# Patient Record
Sex: FEMALE | Race: WHITE | NOT HISPANIC OR LATINO | Employment: OTHER | URBAN - METROPOLITAN AREA
[De-identification: names, ages, dates, MRNs, and addresses within clinical notes are randomized per-mention and may not be internally consistent; named-entity substitution may affect disease eponyms.]

---

## 2017-01-02 ENCOUNTER — OUTPATIENT INFUSION SERVICES (OUTPATIENT)
Dept: ONCOLOGY | Facility: MEDICAL CENTER | Age: 72
End: 2017-01-02
Attending: INTERNAL MEDICINE
Payer: MEDICARE

## 2017-01-02 NOTE — PROGRESS NOTES
Patient canceled appointment today. She left a message stating she had a cold. She does have appointment for 1/5/17 at 1400.

## 2017-01-05 ENCOUNTER — OUTPATIENT INFUSION SERVICES (OUTPATIENT)
Dept: ONCOLOGY | Facility: MEDICAL CENTER | Age: 72
End: 2017-01-05
Attending: INTERNAL MEDICINE
Payer: MEDICARE

## 2017-01-05 VITALS
SYSTOLIC BLOOD PRESSURE: 87 MMHG | TEMPERATURE: 98.3 F | HEIGHT: 63 IN | OXYGEN SATURATION: 97 % | BODY MASS INDEX: 20.23 KG/M2 | WEIGHT: 114.2 LBS | RESPIRATION RATE: 20 BRPM | DIASTOLIC BLOOD PRESSURE: 46 MMHG | HEART RATE: 79 BPM

## 2017-01-05 DIAGNOSIS — G93.32 CHRONIC FATIGUE SYNDROME: ICD-10-CM

## 2017-01-05 PROCEDURE — 700105 HCHG RX REV CODE 258: Performed by: INTERNAL MEDICINE

## 2017-01-05 PROCEDURE — 96360 HYDRATION IV INFUSION INIT: CPT

## 2017-01-05 RX ORDER — SODIUM CHLORIDE 9 MG/ML
1000 INJECTION, SOLUTION INTRAVENOUS ONCE
Status: COMPLETED | OUTPATIENT
Start: 2017-01-05 | End: 2017-01-05

## 2017-01-05 RX ADMIN — SODIUM CHLORIDE 1000 ML: 9 INJECTION, SOLUTION INTRAVENOUS at 14:25

## 2017-01-06 ENCOUNTER — OFFICE VISIT (OUTPATIENT)
Dept: URGENT CARE | Facility: CLINIC | Age: 72
End: 2017-01-06
Payer: MEDICARE

## 2017-01-06 VITALS
HEART RATE: 90 BPM | WEIGHT: 116 LBS | SYSTOLIC BLOOD PRESSURE: 98 MMHG | TEMPERATURE: 98.3 F | DIASTOLIC BLOOD PRESSURE: 68 MMHG | RESPIRATION RATE: 20 BRPM | OXYGEN SATURATION: 98 % | BODY MASS INDEX: 20.55 KG/M2

## 2017-01-06 DIAGNOSIS — Z20.828 EXPOSURE TO INFLUENZA: ICD-10-CM

## 2017-01-06 DIAGNOSIS — D84.9 IMMUNOCOMPROMISED (HCC): ICD-10-CM

## 2017-01-06 LAB
FLUAV+FLUBV AG SPEC QL IA: NEGATIVE
INT CON NEG: NEGATIVE
INT CON POS: POSITIVE

## 2017-01-06 PROCEDURE — 87804 INFLUENZA ASSAY W/OPTIC: CPT | Performed by: PHYSICIAN ASSISTANT

## 2017-01-06 PROCEDURE — 99214 OFFICE O/P EST MOD 30 MIN: CPT | Performed by: PHYSICIAN ASSISTANT

## 2017-01-06 RX ORDER — OSELTAMIVIR PHOSPHATE 75 MG/1
75 CAPSULE ORAL DAILY
Qty: 10 CAP | Refills: 0 | Status: SHIPPED | OUTPATIENT
Start: 2017-01-06 | End: 2017-01-16

## 2017-01-06 ASSESSMENT — ENCOUNTER SYMPTOMS
FEVER: 0
NAUSEA: 0
SHORTNESS OF BREATH: 0
VOMITING: 0
PALPITATIONS: 0
COUGH: 0
FOCAL WEAKNESS: 0
TINGLING: 0
CHILLS: 0
DIZZINESS: 0
ABDOMINAL PAIN: 0
WHEEZING: 0
SPUTUM PRODUCTION: 0
SENSORY CHANGE: 0
HEADACHES: 0
MYALGIAS: 0
DIARRHEA: 0
SORE THROAT: 0

## 2017-01-06 NOTE — PROGRESS NOTES
Pt arrived to IS ambulatory, here for hydration only. IV access established. Pt requesting 500 ml at 400 ml/hr. Amount infused as requested. BP rechecked. See doc flowsheets. Pt deciding to not receive any further hydration. IV flushed and removed. Pressure dressing applied. Pt knows when to return. Discharged home under self care in no apparent distress.

## 2017-01-06 NOTE — MR AVS SNAPSHOT
Deb Vega   2017 5:15 PM   Office Visit   MRN: [id number]    Department:  [facility]   Dept Phone:  [phone]    Description:  Female : 1945   Provider:  Sierra Gillespie PA-C           Reason for Visit     Sinus Problem x 2.5 months worse x 1 wk, nasal congestion, runny nose and post nasal drip.  Patient was exposed to influenza and she wants to get tested.       Allergies as of 2017     Allergen Noted Reactions    Gadolinium 2012   Anaphylaxis, Nausea    Influenza Virus Vaccine 10/03/2013       Zyvox [Linezolid] 2015   Rash    Severe anxiety; prominent facial rash with swelling    Cefuroxime 2015   Nausea    Epinephrine 2016       Other reaction(s): Tachycardia    Other Drug 2012       Any antibiotics by mouth dizziness, abdominal pain.     Other Misc 2011       Intolerant of any stimulants    Prednisone 2016       Aggravates tachycardia    Sulfa Drugs 2011       Other reaction(s): Not Known      You were diagnosed with     Exposure to influenza   [956853]       Immunocompromised (HCC)   [297533]         Vital Signs     Blood Pressure Pulse Temperature Respirations Weight Oxygen Saturation    98/68 mmHg 90 36.8 °C (98.3 °F) 20 52.617 kg (116 lb) 98%    Smoking Status                   Current Every Day Smoker           Basic Information     Date Of Birth Sex Race Ethnicity Preferred Language    1945 Female White Non- English      Your appointments     2017  2:00 PM   Est Hydration with RN 3   Infusion Services ([facility])    [address] [facility] [address]  [place] [address]   [phone]            2017  2:00 PM   Est Hydration with RN 3   Infusion Services ([facility])    [address]  [place] [address]   [phone]            2017  2:00 PM   Est Hydration with RN 3   Infusion Services ([facility])    [address]  [place] [address]   [phone]            2017   2:00 PM   Est Hydration with RN 3   Infusion Services ([facility])    [address] [facility] [address]  [place] [address]   [phone]            Jan 23, 2017  2:00 PM   Est Hydration with RN 3   Infusion Services ([facility])    [address] [facility] [address]  [place] [address]   [phone]            Jan 26, 2017  2:00 PM   Est Hydration with RN 3   Infusion Services ([facility])    [address] [facility] [address]  [place] [address]   [phone]            Jan 30, 2017  2:00 PM   Est Hydration with RN 3   Infusion Services ([facility])    [address] [facility] [address]  [place] [address]   [phone]            Feb 07, 2017  2:00 PM   Pulmonary Function Test with PFT-RM2   [facility] (--)    [address]  [address]  [place] [id number] [phone]            Feb 07, 2017  3:00 PM   Established Patient Pul with FELICITAS Lopez   [facility] (--)    [address]  [address]  [place] [id number] [phone]              Problem List              ICD-10-CM Priority Class Noted - Resolved    Hyperlipidemia with target LDL less than 100 (Chronic) E78.5   6/6/2012 - Present    Smoking (Chronic) F17.200   6/6/2012 - Present    Anxiety F41.9   6/6/2012 - Present    Chronic fatigue syndrome R53.82   12/7/2012 - Present    COPD (chronic obstructive pulmonary disease) (HCC) J44.9   3/1/2013 - Present    Lyme disease A69.20   3/1/2013 - Present    Immunologic deficiency syndrome (HCC) D84.9   3/1/2013 - Present    Hypotension I95.9   7/2/2015 - Present    Tachycardia R00.0   7/15/2015 - Present      Health Maintenance        Date Due Completion Dates    IMM DTaP/Tdap/Td Vaccine (1 - Tdap) 10/19/1964 ---    PAP SMEAR 10/19/1966 ---    IMM ZOSTER VACCINE 10/19/2005 ---    IMM PNEUMOCOCCAL 65+ (ADULT) LOW/MEDIUM RISK SERIES (1 of 2 - PCV13) 10/19/2010 ---    IMM INFLUENZA (1) 9/1/2016 ---    MAMMOGRAM 6/23/2017 6/23/2016    COLONOSCOPY 7/23/2018 7/23/2008 (Done)    Override on 7/23/2008:  Done ([facility]    BONE DENSITY 2/5/2020 2/5/2015            Current Immunizations     No immunizations on file.      Below and/or attached are the medications your provider expects you to take. Review all of your home medications and newly ordered medications with your provider and/or pharmacist. Follow medication instructions as directed by your provider and/or pharmacist. Please keep your medication list with you and share with your provider. Update the information when medications are discontinued, doses are changed, or new medications (including over-the-counter products) are added; and carry medication information at all times in the event of emergency situations     Allergies:  GADOLINIUM - Anaphylaxis,Nausea     INFLUENZA VIRUS VACCINE - (reactions not documented)     ZYVOX - Rash     CEFUROXIME - Nausea     EPINEPHRINE - (reactions not documented)     OTHER DRUG - (reactions not documented)     OTHER MISC - (reactions not documented)     PREDNISONE - (reactions not documented)     SULFA DRUGS - (reactions not documented)               Medications  Valid as of: January 06, 2017 -  6:17 PM    Generic Name Brand Name Tablet Size Instructions for use    Acetaminophen (Tab) TYLENOL 325 MG Take 650 mg by mouth every four hours as needed.        ALPRAZolam (Tab) XANAX 0.25 MG Take 1 Tab by mouth 2 times a day.        Aspirin (Chew Tab) ASA 81 MG Take 162 mg by mouth every day.        Azithromycin (Tab) ZITHROMAX 250 MG Two day one then qd x 4.        Budesonide-Formoterol Fumarate (Aerosol) SYMBICORT 80-4.5 MCG/ACT Inhale 2 Puffs by mouth 2 Times a Day. Use spacer. Rinse mouth after each use.        Ca Phosphate-Cholecalciferol (Chew Tab) Ca Phosphate-Cholecalciferol 250-400 MG-UNIT Take  by mouth.        ClonazePAM (Tab) KLONOPIN 0.5 MG TAKE TWO TABLETS BY MOUTH EVERY MORNING AND ONE TABLET BY MOUTH EVERY NIGHT AT BEDTIME        Desloratadine (Tab) CLARINEX 5 MG TAKE 1 TABLET BY MOUTH EVERY  DAY        Immune Globulin (Human) (Solution) Immune Globulin 10% (10 g/100mL) 5 g by Intravenous route Once. Indications: Pt now on 5 grams        Levalbuterol HCl (Nebu Soln) XOPENEX 1.25 MG/3ML 3 mL by Nebulization route every four hours as needed for Shortness of Breath.        Levalbuterol Tartrate (Aerosol) XOPENEX HFA 45 MCG/ACT INHALE ONE TO TWO PUFFS BY MOUTH EVERY 4 HOURS AS NEEDED FOR SHORTNESS OF BREATH        Magnesium (Cap) Magnesium 100 MG Take 480 mg by mouth.        Magnesium Glycinate   Take 480 mg by mouth.        Montelukast Sodium (Tab) SINGULAIR 10 MG TAKE 1 TABLET BY MOUTH EVERY DAY        Nicotine Polacrilex (Gum) NICORETTE 2 MG Take 1 Each by mouth as needed for Smoking Cessation.        NON SPECIFIED   Medrol dose pack        Omega-3 Fatty Acids (Cap) OMEGA 3 FA 1000 MG Take 1,000 mg by mouth 2 Times a Day.        Oral Electrolytes   Take  by mouth every day.        Oseltamivir Phosphate (Cap) TAMIFLU 75 MG Take 1 Cap by mouth every day for 10 days.        Pneumococcal Vac Polyvalent (Injection) PNEUMOVAX-23 25 MCG/0.5ML 0.5 mL by Intramuscular route Once PRN.        Pravastatin Sodium (Tab) PRAVACHOL 40 MG TAKE 1 TABLET BY MOUTH EVERY DAY        Tiotropium Bromide Monohydrate (Cap) SPIRIVA 18 MCG Inhale 1 Cap by mouth every day. AT THE SAME TIME EVERY DAY        .                 Medicines prescribed today were sent to:     [facility] [address] [place] [address] [address] AT [facility] [address] [facility]    [address] [facility] [address]    Phone: [phone] Fax: [phone]    Open 24 Hours?: No      Medication refill instructions:       If your prescription bottle indicates you have medication refills left, it is not necessary to call your provider’s office. Please contact your pharmacy and they will refill your medication.    If your prescription bottle indicates you do not have any refills left, you may request refills at any time through one of the following  ways: The online [employer] system (except Urgent Care), by calling your provider’s office, or by asking your pharmacy to contact your provider’s office with a refill request. Medication refills are processed only during regular business hours and may not be available until the next business day. Your provider may request additional information or to have a follow-up visit with you prior to refilling your medication.   *Please Note: Medication refills are assigned a new Rx number when refilled electronically. Your pharmacy may indicate that no refills were authorized even though a new prescription for the same medication is available at the pharmacy. Please request the medicine by name with the pharmacy before contacting your provider for a refill.           [employer] Access Code: Activation code not generated  Current [employer] Status: Active

## 2017-01-07 NOTE — PROGRESS NOTES
Subjective:      Deb Vega is a 71 y.o. female who presents with Sinus Problem            HPI  The patient is here today because she was exposed to influenza.  The patient has a hx of Immunologic deficiency syndrome, Chronic Lyme Disease and states she has severe Emphysema.  She is worried because her roommate was tested for influenza today and came back positive.  She states she has been exposed and was told by her Specialist that influenza could be very dangerous for her.  She denies any symptoms at this time. Specifically, she denies fever, chills, nausea, vomiting, diarrhea, cough,  Shortness of breath, wheezing, body aches, or fatigue.  The patient has had some nasal drainage but attributes her symptoms  To recently starting oxygen and using a nasal cannula. The patient denies any hx of kidney or liver problems.       Past Medical History   Diagnosis Date   • Allergy    • Anxiety    • Hyperlipidemia    • ASTHMA    • Back pain    • Bladder infection    • H/O blood transfusion reaction    • Bronchitis    • COPD    • Mononucleosis    • Hives    • History of measles    • History of mumps    • Pneumonia    • COPD (chronic obstructive pulmonary disease) ([facility]) 3/1/2013   • Lyme disease 3/1/2013   • Immunologic deficiency syndrome ([facility]) 3/1/2013   • Cellulitis      (L) elbow    • Tachycardia 7/15/2015   • Tachycardia 7/15/2015     Past Surgical History   Procedure Laterality Date   • Breast biopsy     • Thyroid lobectomy     • Tonsillectomy         Family History   Problem Relation Age of Onset   • Alcohol/Drug Mother    • Cancer Father    • Stroke Father    • Heart Disease Father 42       Allergies   Allergen Reactions   • Gadolinium Anaphylaxis and Nausea   • Influenza Virus Vaccine    • Zyvox [Linezolid] Rash     Severe anxiety; prominent facial rash with swelling   • Cefuroxime Nausea   • Epinephrine      Other reaction(s): Tachycardia   • Other Drug      Any antibiotics by mouth dizziness, abdominal pain.    •  Other Misc      Intolerant of any stimulants   • Prednisone      Aggravates tachycardia   • Sulfa Drugs      Other reaction(s): Not Known       Medications, Allergies, and current problem list reviewed today in Epic    Review of Systems   Constitutional: Negative for fever, chills and malaise/fatigue.   HENT: Negative for congestion, ear discharge, ear pain and sore throat.         Nasal drainage    Respiratory: Negative for cough, sputum production, shortness of breath and wheezing.    Cardiovascular: Negative for chest pain, palpitations and leg swelling.   Gastrointestinal: Negative for nausea, vomiting, abdominal pain and diarrhea.   Musculoskeletal: Negative for myalgias.   Neurological: Negative for dizziness, tingling, sensory change, focal weakness and headaches.       All other systems reviewed and are negative.        Objective:     BP 98/68 mmHg  Pulse 90  Temp(Src) 36.8 °C (98.3 °F)  Resp 20  Wt 52.617 kg (116 lb)  SpO2 98%     Physical Exam   Constitutional: She is oriented to person, place, and time. She appears well-developed and well-nourished. No distress.   HENT:   Head: Normocephalic and atraumatic.   Right Ear: Tympanic membrane, external ear and ear canal normal.   Left Ear: Tympanic membrane, external ear and ear canal normal.   Nose: Nose normal.   Mouth/Throat: Uvula is midline and oropharynx is clear and moist. No oropharyngeal exudate or posterior oropharyngeal erythema.   Neck: Neck supple.   Cardiovascular: Normal rate, regular rhythm and normal heart sounds.  Exam reveals no gallop and no friction rub.    No murmur heard.  Pulmonary/Chest: Effort normal and breath sounds normal. No respiratory distress. She has no decreased breath sounds. She has no wheezes. She has no rhonchi. She has no rales.   Abdominal: Soft. Bowel sounds are normal. She exhibits no distension and no mass. There is no tenderness. There is no rebound and no guarding.   Lymphadenopathy:     She has no cervical  adenopathy.   Neurological: She is alert and oriented to person, place, and time. No cranial nerve deficit.   Skin: Skin is warm and dry. No rash noted.   Psychiatric: She has a normal mood and affect. Her behavior is normal. Judgment and thought content normal.               Assessment/Plan:       1. Exposure to influenza  POCT Influenza A/B- negative   2. Immunocompromised (HCC)  oseltamivir (TAMIFLU) 75 MG Cap      Influenza negative.  -   Current outpatient prescriptions:   •  oseltamivir (TAMIFLU) 75 MG Cap, Take 1 Cap by mouth every day for 10 days., Disp: 10 Cap, Rfl: 0    Prophylaxis initiated due to exposure and immunocompromised state.       Differential diagnoses, Supportive care, and indications for immediate follow-up discussed with patient.   Instructed to return to clinic or nearest emergency department for any change in condition, further concerns, or worsening of symptoms.    The patient demonstrated a good understanding and agreed with the treatment plan.    Sierra Gillespie PA-C

## 2017-01-09 ENCOUNTER — OUTPATIENT INFUSION SERVICES (OUTPATIENT)
Dept: ONCOLOGY | Facility: MEDICAL CENTER | Age: 72
End: 2017-01-09
Attending: INTERNAL MEDICINE
Payer: MEDICARE

## 2017-01-09 VITALS
WEIGHT: 112.43 LBS | RESPIRATION RATE: 20 BRPM | DIASTOLIC BLOOD PRESSURE: 77 MMHG | HEIGHT: 63 IN | OXYGEN SATURATION: 95 % | BODY MASS INDEX: 19.92 KG/M2 | TEMPERATURE: 98.2 F | SYSTOLIC BLOOD PRESSURE: 109 MMHG | HEART RATE: 109 BPM

## 2017-01-09 DIAGNOSIS — G93.32 CHRONIC FATIGUE SYNDROME: ICD-10-CM

## 2017-01-09 PROCEDURE — 96360 HYDRATION IV INFUSION INIT: CPT

## 2017-01-09 PROCEDURE — 700105 HCHG RX REV CODE 258: Performed by: INTERNAL MEDICINE

## 2017-01-09 RX ORDER — SODIUM CHLORIDE 9 MG/ML
1000 INJECTION, SOLUTION INTRAVENOUS CONTINUOUS
Status: DISCONTINUED | OUTPATIENT
Start: 2017-01-09 | End: 2017-01-09 | Stop reason: HOSPADM

## 2017-01-09 RX ADMIN — SODIUM CHLORIDE 1000 ML: 9 INJECTION, SOLUTION INTRAVENOUS at 14:25

## 2017-01-09 NOTE — PROGRESS NOTES
Patient presents ambulatory for IV hydration.  IV established and hydration started at 400mL/hr per patient request.  500cc infused with no complications or adverse reactions, patient declined receiving the remaining 1500ccs.  Patient to return 1/12/17 for next appt, confirmed with patient.  IV discontinued, gauze, and coban applied to site.  Patient left ambulatory in no distress.

## 2017-01-12 ENCOUNTER — OUTPATIENT INFUSION SERVICES (OUTPATIENT)
Dept: ONCOLOGY | Facility: MEDICAL CENTER | Age: 72
End: 2017-01-12
Attending: INTERNAL MEDICINE
Payer: MEDICARE

## 2017-01-12 NOTE — PROGRESS NOTES
Patient called to cancel appt d/t weather conditions.  Patient already has appt for next week in place.

## 2017-01-15 ENCOUNTER — OUTPATIENT INFUSION SERVICES (OUTPATIENT)
Dept: ONCOLOGY | Facility: MEDICAL CENTER | Age: 72
End: 2017-01-15
Attending: INTERNAL MEDICINE
Payer: MEDICARE

## 2017-01-15 VITALS
HEIGHT: 63 IN | DIASTOLIC BLOOD PRESSURE: 65 MMHG | WEIGHT: 113.76 LBS | SYSTOLIC BLOOD PRESSURE: 92 MMHG | OXYGEN SATURATION: 96 % | RESPIRATION RATE: 20 BRPM | HEART RATE: 79 BPM | TEMPERATURE: 99 F | BODY MASS INDEX: 20.16 KG/M2

## 2017-01-15 DIAGNOSIS — G93.32 CHRONIC FATIGUE SYNDROME: ICD-10-CM

## 2017-01-15 DIAGNOSIS — G90.3 NEUROGENIC ORTHOSTATIC HYPOTENSION (HCC): ICD-10-CM

## 2017-01-15 PROCEDURE — 96360 HYDRATION IV INFUSION INIT: CPT

## 2017-01-15 PROCEDURE — 700105 HCHG RX REV CODE 258: Performed by: INTERNAL MEDICINE

## 2017-01-15 RX ORDER — OSELTAMIVIR PHOSPHATE 30 MG/1
75 CAPSULE ORAL EVERY 12 HOURS
COMMUNITY
End: 2017-02-02

## 2017-01-15 RX ORDER — SODIUM CHLORIDE 9 MG/ML
1000 INJECTION, SOLUTION INTRAVENOUS ONCE
Status: COMPLETED | OUTPATIENT
Start: 2017-01-15 | End: 2017-01-15

## 2017-01-15 RX ADMIN — SODIUM CHLORIDE 1000 ML: 9 INJECTION, SOLUTION INTRAVENOUS at 14:18

## 2017-01-15 NOTE — PROGRESS NOTES
Pt is here for her hydration.Oxygen dependent. Receives home infusions for IVIG. Stays home most of the time as she is worried about getting respiratory infection - has received pneumococcal vaccine. Her roommate got sick with Influenza and this is why Mrs Vega went to Urgent Care and was given Tamiflu for 10 days as a preventative measure. 500ml of NS infused at 400ml/h per pt request. Vitals taken upon the completion (see EPIC). Discharged home to self care. Next appointment verified.

## 2017-01-16 ENCOUNTER — APPOINTMENT (OUTPATIENT)
Dept: ONCOLOGY | Facility: MEDICAL CENTER | Age: 72
End: 2017-01-16
Attending: INTERNAL MEDICINE
Payer: MEDICARE

## 2017-01-19 ENCOUNTER — OUTPATIENT INFUSION SERVICES (OUTPATIENT)
Dept: ONCOLOGY | Facility: MEDICAL CENTER | Age: 72
End: 2017-01-19
Attending: INTERNAL MEDICINE
Payer: MEDICARE

## 2017-01-19 VITALS
DIASTOLIC BLOOD PRESSURE: 51 MMHG | SYSTOLIC BLOOD PRESSURE: 85 MMHG | HEART RATE: 86 BPM | HEIGHT: 63 IN | BODY MASS INDEX: 20.2 KG/M2 | RESPIRATION RATE: 18 BRPM | WEIGHT: 113.98 LBS | OXYGEN SATURATION: 98 % | TEMPERATURE: 99.3 F

## 2017-01-19 DIAGNOSIS — G93.32 CHRONIC FATIGUE SYNDROME: ICD-10-CM

## 2017-01-19 PROCEDURE — 96360 HYDRATION IV INFUSION INIT: CPT

## 2017-01-19 PROCEDURE — 700105 HCHG RX REV CODE 258: Performed by: INTERNAL MEDICINE

## 2017-01-19 RX ORDER — SODIUM CHLORIDE 9 MG/ML
1000 INJECTION, SOLUTION INTRAVENOUS ONCE
Status: COMPLETED | OUTPATIENT
Start: 2017-01-19 | End: 2017-01-19

## 2017-01-19 RX ADMIN — SODIUM CHLORIDE 500 ML: 9 INJECTION, SOLUTION INTRAVENOUS at 14:27

## 2017-01-20 NOTE — PROGRESS NOTES
"Pt here for hydration. Oxygen dependent. Assessment complete. POC reviewed. PIV started in L FA; brisk blood observed. 500mL NS infused at a rate of 400mL hr per pt request. States she is doing \"well with 500\". Hydration completed. Vitals taken & pt d/c'd by Bebe RN, per RN pt feeling well and ready for discharge. Future appt confirmed. Pt discharged in no apparent distress.   "

## 2017-01-23 ENCOUNTER — OUTPATIENT INFUSION SERVICES (OUTPATIENT)
Dept: ONCOLOGY | Facility: MEDICAL CENTER | Age: 72
End: 2017-01-23
Attending: INTERNAL MEDICINE
Payer: MEDICARE

## 2017-01-23 VITALS
BODY MASS INDEX: 20 KG/M2 | OXYGEN SATURATION: 97 % | HEART RATE: 98 BPM | SYSTOLIC BLOOD PRESSURE: 82 MMHG | HEIGHT: 63 IN | RESPIRATION RATE: 20 BRPM | TEMPERATURE: 98.2 F | DIASTOLIC BLOOD PRESSURE: 46 MMHG | WEIGHT: 112.88 LBS

## 2017-01-23 DIAGNOSIS — G93.32 CHRONIC FATIGUE SYNDROME: ICD-10-CM

## 2017-01-23 PROCEDURE — 700105 HCHG RX REV CODE 258: Performed by: INTERNAL MEDICINE

## 2017-01-23 PROCEDURE — 96360 HYDRATION IV INFUSION INIT: CPT

## 2017-01-23 RX ORDER — SODIUM CHLORIDE 9 MG/ML
1000 INJECTION, SOLUTION INTRAVENOUS ONCE
Status: COMPLETED | OUTPATIENT
Start: 2017-01-23 | End: 2017-01-23

## 2017-01-23 RX ADMIN — SODIUM CHLORIDE 500 ML: 9 INJECTION, SOLUTION INTRAVENOUS at 14:22

## 2017-01-23 NOTE — PROGRESS NOTES
Pt here for hydration.  Pt trying to get her hydration infused with home health and IVIG.  PIV started,  Pt requested 500 ml at 400 ml/hr.  Infusion completed without issue.  BP lower after infusion, but pt states this is normal for her and 82/46 is an appropriate number.  PIV removed. Pt left IC, no s/s of distress.

## 2017-01-26 ENCOUNTER — APPOINTMENT (OUTPATIENT)
Dept: ONCOLOGY | Facility: MEDICAL CENTER | Age: 72
End: 2017-01-26
Attending: INTERNAL MEDICINE
Payer: MEDICARE

## 2017-01-29 ENCOUNTER — OUTPATIENT INFUSION SERVICES (OUTPATIENT)
Dept: ONCOLOGY | Facility: MEDICAL CENTER | Age: 72
End: 2017-01-29
Attending: INTERNAL MEDICINE
Payer: MEDICARE

## 2017-01-29 VITALS
BODY MASS INDEX: 20 KG/M2 | RESPIRATION RATE: 18 BRPM | SYSTOLIC BLOOD PRESSURE: 87 MMHG | WEIGHT: 112.88 LBS | HEIGHT: 63 IN | HEART RATE: 96 BPM | OXYGEN SATURATION: 95 % | TEMPERATURE: 98.5 F | DIASTOLIC BLOOD PRESSURE: 52 MMHG

## 2017-01-29 DIAGNOSIS — G90.3 NEUROGENIC ORTHOSTATIC HYPOTENSION (HCC): ICD-10-CM

## 2017-01-29 DIAGNOSIS — R00.0 TACHYCARDIA: ICD-10-CM

## 2017-01-29 PROCEDURE — 700105 HCHG RX REV CODE 258: Performed by: INTERNAL MEDICINE

## 2017-01-29 PROCEDURE — 96361 HYDRATE IV INFUSION ADD-ON: CPT

## 2017-01-29 PROCEDURE — 96360 HYDRATION IV INFUSION INIT: CPT

## 2017-01-29 RX ORDER — SODIUM CHLORIDE 9 MG/ML
INJECTION, SOLUTION INTRAVENOUS ONCE
Status: COMPLETED | OUTPATIENT
Start: 2017-01-29 | End: 2017-01-29

## 2017-01-29 RX ADMIN — SODIUM CHLORIDE: 9 INJECTION, SOLUTION INTRAVENOUS at 13:56

## 2017-01-29 NOTE — PROGRESS NOTES
"Pt ambulated into department for bi-weekly hydration for Tachycardia. Pt reported that her MD has recently put her on continuous oxygen due to hypoxia with exertion. Pt denied having any acute complaints today, VSS upon arrival. PIV started, had + blood return, flushed briskly. Pt given 500 ml of NS, at which point Pt requested for vital signs to be taken. Pt who hypotensive with a BP of 87/52. Pt reported this was \"good\" for her, denied being symptomatic and declined needing additional fluid. PIV discontinued, bleeding controlled with gauze and coban. Future appointments confirmed with Pt prior to leaving, left department by self appearing in good spirits and NAD.   "

## 2017-01-30 ENCOUNTER — APPOINTMENT (OUTPATIENT)
Dept: ONCOLOGY | Facility: MEDICAL CENTER | Age: 72
End: 2017-01-30
Attending: INTERNAL MEDICINE
Payer: MEDICARE

## 2017-01-30 RX ORDER — CLONAZEPAM 0.5 MG
TABLET ORAL
Qty: 90 TAB | Refills: 1 | Status: SHIPPED
Start: 2017-01-30 | End: 2017-08-29 | Stop reason: SDUPTHER

## 2017-02-02 ENCOUNTER — OUTPATIENT INFUSION SERVICES (OUTPATIENT)
Dept: ONCOLOGY | Facility: MEDICAL CENTER | Age: 72
End: 2017-02-02
Attending: INTERNAL MEDICINE
Payer: MEDICARE

## 2017-02-02 VITALS
BODY MASS INDEX: 20.08 KG/M2 | HEART RATE: 94 BPM | RESPIRATION RATE: 18 BRPM | WEIGHT: 113.32 LBS | SYSTOLIC BLOOD PRESSURE: 96 MMHG | OXYGEN SATURATION: 95 % | DIASTOLIC BLOOD PRESSURE: 63 MMHG | TEMPERATURE: 98.5 F | HEIGHT: 63 IN

## 2017-02-02 PROCEDURE — 96360 HYDRATION IV INFUSION INIT: CPT

## 2017-02-02 ASSESSMENT — PAIN SCALES - GENERAL: PAINLEVEL: 4=SLIGHT-MODERATE PAIN

## 2017-02-03 NOTE — PROGRESS NOTES
Patient presents ambulatory for hydration.  IV established and hydration given per order with no complications or adverse reactions.  Patient to return 2/6/17 for next appointment, confirmed with patient.  IV discontinued, gauze, and coban applied to site.  Patient left ambulatory in no distress.

## 2017-02-06 ENCOUNTER — APPOINTMENT (OUTPATIENT)
Dept: ONCOLOGY | Facility: MEDICAL CENTER | Age: 72
End: 2017-02-06
Attending: INTERNAL MEDICINE
Payer: MEDICARE

## 2017-02-07 ENCOUNTER — OFFICE VISIT (OUTPATIENT)
Dept: PULMONOLOGY | Facility: HOSPICE | Age: 72
End: 2017-02-07
Payer: MEDICARE

## 2017-02-07 ENCOUNTER — NON-PROVIDER VISIT (OUTPATIENT)
Dept: PULMONOLOGY | Facility: HOSPICE | Age: 72
End: 2017-02-07
Payer: MEDICARE

## 2017-02-07 VITALS — WEIGHT: 111 LBS | BODY MASS INDEX: 19.67 KG/M2 | HEIGHT: 63 IN

## 2017-02-07 VITALS
BODY MASS INDEX: 20.38 KG/M2 | HEIGHT: 63 IN | HEART RATE: 88 BPM | SYSTOLIC BLOOD PRESSURE: 116 MMHG | RESPIRATION RATE: 14 BRPM | TEMPERATURE: 97.9 F | DIASTOLIC BLOOD PRESSURE: 72 MMHG | WEIGHT: 115 LBS

## 2017-02-07 DIAGNOSIS — Z99.81 SUPPLEMENTAL OXYGEN DEPENDENT: ICD-10-CM

## 2017-02-07 DIAGNOSIS — J43.8 OTHER EMPHYSEMA (HCC): ICD-10-CM

## 2017-02-07 DIAGNOSIS — F41.9 ANXIETY: ICD-10-CM

## 2017-02-07 DIAGNOSIS — E78.5 HYPERLIPIDEMIA WITH TARGET LDL LESS THAN 100: Chronic | ICD-10-CM

## 2017-02-07 DIAGNOSIS — J44.9 CHRONIC OBSTRUCTIVE PULMONARY DISEASE, UNSPECIFIED COPD TYPE (HCC): ICD-10-CM

## 2017-02-07 DIAGNOSIS — R53.83 OTHER FATIGUE: ICD-10-CM

## 2017-02-07 DIAGNOSIS — R91.8 ABNORMAL CT SCAN OF LUNG: ICD-10-CM

## 2017-02-07 DIAGNOSIS — F17.200 SMOKING: Chronic | ICD-10-CM

## 2017-02-07 PROCEDURE — G8432 DEP SCR NOT DOC, RNG: HCPCS | Performed by: NURSE PRACTITIONER

## 2017-02-07 PROCEDURE — G8483 FLU IMM NO ADMIN DOC REA: HCPCS | Performed by: NURSE PRACTITIONER

## 2017-02-07 PROCEDURE — 4004F PT TOBACCO SCREEN RCVD TLK: CPT | Performed by: NURSE PRACTITIONER

## 2017-02-07 PROCEDURE — 3017F COLORECTAL CA SCREEN DOC REV: CPT | Performed by: NURSE PRACTITIONER

## 2017-02-07 PROCEDURE — 3014F SCREEN MAMMO DOC REV: CPT | Performed by: NURSE PRACTITIONER

## 2017-02-07 PROCEDURE — 4040F PNEUMOC VAC/ADMIN/RCVD: CPT | Mod: 8P | Performed by: NURSE PRACTITIONER

## 2017-02-07 PROCEDURE — 1101F PT FALLS ASSESS-DOCD LE1/YR: CPT | Performed by: NURSE PRACTITIONER

## 2017-02-07 PROCEDURE — G8419 CALC BMI OUT NRM PARAM NOF/U: HCPCS | Performed by: NURSE PRACTITIONER

## 2017-02-07 PROCEDURE — 99214 OFFICE O/P EST MOD 30 MIN: CPT | Performed by: NURSE PRACTITIONER

## 2017-02-07 RX ORDER — SODIUM CHLORIDE 9 MG/ML
INJECTION, SOLUTION INTRAVENOUS
COMMUNITY
Start: 2016-11-28 | End: 2017-05-22

## 2017-02-07 ASSESSMENT — PULMONARY FUNCTION TESTS
FEV1/FVC_PERCENT_PREDICTED: 75
FEV1/FVC_PERCENT_PREDICTED: 52
FEV1_PERCENT_PREDICTED: 42
FVC: 2.51
FEV1/FVC_PERCENT_CHANGE: 120
FEV1/FVC: 39
FEV1_PERCENT_PREDICTED: 48
FEV1: .9
FVC_PERCENT_PREDICTED: 81
FEV1_PERCENT_CHANGE: 10
FVC_PERCENT_PREDICTED: 89
FEV1/FVC: 40.24
FEV1_PERCENT_CHANGE: 12
FEV1_PREDICTED: 2.11
FVC_PREDICTED: 2.8
FVC: 2.28
FEV1: 1.01
FEV1/FVC_PERCENT_PREDICTED: 54

## 2017-02-07 NOTE — PATIENT INSTRUCTIONS
1. Follow up after sleep study.  2. Due for CT scan May 2017. Follow up afterward for results.  3. Continue oxygen 2L 24/7.  4. Continue inhaler regimen.  5. Add nasal gel and oxygen mask at night. Continue humidifier on oxygen concentrator as long as tolerable.

## 2017-02-07 NOTE — MR AVS SNAPSHOT
"        Deb Vega   2017 2:00 PM   Non-Provider Visit   MRN: [id number]    Department:  [facility]   Dept Phone:  [phone]    Description:  Female : 1945   Provider:  Osei Chapa M.D.           Reason for Visit     COPD           Allergies as of 2017     Allergen Noted Reactions    Gadolinium 2012   Anaphylaxis, Nausea    Influenza Virus Vaccine 10/03/2013       Zyvox [Linezolid] 2015   Rash    Severe anxiety; prominent facial rash with swelling    Cefuroxime 2015   Nausea    Epinephrine 2016       Other reaction(s): Tachycardia    Other Drug 2012       Any antibiotics by mouth dizziness, abdominal pain.     Other Misc 2011       Intolerant of any stimulants    Prednisone 2016       Aggravates tachycardia    Sulfa Drugs 2011       Other reaction(s): Not Known      You were diagnosed with     Chronic obstructive pulmonary disease, unspecified COPD type (CMS-[facility])   [[id number]]         Vital Signs     Height Weight Body Mass Index Smoking Status          1.594 m (5' 2.75\") 50.349 kg (111 lb) 19.82 kg/m2 Current Every Day Smoker        Basic Information     Date Of Birth Sex Race Ethnicity Preferred Language    1945 Female White Non- English      Your appointments     2017  2:00 PM   Est Hydration with RN 3   Infusion Services ([facility])    [id number]  [place] [address]   [phone]            2017  2:00 PM   Est Hydration with RN 3   Infusion Services ([facility])    [address] [address]  [place] [address]   [phone]            2017  2:00 PM   Est Hydration with RN 6   Infusion Services ([facility])    [address] [address]  [place] [address]   [phone]            2017  2:00 PM   Est Hydration with RN 3   Infusion Services ([facility])    [address] [address]  [place] [address]   [phone]            2017  2:00 PM   Est Hydration with RN 6   Infusion " Services ([facility])    [address] [facility] [address]  [place] [address]   [phone]            Feb 27, 2017  2:00 PM   Est Hydration with RN 3   Infusion Services ([facility])    [address] [facility] [address]  [place] [address]   [phone]            Mar 02, 2017  2:00 PM   Est Hydration with RN 3   Infusion Services ([facility])    [address] [facility] [address]  [place] [address]   [phone]            Mar 06, 2017  2:00 PM   Est Hydration with RN 3   Infusion Services ([facility])    [address] [facility] [address]  [place] [address]   [phone]            Mar 09, 2017  2:00 PM   Est Hydration with RN 3   Infusion Services ([facility])    [address] [facility] [address]  [place] [address]   [phone]            Mar 13, 2017  2:00 PM   Est Hydration with RN 3   Infusion Services ([facility])    [address] [facility] [address]  [place] [address]   [phone]            Mar 16, 2017  2:00 PM   Est Hydration with RN 3   Infusion Services ([facility])    [address] [facility] [address]  [place] [address]   [phone]            Mar 20, 2017  2:00 PM   Est Hydration with RN 3   Infusion Services ([facility])    [address] [facility] [address]  [place] [address]   [phone]            Mar 23, 2017  2:00 PM   Est Hydration with RN 3   Infusion Services ([facility])    [address] [facility] [address]  [place] [address]   [phone]            Mar 27, 2017  2:00 PM   Est Hydration with RN 3   Infusion Services ([facility])    [address] [facility] [address]  [place] [address]   [phone]            Mar 30, 2017  2:00 PM   Est Hydration with RN 3   Infusion Services ([facility])    [address] [facility] [address]  [place] [address]   [phone]            Apr 03, 2017  2:00 PM   Est Hydration with RN 3   Infusion Services ([facility])    [address] [facility] [address]  [place] [address]   [phone]            Apr 06, 2017  2:00 PM   Est Hydration with RN 3   Infusion Services ([facility])    [address] [facility] [address]  [place] [address]   [phone]            Apr 10, 2017  2:00 PM   Est  Hydration with RN 3   Infusion Services ([facility])    [address] [facility] [address]  [place] [address]   [phone]            Apr 13, 2017  2:00 PM   Est Hydration with RN 3   Infusion Services ([facility])    [address] [facility] [address]  [place] [address]   [phone]            Apr 17, 2017  2:00 PM   Est Hydration with RN 3   Infusion Services ([facility])    [address]  [place] [address]   [phone]            Apr 20, 2017  2:00 PM   Est Hydration with RN 6   Infusion Services ([facility])    [address]  [place] [address]   [phone]            Apr 24, 2017  2:00 PM   Est Hydration with RN 3   Infusion Services ([facility])    [address]  [place] [address]   [phone]            Apr 27, 2017  2:00 PM   Est Hydration with RN 3   Infusion Services ([facility])    [address]  [place] [address]   [phone]              Problem List              ICD-10-CM Priority Class Noted - Resolved    Hyperlipidemia with target LDL less than 100 (Chronic) E78.5   6/6/2012 - Present    Smoking (Chronic) F17.200   6/6/2012 - Present    Anxiety F41.9   6/6/2012 - Present    Chronic fatigue syndrome R53.82   12/7/2012 - Present    COPD (chronic obstructive pulmonary disease) (CMS-HCC) J44.9   3/1/2013 - Present    Lyme disease A69.20   3/1/2013 - Present    Immunologic deficiency syndrome (CMS-HCC) D84.9   3/1/2013 - Present    Hypotension I95.9   7/2/2015 - Present    Tachycardia R00.0   7/15/2015 - Present    Supplemental oxygen dependent Z99.81   2/7/2017 - Present    Abnormal CT scan of lung R91.8   2/7/2017 - Present      Health Maintenance        Date Due Completion Dates    IMM DTaP/Tdap/Td Vaccine (1 - Tdap) 10/19/1964 ---    PAP SMEAR 10/19/1966 ---    IMM ZOSTER VACCINE 10/19/2005 ---    IMM PNEUMOCOCCAL 65+ (ADULT) LOW/MEDIUM RISK SERIES (1 of 2 - PCV13) 10/19/2010 ---    IMM INFLUENZA (1) 9/1/2016 ---    MAMMOGRAM 6/23/2017 6/23/2016    COLONOSCOPY 7/23/2018  7/23/2008 (Done)    Override on 7/23/2008: Done ([facility]    BONE DENSITY 2/5/2020 2/5/2015            Current Immunizations     No immunizations on file.      Below and/or attached are the medications your provider expects you to take. Review all of your home medications and newly ordered medications with your provider and/or pharmacist. Follow medication instructions as directed by your provider and/or pharmacist. Please keep your medication list with you and share with your provider. Update the information when medications are discontinued, doses are changed, or new medications (including over-the-counter products) are added; and carry medication information at all times in the event of emergency situations     Allergies:  GADOLINIUM - Anaphylaxis,Nausea     INFLUENZA VIRUS VACCINE - (reactions not documented)     ZYVOX - Rash     CEFUROXIME - Nausea     EPINEPHRINE - (reactions not documented)     OTHER DRUG - (reactions not documented)     OTHER MISC - (reactions not documented)     PREDNISONE - (reactions not documented)     SULFA DRUGS - (reactions not documented)               Medications  Valid as of: February 07, 2017 -  3:05 PM    Generic Name Brand Name Tablet Size Instructions for use    Acetaminophen (Tab) TYLENOL 325 MG Take 650 mg by mouth every four hours as needed.        ALPRAZolam (Tab) XANAX 0.25 MG Take 1 Tab by mouth 2 times a day.        Aspirin (Chew Tab) ASA 81 MG Take 162 mg by mouth every day.        Azithromycin (Tab) ZITHROMAX 250 MG Two day one then qd x 4.        Budesonide-Formoterol Fumarate (Aerosol) SYMBICORT 80-4.5 MCG/ACT Inhale 2 Puffs by mouth 2 Times a Day. Use spacer. Rinse mouth after each use.        Ca Phosphate-Cholecalciferol (Chew Tab) Ca Phosphate-Cholecalciferol 250-400 MG-UNIT Take  by mouth.        ClonazePAM (Tab) KLONOPIN 0.5 MG TAKE TWO TABLETS BY MOUTH EVERY MORNING AND ONE TABLET BY MOUTH EVERY NIGHT AT BEDTIME        Desloratadine (Tab)  CLARINEX 5 MG TAKE 1 TABLET BY MOUTH EVERY DAY        Immune Globulin (Human) (Solution) Immune Globulin 10% (10 g/100mL) 5 g by Intravenous route Once. Indications: Pt now on 5 grams        Levalbuterol HCl (Nebu Soln) XOPENEX 1.25 MG/3ML 3 mL by Nebulization route every four hours as needed for Shortness of Breath.        Levalbuterol Tartrate (Aerosol) XOPENEX HFA 45 MCG/ACT INHALE ONE TO TWO PUFFS BY MOUTH EVERY 4 HOURS AS NEEDED FOR SHORTNESS OF BREATH        Magnesium (Cap) Magnesium 100 MG Take 480 mg by mouth.        Magnesium Glycinate   Take 480 mg by mouth.        Montelukast Sodium (Tab) SINGULAIR 10 MG TAKE 1 TABLET BY MOUTH EVERY DAY        Nicotine Polacrilex (Gum) NICORETTE 2 MG Take 1 Each by mouth as needed for Smoking Cessation.        NON SPECIFIED   Medrol dose pack        Omega-3 Fatty Acids (Cap) OMEGA 3 FA 1000 MG Take 1,000 mg by mouth 2 Times a Day.        Oral Electrolytes   Take  by mouth every day.        Pneumococcal Vac Polyvalent (Injection) PNEUMOVAX-23 25 MCG/0.5ML 0.5 mL by Intramuscular route Once PRN.        Pravastatin Sodium (Tab) PRAVACHOL 40 MG TAKE 1 TABLET BY MOUTH EVERY DAY        Sodium Chloride (Solution) NS          Tiotropium Bromide Monohydrate (Cap) SPIRIVA 18 MCG Inhale 1 Cap by mouth every day. AT THE SAME TIME EVERY DAY        .                 Medicines prescribed today were sent to:     [facility] [address] [place] [address] [address] [address] AT [facility] [address] [facility]    [address] [facility] [address]    Phone: [phone] Fax: [phone]    Open 24 Hours?: No      Medication refill instructions:       If your prescription bottle indicates you have medication refills left, it is not necessary to call your provider’s office. Please contact your pharmacy and they will refill your medication.    If your prescription bottle indicates you do not have any refills left, you may request refills at any time through one of the following ways: The  online [employer] system (except Urgent Care), by calling your provider’s office, or by asking your pharmacy to contact your provider’s office with a refill request. Medication refills are processed only during regular business hours and may not be available until the next business day. Your provider may request additional information or to have a follow-up visit with you prior to refilling your medication.   *Please Note: Medication refills are assigned a new Rx number when refilled electronically. Your pharmacy may indicate that no refills were authorized even though a new prescription for the same medication is available at the pharmacy. Please request the medicine by name with the pharmacy before contacting your provider for a refill.           [employer] Access Code: Activation code not generated  Current Fat[employer] Status: Active

## 2017-02-07 NOTE — PROCEDURES
Technician: ZEFERINO Pereira    Technician Comment:  Good patient effort & cooperation.  The results of this test meet the ATS/ERS standards for acceptability & reproducibility.  Test was performed on the [employer] Body Plethysmograph-Elite DX system.  Predicted values were [facility]-3 for spirometry, [facility] for DLCO, ITS for Lung Volumes.  The DLCO was uncorrected for Hgb.  A bronchodilator of Ventolin HFA -2puffs via spacer administered.    Interpretation:    Baseline FEV1 is 0.90 L which is 42% of predicted. FEV1 FVC ratio is reduced at 40%.  After administration of a Hep-Lock better there is a 12% improvement in FEV1.  Lung volume studies showed hyperinflation.  Gas exchange as estimated by DLCO is reduced at 50% of predicted.    Severe obstructive lung disease with a partially reversible component. The decrease in DLCO suggests underlying emphysema.

## 2017-02-07 NOTE — MR AVS SNAPSHOT
"        Deb Iversonporsha   2017 3:00 PM   Office Visit   MRN: [id number]    Department:  [facility]   Dept Phone:  [phone]    Description:  Female : 1945   Provider:  FELICITAS Lopez           Reason for Visit     Follow-Up           Allergies as of 2017     Allergen Noted Reactions    Gadolinium 2012   Anaphylaxis, Nausea    Influenza Virus Vaccine 10/03/2013       Zyvox [Linezolid] 2015   Rash    Severe anxiety; prominent facial rash with swelling    Cefuroxime 2015   Nausea    Epinephrine 2016       Other reaction(s): Tachycardia    Other Drug 2012       Any antibiotics by mouth dizziness, abdominal pain.     Other Misc 2011       Intolerant of any stimulants    Prednisone 2016       Aggravates tachycardia    Sulfa Drugs 2011       Other reaction(s): Not Known      You were diagnosed with     Other emphysema (CMS-HCC)   [492.8.ICD-9-CM]       Supplemental oxygen dependent   [079020]       Abnormal CT scan of lung   [401024]       Smoking   [192831]       Hyperlipidemia with target LDL less than 100   [711076]       Other fatigue   [8943853]         Vital Signs     Blood Pressure Pulse Temperature Respirations Height Weight    116/72 mmHg 88 36.6 °C (97.9 °F) (Temporal) 14 1.6 m (5' 2.99\") 52.164 kg (115 lb)    Body Mass Index Smoking Status                20.38 kg/m2 Current Every Day Smoker          Basic Information     Date Of Birth Sex Race Ethnicity Preferred Language    1945 Female White Non- English      Your appointments     2017  2:00 PM   Est Hydration with RN 3   Infusion Services ([facility])    [address]  [place] [address]   [phone]            2017  2:00 PM   Est Hydration with RN 3   Infusion Services ([facility])    [address]  [place] [address]   [phone]            2017  2:00 PM   Est Hydration with RN 6   Infusion Services ([facility])   " [address] [facility] [address] [facility] [address]  [place] [address]   [phone]            Feb 20, 2017  2:00 PM   Est Hydration with RN 3   Infusion Services ([facility])    [address]  [place] [address]   [phone]            Feb 23, 2017  2:00 PM   Est Hydration with RN 6   Infusion Services ([facility])    [address]  [place] [address]   [phone]            Feb 27, 2017  2:00 PM   Est Hydration with RN 3   Infusion Services ([facility])    [address]  [place] [address]   [phone]            Mar 02, 2017  2:00 PM   Est Hydration with RN 3   Infusion Services ([facility])    [address]  [place] [address]   [phone]            Mar 06, 2017  2:00 PM   Est Hydration with RN 3   Infusion Services ([facility])    [address]  [place] [address]   [phone]            Mar 09, 2017  2:00 PM   Est Hydration with RN 3   Infusion Services ([facility])    [address] [facility] [address]  [place] [address]   [phone]            Mar 13, 2017  2:00 PM   Est Hydration with RN 3   Infusion Services ([facility])    [address] [facility] [address]  [place] [address]   [phone]            Mar 16, 2017  2:00 PM   Est Hydration with RN 3   Infusion Services ([facility])    [address] [facility] [address]  [place] [address]   [phone]            Mar 20, 2017  2:00 PM   Est Hydration with RN 3   Infusion Services ([facility])    [address] [facility] [address]  [place] [address]   [phone]            Mar 23, 2017  2:00 PM   Est Hydration with RN 3   Infusion Services ([facility])    [address] [facility] [address]  [place] [address]   [phone]            Mar 27, 2017  2:00 PM   Est Hydration with RN 3   Infusion Services ([facility])    [address] [facility] [address] [facility] [address]  [place] [address]   [phone]            Mar 30, 2017  2:00 PM   Est Hydration with RN 3   Infusion Services ([facility])    [address] [facility] [address]  [place] [address]   [phone]            Apr 03, 2017  2:00 PM   Est Hydration with RN 3   Infusion  Services ([facility])    [address] [facility] [address]  [place] [address]   [phone]            Apr 06, 2017  2:00 PM   Est Hydration with RN 3   Infusion Services ([facility])    [address] [facility] [address]  [place] [address]   [phone]            Apr 10, 2017  2:00 PM   Est Hydration with RN 3   Infusion Services ([facility])    [address]  [place] [address]   [phone]            Apr 13, 2017  2:00 PM   Est Hydration with RN 3   Infusion Services ([facility])    [address]  [place] [address]   [phone]            Apr 17, 2017  2:00 PM   Est Hydration with RN 3   Infusion Services ([facility])    [address]  [place] [address]   [phone]            Apr 20, 2017  2:00 PM   Est Hydration with RN 6   Infusion Services ([facility])    [address] [address]  [place] [address]   [phone]            Apr 24, 2017  2:00 PM   Est Hydration with RN 3   Infusion Services ([facility])    [address]  [place] [address]   [phone]            Apr 27, 2017  2:00 PM   Est Hydration with RN 3   Infusion Services ([facility])    [address]  [place] [address]   [phone]              Problem List              ICD-10-CM Priority Class Noted - Resolved    Hyperlipidemia with target LDL less than 100 (Chronic) E78.5   6/6/2012 - Present    Smoking (Chronic) F17.200   6/6/2012 - Present    Anxiety F41.9   6/6/2012 - Present    Chronic fatigue syndrome R53.82   12/7/2012 - Present    COPD (chronic obstructive pulmonary disease) (CMS-HCC) J44.9   3/1/2013 - Present    Lyme disease A69.20   3/1/2013 - Present    Immunologic deficiency syndrome (CMS-HCC) D84.9   3/1/2013 - Present    Hypotension I95.9   7/2/2015 - Present    Tachycardia R00.0   7/15/2015 - Present    Supplemental oxygen dependent Z99.81   2/7/2017 - Present    Abnormal CT scan of lung R91.8   2/7/2017 - Present      Health Maintenance        Date Due Completion Dates    IMM DTaP/Tdap/Td Vaccine (1 -  Tdap) 10/19/1964 ---    PAP SMEAR 10/19/1966 ---    IMM ZOSTER VACCINE 10/19/2005 ---    IMM PNEUMOCOCCAL 65+ (ADULT) LOW/MEDIUM RISK SERIES (1 of 2 - PCV13) 10/19/2010 ---    IMM INFLUENZA (1) 9/1/2016 ---    MAMMOGRAM 6/23/2017 6/23/2016    COLONOSCOPY 7/23/2018 7/23/2008 (Done)    Override on 7/23/2008: Done ([facility]    BONE DENSITY 2/5/2020 2/5/2015            Current Immunizations     No immunizations on file.      Below and/or attached are the medications your provider expects you to take. Review all of your home medications and newly ordered medications with your provider and/or pharmacist. Follow medication instructions as directed by your provider and/or pharmacist. Please keep your medication list with you and share with your provider. Update the information when medications are discontinued, doses are changed, or new medications (including over-the-counter products) are added; and carry medication information at all times in the event of emergency situations     Allergies:  GADOLINIUM - Anaphylaxis,Nausea     INFLUENZA VIRUS VACCINE - (reactions not documented)     ZYVOX - Rash     CEFUROXIME - Nausea     EPINEPHRINE - (reactions not documented)     OTHER DRUG - (reactions not documented)     OTHER MISC - (reactions not documented)     PREDNISONE - (reactions not documented)     SULFA DRUGS - (reactions not documented)               Medications  Valid as of: February 07, 2017 -  3:44 PM    Generic Name Brand Name Tablet Size Instructions for use    Acetaminophen (Tab) TYLENOL 325 MG Take 650 mg by mouth every four hours as needed.        ALPRAZolam (Tab) XANAX 0.25 MG Take 1 Tab by mouth 2 times a day.        Aspirin (Chew Tab) ASA 81 MG Take 162 mg by mouth every day.        Azithromycin (Tab) ZITHROMAX 250 MG Two day one then qd x 4.        Budesonide-Formoterol Fumarate (Aerosol) SYMBICORT 80-4.5 MCG/ACT Inhale 2 Puffs by mouth 2 Times a Day. Use spacer. Rinse mouth after each  use.        Ca Phosphate-Cholecalciferol (Chew Tab) Ca Phosphate-Cholecalciferol 250-400 MG-UNIT Take  by mouth.        ClonazePAM (Tab) KLONOPIN 0.5 MG TAKE TWO TABLETS BY MOUTH EVERY MORNING AND ONE TABLET BY MOUTH EVERY NIGHT AT BEDTIME        Desloratadine (Tab) CLARINEX 5 MG TAKE 1 TABLET BY MOUTH EVERY DAY        Immune Globulin (Human) (Solution) Immune Globulin 10% (10 g/100mL) 5 g by Intravenous route Once. Indications: Pt now on 5 grams        Levalbuterol HCl (Nebu Soln) XOPENEX 1.25 MG/3ML 3 mL by Nebulization route every four hours as needed for Shortness of Breath.        Levalbuterol Tartrate (Aerosol) XOPENEX HFA 45 MCG/ACT INHALE ONE TO TWO PUFFS BY MOUTH EVERY 4 HOURS AS NEEDED FOR SHORTNESS OF BREATH        Magnesium (Cap) Magnesium 100 MG Take 480 mg by mouth.        Magnesium Glycinate   Take 480 mg by mouth.        Montelukast Sodium (Tab) SINGULAIR 10 MG TAKE 1 TABLET BY MOUTH EVERY DAY        Nicotine Polacrilex (Gum) NICORETTE 2 MG Take 1 Each by mouth as needed for Smoking Cessation.        NON SPECIFIED   Medrol dose pack        Omega-3 Fatty Acids (Cap) OMEGA 3 FA 1000 MG Take 1,000 mg by mouth 2 Times a Day.        Oral Electrolytes   Take  by mouth every day.        Pneumococcal Vac Polyvalent (Injection) PNEUMOVAX-23 25 MCG/0.5ML 0.5 mL by Intramuscular route Once PRN.        Pravastatin Sodium (Tab) PRAVACHOL 40 MG TAKE 1 TABLET BY MOUTH EVERY DAY        Sodium Chloride (Solution) NS          Tiotropium Bromide Monohydrate (Cap) SPIRIVA 18 MCG Inhale 1 Cap by mouth every day. AT THE SAME TIME EVERY DAY        .                 Medicines prescribed today were sent to:     [id number] - [place] - [address]    [address] [place] [address]    Phone: [phone] Fax: [phone]    Open 24 Hours?: No      Medication refill instructions:       If your prescription bottle indicates you have medication refills left, it is  not necessary to call your provider’s office. Please contact your pharmacy and they will refill your medication.    If your prescription bottle indicates you do not have any refills left, you may request refills at any time through one of the following ways: The online [employer] system (except Urgent Care), by calling your provider’s office, or by asking your pharmacy to contact your provider’s office with a refill request. Medication refills are processed only during regular business hours and may not be available until the next business day. Your provider may request additional information or to have a follow-up visit with you prior to refilling your medication.   *Please Note: Medication refills are assigned a new Rx number when refilled electronically. Your pharmacy may indicate that no refills were authorized even though a new prescription for the same medication is available at the pharmacy. Please request the medicine by name with the pharmacy before contacting your provider for a refill.        Your To Do List     Future Labs/Procedures Complete By Expires    POLYSOMNOGRAPHY, 4 OR MORE  As directed 2/7/2018      Instructions    1. Follow up after sleep study.  2. Due for CT scan May 2017. Follow up afterward for results.  3. Continue oxygen 2L 24/7.  4. Continue inhaler regimen.  5. Add nasal gel and oxygen mask at night. Continue humidifier on oxygen concentrator as long as tolerable.          [employer] Access Code: Activation code not generated  Current [employer] Status: Active

## 2017-02-08 NOTE — PROGRESS NOTES
Chief Complaint   Patient presents with   • Follow-Up       HPI:  Deb Vega is a 71 y.o. year old female here today for follow-up on PFT results. She is referred by PCP Dr. Mccullough for further evaluation and management of COPD. She is first evaluated 10/27/2016 by Dr. Tucker. PFT 8/18/2016 indicated FEV1 0.78 L or 36% predicted, FEV1/FVC ratio 41 and a DLCO of 31% predicted. Patient did have significant myocardial response. Patient was placed on Symbicort 160/4.5 µg 2 puffs twice a day, Spiriva once daily and Xopenex HFA inhaler when necessary. She also has nebulizer on him. Repeat PFT 2/7/2017 indicated stage III severe COPD with FEV1 0.90 L or 42% predicted, FEV1/FVC ratio 40, % predicted and a DLCO of 52% predicted. Patient does have significant bronchospasm response. Patient has a 55 year history of smoking and his tapered from 3 packs daily down to 6-9 cigarettes daily. She is motivated to quit. CT scan 11/23/2016 indicated moderate diffuse centrilobular emphysematous changes. A 2.0 x 0.6 cm opacity in the medial right lower lobe. No adenopathy noted. PET scan was performed 11/23/2016 indicating no metabolic activity in the right lower lobe opacity. Repeat CT scan was ordered for May 2017. Today she notes some improvement in her dyspnea but continues to have it with minimal exertion. She notes occasional cough but no significant phlegm or wheezing. She denies fever, chills, night sweats, chest pain, ankle swelling or hemoptysis. She is attempted to use her nebulizer but states he takes for 20 minutes to even get the medication to steam and she still has the majority the left. She also is a mouth breather and does not feel she is getting full benefit of her oxygen at night. She uses 2L 24/7. She notes nasal dryness with scabbing and some bleeding recently. She does have a humidifier but this caused condensation. She notes difficulty being able to carry her potable oxygen device due to chronic back  deterioration. She would prefer to have battery operated device which would be easier for her to manipulate with her hands and possibly carry.    She does have chronic fatigue and excess daytime tiredness. She has an irregular sleep schedule going to bed at 2 AM and awakening at 11 AM. She notes after being awake for several hours she finally has energy. Sleep study was ordered but she's been fearful to that facility to complete this because she does not want to be exposed to sick patients. I reassured her that we do not see L patient's for sleep studies. Encouraged her to complete this within the next 1-2 months. She is amenable to this. She also has a history of anxiety.      ROS: As per HPI and otherwise negative if not stated.    Past Medical History   Diagnosis Date   • Allergy    • Anxiety    • Hyperlipidemia    • ASTHMA    • Back pain    • Bladder infection    • H/O blood transfusion reaction    • Bronchitis    • COPD    • Mononucleosis    • Hives    • History of measles    • History of mumps    • Pneumonia    • COPD (chronic obstructive pulmonary disease) (CMS-HCC) 3/1/2013   • Lyme disease 3/1/2013   • Immunologic deficiency syndrome (CMS-HCC) 3/1/2013   • Cellulitis      (L) elbow    • Tachycardia 7/15/2015   • Tachycardia 7/15/2015       Past Surgical History   Procedure Laterality Date   • Breast biopsy     • Thyroid lobectomy     • Tonsillectomy         Family History   Problem Relation Age of Onset   • Alcohol/Drug Mother    • Cancer Father    • Stroke Father    • Heart Disease Father 42       Social History     Social History   • Marital Status:      Spouse Name: N/A   • Number of Children: N/A   • Years of Education: N/A     Occupational History   • Not on file.     Social History Main Topics   • Smoking status: Current Every Day Smoker -- 0.50 packs/day for 55 years     Types: Cigarettes   • Smokeless tobacco: Never Used   • Alcohol Use: No      Comment: patient states she has not had a  "drink in 2 years 10/09/2015   • Drug Use: No   • Sexual Activity: No     Other Topics Concern   • Not on file     Social History Narrative       Allergies as of 02/07/2017 - Donny as Reviewed 02/07/2017   Allergen Reaction Noted   • Gadolinium Anaphylaxis and Nausea 04/19/2012   • Influenza virus vaccine  10/03/2013   • Zyvox [linezolid] Rash 07/06/2015   • Cefuroxime Nausea 02/23/2015   • Epinephrine  09/08/2016   • Other drug  06/06/2012   • Other misc  11/06/2011   • Prednisone  09/08/2016   • Sulfa drugs  11/06/2011        @Vital signs for this encounter:  Filed Vitals:    02/07/17 1455   Height: 1.6 m (5' 2.99\")   Weight: 52.164 kg (115 lb)   Weight % change since last entry.: 0 %   BP: 116/72   Pulse: 88   BMI (Calculated): 20.38   Resp: 14   Temp: 36.6 °C (97.9 °F)   TempSrc: Temporal   O2 sat % on O2: 94 %   O2 Flow Rate (L/min): 2       Current medications as of today   Current Outpatient Prescriptions   Medication Sig Dispense Refill   • Sodium Chloride (NS) Solution      • KLONOPIN 0.5 MG Tab TAKE TWO TABLETS BY MOUTH EVERY MORNING AND ONE TABLET BY MOUTH EVERY NIGHT AT BEDTIME 90 Tab 1   • pneumococcal vaccine (PNEUMOVAX-23) 25 MCG/0.5ML Injection 0.5 mL by Intramuscular route Once PRN.     • budesonide-formoterol (SYMBICORT) 80-4.5 MCG/ACT Aerosol Inhale 2 Puffs by mouth 2 Times a Day. Use spacer. Rinse mouth after each use. 1 Inhaler 5   • montelukast (SINGULAIR) 10 MG Tab TAKE 1 TABLET BY MOUTH EVERY DAY 90 Tab 3   • pravastatin (PRAVACHOL) 40 MG tablet TAKE 1 TABLET BY MOUTH EVERY DAY 90 Tab 3   • XOPENEX HFA 45 MCG/ACT inhaler INHALE ONE TO TWO PUFFS BY MOUTH EVERY 4 HOURS AS NEEDED FOR SHORTNESS OF BREATH 15 g 11   • alprazolam (XANAX) 0.25 MG Tab Take 1 Tab by mouth 2 times a day. 60 Tab 3   • Magnesium 100 MG Cap Take 480 mg by mouth.     • levalbuterol (XOPENEX) 1.25 MG/3ML Nebu Soln 3 mL by Nebulization route every four hours as needed for Shortness of Breath. 24 mL 11   • nicotine polacrilex " (NICORETTE) 2 MG Gum Take 1 Each by mouth as needed for Smoking Cessation. 150 Each 3   • azithromycin (ZITHROMAX) 250 MG Tab Two day one then qd x 4. 6 Tab 0   • NON SPECIFIED Medrol dose pack 1 Each 3   • tiotropium (SPIRIVA HANDIHALER) 18 MCG Cap Inhale 1 Cap by mouth every day. AT THE SAME TIME EVERY DAY 90 Cap 3   • acetaminophen (TYLENOL) 325 MG Tab Take 650 mg by mouth every four hours as needed.     • CLARINEX 5 MG tablet TAKE 1 TABLET BY MOUTH EVERY DAY 90 Tab 3   • immune globulin (FLEBOGAMMA) 10 GM/100ML Solution 5 g by Intravenous route Once. Indications: Pt now on 5 grams     • Oral Electrolytes (EMERGEN-C ELECTRO MIX PO) Take  by mouth every day.     • Ca Phosphate-Cholecalciferol 250-400 MG-UNIT CHEW Take  by mouth.     • docosahexanoic acid (OMEGA 3 FA) 1000 MG CAPS Take 1,000 mg by mouth 2 Times a Day.     • aspirin (ASA) 81 MG CHEW Take 162 mg by mouth every day.     • MAGNESIUM GLYCINATE Take 480 mg by mouth.       No current facility-administered medications for this visit.         Physical Exam:   Gen:           Alert and oriented, No apparent distress. Mood and affect appropriate, normal interaction with examiner. Thin.  Eyes:          PERRL, EOM intact, sclere white, conjunctive moist.  Ears:          Not examined.   Hearing:     Grossly intact.  Nose:          Normal, no lesions or deformities.  Dentition:    Good dentition.  Oropharynx:   Tongue normal, posterior pharynx without erythema or exudate.  Mallampati Classification: 3  Neck:        Supple, trachea midline, no masses.  Respiratory Effort: No intercostal retractions or use of accessory muscles.   Lung Auscultation:      Clear to auscultation bilaterally; no rales, rhonchi or wheezing.  CV:            Regular rate and rhythm. No murmurs, rubs or gallops.  Abd:           Not examined.   Lymphadenopathy: Not examined.  Gait and Station: Normal.  Digits and Nails: No clubbing, cyanosis, petechiae, or nodes.   Cranial Nerves: II-XII  grossly intact.  Skin:        No rashes, lesions or ulcers noted.               Ext:           No cyanosis or edema. Gloves on hands.      Assessment:  1. Other emphysema (CMS-HCC)  DME OTHER    DME OTHER   2. Supplemental oxygen dependent  DME OTHER    DME OTHER    DME OTHER   3. Abnormal CT scan of lung     4. Smoking     5. Hyperlipidemia with target LDL less than 100     6. Other fatigue  POLYSOMNOGRAPHY, 4 OR MORE   7. Anxiety         Immunizations:    Flu:not given  Pneumovax 23:not given  Prevnar 13:not given    Plan:  1. PSG now.  2. Continue 2L oxygen 24/7. DME order for oxygen mask, nebulizer to be checked and battery POC.  3. Continue inhaler regimen. Reviewed appropriate use. Use spacer with devices.  4. Smoking cessation encouraged. When she quits, she would benefit from pulmonary rehab.  5. CT scan of chest w/o contrast due 5/2017.  6. Discussed respiratory and sleep hygiene.  7. Add nasal gel for nasal dryness and attempt humidifier use with oxygen mask.  8. Follow up after sleep study, sooner if needed. Follow up after CT scan, sooner if needed.

## 2017-02-09 ENCOUNTER — OUTPATIENT INFUSION SERVICES (OUTPATIENT)
Dept: ONCOLOGY | Facility: MEDICAL CENTER | Age: 72
End: 2017-02-09
Attending: INTERNAL MEDICINE
Payer: MEDICARE

## 2017-02-09 VITALS
WEIGHT: 113.32 LBS | HEART RATE: 99 BPM | BODY MASS INDEX: 20.08 KG/M2 | HEIGHT: 63 IN | OXYGEN SATURATION: 93 % | TEMPERATURE: 98.8 F | DIASTOLIC BLOOD PRESSURE: 74 MMHG | RESPIRATION RATE: 18 BRPM | SYSTOLIC BLOOD PRESSURE: 104 MMHG

## 2017-02-09 PROCEDURE — 96360 HYDRATION IV INFUSION INIT: CPT

## 2017-02-09 ASSESSMENT — PAIN SCALES - GENERAL: PAINLEVEL: 4=SLIGHT-MODERATE PAIN

## 2017-02-10 NOTE — PROGRESS NOTES
Patient presents ambulatory for hydration. IV established and hydration given per order with no complications or adverse reactions. Patient to return 2/13/17 for next appointment, confirmed with patient. IV discontinued, gauze, and coban applied to site. Patient left ambulatory in no distress.

## 2017-02-13 ENCOUNTER — OUTPATIENT INFUSION SERVICES (OUTPATIENT)
Dept: ONCOLOGY | Facility: MEDICAL CENTER | Age: 72
End: 2017-02-13
Attending: INTERNAL MEDICINE
Payer: MEDICARE

## 2017-02-13 VITALS
BODY MASS INDEX: 20.12 KG/M2 | HEIGHT: 63 IN | WEIGHT: 113.54 LBS | HEART RATE: 98 BPM | TEMPERATURE: 99.8 F | SYSTOLIC BLOOD PRESSURE: 98 MMHG | OXYGEN SATURATION: 95 % | DIASTOLIC BLOOD PRESSURE: 65 MMHG | RESPIRATION RATE: 12 BRPM

## 2017-02-13 DIAGNOSIS — G93.32 CHRONIC FATIGUE SYNDROME: ICD-10-CM

## 2017-02-13 PROCEDURE — 96360 HYDRATION IV INFUSION INIT: CPT

## 2017-02-13 PROCEDURE — 700105 HCHG RX REV CODE 258: Performed by: INTERNAL MEDICINE

## 2017-02-13 RX ORDER — SODIUM CHLORIDE 9 MG/ML
1000 INJECTION, SOLUTION INTRAVENOUS ONCE
Status: COMPLETED | OUTPATIENT
Start: 2017-02-13 | End: 2017-02-13

## 2017-02-13 RX ADMIN — SODIUM CHLORIDE 1000 ML: 9 INJECTION, SOLUTION INTRAVENOUS at 14:30

## 2017-02-13 ASSESSMENT — PAIN SCALES - GENERAL: PAINLEVEL: 4=SLIGHT-MODERATE PAIN

## 2017-02-14 NOTE — PROGRESS NOTES
Pt is here for her hydration.Oxygen dependent. Stays home as she is worried about getting respiratory infection - has received pneumococcal vaccine. 500ml of NS infused at 400ml/h per pt request. Vitals taken upon the completion (see EPIC). Discharged home to self care. Next appointment verified.

## 2017-02-16 ENCOUNTER — APPOINTMENT (OUTPATIENT)
Dept: ONCOLOGY | Facility: MEDICAL CENTER | Age: 72
End: 2017-02-16
Attending: INTERNAL MEDICINE
Payer: MEDICARE

## 2017-02-20 ENCOUNTER — OUTPATIENT INFUSION SERVICES (OUTPATIENT)
Dept: ONCOLOGY | Facility: MEDICAL CENTER | Age: 72
End: 2017-02-20
Attending: INTERNAL MEDICINE
Payer: MEDICARE

## 2017-02-20 VITALS
WEIGHT: 113.32 LBS | TEMPERATURE: 99 F | DIASTOLIC BLOOD PRESSURE: 64 MMHG | HEART RATE: 106 BPM | SYSTOLIC BLOOD PRESSURE: 109 MMHG | OXYGEN SATURATION: 90 % | HEIGHT: 63 IN | BODY MASS INDEX: 20.08 KG/M2 | RESPIRATION RATE: 20 BRPM

## 2017-02-20 PROCEDURE — 96360 HYDRATION IV INFUSION INIT: CPT

## 2017-02-20 ASSESSMENT — PAIN SCALES - GENERAL: PAINLEVEL: 3=SLIGHT PAIN

## 2017-02-20 NOTE — PROGRESS NOTES
Patient arrived to Infusion for hydration.  PIV started, pt preferred 500ml run at 400ml/hr.  Hydration infused.  Pt was given heat to apply to sore back; pt also provided own heating pad.  Confirmed pt's next appt. PIV removed, gauze pressure dressing in place.  Pt left in good spirits under no apparent distress.

## 2017-02-23 ENCOUNTER — APPOINTMENT (OUTPATIENT)
Dept: ONCOLOGY | Facility: MEDICAL CENTER | Age: 72
End: 2017-02-23
Attending: INTERNAL MEDICINE
Payer: MEDICARE

## 2017-02-26 ENCOUNTER — OUTPATIENT INFUSION SERVICES (OUTPATIENT)
Dept: ONCOLOGY | Facility: MEDICAL CENTER | Age: 72
End: 2017-02-26
Attending: INTERNAL MEDICINE
Payer: MEDICARE

## 2017-02-26 VITALS
HEIGHT: 63 IN | WEIGHT: 113.1 LBS | OXYGEN SATURATION: 97 % | TEMPERATURE: 99 F | SYSTOLIC BLOOD PRESSURE: 86 MMHG | HEART RATE: 89 BPM | BODY MASS INDEX: 20.04 KG/M2 | DIASTOLIC BLOOD PRESSURE: 48 MMHG | RESPIRATION RATE: 18 BRPM

## 2017-02-26 DIAGNOSIS — G93.32 CHRONIC FATIGUE SYNDROME: ICD-10-CM

## 2017-02-26 PROCEDURE — 96360 HYDRATION IV INFUSION INIT: CPT

## 2017-02-26 PROCEDURE — 700105 HCHG RX REV CODE 258: Performed by: INTERNAL MEDICINE

## 2017-02-26 RX ORDER — SODIUM CHLORIDE 9 MG/ML
1000 INJECTION, SOLUTION INTRAVENOUS ONCE
Status: COMPLETED | OUTPATIENT
Start: 2017-02-26 | End: 2017-02-26

## 2017-02-26 RX ADMIN — SODIUM CHLORIDE 1000 ML: 9 INJECTION, SOLUTION INTRAVENOUS at 14:33

## 2017-02-26 ASSESSMENT — PAIN SCALES - GENERAL: PAINLEVEL: 5=MODERATE PAIN

## 2017-02-27 ENCOUNTER — APPOINTMENT (OUTPATIENT)
Dept: ONCOLOGY | Facility: MEDICAL CENTER | Age: 72
End: 2017-02-27
Attending: INTERNAL MEDICINE
Payer: MEDICARE

## 2017-03-02 ENCOUNTER — APPOINTMENT (OUTPATIENT)
Dept: ONCOLOGY | Facility: MEDICAL CENTER | Age: 72
End: 2017-03-02
Attending: INTERNAL MEDICINE
Payer: MEDICARE

## 2017-03-06 ENCOUNTER — TELEPHONE (OUTPATIENT)
Dept: ONCOLOGY | Facility: MEDICAL CENTER | Age: 72
End: 2017-03-06

## 2017-03-06 ENCOUNTER — APPOINTMENT (OUTPATIENT)
Dept: ONCOLOGY | Facility: MEDICAL CENTER | Age: 72
End: 2017-03-06
Attending: INTERNAL MEDICINE
Payer: MEDICARE

## 2017-03-06 RX ORDER — DESLORATADINE 5 MG
TABLET ORAL
Qty: 90 TAB | Refills: 3 | Status: SHIPPED | OUTPATIENT
Start: 2017-03-06 | End: 2018-03-03 | Stop reason: SDUPTHER

## 2017-03-09 ENCOUNTER — OUTPATIENT INFUSION SERVICES (OUTPATIENT)
Dept: ONCOLOGY | Facility: MEDICAL CENTER | Age: 72
End: 2017-03-09
Attending: INTERNAL MEDICINE
Payer: MEDICARE

## 2017-03-09 VITALS
OXYGEN SATURATION: 97 % | DIASTOLIC BLOOD PRESSURE: 67 MMHG | SYSTOLIC BLOOD PRESSURE: 97 MMHG | TEMPERATURE: 98.8 F | RESPIRATION RATE: 18 BRPM | HEART RATE: 94 BPM | BODY MASS INDEX: 20 KG/M2 | WEIGHT: 112.88 LBS | HEIGHT: 63 IN

## 2017-03-09 PROCEDURE — 96360 HYDRATION IV INFUSION INIT: CPT

## 2017-03-09 ASSESSMENT — PAIN SCALES - GENERAL: PAINLEVEL: 3=SLIGHT PAIN

## 2017-03-10 NOTE — PROGRESS NOTES
Patient presents ambulatory for hydration.  Patient reports feeling well, POC reviewed with patient, and patient verbalized understanding.  IV established and hydration infused per MD order with no complications or adverse reactions.  Patient to return 3/13/17, confirmed with patient.  IV discontinued, gauze, and coban applied to site.  Patient left ambulatory in no distress.

## 2017-03-13 ENCOUNTER — OUTPATIENT INFUSION SERVICES (OUTPATIENT)
Dept: ONCOLOGY | Facility: MEDICAL CENTER | Age: 72
End: 2017-03-13
Attending: INTERNAL MEDICINE
Payer: MEDICARE

## 2017-03-13 VITALS
RESPIRATION RATE: 20 BRPM | TEMPERATURE: 98 F | OXYGEN SATURATION: 95 % | BODY MASS INDEX: 20.08 KG/M2 | HEIGHT: 63 IN | HEART RATE: 100 BPM | DIASTOLIC BLOOD PRESSURE: 55 MMHG | WEIGHT: 113.32 LBS | SYSTOLIC BLOOD PRESSURE: 90 MMHG

## 2017-03-13 DIAGNOSIS — G93.32 CHRONIC FATIGUE SYNDROME: ICD-10-CM

## 2017-03-13 PROCEDURE — 96360 HYDRATION IV INFUSION INIT: CPT

## 2017-03-13 PROCEDURE — 700105 HCHG RX REV CODE 258: Performed by: INTERNAL MEDICINE

## 2017-03-13 RX ORDER — SODIUM CHLORIDE 9 MG/ML
1000 INJECTION, SOLUTION INTRAVENOUS ONCE
Status: COMPLETED | OUTPATIENT
Start: 2017-03-13 | End: 2017-03-13

## 2017-03-13 RX ADMIN — SODIUM CHLORIDE 1000 ML: 9 INJECTION, SOLUTION INTRAVENOUS at 14:46

## 2017-03-13 ASSESSMENT — PAIN SCALES - GENERAL: PAINLEVEL: 2=MINIMAL-SLIGHT

## 2017-03-14 NOTE — PROGRESS NOTES
"Late entry for 1610:  Pt to infusion services ambulatory per self.  Pt here for scheduled IV Normal saline hydration.  Plan of care reviewed.  Pt verbalizes understanding.  PIV established to [id number].  IV flushes easily; + blood return verified.  Pt requesting \"500 mL at 400 mL/hr\".  IV hydration administered per MD orders and per patient request.  IV hydration completed without incident.  Pt refuses additional BP check and refuses additional hydration at this time.  PIV flushed with normal saline; continued + blood return verified.  PIV d/c'd.  Pressure dressing applied.  Pt released to self care in no apparent distress after completion of treatment, ambulatory.  Pt tells me she cancelled Thursdays appointment for hydration as she tells me she has home health coming to her home on Friday for IVIG infusion and hydration at that time.  Pt tells me she will return next week.  Future appointments scheduled.   "

## 2017-03-16 ENCOUNTER — APPOINTMENT (OUTPATIENT)
Dept: ONCOLOGY | Facility: MEDICAL CENTER | Age: 72
End: 2017-03-16
Attending: INTERNAL MEDICINE
Payer: MEDICARE

## 2017-03-20 ENCOUNTER — OUTPATIENT INFUSION SERVICES (OUTPATIENT)
Dept: ONCOLOGY | Facility: MEDICAL CENTER | Age: 72
End: 2017-03-20
Attending: INTERNAL MEDICINE
Payer: MEDICARE

## 2017-03-20 VITALS
HEIGHT: 63 IN | DIASTOLIC BLOOD PRESSURE: 59 MMHG | OXYGEN SATURATION: 96 % | RESPIRATION RATE: 18 BRPM | TEMPERATURE: 98.6 F | HEART RATE: 101 BPM | BODY MASS INDEX: 20.23 KG/M2 | WEIGHT: 114.2 LBS | SYSTOLIC BLOOD PRESSURE: 98 MMHG

## 2017-03-20 DIAGNOSIS — F41.9 ANXIETY: ICD-10-CM

## 2017-03-20 DIAGNOSIS — G93.32 CHRONIC FATIGUE SYNDROME: ICD-10-CM

## 2017-03-20 PROCEDURE — 700105 HCHG RX REV CODE 258: Performed by: INTERNAL MEDICINE

## 2017-03-20 PROCEDURE — 96360 HYDRATION IV INFUSION INIT: CPT

## 2017-03-20 RX ORDER — SODIUM CHLORIDE 9 MG/ML
1000 INJECTION, SOLUTION INTRAVENOUS ONCE
Status: COMPLETED | OUTPATIENT
Start: 2017-03-20 | End: 2017-03-20

## 2017-03-20 RX ORDER — ALPRAZOLAM 0.25 MG/1
0.25 TABLET ORAL 2 TIMES DAILY
Qty: 60 TAB | Refills: 0 | Status: SHIPPED
Start: 2017-03-20 | End: 2017-08-29 | Stop reason: SDUPTHER

## 2017-03-20 RX ADMIN — SODIUM CHLORIDE 1000 ML: 9 INJECTION, SOLUTION INTRAVENOUS at 14:18

## 2017-03-20 ASSESSMENT — PAIN SCALES - GENERAL: PAINLEVEL: 2=MINIMAL-SLIGHT

## 2017-03-21 NOTE — PROGRESS NOTES
Pt is here for her hydration.Oxygen dependent. Stays home as she is worried about getting respiratory infection. Wearing plastic gloves when outdoors in public.. 500ml of NS infused at 400ml/h per pt request. Vitals taken upon the completion (see EPIC). Discharged home to self care. Next appointment verified.

## 2017-03-23 ENCOUNTER — APPOINTMENT (OUTPATIENT)
Dept: ONCOLOGY | Facility: MEDICAL CENTER | Age: 72
End: 2017-03-23
Attending: INTERNAL MEDICINE
Payer: MEDICARE

## 2017-03-26 ENCOUNTER — OUTPATIENT INFUSION SERVICES (OUTPATIENT)
Dept: ONCOLOGY | Facility: MEDICAL CENTER | Age: 72
End: 2017-03-26
Attending: INTERNAL MEDICINE
Payer: MEDICARE

## 2017-03-26 VITALS
HEIGHT: 63 IN | WEIGHT: 112.43 LBS | OXYGEN SATURATION: 97 % | HEART RATE: 100 BPM | RESPIRATION RATE: 20 BRPM | DIASTOLIC BLOOD PRESSURE: 68 MMHG | SYSTOLIC BLOOD PRESSURE: 110 MMHG | BODY MASS INDEX: 19.92 KG/M2 | TEMPERATURE: 98.6 F

## 2017-03-26 DIAGNOSIS — G93.32 CHRONIC FATIGUE SYNDROME: ICD-10-CM

## 2017-03-26 PROCEDURE — 700105 HCHG RX REV CODE 258: Performed by: INTERNAL MEDICINE

## 2017-03-26 PROCEDURE — 96360 HYDRATION IV INFUSION INIT: CPT

## 2017-03-26 PROCEDURE — 96365 THER/PROPH/DIAG IV INF INIT: CPT

## 2017-03-26 PROCEDURE — 36415 COLL VENOUS BLD VENIPUNCTURE: CPT

## 2017-03-26 RX ORDER — SODIUM CHLORIDE 9 MG/ML
1000 INJECTION, SOLUTION INTRAVENOUS ONCE
Status: COMPLETED | OUTPATIENT
Start: 2017-03-26 | End: 2017-03-26

## 2017-03-26 RX ADMIN — SODIUM CHLORIDE 500 ML: 9 INJECTION, SOLUTION INTRAVENOUS at 15:20

## 2017-03-26 ASSESSMENT — PAIN SCALES - GENERAL: PAINLEVEL: 5=MODERATE PAIN

## 2017-03-26 NOTE — PROGRESS NOTES
Patient arrived to infusion center for hydration. No new complaints today. PIV started and flushed with positive blood return. Normal saline 500 ml infused without difficulty at 400 cc/hr per patients request. Patient tolerated well. Patient confirms appointment for 3/30/17 @ 1400. Discharged to self-care.

## 2017-03-27 ENCOUNTER — APPOINTMENT (OUTPATIENT)
Dept: ONCOLOGY | Facility: MEDICAL CENTER | Age: 72
End: 2017-03-27
Attending: INTERNAL MEDICINE
Payer: MEDICARE

## 2017-03-30 ENCOUNTER — APPOINTMENT (OUTPATIENT)
Dept: ONCOLOGY | Facility: MEDICAL CENTER | Age: 72
End: 2017-03-30
Attending: INTERNAL MEDICINE
Payer: MEDICARE

## 2017-04-03 ENCOUNTER — OUTPATIENT INFUSION SERVICES (OUTPATIENT)
Dept: ONCOLOGY | Facility: MEDICAL CENTER | Age: 72
End: 2017-04-03
Attending: INTERNAL MEDICINE
Payer: MEDICARE

## 2017-04-03 VITALS
HEART RATE: 94 BPM | TEMPERATURE: 98.8 F | HEIGHT: 63 IN | BODY MASS INDEX: 19.96 KG/M2 | DIASTOLIC BLOOD PRESSURE: 55 MMHG | SYSTOLIC BLOOD PRESSURE: 95 MMHG | OXYGEN SATURATION: 92 % | WEIGHT: 112.66 LBS | RESPIRATION RATE: 20 BRPM

## 2017-04-03 PROCEDURE — 700105 HCHG RX REV CODE 258: Performed by: INTERNAL MEDICINE

## 2017-04-03 PROCEDURE — 96360 HYDRATION IV INFUSION INIT: CPT

## 2017-04-03 RX ORDER — SODIUM CHLORIDE 9 MG/ML
1000 INJECTION, SOLUTION INTRAVENOUS ONCE
Status: COMPLETED | OUTPATIENT
Start: 2017-04-03 | End: 2017-04-03

## 2017-04-03 RX ADMIN — SODIUM CHLORIDE 1000 ML: 9 INJECTION, SOLUTION INTRAVENOUS at 14:28

## 2017-04-03 ASSESSMENT — PAIN SCALES - GENERAL: PAINLEVEL: 5=MODERATE PAIN

## 2017-04-03 NOTE — PROGRESS NOTES
Pt arrived to IS ambulatory, oxygen dependent, here for hydration. IV access established. Hydration infused as requested by pt. At completion of infusion, VS assessed and pt determined she would not need remaining fluid. Pt states she has been experiencing increasing cramping and numbness/tingling to BLE. Message sent to her primary to request lab orders as she is uncomfortable to be seen in her MD office during flu season. Pt also concerned regarding some moles on her hands and legs. Suggested pt be evaluated by a dermatologist. Pt reports she does not have a dermatologist but requested message to Dr. Mccullough. Included request for referral to derm. Pt informed of requests. Discharged home under self care in no apparent distress.

## 2017-04-03 NOTE — Clinical Note
Infusion Services   [address] [place] [address]  JON Elziondo [address]  Phone: [phone]  Fax: [phone]              Dear Dr. Mccullough,    Your patient, Deb Vega (: 1945), was scheduled at [facility].    Deb's encounter diagnosis is:  Dehydration  She arrived for her appointment, and  the scheduled treatment was given. These medications were administered to the patient: We administered NS..  Deb tolerated treatment.    Her next appointment is scheduled for 2017.    Pt has been complaining of worsening cramping and numbness/tingling not only to her lower extremities but also her hands now. Her home infusion nurse suggested she request a CMP. Since we see her frequently for hydration, it would be more convenient for her to have her labs drawn on a hydration day as opposed to being seen in your office or getting labs at an outside lab. She has also been noticing some changing moles. Would you be able to do a referral for a dermatologist?     Thanks for your attention to her care and you can fax the order for the CMP (and any other labs you may like) to [phone].     For more information, you may review the nurse's progress notes in chart review under the notes section.       Sincerely,  Nora Wakefield R.N.

## 2017-04-06 ENCOUNTER — OUTPATIENT INFUSION SERVICES (OUTPATIENT)
Dept: ONCOLOGY | Facility: MEDICAL CENTER | Age: 72
End: 2017-04-06
Attending: INTERNAL MEDICINE
Payer: MEDICARE

## 2017-04-06 VITALS
HEART RATE: 93 BPM | SYSTOLIC BLOOD PRESSURE: 89 MMHG | BODY MASS INDEX: 20.08 KG/M2 | DIASTOLIC BLOOD PRESSURE: 52 MMHG | WEIGHT: 113.32 LBS | OXYGEN SATURATION: 92 % | RESPIRATION RATE: 18 BRPM | TEMPERATURE: 98.5 F | HEIGHT: 63 IN

## 2017-04-06 PROCEDURE — 700105 HCHG RX REV CODE 258: Performed by: INTERNAL MEDICINE

## 2017-04-06 PROCEDURE — 96360 HYDRATION IV INFUSION INIT: CPT

## 2017-04-06 RX ORDER — SODIUM CHLORIDE 9 MG/ML
1000 INJECTION, SOLUTION INTRAVENOUS ONCE
Status: COMPLETED | OUTPATIENT
Start: 2017-04-06 | End: 2017-04-06

## 2017-04-06 RX ADMIN — SODIUM CHLORIDE 500 ML: 9 INJECTION, SOLUTION INTRAVENOUS at 14:30

## 2017-04-06 ASSESSMENT — PAIN SCALES - GENERAL: PAINLEVEL: 5=MODERATE PAIN

## 2017-04-07 NOTE — PROGRESS NOTES
Pt presented for weekly IV hydration. PIV started, brisk blood return observed. Pt elected to have only 500cc of NS at 400cc/hr. Fluids infused with no s/s of adverse reaction. BP taken after infusion finished, 89/52, pt asymptomatic and reports her BP is always lower after sitting in chair for a while. PIV flushed and removed, gauze dressing placed. Has next appt, left on foot in good spirits.

## 2017-04-10 ENCOUNTER — TELEPHONE (OUTPATIENT)
Dept: MEDICAL GROUP | Facility: CLINIC | Age: 72
End: 2017-04-10

## 2017-04-10 ENCOUNTER — OUTPATIENT INFUSION SERVICES (OUTPATIENT)
Dept: ONCOLOGY | Facility: MEDICAL CENTER | Age: 72
End: 2017-04-10
Attending: INTERNAL MEDICINE
Payer: MEDICARE

## 2017-04-10 ENCOUNTER — TELEPHONE (OUTPATIENT)
Dept: PULMONOLOGY | Facility: HOSPICE | Age: 72
End: 2017-04-10

## 2017-04-10 VITALS
SYSTOLIC BLOOD PRESSURE: 95 MMHG | DIASTOLIC BLOOD PRESSURE: 56 MMHG | OXYGEN SATURATION: 95 % | HEART RATE: 95 BPM | WEIGHT: 113.1 LBS | TEMPERATURE: 98.7 F | RESPIRATION RATE: 18 BRPM | HEIGHT: 63 IN | BODY MASS INDEX: 20.04 KG/M2

## 2017-04-10 DIAGNOSIS — G93.32 CHRONIC FATIGUE SYNDROME: ICD-10-CM

## 2017-04-10 DIAGNOSIS — R25.2 CRAMPING OF FEET: ICD-10-CM

## 2017-04-10 PROCEDURE — 96360 HYDRATION IV INFUSION INIT: CPT

## 2017-04-10 PROCEDURE — 700105 HCHG RX REV CODE 258: Performed by: INTERNAL MEDICINE

## 2017-04-10 RX ORDER — SODIUM CHLORIDE 9 MG/ML
1000 INJECTION, SOLUTION INTRAVENOUS ONCE
Status: COMPLETED | OUTPATIENT
Start: 2017-04-10 | End: 2017-04-10

## 2017-04-10 RX ADMIN — SODIUM CHLORIDE 1000 ML: 9 INJECTION, SOLUTION INTRAVENOUS at 15:00

## 2017-04-10 ASSESSMENT — PAIN SCALES - GENERAL: PAINLEVEL: 6=MODERATE PAIN

## 2017-04-10 NOTE — TELEPHONE ENCOUNTER
1. Caller Name: Deb Vega                      Call Back Number: [phone] (home)     2. Message: pt called to please enter a CMP order, pt is already at the infusion center she is having leg cramping so she wants to be tested. She was informed Dr. schwartz is gone for the day, pt requested to ask Latasha for an order if would please. Thank you     3. Patient approves office to leave a detailed voicemail/MyChart message: yes

## 2017-04-10 NOTE — PROGRESS NOTES
Patient presents ambulatory for hydration appt.  Patient reports feeling well and no s/s of infection at this time.  Patient requests RN to call MD office for possible lab orders as she is reporting increased cramping to lower extremities.  Phone call made to pulmonary MD office, voicemail left for MA.  IV established and hydration given per MD order with no complications or adverse reactions.  Patient reached MD office during her visit and they declined to order the labs and asked patient to contact primary MD.  Patient will contact primary MD and will schedule a separate lab appt if she needs to.  IV discontinued, gauze, and coban applied to site.  Patient to return 4/13/17 for next appt, confirmed with patient.  Patient left ambulatory in no distress.

## 2017-04-10 NOTE — TELEPHONE ENCOUNTER
Patient called wanting order for CMP, she states she has leg and arm cramping, I talked to Lilian Perez who reviewed her chart and it does not seem appropriate to have that ordered through our office. I advised patient to contact Dr. Mccullough who looks like manages her dehydration and possible IVIG.

## 2017-04-13 ENCOUNTER — OUTPATIENT INFUSION SERVICES (OUTPATIENT)
Dept: ONCOLOGY | Facility: MEDICAL CENTER | Age: 72
End: 2017-04-13
Attending: INTERNAL MEDICINE
Payer: MEDICARE

## 2017-04-13 VITALS
DIASTOLIC BLOOD PRESSURE: 70 MMHG | OXYGEN SATURATION: 95 % | RESPIRATION RATE: 20 BRPM | TEMPERATURE: 97.9 F | SYSTOLIC BLOOD PRESSURE: 105 MMHG | HEIGHT: 63 IN | HEART RATE: 90 BPM | WEIGHT: 113.32 LBS | BODY MASS INDEX: 20.08 KG/M2

## 2017-04-13 DIAGNOSIS — G93.32 CHRONIC FATIGUE SYNDROME: ICD-10-CM

## 2017-04-13 LAB
ALBUMIN SERPL BCP-MCNC: 4 G/DL (ref 3.2–4.9)
ALBUMIN/GLOB SERPL: 1.5 G/DL
ALP SERPL-CCNC: 52 U/L (ref 30–99)
ALT SERPL-CCNC: 15 U/L (ref 2–50)
ANION GAP SERPL CALC-SCNC: 6 MMOL/L (ref 0–11.9)
AST SERPL-CCNC: 18 U/L (ref 12–45)
BILIRUB SERPL-MCNC: 0.4 MG/DL (ref 0.1–1.5)
BUN SERPL-MCNC: 10 MG/DL (ref 8–22)
CALCIUM SERPL-MCNC: 9.3 MG/DL (ref 8.5–10.5)
CHLORIDE SERPL-SCNC: 103 MMOL/L (ref 96–112)
CO2 SERPL-SCNC: 30 MMOL/L (ref 20–33)
CREAT SERPL-MCNC: 0.48 MG/DL (ref 0.5–1.4)
GFR SERPL CREATININE-BSD FRML MDRD: >60 ML/MIN/1.73 M 2
GLOBULIN SER CALC-MCNC: 2.7 G/DL (ref 1.9–3.5)
GLUCOSE SERPL-MCNC: 102 MG/DL (ref 65–99)
POTASSIUM SERPL-SCNC: 4 MMOL/L (ref 3.6–5.5)
PROT SERPL-MCNC: 6.7 G/DL (ref 6–8.2)
SODIUM SERPL-SCNC: 139 MMOL/L (ref 135–145)

## 2017-04-13 PROCEDURE — 36415 COLL VENOUS BLD VENIPUNCTURE: CPT

## 2017-04-13 PROCEDURE — 80053 COMPREHEN METABOLIC PANEL: CPT

## 2017-04-13 ASSESSMENT — PAIN SCALES - GENERAL: PAINLEVEL: 7=MODERATE-SEVERE PAIN

## 2017-04-13 NOTE — PROGRESS NOTES
Pt arrived to IS, ambulatory, for labs. Pt voices no new complaints. Labs drawn with 23g butterfly needle in the R-AC. Pt left IS with no s/sx of distress. Follow up appointment confirmed.

## 2017-04-17 ENCOUNTER — OUTPATIENT INFUSION SERVICES (OUTPATIENT)
Dept: ONCOLOGY | Facility: MEDICAL CENTER | Age: 72
End: 2017-04-17
Attending: INTERNAL MEDICINE
Payer: MEDICARE

## 2017-04-17 VITALS
OXYGEN SATURATION: 93 % | WEIGHT: 112.88 LBS | DIASTOLIC BLOOD PRESSURE: 55 MMHG | SYSTOLIC BLOOD PRESSURE: 94 MMHG | HEIGHT: 63 IN | TEMPERATURE: 98.4 F | RESPIRATION RATE: 20 BRPM | BODY MASS INDEX: 20 KG/M2 | HEART RATE: 107 BPM

## 2017-04-17 PROCEDURE — 96360 HYDRATION IV INFUSION INIT: CPT

## 2017-04-17 PROCEDURE — 700105 HCHG RX REV CODE 258: Performed by: INTERNAL MEDICINE

## 2017-04-17 RX ORDER — SODIUM CHLORIDE 9 MG/ML
1000 INJECTION, SOLUTION INTRAVENOUS ONCE
Status: COMPLETED | OUTPATIENT
Start: 2017-04-17 | End: 2017-04-17

## 2017-04-17 RX ADMIN — SODIUM CHLORIDE 1000 ML: 9 INJECTION, SOLUTION INTRAVENOUS at 14:17

## 2017-04-17 ASSESSMENT — PAIN SCALES - GENERAL: PAINLEVEL: 7=MODERATE-SEVERE PAIN

## 2017-04-18 NOTE — PROGRESS NOTES
Message left for patient to return call to clinic   Pt here for hydration.  PIV started.  Pt requested to have 500 ml running at a rate of 400 ml/hr.  Pt c/o leg cramping MD aware.  Hydration competed without issue.  PIV removed post infusion.  Next apt confirmed.  Pt left IC, no s/s of distress.  Next apt confirmed.

## 2017-04-19 NOTE — TELEPHONE ENCOUNTER
There are orders for the patient on the system, left message for Deb to please call us back to find out if she needs something else to be order and I will be happy to schedule her an apt.

## 2017-04-19 NOTE — TELEPHONE ENCOUNTER
Voicemail: [facility] called requesting blood orders for Deb Maame will be back tomorrow Thursday. Ok to call the patient to follow up on blood orders.

## 2017-04-19 NOTE — TELEPHONE ENCOUNTER
Spoke to Dora at the infusion center could you please order a magnesium test since pt is getting hydration and pt is complaining of leg cramping may be something is been missed in the magnesium. Please order.

## 2017-04-20 ENCOUNTER — OUTPATIENT INFUSION SERVICES (OUTPATIENT)
Dept: ONCOLOGY | Facility: MEDICAL CENTER | Age: 72
End: 2017-04-20
Attending: INTERNAL MEDICINE
Payer: MEDICARE

## 2017-04-20 VITALS
OXYGEN SATURATION: 97 % | HEIGHT: 63 IN | SYSTOLIC BLOOD PRESSURE: 96 MMHG | WEIGHT: 113.76 LBS | RESPIRATION RATE: 18 BRPM | DIASTOLIC BLOOD PRESSURE: 60 MMHG | BODY MASS INDEX: 20.16 KG/M2 | HEART RATE: 90 BPM | TEMPERATURE: 98.4 F

## 2017-04-20 DIAGNOSIS — R25.2 CRAMPING OF FEET: ICD-10-CM

## 2017-04-20 DIAGNOSIS — R09.02 HYPOXEMIA: ICD-10-CM

## 2017-04-20 LAB
BASOPHILS # BLD AUTO: 1.3 % (ref 0–1.8)
BASOPHILS # BLD: 0.08 K/UL (ref 0–0.12)
EOSINOPHIL # BLD AUTO: 0.06 K/UL (ref 0–0.51)
EOSINOPHIL NFR BLD: 0.9 % (ref 0–6.9)
ERYTHROCYTE [DISTWIDTH] IN BLOOD BY AUTOMATED COUNT: 44.3 FL (ref 35.9–50)
HCT VFR BLD AUTO: 41.6 % (ref 37–47)
HGB BLD-MCNC: 13.9 G/DL (ref 12–16)
IMM GRANULOCYTES # BLD AUTO: 0.01 K/UL (ref 0–0.11)
IMM GRANULOCYTES NFR BLD AUTO: 0.2 % (ref 0–0.9)
LYMPHOCYTES # BLD AUTO: 1.42 K/UL (ref 1–4.8)
LYMPHOCYTES NFR BLD: 22.5 % (ref 22–41)
MAGNESIUM SERPL-MCNC: 2.2 MG/DL (ref 1.5–2.5)
MCH RBC QN AUTO: 31.1 PG (ref 27–33)
MCHC RBC AUTO-ENTMCNC: 33.4 G/DL (ref 33.6–35)
MCV RBC AUTO: 93.1 FL (ref 81.4–97.8)
MONOCYTES # BLD AUTO: 0.39 K/UL (ref 0–0.85)
MONOCYTES NFR BLD AUTO: 6.2 % (ref 0–13.4)
NEUTROPHILS # BLD AUTO: 4.36 K/UL (ref 2–7.15)
NEUTROPHILS NFR BLD: 68.9 % (ref 44–72)
NRBC # BLD AUTO: 0 K/UL
NRBC BLD AUTO-RTO: 0 /100 WBC
PLATELET # BLD AUTO: 252 K/UL (ref 164–446)
PMV BLD AUTO: 9.5 FL (ref 9–12.9)
RBC # BLD AUTO: 4.47 M/UL (ref 4.2–5.4)
WBC # BLD AUTO: 6.3 K/UL (ref 4.8–10.8)

## 2017-04-20 PROCEDURE — 96360 HYDRATION IV INFUSION INIT: CPT

## 2017-04-20 PROCEDURE — 83735 ASSAY OF MAGNESIUM: CPT

## 2017-04-20 PROCEDURE — 85025 COMPLETE CBC W/AUTO DIFF WBC: CPT

## 2017-04-20 PROCEDURE — 36415 COLL VENOUS BLD VENIPUNCTURE: CPT

## 2017-04-20 PROCEDURE — 96365 THER/PROPH/DIAG IV INF INIT: CPT

## 2017-04-20 PROCEDURE — 700105 HCHG RX REV CODE 258: Performed by: INTERNAL MEDICINE

## 2017-04-20 RX ORDER — SODIUM CHLORIDE 9 MG/ML
1000 INJECTION, SOLUTION INTRAVENOUS ONCE
Status: COMPLETED | OUTPATIENT
Start: 2017-04-20 | End: 2017-04-20

## 2017-04-20 RX ADMIN — SODIUM CHLORIDE 1000 ML: 9 INJECTION, SOLUTION INTRAVENOUS at 14:38

## 2017-04-20 ASSESSMENT — PAIN SCALES - GENERAL: PAINLEVEL: 6=MODERATE PAIN

## 2017-04-20 NOTE — PROGRESS NOTES
Pt is here for her scheduled hydration. Wearing plastic gloves when outdoors in public. Labs drawn (CBC/Mg). Pt states having had some muscle cramping and thought it might be Mg related.  500ml of NS infused at 400ml/h per pt request. Vitals taken upon the completion (see EPIC). Discharged home to self care. Next appointment verified.

## 2017-04-24 ENCOUNTER — OUTPATIENT INFUSION SERVICES (OUTPATIENT)
Dept: ONCOLOGY | Facility: MEDICAL CENTER | Age: 72
End: 2017-04-24
Attending: INTERNAL MEDICINE
Payer: MEDICARE

## 2017-04-24 VITALS
HEART RATE: 92 BPM | TEMPERATURE: 99.4 F | HEIGHT: 63 IN | BODY MASS INDEX: 20.16 KG/M2 | OXYGEN SATURATION: 97 % | WEIGHT: 113.76 LBS | DIASTOLIC BLOOD PRESSURE: 43 MMHG | RESPIRATION RATE: 20 BRPM | SYSTOLIC BLOOD PRESSURE: 82 MMHG

## 2017-04-24 PROCEDURE — 700105 HCHG RX REV CODE 258: Performed by: INTERNAL MEDICINE

## 2017-04-24 PROCEDURE — 96360 HYDRATION IV INFUSION INIT: CPT

## 2017-04-24 RX ORDER — SODIUM CHLORIDE 9 MG/ML
1000 INJECTION, SOLUTION INTRAVENOUS ONCE
Status: COMPLETED | OUTPATIENT
Start: 2017-04-24 | End: 2017-04-24

## 2017-04-24 RX ADMIN — SODIUM CHLORIDE 1000 ML: 9 INJECTION, SOLUTION INTRAVENOUS at 14:21

## 2017-04-24 ASSESSMENT — PAIN SCALES - GENERAL: PAINLEVEL: 6=MODERATE PAIN

## 2017-04-24 NOTE — PROGRESS NOTES
"Pt arrived to infusion for routine hydration. PIV access est, brisk blood return. Pt requesting 500ml at 400ml/hr. Bp evaluated post transfusion. Pt remains hypotensive, states \"thats good for me.\" PIV flushed and removed. Pt dc'd home self care in no acute distress. Future appointment confirmed.   "

## 2017-04-25 ENCOUNTER — TELEPHONE (OUTPATIENT)
Dept: PULMONOLOGY | Facility: HOSPICE | Age: 72
End: 2017-04-25

## 2017-04-25 DIAGNOSIS — G93.32 CHRONIC FATIGUE SYNDROME: ICD-10-CM

## 2017-04-25 NOTE — TELEPHONE ENCOUNTER
Next Visit: 05/17/17-Binu   DX: COPD      Pt nose is raw and sore from using O2, is it okay for her to use OTC Ponaris?

## 2017-04-26 ENCOUNTER — SLEEP STUDY (OUTPATIENT)
Dept: SLEEP MEDICINE | Facility: MEDICAL CENTER | Age: 72
End: 2017-04-26
Attending: INTERNAL MEDICINE
Payer: MEDICARE

## 2017-04-26 DIAGNOSIS — G47.33 OSA (OBSTRUCTIVE SLEEP APNEA): ICD-10-CM

## 2017-04-26 PROCEDURE — 95811 POLYSOM 6/>YRS CPAP 4/> PARM: CPT | Performed by: INTERNAL MEDICINE

## 2017-04-27 ENCOUNTER — APPOINTMENT (OUTPATIENT)
Dept: ONCOLOGY | Facility: MEDICAL CENTER | Age: 72
End: 2017-04-27
Attending: INTERNAL MEDICINE
Payer: MEDICARE

## 2017-04-28 NOTE — PROCEDURES
Clinical Comments:  The patient underwent a comprehensive polysomnogram using the standard montage for measurement of parameters of sleep, respiratory events, movement abnormalities, heart rate and rhythm. A microphone was used to monitor snoring.      INTERPRETATION:  The total recording time was 358.9 minutes with a sleep period of 295.5 minutes and the total sleep time was 197.5 minutes with a sleep efficiency of 55.0%.  The sleep latency was 63.3 minutes, and REM latency was N/A minutes.  The patient experienced 26 arousals in total, for an arousal index of 7.9    RESPIRATORY: The patient had 0 apneas in total.  Of these, 0 were obstructive apneas, and 0 were central apneas.  This resulted in an apnea index (AI) of 0.0.  The patient had 1 hypopneas, for a hypopnea index of 0.3.  The overall AHI was 0.3, while the AHI during Stage R sleep was N/A.  AHI while supine was N/A.    OXIMETRY: Oxygen saturation monitoring showed a mean SpO2 of 83.9%, with a minimum oxygen saturation of 75.0%.  Oxygen saturations were less than or = 89% for 186.9 minutes of sleep time.    CARDIAC: The highest heart rate during the recording was 112.0 beats per minute.  The average heart rate during sleep was 88.4 bpm.    LIMB MOVEMENTS: There were a total of 148 PLMs during sleep, of which 3 were PLMs arousals.  This resulted in a PLMS index of 45.0.    Interpretation:    This was an overnight diagnostic polysomnogram. The patient's sleep efficiency was significantly reduced at 55% and there were no REM periods. The sleep stage proportions were 98 minutes of wake after sleep onset, 60.5 minutes of stage I sleep, 180 minutes of stage II sleep, one minute of stage III sleep, and no REM. The latency to sleep was prolonged to 63 minutes. While the patient had a limb movement index of 45, most did not provoke arousals or disrupt sleep continuity.    No significant sleep disordered breathing was found. The apnea hypopnea index was 0.3 and  the respiratory disturbance index was 6.4. The lowest saturation during sleep was 75% and saturations were normal that is greater than or equal to 90% for 19 % of the recording.     Recommendation:    Positive airway pressure is not needed. The patient's nocturnal hypoxemia is likely on the basis of lung disease and the patient is a candidate for nocturnal oxygen supplementation.

## 2017-05-01 ENCOUNTER — OUTPATIENT INFUSION SERVICES (OUTPATIENT)
Dept: ONCOLOGY | Facility: MEDICAL CENTER | Age: 72
End: 2017-05-01
Attending: INTERNAL MEDICINE
Payer: MEDICARE

## 2017-05-01 VITALS
SYSTOLIC BLOOD PRESSURE: 88 MMHG | BODY MASS INDEX: 20.12 KG/M2 | RESPIRATION RATE: 18 BRPM | TEMPERATURE: 97.6 F | DIASTOLIC BLOOD PRESSURE: 47 MMHG | HEART RATE: 93 BPM | OXYGEN SATURATION: 93 % | WEIGHT: 113.54 LBS | HEIGHT: 63 IN

## 2017-05-01 PROCEDURE — 96360 HYDRATION IV INFUSION INIT: CPT

## 2017-05-01 PROCEDURE — 700105 HCHG RX REV CODE 258: Performed by: INTERNAL MEDICINE

## 2017-05-01 RX ORDER — SODIUM CHLORIDE 9 MG/ML
1000 INJECTION, SOLUTION INTRAVENOUS ONCE
Status: COMPLETED | OUTPATIENT
Start: 2017-05-01 | End: 2017-05-01

## 2017-05-01 RX ADMIN — SODIUM CHLORIDE 500 ML: 9 INJECTION, SOLUTION INTRAVENOUS at 14:45

## 2017-05-01 ASSESSMENT — PAIN SCALES - GENERAL: PAINLEVEL: 6=MODERATE PAIN

## 2017-05-02 NOTE — PROGRESS NOTES
"Late entry for 1615:    Pt arrives for scheduled hydration. Reports that she has not been feeling well and recently completed a sleep study in which she inquires about result. Informed pt study is to be reviewed by MD and ordering MD may read her the impression once reviewed. PIV attempt in L FA unsuccessful, gauze dressing applied. L upper FA PIV established; brisk blood return observed. Pt requests 500mL total volume of NS and at a rate of 400mL/hr with BP assessed at completion. Hydration completed w/o adverse s/s reported or observed. See epic flow sheet for vitals. Pt reports BP \"that's good for me\". PIV flushed and removed per policy, gauze dressing applied. Pt d/c'd in good condition no distress observed. Future appt confirmed.   "

## 2017-05-04 ENCOUNTER — OUTPATIENT INFUSION SERVICES (OUTPATIENT)
Dept: ONCOLOGY | Facility: MEDICAL CENTER | Age: 72
End: 2017-05-04
Attending: INTERNAL MEDICINE
Payer: MEDICARE

## 2017-05-04 VITALS
DIASTOLIC BLOOD PRESSURE: 52 MMHG | SYSTOLIC BLOOD PRESSURE: 99 MMHG | WEIGHT: 112.21 LBS | HEIGHT: 63 IN | BODY MASS INDEX: 19.88 KG/M2 | RESPIRATION RATE: 18 BRPM | HEART RATE: 104 BPM | TEMPERATURE: 99.2 F | OXYGEN SATURATION: 95 %

## 2017-05-04 PROCEDURE — 96360 HYDRATION IV INFUSION INIT: CPT

## 2017-05-04 PROCEDURE — 700105 HCHG RX REV CODE 258: Performed by: INTERNAL MEDICINE

## 2017-05-04 RX ORDER — SODIUM CHLORIDE 9 MG/ML
1000 INJECTION, SOLUTION INTRAVENOUS ONCE
Status: COMPLETED | OUTPATIENT
Start: 2017-05-04 | End: 2017-05-04

## 2017-05-04 RX ADMIN — SODIUM CHLORIDE 500 ML: 9 INJECTION, SOLUTION INTRAVENOUS at 14:28

## 2017-05-04 ASSESSMENT — PAIN SCALES - GENERAL: PAINLEVEL: 4=SLIGHT-MODERATE PAIN

## 2017-05-05 NOTE — PROGRESS NOTES
Pt arrives for scheduled hydration. Reports she is feeling slightly better since last visit otherwise no changes. Assessment complete. POC reviewed. PIV established in L FA; brisk blood return observed. Pt requests 500mL total volume of NS and at a rate of 400mL/hr with BP assessed at completion. Hydration completed w/o adverse s/s reported or observed. See epic flow sheet for vitals.Pt states she feels good and is satisfied w/ vitals. PIV flushed and removed per policy, gauze dressing applied. Pt d/c'd in good condition no distress observed. Future appt confirmed.

## 2017-05-08 ENCOUNTER — OUTPATIENT INFUSION SERVICES (OUTPATIENT)
Dept: ONCOLOGY | Facility: MEDICAL CENTER | Age: 72
End: 2017-05-08
Attending: INTERNAL MEDICINE
Payer: MEDICARE

## 2017-05-08 VITALS
DIASTOLIC BLOOD PRESSURE: 63 MMHG | OXYGEN SATURATION: 92 % | SYSTOLIC BLOOD PRESSURE: 87 MMHG | BODY MASS INDEX: 19.92 KG/M2 | HEART RATE: 75 BPM | HEIGHT: 63 IN | WEIGHT: 112.43 LBS | TEMPERATURE: 98.6 F | RESPIRATION RATE: 20 BRPM

## 2017-05-08 PROCEDURE — 700105 HCHG RX REV CODE 258: Performed by: INTERNAL MEDICINE

## 2017-05-08 PROCEDURE — 96360 HYDRATION IV INFUSION INIT: CPT

## 2017-05-08 RX ORDER — SODIUM CHLORIDE 9 MG/ML
1000 INJECTION, SOLUTION INTRAVENOUS ONCE
Status: COMPLETED | OUTPATIENT
Start: 2017-05-08 | End: 2017-05-08

## 2017-05-08 RX ADMIN — SODIUM CHLORIDE 1000 ML: 9 INJECTION, SOLUTION INTRAVENOUS at 14:10

## 2017-05-08 ASSESSMENT — PAIN SCALES - GENERAL: PAINLEVEL: 6=MODERATE PAIN

## 2017-05-08 NOTE — PROGRESS NOTES
Pt arrived to IS ambulatory, oxygen dependent, here for hydration. IV access established. Hydration infused as requested by pt. At completion of infusion, VS assessed and pt determined she would not need remaining fluid. Discharged home under self care in no apparent distress. Knows when to follow up.

## 2017-05-11 ENCOUNTER — APPOINTMENT (OUTPATIENT)
Dept: RADIOLOGY | Facility: MEDICAL CENTER | Age: 72
End: 2017-05-11
Attending: INTERNAL MEDICINE
Payer: MEDICARE

## 2017-05-11 ENCOUNTER — OUTPATIENT INFUSION SERVICES (OUTPATIENT)
Dept: ONCOLOGY | Facility: MEDICAL CENTER | Age: 72
End: 2017-05-11
Attending: INTERNAL MEDICINE
Payer: MEDICARE

## 2017-05-15 ENCOUNTER — OUTPATIENT INFUSION SERVICES (OUTPATIENT)
Dept: ONCOLOGY | Facility: MEDICAL CENTER | Age: 72
End: 2017-05-15
Attending: INTERNAL MEDICINE
Payer: MEDICARE

## 2017-05-15 VITALS
HEART RATE: 94 BPM | DIASTOLIC BLOOD PRESSURE: 64 MMHG | WEIGHT: 113.98 LBS | OXYGEN SATURATION: 91 % | RESPIRATION RATE: 18 BRPM | SYSTOLIC BLOOD PRESSURE: 104 MMHG | BODY MASS INDEX: 20.2 KG/M2 | TEMPERATURE: 97.2 F | HEIGHT: 63 IN

## 2017-05-15 PROCEDURE — 96360 HYDRATION IV INFUSION INIT: CPT

## 2017-05-15 PROCEDURE — 700105 HCHG RX REV CODE 258: Performed by: INTERNAL MEDICINE

## 2017-05-15 RX ORDER — SODIUM CHLORIDE 9 MG/ML
1000 INJECTION, SOLUTION INTRAVENOUS ONCE
Status: COMPLETED | OUTPATIENT
Start: 2017-05-15 | End: 2017-05-15

## 2017-05-15 RX ADMIN — SODIUM CHLORIDE 1000 ML: 9 INJECTION, SOLUTION INTRAVENOUS at 13:57

## 2017-05-15 ASSESSMENT — PAIN SCALES - GENERAL: PAINLEVEL: 5=MODERATE PAIN

## 2017-05-15 NOTE — PROGRESS NOTES
Patient presents for hydration. Reviewed plan of care, patient verbalizes understanding. IV started, flushes well with brisk blood return. Hydration infused at a rate of 400 mL/hr for a total volume of 500 mL per patient request. Infusion completed with no new patient complaints, line flushed clear. IV discontinued, catheter intact, compression dressing to site. Patient scheduled for next appointment and released in no acute distress.

## 2017-05-17 ENCOUNTER — APPOINTMENT (OUTPATIENT)
Dept: PULMONOLOGY | Facility: HOSPICE | Age: 72
End: 2017-05-17
Payer: MEDICARE

## 2017-05-18 ENCOUNTER — OUTPATIENT INFUSION SERVICES (OUTPATIENT)
Dept: ONCOLOGY | Facility: MEDICAL CENTER | Age: 72
End: 2017-05-18
Attending: INTERNAL MEDICINE
Payer: MEDICARE

## 2017-05-18 VITALS
TEMPERATURE: 99.2 F | HEIGHT: 63 IN | OXYGEN SATURATION: 93 % | HEART RATE: 93 BPM | RESPIRATION RATE: 20 BRPM | DIASTOLIC BLOOD PRESSURE: 64 MMHG | SYSTOLIC BLOOD PRESSURE: 96 MMHG | WEIGHT: 113.1 LBS | BODY MASS INDEX: 20.04 KG/M2

## 2017-05-18 PROCEDURE — 700105 HCHG RX REV CODE 258: Performed by: INTERNAL MEDICINE

## 2017-05-18 PROCEDURE — 96360 HYDRATION IV INFUSION INIT: CPT

## 2017-05-18 RX ORDER — SODIUM CHLORIDE 9 MG/ML
INJECTION, SOLUTION INTRAVENOUS ONCE
Status: COMPLETED | OUTPATIENT
Start: 2017-05-18 | End: 2017-05-18

## 2017-05-18 RX ADMIN — SODIUM CHLORIDE 500 ML: 9 INJECTION, SOLUTION INTRAVENOUS at 13:56

## 2017-05-18 ASSESSMENT — PAIN SCALES - GENERAL: PAINLEVEL: 4=SLIGHT-MODERATE PAIN

## 2017-05-18 NOTE — PROGRESS NOTES
Pt arrived to infusion center for hydration. PIV started, brisk blood return observed. Pt elects to receive 500cc at 400 cc /hr. NS infused with no s/s of adverse effect, pt declined having BP done afterwards, reports feeling fine. PIV flushed and removed, gauze and coban dressing placed. Has next several appts. Left on foot in good spirits.

## 2017-05-22 ENCOUNTER — OUTPATIENT INFUSION SERVICES (OUTPATIENT)
Dept: ONCOLOGY | Facility: MEDICAL CENTER | Age: 72
End: 2017-05-22
Attending: INTERNAL MEDICINE
Payer: MEDICARE

## 2017-05-22 VITALS
BODY MASS INDEX: 20.16 KG/M2 | WEIGHT: 113.76 LBS | OXYGEN SATURATION: 85 % | HEIGHT: 63 IN | DIASTOLIC BLOOD PRESSURE: 65 MMHG | SYSTOLIC BLOOD PRESSURE: 100 MMHG | RESPIRATION RATE: 20 BRPM | TEMPERATURE: 99.4 F | HEART RATE: 103 BPM

## 2017-05-22 PROCEDURE — 700105 HCHG RX REV CODE 258: Performed by: INTERNAL MEDICINE

## 2017-05-22 PROCEDURE — 96360 HYDRATION IV INFUSION INIT: CPT

## 2017-05-22 RX ORDER — SODIUM CHLORIDE 9 MG/ML
1000 INJECTION, SOLUTION INTRAVENOUS ONCE
Status: COMPLETED | OUTPATIENT
Start: 2017-05-22 | End: 2017-05-22

## 2017-05-22 RX ADMIN — SODIUM CHLORIDE 1000 ML: 9 INJECTION, SOLUTION INTRAVENOUS at 13:58

## 2017-05-22 ASSESSMENT — PAIN SCALES - GENERAL: PAINLEVEL: 3=SLIGHT PAIN

## 2017-05-22 NOTE — PROGRESS NOTES
Pt ambulated into department on home oxygen for her bi-weekly hydration for Tachycardia. Pt denied having any acute complaints today, VSS upon arrival. PIV started, had + blood return, flushed briskly. Pt requested that fluid be given at 400 ml/hr and that only 500 ml of NS be given. Pt refused that vitals be taken after. Marleen BENITO discontinued Pt's IV, bleeding controlled with gauze and coban. Future appointments confirmed with Pt prior to leaving, left department by self appearing in good spirits and NAD using home O2.

## 2017-05-23 ENCOUNTER — APPOINTMENT (OUTPATIENT)
Dept: PULMONOLOGY | Facility: HOSPICE | Age: 72
End: 2017-05-23
Payer: MEDICARE

## 2017-05-23 ENCOUNTER — TELEPHONE (OUTPATIENT)
Dept: PULMONOLOGY | Facility: HOSPICE | Age: 72
End: 2017-05-23

## 2017-05-23 NOTE — TELEPHONE ENCOUNTER
She needs to use sugar free candy to suck on instead of menthol cough drops that are meant for congestion.

## 2017-05-23 NOTE — TELEPHONE ENCOUNTER
1. Caller Name: Pt                       Call Back Number: [phone]    2. Message: Pt wants to know if its okay to use halls menthol cough drops as a crutch while she quits smoking. The bag has a warning about using them if she has a chronic cough from Emphysema. It also states to use only every 2 hours but she is using them more often.    Last Seen: 02/07/17Adin  Next Visit: 06/06/17-Brown  DX: Emphysema        3. Patient approves office to leave a detailed voicemail/MyChart message: no

## 2017-05-25 ENCOUNTER — APPOINTMENT (OUTPATIENT)
Dept: ONCOLOGY | Facility: MEDICAL CENTER | Age: 72
End: 2017-05-25
Attending: INTERNAL MEDICINE
Payer: MEDICARE

## 2017-05-29 ENCOUNTER — OUTPATIENT INFUSION SERVICES (OUTPATIENT)
Dept: ONCOLOGY | Facility: MEDICAL CENTER | Age: 72
End: 2017-05-29
Attending: INTERNAL MEDICINE
Payer: MEDICARE

## 2017-05-29 VITALS
SYSTOLIC BLOOD PRESSURE: 111 MMHG | OXYGEN SATURATION: 92 % | HEIGHT: 63 IN | BODY MASS INDEX: 19.92 KG/M2 | DIASTOLIC BLOOD PRESSURE: 67 MMHG | HEART RATE: 98 BPM | WEIGHT: 112.43 LBS | TEMPERATURE: 99.3 F | RESPIRATION RATE: 18 BRPM

## 2017-05-29 DIAGNOSIS — R00.0 TACHYCARDIA: ICD-10-CM

## 2017-05-29 DIAGNOSIS — G90.3 NEUROGENIC ORTHOSTATIC HYPOTENSION (HCC): ICD-10-CM

## 2017-05-29 PROCEDURE — 96360 HYDRATION IV INFUSION INIT: CPT

## 2017-05-29 PROCEDURE — 700105 HCHG RX REV CODE 258: Performed by: INTERNAL MEDICINE

## 2017-05-29 RX ORDER — SODIUM CHLORIDE 9 MG/ML
INJECTION, SOLUTION INTRAVENOUS ONCE
Status: COMPLETED | OUTPATIENT
Start: 2017-05-29 | End: 2017-05-29

## 2017-05-29 RX ADMIN — SODIUM CHLORIDE: 9 INJECTION, SOLUTION INTRAVENOUS at 13:46

## 2017-05-29 ASSESSMENT — PAIN SCALES - GENERAL: PAINLEVEL: 2=MINIMAL-SLIGHT

## 2017-05-29 NOTE — PROGRESS NOTES
"Pt ambulated into department for her bi-weekly hydration for Tachycardia. Pt connected to [facility] oxygen by CNA upon arrival. Pt reported that she left home oxygen in car because she doesn't \"need it\" when walking between [facility] and her car. Pt denied having any new or acute complaints today, VSS upon arrival. PIV started, had + blood return, flushed briskly. Pt requested that fluid be given at 400 ml/hr and that only 500 ml of NS be given. Pt refused that vitals be taken after. IV discontinued, bleeding controlled with gauze and coban. Future appointments confirmed with Pt prior to leaving, left department by self appearing in good spirits and NAD. Told RN that she would put home O2 on once she got to her car.  "

## 2017-06-01 ENCOUNTER — OUTPATIENT INFUSION SERVICES (OUTPATIENT)
Dept: ONCOLOGY | Facility: MEDICAL CENTER | Age: 72
End: 2017-06-01
Attending: INTERNAL MEDICINE
Payer: MEDICARE

## 2017-06-01 VITALS
RESPIRATION RATE: 20 BRPM | HEIGHT: 63 IN | OXYGEN SATURATION: 97 % | HEART RATE: 100 BPM | BODY MASS INDEX: 20.04 KG/M2 | DIASTOLIC BLOOD PRESSURE: 61 MMHG | WEIGHT: 113.1 LBS | SYSTOLIC BLOOD PRESSURE: 98 MMHG | TEMPERATURE: 99.4 F

## 2017-06-01 PROCEDURE — 96360 HYDRATION IV INFUSION INIT: CPT

## 2017-06-01 PROCEDURE — 700105 HCHG RX REV CODE 258: Performed by: INTERNAL MEDICINE

## 2017-06-01 RX ORDER — SODIUM CHLORIDE 9 MG/ML
1000 INJECTION, SOLUTION INTRAVENOUS ONCE
Status: COMPLETED | OUTPATIENT
Start: 2017-06-01 | End: 2017-06-01

## 2017-06-01 RX ADMIN — SODIUM CHLORIDE 500 ML: 9 INJECTION, SOLUTION INTRAVENOUS at 13:58

## 2017-06-01 ASSESSMENT — PAIN SCALES - GENERAL: PAINLEVEL: 2=MINIMAL-SLIGHT

## 2017-06-01 NOTE — PROGRESS NOTES
Patient arrived ambulatory for hydration appt.  Patient reports feeling well and denies any s/s of infection at this time.  Patient on 2L O2 via [facility] of [facility]'s oxygen, patient declines wearing her O2 from car into [facility], patient educated regarding danger of going without even for a short time, patient verbalized understanding.  IV established and hydration given at a rate of 400mL/hr per patient request and patient requests only 500mLs be given.  Hydration completed with no complications or adverse reactions, patient declined having vital signs checked post hydration.  Patient to return 6/5/17 for next appt, confirmed with patient.  IV discontinued, gauze, and coban applied to site.  Patient left ambulatory in no distress.

## 2017-06-02 ENCOUNTER — HOSPITAL ENCOUNTER (OUTPATIENT)
Dept: RADIOLOGY | Facility: MEDICAL CENTER | Age: 72
End: 2017-06-02
Attending: INTERNAL MEDICINE
Payer: MEDICARE

## 2017-06-02 DIAGNOSIS — R91.1 LUNG NODULE: ICD-10-CM

## 2017-06-02 PROCEDURE — 71250 CT THORAX DX C-: CPT

## 2017-06-05 ENCOUNTER — OUTPATIENT INFUSION SERVICES (OUTPATIENT)
Dept: ONCOLOGY | Facility: MEDICAL CENTER | Age: 72
End: 2017-06-05
Attending: INTERNAL MEDICINE
Payer: MEDICARE

## 2017-06-05 VITALS
DIASTOLIC BLOOD PRESSURE: 69 MMHG | OXYGEN SATURATION: 98 % | TEMPERATURE: 99 F | HEIGHT: 63 IN | SYSTOLIC BLOOD PRESSURE: 103 MMHG | BODY MASS INDEX: 19.88 KG/M2 | RESPIRATION RATE: 20 BRPM | HEART RATE: 89 BPM | WEIGHT: 112.21 LBS

## 2017-06-05 PROCEDURE — 700105 HCHG RX REV CODE 258: Performed by: INTERNAL MEDICINE

## 2017-06-05 PROCEDURE — 96361 HYDRATE IV INFUSION ADD-ON: CPT

## 2017-06-05 PROCEDURE — 96360 HYDRATION IV INFUSION INIT: CPT

## 2017-06-05 RX ORDER — SODIUM CHLORIDE 9 MG/ML
1000 INJECTION, SOLUTION INTRAVENOUS ONCE
Status: COMPLETED | OUTPATIENT
Start: 2017-06-05 | End: 2017-06-05

## 2017-06-05 RX ADMIN — SODIUM CHLORIDE 1000 ML: 9 INJECTION, SOLUTION INTRAVENOUS at 13:51

## 2017-06-05 ASSESSMENT — PAIN SCALES - GENERAL: PAINLEVEL: 2=MINIMAL-SLIGHT

## 2017-06-05 NOTE — PROGRESS NOTES
Pt arrived to infusion for hydration. PIV access est, brisk blood return. Hydration administered, pt refusing full ordered amount. Requesting 500ml. Hydration complete. PIV flushed and removed. Pt dc'd home self care in no acute distress. Future appointment confirmed.

## 2017-06-06 ENCOUNTER — OFFICE VISIT (OUTPATIENT)
Dept: PULMONOLOGY | Facility: HOSPICE | Age: 72
End: 2017-06-06
Payer: MEDICARE

## 2017-06-06 VITALS
HEIGHT: 63 IN | HEART RATE: 118 BPM | BODY MASS INDEX: 20.2 KG/M2 | RESPIRATION RATE: 16 BRPM | OXYGEN SATURATION: 96 % | SYSTOLIC BLOOD PRESSURE: 118 MMHG | DIASTOLIC BLOOD PRESSURE: 68 MMHG | WEIGHT: 114 LBS

## 2017-06-06 DIAGNOSIS — Z99.81 SUPPLEMENTAL OXYGEN DEPENDENT: ICD-10-CM

## 2017-06-06 DIAGNOSIS — F17.200 SMOKING: Chronic | ICD-10-CM

## 2017-06-06 DIAGNOSIS — J43.8 OTHER EMPHYSEMA (HCC): ICD-10-CM

## 2017-06-06 DIAGNOSIS — R91.8 ABNORMAL CT SCAN OF LUNG: ICD-10-CM

## 2017-06-06 DIAGNOSIS — G47.34 NOCTURNAL HYPOXEMIA: ICD-10-CM

## 2017-06-06 PROCEDURE — 99214 OFFICE O/P EST MOD 30 MIN: CPT | Performed by: NURSE PRACTITIONER

## 2017-06-06 PROCEDURE — 3014F SCREEN MAMMO DOC REV: CPT | Performed by: NURSE PRACTITIONER

## 2017-06-06 PROCEDURE — G8420 CALC BMI NORM PARAMETERS: HCPCS | Performed by: NURSE PRACTITIONER

## 2017-06-06 PROCEDURE — 1101F PT FALLS ASSESS-DOCD LE1/YR: CPT | Performed by: NURSE PRACTITIONER

## 2017-06-06 PROCEDURE — 4040F PNEUMOC VAC/ADMIN/RCVD: CPT | Mod: 8P | Performed by: NURSE PRACTITIONER

## 2017-06-06 PROCEDURE — 4004F PT TOBACCO SCREEN RCVD TLK: CPT | Performed by: NURSE PRACTITIONER

## 2017-06-06 PROCEDURE — G8432 DEP SCR NOT DOC, RNG: HCPCS | Performed by: NURSE PRACTITIONER

## 2017-06-06 RX ORDER — CLINDAMYCIN HYDROCHLORIDE 150 MG/1
CAPSULE ORAL
Refills: 0 | COMMUNITY
Start: 2017-05-16 | End: 2017-10-05

## 2017-06-06 NOTE — PROGRESS NOTES
Chief Complaint   Patient presents with   • Results     SS       HPI:  Deb Vega is a 71 y.o. year old female here today for follow-up on COPD with hypoxia and abnormal CT scan. She was first evaluated 10/27/2016 by Dr. Tucker.  Patient was placed on Symbicort 160/4.5 µg 2 puffs twice a day, Spiriva once daily and Xopenex HFA inhaler when necessary. She also has nebulizer prn. Repeat PFT 2/7/2017 indicated stage III severe COPD with FEV1 0.90 L or 42% predicted, FEV1/FVC ratio 40, % predicted and a DLCO of 52% predicted. Patient does have significant bronchospasm response. Patient has a 55 year history of smoking and his tapered from 3 packs daily down to 6-9 cigarettes daily. She is motivated to quit. She uses oxygen 2L 24/7. She started using Ponaris due to chronic nasal dryness which has been very beneficial. She underwent sleep testing due to chronic fatigue which did not indicate sleep disordered breathing, but nocturnal hypoxemia only and PLMS index 45.0. She does not pins/needles and cramping in lower extremities at night. Labs for K+, Mg are normal. PCP has not check iron levels.    CT scan 11/23/2016 indicated moderate diffuse centrilobular emphysematous changes. A 2.0 x 0.6 cm opacity in the medial right lower lobe. No adenopathy noted. PET scan was performed 11/23/2016 indicating no metabolic activity in the right lower lobe opacity. Repeat CT 6/2/2017 indicates no change in 6.8 mm x 18.0 mm ill-defined opacity medial right lower lobe, markedly hyperexpanded emphysematous lungs and apical scarring. No pleural effusion. Patient will be due for repeat 6 months for continued monitoring. I reviewed testing with patient.    Today she notes dyspnea to be stable with routine AM cough that is nonproductive. No wheezing. She notes seasonal allergies with sinus congestion. She denies fever, chills, night sweats, chest pain, ankle swelling, chest pain, ankle swelling or hemoptysis. She is requesting Alpha  1 to be checked.    History of IgG deficiency and chronic fatigue. She receives IgG infusions regularly.    ROS: As per HPI and otherwise negative if not stated.    Past Medical History   Diagnosis Date   • Allergy    • Anxiety    • Hyperlipidemia    • ASTHMA    • Back pain    • Bladder infection    • H/O blood transfusion reaction    • Bronchitis    • COPD    • Mononucleosis    • Hives    • History of measles    • History of mumps    • Pneumonia    • COPD (chronic obstructive pulmonary disease) (CMS-HCC) 3/1/2013   • Lyme disease 3/1/2013   • Immunologic deficiency syndrome (CMS-HCC) 3/1/2013   • Cellulitis      (L) elbow    • Tachycardia 7/15/2015   • Tachycardia 7/15/2015       Past Surgical History   Procedure Laterality Date   • Breast biopsy     • Thyroid lobectomy     • Tonsillectomy         Family History   Problem Relation Age of Onset   • Alcohol/Drug Mother    • Cancer Father    • Stroke Father    • Heart Disease Father 42       Social History     Social History   • Marital Status:      Spouse Name: N/A   • Number of Children: N/A   • Years of Education: N/A     Occupational History   • Not on file.     Social History Main Topics   • Smoking status: Current Every Day Smoker -- 0.50 packs/day for 55 years     Types: Cigarettes   • Smokeless tobacco: Never Used   • Alcohol Use: No      Comment: patient states she has not had a drink in 2 years 10/09/2015   • Drug Use: No   • Sexual Activity: No     Other Topics Concern   • Not on file     Social History Narrative       Allergies as of 06/06/2017 - Donny as Reviewed 06/06/2017   Allergen Reaction Noted   • Gadolinium Anaphylaxis and Nausea 04/19/2012   • Influenza virus vaccine  10/03/2013   • Zyvox [linezolid] Rash 07/06/2015   • Cefuroxime Nausea 02/23/2015   • Epinephrine  09/08/2016   • Other drug  06/06/2012   • Other misc  11/06/2011   • Prednisone  09/08/2016   • Sulfa drugs  11/06/2011        @Vital signs for this encounter:  Filed Vitals:     "06/06/17 1323   Height: 1.59 m (5' 2.6\")   Weight: 51.71 kg (114 lb)   Weight % change since last entry.: 0 %   BP: 118/68   Pulse: 118   BMI (Calculated): 20.45   Resp: 16   O2 sat % on O2: 96 %   O2 Flow Rate (L/min): 2       Current medications as of today   Current Outpatient Prescriptions   Medication Sig Dispense Refill   • clindamycin (CLEOCIN) 150 MG Cap TK ONE C PO  BID FOR 10 DAYS  0   • CLARINEX 5 MG tablet TAKE 1 TABLET BY MOUTH EVERY DAY 90 Tab 3   • KLONOPIN 0.5 MG Tab TAKE TWO TABLETS BY MOUTH EVERY MORNING AND ONE TABLET BY MOUTH EVERY NIGHT AT BEDTIME 90 Tab 1   • budesonide-formoterol (SYMBICORT) 80-4.5 MCG/ACT Aerosol Inhale 2 Puffs by mouth 2 Times a Day. Use spacer. Rinse mouth after each use. 1 Inhaler 5   • montelukast (SINGULAIR) 10 MG Tab TAKE 1 TABLET BY MOUTH EVERY DAY 90 Tab 3   • pravastatin (PRAVACHOL) 40 MG tablet TAKE 1 TABLET BY MOUTH EVERY DAY 90 Tab 3   • Magnesium 100 MG Cap Take 480 mg by mouth.     • tiotropium (SPIRIVA HANDIHALER) 18 MCG Cap Inhale 1 Cap by mouth every day. AT THE SAME TIME EVERY DAY 90 Cap 3   • immune globulin (FLEBOGAMMA) 10 GM/100ML Solution 5 g by Intravenous route Once. Indications: Pt now on 5 grams     • Oral Electrolytes (EMERGEN-C ELECTRO MIX PO) Take  by mouth every day.     • aspirin (ASA) 81 MG CHEW Take 162 mg by mouth every day.     • CLINDAMYCIN HCL PO Take  by mouth.     • NON SPECIFIED (hydration orders)  please infuse 2 L NS per week. Infuse over one hour QT: 2 L REFILL: 56 fax [phone] 2 Each 56   • alprazolam (XANAX) 0.25 MG Tab Take 1 Tab by mouth 2 times a day. 60 Tab 0   • pneumococcal vaccine (PNEUMOVAX-23) 25 MCG/0.5ML Injection 0.5 mL by Intramuscular route Once PRN.     • XOPENEX HFA 45 MCG/ACT inhaler INHALE ONE TO TWO PUFFS BY MOUTH EVERY 4 HOURS AS NEEDED FOR SHORTNESS OF BREATH 15 g 11   • levalbuterol (XOPENEX) 1.25 MG/3ML Nebu Soln 3 mL by Nebulization route every four hours as needed for Shortness of Breath. 24 mL 11   • " nicotine polacrilex (NICORETTE) 2 MG Gum Take 1 Each by mouth as needed for Smoking Cessation. 150 Each 3   • azithromycin (ZITHROMAX) 250 MG Tab Two day one then qd x 4. 6 Tab 0   • acetaminophen (TYLENOL) 325 MG Tab Take 650 mg by mouth every four hours as needed.     • Ca Phosphate-Cholecalciferol 250-400 MG-UNIT CHEW Take  by mouth.     • docosahexanoic acid (OMEGA 3 FA) 1000 MG CAPS Take 1,000 mg by mouth 2 Times a Day.     • MAGNESIUM GLYCINATE Take 480 mg by mouth.       No current facility-administered medications for this visit.         Physical Exam:   Gen:           Alert and oriented, No apparent distress. Mood and affect appropriate, normal interaction with examiner.  Eyes:          PERRL, EOM intact, sclere white, conjunctive moist.  Ears:          Not examined.   Hearing:     Grossly intact.  Nose:          Normal, no lesions or deformities.  Dentition:    Good dentition.  Oropharynx:   Tongue normal, posterior pharynx without erythema or exudate.  Mallampati Classification: 2/3  Neck:        Supple, trachea midline, no masses.  Respiratory Effort: No intercostal retractions or use of accessory muscles.   Lung Auscultation:      Clear to auscultation bilaterally; no rales, rhonchi or wheezing.  CV:            Regular rate and rhythm. No murmurs, rubs or gallops.  Abd:           Not examined.  Lymphadenopathy: Not examined.  Gait and Station: Normal.  Digits and Nails: No clubbing, cyanosis, petechiae, or nodes.   Cranial Nerves: II-XII grossly intact.  Skin:        No rashes, lesions or ulcers noted.               Ext:           No cyanosis or edema.      Assessment:  1. Other emphysema (CMS-HCC)     2. Supplemental oxygen dependent     3. Abnormal CT scan of lung     4. Smoking     5. Nocturnal hypoxemia         Immunizations:    Flu:not given  Pneumovax 23:not given  Prevnar 13:not given    Plan: Face to face 30 minutes discussing COPD/abnormal CT/sleep results education and pathophysiology.  Answered all patient questions to their satisfaction.    1. CT scan of chest w/o contrast in 6 months for pulmonary nodule monitoring.  2. Continue inhaler regimen.  3. Continue 2L oxygen 24/7.  4. Discussed respiratory hygiene.  5. Continue Ponaris for dryness.  6. Add Flonase/Nasacort for current ear/sinus congestion.  7. Continue IgG infusions.  8. Alpha 1 in office today. May call for results.  9. Recommend pneumonia vaccines updated.  10. Follow up in 6 months with repeat CT scan of chest w/o contrast for nodule monitoring, sooner if needed.

## 2017-06-06 NOTE — MR AVS SNAPSHOT
"        Deb Vega   2017 1:20 PM   Office Visit   MRN: [id number]    Department:  [facility]   Dept Phone:  [phone]    Description:  Female : 1945   Provider:  FELICITAS Lopez           Reason for Visit     Results SS      Allergies as of 2017     Allergen Noted Reactions    Gadolinium 2012   Anaphylaxis, Nausea    Influenza Virus Vaccine 10/03/2013       Zyvox [Linezolid] 2015   Rash    Severe anxiety; prominent facial rash with swelling    Cefuroxime 2015   Nausea    Epinephrine 2016       Other reaction(s): Tachycardia    Other Drug 2012       Any antibiotics by mouth dizziness, abdominal pain.     Other Misc 2011       Intolerant of any stimulants    Prednisone 2016       Aggravates tachycardia    Sulfa Drugs 2011       Other reaction(s): Not Known      You were diagnosed with     Other emphysema (CMS-HCC)   [492.8.ICD-9-CM]       Supplemental oxygen dependent   [027572]       Abnormal CT scan of lung   [694747]       Smoking   [782775]       Nocturnal hypoxemia   [267662]         Vital Signs     Blood Pressure Pulse Respirations Height Weight Body Mass Index    118/68 mmHg 118 16 1.59 m (5' 2.6\") 51.71 kg (114 lb) 20.45 kg/m2    Oxygen Saturation Smoking Status                96% Current Every Day Smoker          Basic Information     Date Of Birth Sex Race Ethnicity Preferred Language    1945 Female White Non- English      Your appointments     2017  1:30 PM   Est Hydration with RN 2   Infusion Services ([facility])    [address] [address]  [place] [address]   [phone]            2017  1:30 PM   Est Hydration with RN 1   Infusion Services ([facility])    [address] [address]  [place] [address]   [phone]            Manjit 15, 2017  1:30 PM   Est Hydration with RN 5   Infusion Services ([facility])    [address] [address]  [place] [address]   [phone]            2017  " 1:30 PM   Est Hydration with RN 2   Infusion Services ([facility])    [address]  [place] [address]   [phone]            Jun 22, 2017  1:30 PM   Est Hydration with RN 2   Infusion Services ([facility])    [address]  [place] [address]   [phone]            Jun 26, 2017  1:30 PM   Est Hydration with RN 2   Infusion Services ([facility])    [address]  [place] [address]   [phone]            Jun 27, 2017  2:00 PM   Established Patient with Andi Mccullough D.O.   [facility] ([facility])    [address] [facility] [address]  [place] [address]   [phone]           You will be receiving a confirmation call a few days before your appointment from our automated call confirmation system.            Jun 29, 2017  1:30 PM   Est Hydration with RN 2   Infusion Services ([facility])    [address]  [place] [address]   [phone]            Dec 15, 2017  1:40 PM   Established Patient Pul with FELICITAS Samson   [facility] (--)    [address]  [address]  [place] [address]   [phone]              Problem List              ICD-10-CM Priority Class Noted - Resolved    Hyperlipidemia with target LDL less than 100 (Chronic) E78.5   6/6/2012 - Present    Smoking (Chronic) F17.200   6/6/2012 - Present    Anxiety F41.9   6/6/2012 - Present    Chronic fatigue syndrome R53.82   12/7/2012 - Present    COPD (chronic obstructive pulmonary disease) (CMS-HCC) J44.9   3/1/2013 - Present    Lyme disease A69.20   3/1/2013 - Present    Immunologic deficiency syndrome (CMS-HCC) D84.9   3/1/2013 - Present    Hypotension I95.9   7/2/2015 - Present    Tachycardia R00.0   7/15/2015 - Present    Supplemental oxygen dependent Z99.81   2/7/2017 - Present    Abnormal CT scan of lung R91.8   2/7/2017 - Present    Nocturnal hypoxemia G47.34   6/6/2017 - Present      Health Maintenance        Date Due Completion Dates    IMM DTaP/Tdap/Td Vaccine (1  - Tdap) 10/19/1964 ---    PAP SMEAR 10/19/1966 ---    IMM ZOSTER VACCINE 10/19/2005 ---    IMM PNEUMOCOCCAL 65+ (ADULT) LOW/MEDIUM RISK SERIES (1 of 2 - PCV13) 10/19/2010 ---    MAMMOGRAM 6/23/2017 6/23/2016    COLONOSCOPY 7/23/2018 7/23/2008 (Done)    Override on 7/23/2008: Done ([facility]    BONE DENSITY 2/5/2020 2/5/2015            Current Immunizations     No immunizations on file.      Below and/or attached are the medications your provider expects you to take. Review all of your home medications and newly ordered medications with your provider and/or pharmacist. Follow medication instructions as directed by your provider and/or pharmacist. Please keep your medication list with you and share with your provider. Update the information when medications are discontinued, doses are changed, or new medications (including over-the-counter products) are added; and carry medication information at all times in the event of emergency situations     Allergies:  GADOLINIUM - Anaphylaxis,Nausea     INFLUENZA VIRUS VACCINE - (reactions not documented)     ZYVOX - Rash     CEFUROXIME - Nausea     EPINEPHRINE - (reactions not documented)     OTHER DRUG - (reactions not documented)     OTHER MISC - (reactions not documented)     PREDNISONE - (reactions not documented)     SULFA DRUGS - (reactions not documented)               Medications  Valid as of: June 06, 2017 -  2:29 PM    Generic Name Brand Name Tablet Size Instructions for use    Acetaminophen (Tab) TYLENOL 325 MG Take 650 mg by mouth every four hours as needed.        ALPRAZolam (Tab) XANAX 0.25 MG Take 1 Tab by mouth 2 times a day.        Aspirin (Chew Tab) ASA 81 MG Take 162 mg by mouth every day.        Azithromycin (Tab) ZITHROMAX 250 MG Two day one then qd x 4.        Budesonide-Formoterol Fumarate (Aerosol) SYMBICORT 80-4.5 MCG/ACT Inhale 2 Puffs by mouth 2 Times a Day. Use spacer. Rinse mouth after each use.        Ca  Phosphate-Cholecalciferol (Chew Tab) Ca Phosphate-Cholecalciferol 250-400 MG-UNIT Take  by mouth.        Clindamycin HCl   Take  by mouth.        Clindamycin HCl (Cap) CLEOCIN 150 MG TK ONE C PO  BID FOR 10 DAYS        ClonazePAM (Tab) KLONOPIN 0.5 MG TAKE TWO TABLETS BY MOUTH EVERY MORNING AND ONE TABLET BY MOUTH EVERY NIGHT AT BEDTIME        Desloratadine (Tab) CLARINEX 5 MG TAKE 1 TABLET BY MOUTH EVERY DAY        Immune Globulin (Human) (Solution) Immune Globulin 10% (10 g/100mL) 5 g by Intravenous route Once. Indications: Pt now on 5 grams        Levalbuterol HCl (Nebu Soln) XOPENEX 1.25 MG/3ML 3 mL by Nebulization route every four hours as needed for Shortness of Breath.        Levalbuterol Tartrate (Aerosol) XOPENEX HFA 45 MCG/ACT INHALE ONE TO TWO PUFFS BY MOUTH EVERY 4 HOURS AS NEEDED FOR SHORTNESS OF BREATH        Magnesium (Cap) Magnesium 100 MG Take 480 mg by mouth.        Magnesium Glycinate   Take 480 mg by mouth.        Montelukast Sodium (Tab) SINGULAIR 10 MG TAKE 1 TABLET BY MOUTH EVERY DAY        Nicotine Polacrilex (Gum) NICORETTE 2 MG Take 1 Each by mouth as needed for Smoking Cessation.        NON SPECIFIED   (hydration orders)  please infuse 2 L NS per week. Infuse over one hour QT: 2 L REFILL: 56 fax [phone]        Omega-3 Fatty Acids (Cap) OMEGA 3 FA 1000 MG Take 1,000 mg by mouth 2 Times a Day.        Oral Electrolytes   Take  by mouth every day.        Pneumococcal Vac Polyvalent (Injection) PNEUMOVAX-23 25 MCG/0.5ML 0.5 mL by Intramuscular route Once PRN.        Pravastatin Sodium (Tab) PRAVACHOL 40 MG TAKE 1 TABLET BY MOUTH EVERY DAY        Tiotropium Bromide Monohydrate (Cap) SPIRIVA 18 MCG Inhale 1 Cap by mouth every day. AT THE SAME TIME EVERY DAY        .                 Medicines prescribed today were sent to:     [id number] - [place] - [address] AT [facility] [address]    [address] [place] [address]    Phone: [phone] Fax:  [phone]    Open 24 Hours?: No      Medication refill instructions:       If your prescription bottle indicates you have medication refills left, it is not necessary to call your provider’s office. Please contact your pharmacy and they will refill your medication.    If your prescription bottle indicates you do not have any refills left, you may request refills at any time through one of the following ways: The online ZipZap system (except Urgent Care), by calling your provider’s office, or by asking your pharmacy to contact your provider’s office with a refill request. Medication refills are processed only during regular business hours and may not be available until the next business day. Your provider may request additional information or to have a follow-up visit with you prior to refilling your medication.   *Please Note: Medication refills are assigned a new Rx number when refilled electronically. Your pharmacy may indicate that no refills were authorized even though a new prescription for the same medication is available at the pharmacy. Please request the medicine by name with the pharmacy before contacting your provider for a refill.        Your To Do List     Future Labs/Procedures Complete By Expires    CT-CHEST (THORAX) W/O  12/2/2017 6/6/2018         ZipZap Access Code: Activation code not generated  Current ZipZap Status: Active          Quit Tobacco Information     Do you want to quit using tobacco?    Quitting tobacco decreases risks of cancer, heart and lung disease, increases life expectancy, improves sense of taste and smell, and increases spending money, among other benefits.    If you are thinking about quitting, we can help.  • [facility] Quit Tobacco Program: 941.292.2833  o Program occurs weekly for four weeks and includes pharmacist consultation on products to support quitting smoking or chewing tobacco. A provider referral is needed for pharmacist consultation.  • Tobacco Users Help  Hotline: 0-800-QUIT-NOW (482-9515) or https://[place].quitlogix.org/  o Free, confidential telephone and online coaching for [place] residents. Sessions are designed on a schedule that is convenient for you. Eligible clients receive free nicotine replacement therapy.  • Nationally: www.smokefree.gov  o Information and professional assistance to support both immediate and long-term needs as you become, and remain, a non-smoker. Smokefree.gov allows you to choose the help that best fits your needs.

## 2017-06-08 ENCOUNTER — OUTPATIENT INFUSION SERVICES (OUTPATIENT)
Dept: ONCOLOGY | Facility: MEDICAL CENTER | Age: 72
End: 2017-06-08
Attending: INTERNAL MEDICINE
Payer: MEDICARE

## 2017-06-08 VITALS
WEIGHT: 114.42 LBS | TEMPERATURE: 98.6 F | HEIGHT: 63 IN | BODY MASS INDEX: 20.27 KG/M2 | SYSTOLIC BLOOD PRESSURE: 90 MMHG | DIASTOLIC BLOOD PRESSURE: 58 MMHG | HEART RATE: 90 BPM | OXYGEN SATURATION: 92 % | RESPIRATION RATE: 20 BRPM

## 2017-06-08 PROCEDURE — 700105 HCHG RX REV CODE 258: Performed by: INTERNAL MEDICINE

## 2017-06-08 PROCEDURE — 96360 HYDRATION IV INFUSION INIT: CPT

## 2017-06-08 RX ORDER — SODIUM CHLORIDE 9 MG/ML
1000 INJECTION, SOLUTION INTRAVENOUS ONCE
Status: COMPLETED | OUTPATIENT
Start: 2017-06-08 | End: 2017-06-08

## 2017-06-08 RX ADMIN — SODIUM CHLORIDE 1000 ML: 9 INJECTION, SOLUTION INTRAVENOUS at 14:08

## 2017-06-08 ASSESSMENT — PAIN SCALES - GENERAL: PAINLEVEL: 2=MINIMAL-SLIGHT

## 2017-06-08 NOTE — PROGRESS NOTES
Pt is here for her scheduled hydration. Wearing plastic gloves when outdoors in public. Pt states having had occasional muscle cramping (mainly on her legs) and has tried magnesium, quinine, electrolytes etc without significant relief. MD aware.  500ml of NS infused at 400ml/h per pt request. Vitals taken upon the completion (see EPIC). Discharged home to self care. Next appointment verified.

## 2017-06-12 ENCOUNTER — OUTPATIENT INFUSION SERVICES (OUTPATIENT)
Dept: ONCOLOGY | Facility: MEDICAL CENTER | Age: 72
End: 2017-06-12
Attending: INTERNAL MEDICINE
Payer: MEDICARE

## 2017-06-12 VITALS
HEIGHT: 63 IN | BODY MASS INDEX: 20.31 KG/M2 | RESPIRATION RATE: 20 BRPM | HEART RATE: 103 BPM | OXYGEN SATURATION: 92 % | TEMPERATURE: 97.6 F | WEIGHT: 114.64 LBS

## 2017-06-12 PROCEDURE — 700105 HCHG RX REV CODE 258: Performed by: INTERNAL MEDICINE

## 2017-06-12 PROCEDURE — 96360 HYDRATION IV INFUSION INIT: CPT

## 2017-06-12 RX ORDER — SODIUM CHLORIDE 9 MG/ML
1000 INJECTION, SOLUTION INTRAVENOUS ONCE
Status: COMPLETED | OUTPATIENT
Start: 2017-06-12 | End: 2017-06-12

## 2017-06-12 RX ADMIN — SODIUM CHLORIDE 500 ML: 9 INJECTION, SOLUTION INTRAVENOUS at 14:01

## 2017-06-12 NOTE — PROGRESS NOTES
Patient presents ambulatory for hydration.  Patient reports feeling well and denies any s/s of infection at this time.  IV established and hydration given per MD order with no complications or adverse reactions.  Patient to return 6/15/17 for next appt, confirmed with patient.  IV discontinued, gauze, and coban applied to site.  Patient left ambulatory in no distress.

## 2017-06-13 DIAGNOSIS — J44.9 CHRONIC OBSTRUCTIVE PULMONARY DISEASE, UNSPECIFIED COPD TYPE (HCC): ICD-10-CM

## 2017-06-13 RX ORDER — DILTIAZEM HYDROCHLORIDE 60 MG/1
TABLET, FILM COATED ORAL
Qty: 1 G | Refills: 5 | Status: SHIPPED | OUTPATIENT
Start: 2017-06-13 | End: 2017-11-09

## 2017-06-13 NOTE — TELEPHONE ENCOUNTER
Caller Name: Deb Vega                 Call Back Number: [phone] (home)         Patient approves a detailed voicemail message: N\A    Have we ever prescribed this med? Yes.  If yes, what date? 11/17/16    Last OV: 6/6/17- Cintia Binu     Next OV: 12/15/17- Fiordaliza Mccall     DX: COPD    Medications: Symbicort     Current Outpatient Prescriptions   Medication Sig Dispense Refill   • clindamycin (CLEOCIN) 150 MG Cap TK ONE C PO  BID FOR 10 DAYS  0   • CLINDAMYCIN HCL PO Take  by mouth.     • NON SPECIFIED (hydration orders)  please infuse 2 L NS per week. Infuse over one hour QT: 2 L REFILL: 56 fax [phone] 2 Each 56   • alprazolam (XANAX) 0.25 MG Tab Take 1 Tab by mouth 2 times a day. 60 Tab 0   • CLARINEX 5 MG tablet TAKE 1 TABLET BY MOUTH EVERY DAY 90 Tab 3   • KLONOPIN 0.5 MG Tab TAKE TWO TABLETS BY MOUTH EVERY MORNING AND ONE TABLET BY MOUTH EVERY NIGHT AT BEDTIME 90 Tab 1   • pneumococcal vaccine (PNEUMOVAX-23) 25 MCG/0.5ML Injection 0.5 mL by Intramuscular route Once PRN.     • budesonide-formoterol (SYMBICORT) 80-4.5 MCG/ACT Aerosol Inhale 2 Puffs by mouth 2 Times a Day. Use spacer. Rinse mouth after each use. 1 Inhaler 5   • montelukast (SINGULAIR) 10 MG Tab TAKE 1 TABLET BY MOUTH EVERY DAY 90 Tab 3   • pravastatin (PRAVACHOL) 40 MG tablet TAKE 1 TABLET BY MOUTH EVERY DAY 90 Tab 3   • XOPENEX HFA 45 MCG/ACT inhaler INHALE ONE TO TWO PUFFS BY MOUTH EVERY 4 HOURS AS NEEDED FOR SHORTNESS OF BREATH 15 g 11   • Magnesium 100 MG Cap Take 480 mg by mouth.     • levalbuterol (XOPENEX) 1.25 MG/3ML Nebu Soln 3 mL by Nebulization route every four hours as needed for Shortness of Breath. 24 mL 11   • nicotine polacrilex (NICORETTE) 2 MG Gum Take 1 Each by mouth as needed for Smoking Cessation. 150 Each 3   • azithromycin (ZITHROMAX) 250 MG Tab Two day one then qd x 4. 6 Tab 0   • tiotropium (SPIRIVA HANDIHALER) 18 MCG Cap Inhale 1 Cap by mouth every day. AT THE SAME TIME EVERY DAY 90 Cap 3   •  acetaminophen (TYLENOL) 325 MG Tab Take 650 mg by mouth every four hours as needed.     • immune globulin (FLEBOGAMMA) 10 GM/100ML Solution 5 g by Intravenous route Once. Indications: Pt now on 5 grams     • Oral Electrolytes (EMERGEN-C ELECTRO MIX PO) Take  by mouth every day.     • Ca Phosphate-Cholecalciferol 250-400 MG-UNIT CHEW Take  by mouth.     • docosahexanoic acid (OMEGA 3 FA) 1000 MG CAPS Take 1,000 mg by mouth 2 Times a Day.     • aspirin (ASA) 81 MG CHEW Take 162 mg by mouth every day.     • MAGNESIUM GLYCINATE Take 480 mg by mouth.       No current facility-administered medications for this visit.

## 2017-06-14 ENCOUNTER — RX ONLY (OUTPATIENT)
Age: 72
Setting detail: RX ONLY
End: 2017-06-14

## 2017-06-14 PROBLEM — D22.62 MELANOCYTIC NEVI OF LEFT UPPER LIMB, INCLUDING SHOULDER: Status: ACTIVE | Noted: 2017-06-14

## 2017-06-14 PROBLEM — D22.61 MELANOCYTIC NEVI OF RIGHT UPPER LIMB, INCLUDING SHOULDER: Status: ACTIVE | Noted: 2017-06-14

## 2017-06-15 ENCOUNTER — OUTPATIENT INFUSION SERVICES (OUTPATIENT)
Dept: ONCOLOGY | Facility: MEDICAL CENTER | Age: 72
End: 2017-06-15
Attending: INTERNAL MEDICINE
Payer: MEDICARE

## 2017-06-15 VITALS
BODY MASS INDEX: 19.96 KG/M2 | RESPIRATION RATE: 20 BRPM | HEIGHT: 63 IN | HEART RATE: 90 BPM | SYSTOLIC BLOOD PRESSURE: 98 MMHG | DIASTOLIC BLOOD PRESSURE: 60 MMHG | OXYGEN SATURATION: 93 % | WEIGHT: 112.66 LBS | TEMPERATURE: 99.3 F

## 2017-06-15 PROCEDURE — 700105 HCHG RX REV CODE 258: Performed by: INTERNAL MEDICINE

## 2017-06-15 PROCEDURE — 96360 HYDRATION IV INFUSION INIT: CPT

## 2017-06-15 RX ORDER — SODIUM CHLORIDE 9 MG/ML
1000 INJECTION, SOLUTION INTRAVENOUS ONCE
Status: COMPLETED | OUTPATIENT
Start: 2017-06-15 | End: 2017-06-15

## 2017-06-15 RX ADMIN — SODIUM CHLORIDE 1000 ML: 9 INJECTION, SOLUTION INTRAVENOUS at 14:00

## 2017-06-15 ASSESSMENT — PAIN SCALES - GENERAL: PAINLEVEL: 2=MINIMAL-SLIGHT

## 2017-06-15 NOTE — PROGRESS NOTES
Pt is here for her scheduled hydration. Wearing plastic gloves when outdoors in public.L FA skin biopsy site - CDI- covered with Band-Aid. 500ml of NS infused at 400ml/h per pt request. Treatment well tolerated. Vitals taken upon the completion (see EPIC). Discharged home to self care. Next appointment verified.

## 2017-06-18 ENCOUNTER — OUTPATIENT INFUSION SERVICES (OUTPATIENT)
Dept: ONCOLOGY | Facility: MEDICAL CENTER | Age: 72
End: 2017-06-18
Attending: INTERNAL MEDICINE
Payer: MEDICARE

## 2017-06-18 VITALS
RESPIRATION RATE: 18 BRPM | DIASTOLIC BLOOD PRESSURE: 58 MMHG | HEIGHT: 63 IN | OXYGEN SATURATION: 92 % | BODY MASS INDEX: 20.31 KG/M2 | HEART RATE: 99 BPM | TEMPERATURE: 99.4 F | SYSTOLIC BLOOD PRESSURE: 93 MMHG | WEIGHT: 114.64 LBS

## 2017-06-18 PROCEDURE — 96360 HYDRATION IV INFUSION INIT: CPT

## 2017-06-18 PROCEDURE — 700105 HCHG RX REV CODE 258: Performed by: INTERNAL MEDICINE

## 2017-06-18 RX ORDER — SODIUM CHLORIDE 9 MG/ML
1000 INJECTION, SOLUTION INTRAVENOUS ONCE
Status: COMPLETED | OUTPATIENT
Start: 2017-06-18 | End: 2017-06-18

## 2017-06-18 RX ADMIN — SODIUM CHLORIDE 1000 ML: 9 INJECTION, SOLUTION INTRAVENOUS at 13:40

## 2017-06-18 ASSESSMENT — PAIN SCALES - GENERAL: PAINLEVEL: 2=MINIMAL-SLIGHT

## 2017-06-18 NOTE — PROGRESS NOTES
Pt is here for her scheduled hydration. Wearing plastic gloves when outdoors in public.L FA skin biopsy site - CDI- covered with Band-Aid. 500ml of NS infused at 400ml/h per pt request. Treatment well tolerated. Vitals taken upon the completion of the hydration (see EPIC). Discharged home to self care. Next appointment verified.

## 2017-06-19 ENCOUNTER — TELEPHONE (OUTPATIENT)
Dept: PULMONOLOGY | Facility: HOSPICE | Age: 72
End: 2017-06-19

## 2017-06-22 ENCOUNTER — APPOINTMENT (OUTPATIENT)
Dept: ONCOLOGY | Facility: MEDICAL CENTER | Age: 72
End: 2017-06-22
Attending: INTERNAL MEDICINE
Payer: MEDICARE

## 2017-06-26 ENCOUNTER — OUTPATIENT INFUSION SERVICES (OUTPATIENT)
Dept: ONCOLOGY | Facility: MEDICAL CENTER | Age: 72
End: 2017-06-26
Attending: INTERNAL MEDICINE
Payer: MEDICARE

## 2017-06-26 VITALS
TEMPERATURE: 99.2 F | RESPIRATION RATE: 18 BRPM | SYSTOLIC BLOOD PRESSURE: 90 MMHG | OXYGEN SATURATION: 91 % | WEIGHT: 113.54 LBS | DIASTOLIC BLOOD PRESSURE: 65 MMHG | BODY MASS INDEX: 20.12 KG/M2 | HEART RATE: 101 BPM | HEIGHT: 63 IN

## 2017-06-26 PROCEDURE — 96360 HYDRATION IV INFUSION INIT: CPT

## 2017-06-26 PROCEDURE — 700105 HCHG RX REV CODE 258: Performed by: INTERNAL MEDICINE

## 2017-06-26 RX ORDER — SODIUM CHLORIDE 9 MG/ML
1000 INJECTION, SOLUTION INTRAVENOUS ONCE
Status: COMPLETED | OUTPATIENT
Start: 2017-06-26 | End: 2017-06-26

## 2017-06-26 RX ADMIN — SODIUM CHLORIDE 1000 ML: 9 INJECTION, SOLUTION INTRAVENOUS at 13:46

## 2017-06-26 ASSESSMENT — PAIN SCALES - GENERAL: PAINLEVEL: 2=MINIMAL-SLIGHT

## 2017-06-26 NOTE — PROGRESS NOTES
Pt is here for her scheduled hydration. Oxygen in use. Heating pad in use to ease chronic lower back pain. 500ml of NS infused at 400ml/h per pt request. Treatment well tolerated. Vitals taken upon the completion of the hydration (see EPIC). Pt has her mammogram also scheduled for today. Discharged home to self care. Next appointment verified.

## 2017-06-27 ENCOUNTER — OFFICE VISIT (OUTPATIENT)
Dept: MEDICAL GROUP | Facility: CLINIC | Age: 72
End: 2017-06-27
Payer: MEDICARE

## 2017-06-27 VITALS
RESPIRATION RATE: 18 BRPM | HEART RATE: 80 BPM | SYSTOLIC BLOOD PRESSURE: 118 MMHG | OXYGEN SATURATION: 97 % | HEIGHT: 64 IN | DIASTOLIC BLOOD PRESSURE: 66 MMHG | BODY MASS INDEX: 19.75 KG/M2 | WEIGHT: 115.7 LBS | TEMPERATURE: 98.9 F

## 2017-06-27 DIAGNOSIS — M79.605 PAIN IN BOTH LOWER EXTREMITIES: ICD-10-CM

## 2017-06-27 DIAGNOSIS — M54.50 CHRONIC LOW BACK PAIN WITHOUT SCIATICA, UNSPECIFIED BACK PAIN LATERALITY: ICD-10-CM

## 2017-06-27 DIAGNOSIS — G89.29 CHRONIC LOW BACK PAIN WITHOUT SCIATICA, UNSPECIFIED BACK PAIN LATERALITY: ICD-10-CM

## 2017-06-27 DIAGNOSIS — J44.1 CHRONIC OBSTRUCTIVE PULMONARY DISEASE WITH ACUTE EXACERBATION (HCC): ICD-10-CM

## 2017-06-27 DIAGNOSIS — Z92.89 H/O SCREENING MAMMOGRAPHY: ICD-10-CM

## 2017-06-27 DIAGNOSIS — R79.0 LOW MAGNESIUM LEVEL: ICD-10-CM

## 2017-06-27 DIAGNOSIS — M79.604 PAIN IN BOTH LOWER EXTREMITIES: ICD-10-CM

## 2017-06-27 DIAGNOSIS — G93.32 CHRONIC FATIGUE SYNDROME: ICD-10-CM

## 2017-06-27 PROCEDURE — 99214 OFFICE O/P EST MOD 30 MIN: CPT | Performed by: INTERNAL MEDICINE

## 2017-06-27 NOTE — Clinical Note
[employer]  Andi Mccullough D.O.  [address]  [place] [address]  Fax: [phone]   Authorization for Release/Disclosure of   Protected Health Information   Name: FABIO VEGA : 1945 SSN: [id number]   Address: [address] [place] [address]  [place] [address] Phone:    [phone] (home)    I authorize the entity listed below to release/disclose the PHI below to:   [employer]/Andi Mccullough D.O. and Andi Mccullough D.O.   Provider or Entity Name: Fabio Vega (Self)     Address   City, State, Zip   Phone:      Fax:     Reason for request: continuity of care   Information to be released:  Results for Alpha 1 Results dated 2017   [  ] LAST COLONOSCOPY,  including any PATH REPORT and follow-up  [  ] LAST FIT/COLOGUARD RESULT [  ] LAST DEXA  [  ] LAST MAMMOGRAM  [  ] LAST PAP  [  ] LAST LABS [  ] RETINA EXAM REPORT  [  ] IMMUNIZATION RECORDS  [  ] Release all info      [  ] Check here and initial the line next to each item to release ALL health information INCLUDING  _____ Care and treatment for drug and / or alcohol abuse  _____ HIV testing, infection status, or AIDS  _____ Genetic Testing    DATES OF SERVICE OR TIME PERIOD TO BE DISCLOSED: _____________  I understand and acknowledge that:  * This Authorization may be revoked at any time by you in writing, except if your health information has already been used or disclosed.  * Your health information that will be used or disclosed as a result of you signing this authorization could be re-disclosed by the recipient. If this occurs, your re-disclosed health information may no longer be protected by State or Federal laws.  * You may refuse to sign this Authorization. Your refusal will not affect your ability to obtain treatment.  * This Authorization becomes effective upon signing and will  on (date) __________.      If no date is indicated, this Authorization will  one (1) year from the signature date.    Name: Fabio  Gary    Signature:   Date:     6/27/2017       PLEASE FAX REQUESTED RECORDS BACK TO: [phone]

## 2017-06-27 NOTE — MR AVS SNAPSHOT
"        Deb Vega   2017 2:00 PM   Office Visit   MRN: [id number]    Department:  [facility] [place]   Dept Phone:  [phone]    Description:  Female : 1945   Provider:  Andi Mccullough D.O.           Reason for Visit     Follow-Up           Allergies as of 2017     Allergen Noted Reactions    Gadolinium 2012   Anaphylaxis, Nausea    Influenza Virus Vaccine 10/03/2013       Zyvox [Linezolid] 2015   Rash    Severe anxiety; prominent facial rash with swelling    Cefuroxime 2015   Nausea    Epinephrine 2016       Other reaction(s): Tachycardia    Other Drug 2012       Any antibiotics by mouth dizziness, abdominal pain.     Other Misc 2011       Intolerant of any stimulants    Prednisone 2016       Aggravates tachycardia    Sulfa Drugs 2011       Other reaction(s): Not Known      You were diagnosed with     H/O screening mammography   [080382]       Chronic obstructive pulmonary disease with acute exacerbation (CMS-HCC)   [522839]       Chronic fatigue syndrome   [780.71.ICD-9-CM]       Pain in both lower extremities   [7221966]       Low magnesium level   [1670964]       Chronic low back pain without sciatica, unspecified back pain laterality   [6299689]         Vital Signs     Blood Pressure Pulse Temperature Respirations Height Weight    118/66 mmHg 80 37.2 °C (98.9 °F) 18 1.613 m (5' 3.5\") 52.481 kg (115 lb 11.2 oz)    Body Mass Index Oxygen Saturation Breastfeeding? Smoking Status          20.17 kg/m2 97% No Current Every Day Smoker        Basic Information     Date Of Birth Sex Race Ethnicity Preferred Language    1945 Female White Non- English      Your appointments     2017  1:30 PM   Est Hydration with RN 2   [facility] ([facility])    [address] [place] [place] [address]  [place] [id number] [phone]            2017  1:30 PM   Est Hydration with RN 2   [facility] ([facility])    [address] [place] [place] [address]  [place] [address]   [phone]            Jul 06, 2017  2:30 PM   Est Hydration with RN 2   In[facility] ([facility])    [address] [facility] [address]  [place] [address]   [phone]            Jul 10, 2017  2:00 PM   Est Hydration with RN 6   [facility] ([facility])    [address] [facility] [address]  [place] [address]   [phone]            Jul 13, 2017  2:00 PM   Est Hydration with RN 3   [facility] ([facility])    [address] [facility] [address]  [place] [address]   [phone]            Jul 17, 2017  1:30 PM   Est Hydration with RN 1   [facility] ([facility])    [address] [facility] [address]  [place] [address]   [phone]            Jul 20, 2017  2:00 PM   Est Hydration with RN 6   In[facility] ([facility])    [address] [facility] [address]  [place] [address]   [phone]            Jul 24, 2017  1:30 PM   Est Hydration with RN 2   [facility] ([facility])    [address] [facility] [address]  [place] [address]   [phone]            Jul 27, 2017  2:00 PM   Est Hydration with RN 6   Infusion [facility] ([facility])    [address] [facility] [address]  [place] [address]   [phone]            Jul 31, 2017  1:30 PM   Est Hydration with RN 2   Infusion [facility] ([facility])    [address] [facility] [address]  [place] [address]   [phone]            Aug 03, 2017  1:30 PM   Est Hydration with RN 2   In[facility] ([facility])    [address] [facility] [address]  [place] [address]   [phone]            Aug 07, 2017  1:30 PM   Est Hydration with RN 2   In[facility] ([facility])    [address] [facility] [address]  [place] [address]   [phone]            Aug 10, 2017  1:30 PM   Est Hydration with RN 2   Infusion [facility] ([facility])    [address] [facility] [address]  [place] [address]   [phone]            Aug 14, 2017  1:30 PM   Est Hydration with RN 2   Infusion [facility] ([facility])    [address]  [place] [address]   [phone]            Aug 17, 2017  2:00 PM   Est Hydration with RN 3   [facility] ([facility])    [address]  [facility] [address]   [phone]            Aug 21, 2017  1:30 PM   Est Hydration with RN 2   Infusion [facility] ([facility])    [address] [address]   [phone]            Aug 24, 2017  1:30 PM   Est Hydration with RN 2   Infusion [facility] ([facility])    [address] [address]   [phone]            Aug 28, 2017  1:30 PM   Est Hydration with RN 2   Infusion [facility] ([facility])    [address] [address]   [phone]            Aug 31, 2017  1:30 PM   Est Hydration with RN 2   Infusion Services ([facility])    [address] [place] [address]   [phone]            Dec 15, 2017  1:40 PM   Established Patient Pul with FELICITAS Samson   [facility] (--)    [address]  [facility] [address]  [facility] [id number] [phone]              Problem List              ICD-10-CM Priority Class Noted - Resolved    Hyperlipidemia with target LDL less than 100 (Chronic) E78.5   6/6/2012 - Present    Smoking (Chronic) F17.200   6/6/2012 - Present    Anxiety F41.9   6/6/2012 - Present    Chronic fatigue syndrome R53.82   12/7/2012 - Present    COPD (chronic obstructive pulmonary disease) (CMS-HCC) J44.9   3/1/2013 - Present    Lyme disease A69.20   3/1/2013 - Present    Immunologic deficiency syndrome (CMS-HCC) D84.9   3/1/2013 - Present    Hypotension I95.9   7/2/2015 - Present    Tachycardia R00.0   7/15/2015 - Present    Supplemental oxygen dependent Z99.81   2/7/2017 - Present    Abnormal CT scan of lung R91.8   2/7/2017 - Present    Nocturnal hypoxemia G47.34   6/6/2017 - Present      Health Maintenance        Date Due Completion Dates    IMM DTaP/Tdap/Td Vaccine (1 - Tdap) 10/19/1964 ---    PAP SMEAR 10/19/1966 ---    IMM ZOSTER VACCINE 10/19/2005 ---    IMM PNEUMOCOCCAL 65+ (ADULT) LOW/MEDIUM RISK SERIES (1 of 2 - PCV13) 10/19/2010 ---    MAMMOGRAM 6/23/2017 6/23/2016    COLONOSCOPY  7/23/2018 7/23/2008 (Done)    Override on 7/23/2008: Done ([facility]    BONE DENSITY 2/5/2020 2/5/2015            Current Immunizations     No immunizations on file.      Below and/or attached are the medications your provider expects you to take. Review all of your home medications and newly ordered medications with your provider and/or pharmacist. Follow medication instructions as directed by your provider and/or pharmacist. Please keep your medication list with you and share with your provider. Update the information when medications are discontinued, doses are changed, or new medications (including over-the-counter products) are added; and carry medication information at all times in the event of emergency situations     Allergies:  GADOLINIUM - Anaphylaxis,Nausea     INFLUENZA VIRUS VACCINE - (reactions not documented)     ZYVOX - Rash     CEFUROXIME - Nausea     EPINEPHRINE - (reactions not documented)     OTHER DRUG - (reactions not documented)     OTHER MISC - (reactions not documented)     PREDNISONE - (reactions not documented)     SULFA DRUGS - (reactions not documented)               Medications  Valid as of: June 27, 2017 -  3:49 PM    Generic Name Brand Name Tablet Size Instructions for use    Acetaminophen (Tab) TYLENOL 325 MG Take 650 mg by mouth every four hours as needed.        ALPRAZolam (Tab) XANAX 0.25 MG Take 1 Tab by mouth 2 times a day.        Aspirin (Chew Tab) ASA 81 MG Take 162 mg by mouth every day.        Azithromycin (Tab) ZITHROMAX 250 MG Two day one then qd x 4.        Budesonide-Formoterol Fumarate (Aerosol) SYMBICORT 80-4.5 MCG/ACT Inhale 2 Puffs by mouth 2 Times a Day. Use spacer. Rinse mouth after each use.        Budesonide-Formoterol Fumarate (Aerosol) SYMBICORT 80-4.5 MCG/ACT INHALE 2 PUFFS BY MOUTH TWICE DAILY. USE WITH SPACER. RINSE MOUTH AFTER EACH USE        Ca Phosphate-Cholecalciferol (Chew Tab) Ca Phosphate-Cholecalciferol 250-400 MG-UNIT Take  by  mouth.        Clindamycin HCl (Cap) CLEOCIN 150 MG TK ONE C PO  BID FOR 10 DAYS        ClonazePAM (Tab) KLONOPIN 0.5 MG TAKE TWO TABLETS BY MOUTH EVERY MORNING AND ONE TABLET BY MOUTH EVERY NIGHT AT BEDTIME        Desloratadine (Tab) CLARINEX 5 MG TAKE 1 TABLET BY MOUTH EVERY DAY        Immune Globulin (Human) (Solution) Immune Globulin 10% (10 g/100mL) 5 g by Intravenous route Once. Indications: Pt now on 5 grams        Levalbuterol HCl (Nebu Soln) XOPENEX 1.25 MG/3ML 3 mL by Nebulization route every four hours as needed for Shortness of Breath.        Levalbuterol Tartrate (Aerosol) XOPENEX HFA 45 MCG/ACT INHALE ONE TO TWO PUFFS BY MOUTH EVERY 4 HOURS AS NEEDED FOR SHORTNESS OF BREATH        Magnesium (Cap) Magnesium 100 MG Take 480 mg by mouth.        Magnesium Glycinate   Take 480 mg by mouth.        Montelukast Sodium (Tab) SINGULAIR 10 MG TAKE 1 TABLET BY MOUTH EVERY DAY        Nicotine Polacrilex (Gum) NICORETTE 2 MG Take 1 Each by mouth as needed for Smoking Cessation.        NON SPECIFIED   (hydration orders)  please infuse 2 L NS per week. Infuse over one hour QT: 2 L REFILL: 56 fax [phone]        Omega-3 Fatty Acids (Cap) OMEGA 3 FA 1000 MG Take 1,000 mg by mouth 2 Times a Day.        Oral Electrolytes   Take  by mouth every day.        Pneumococcal Vac Polyvalent (Injection) PNEUMOVAX-23 25 MCG/0.5ML 0.5 mL by Intramuscular route Once PRN.        Pravastatin Sodium (Tab) PRAVACHOL 40 MG TAKE 1 TABLET BY MOUTH EVERY DAY        Tiotropium Bromide Monohydrate (Cap) SPIRIVA 18 MCG Inhale 1 Cap by mouth every day. AT THE SAME TIME EVERY DAY        .                 Medicines prescribed today were sent to:     [id number] - [place] - [address] AT [facility] [address]    [address] [place] [address]    Phone: [phone] Fax: [phone]    Open 24 Hours?: No      Medication refill instructions:       If your prescription bottle indicates you have medication  refills left, it is not necessary to call your provider’s office. Please contact your pharmacy and they will refill your medication.    If your prescription bottle indicates you do not have any refills left, you may request refills at any time through one of the following ways: The online [employer] system (except Urgent Care), by calling your provider’s office, or by asking your pharmacy to contact your provider’s office with a refill request. Medication refills are processed only during regular business hours and may not be available until the next business day. Your provider may request additional information or to have a follow-up visit with you prior to refilling your medication.   *Please Note: Medication refills are assigned a new Rx number when refilled electronically. Your pharmacy may indicate that no refills were authorized even though a new prescription for the same medication is available at the pharmacy. Please request the medicine by name with the pharmacy before contacting your provider for a refill.        Your To Do List     Future Labs/Procedures Complete By Expires    COMP METABOLIC PANEL  As directed 6/28/2018    COMP METABOLIC PANEL  As directed 6/27/2018    DX-LUMBAR SPINE-2 OR 3 VIEWS  As directed 6/27/2018    FOLATE  As directed 6/27/2018    MAGNESIUM  As directed 6/27/2018    VITAMIN B12  As directed 6/27/2018         [employer] Access Code: Activation code not generated  Current [employer] Status: Active          Quit Tobacco Information     Do you want to quit using tobacco?    Quitting tobacco decreases risks of cancer, heart and lung disease, increases life expectancy, improves sense of taste and smell, and increases spending money, among other benefits.    If you are thinking about quitting, we can help.  • [facility] Quit Tobacco Program: 977-866-4205  o Program occurs weekly for four weeks and includes pharmacist consultation on products to support quitting smoking or chewing tobacco. A provider  referral is needed for pharmacist consultation.  • Tobacco Users Help Hotline: 0-653-QUIT-NOW (199-8938) or https://[place].quitlogix.org/  o Free, confidential telephone and online coaching for [place] residents. Sessions are designed on a schedule that is convenient for you. Eligible clients receive free nicotine replacement therapy.  • Nationally: www.smokefree.gov  o Information and professional assistance to support both immediate and long-term needs as you become, and remain, a non-smoker. Smokefree.gov allows you to choose the help that best fits your needs.

## 2017-06-27 NOTE — TELEPHONE ENCOUNTER
Alpha 1 lab results can not be given by the specialty lab. Please review results in media. I will call the patient. She also requested Dr. Mccullough receive the results because she has a follow up with him today. Faxed/ap

## 2017-06-28 ENCOUNTER — TELEPHONE (OUTPATIENT)
Dept: PULMONOLOGY | Facility: HOSPICE | Age: 72
End: 2017-06-28

## 2017-06-28 PROBLEM — D49.2 NEOPLASM OF UNSPECIFIED BEHAVIOR OF BONE, SOFT TISSUE, AND SKIN: Status: RESOLVED | Noted: 2017-06-14 | Resolved: 2017-06-28

## 2017-06-28 NOTE — PROGRESS NOTES
CC: Deb Vega is a 71 y.o. female is suffering from   Chief Complaint   Patient presents with   • Follow-Up         SUBJECTIVE:  1. Chronic fatigue syndrome  Deb is here for follow-up, continues to struggle with chronic fatigue syndrome is being followed by Dr. Ion Gutierrez at [facility].     2. Chronic obstructive pulmonary disease with acute exacerbation (CMS-HCC)  Patient and I have reviewed that she continues to have moderate to severe chronic obstructive pulmonary disease, discussed with her the importance of possibly moving to a lower altitude.     3. Chronic low back pain without sciatica, unspecified back pain laterality  Patient continues to have problems with chronic low back pain I have recommended that we re-x-ray her lower lumbar spine.     4. H/O screening mammography  Completed at [facility]    5. Pain in both lower extremities  Query possible B12 deficiency.     6. Low magnesium level  We'll recheck magnesium levels        Past social, family, history: Single, , former   Social History   Substance Use Topics   • Smoking status: Current Every Day Smoker -- 0.25 packs/day for 55 years     Types: Cigarettes   • Smokeless tobacco: Never Used      Comment: patient smokes 6 to 8 cigarettes a day    • Alcohol Use: No      Comment: patient states she has not had a drink in 2 years 10/09/2015         MEDICATIONS:    Current outpatient prescriptions:   •  SYMBICORT 80-4.5 MCG/ACT Aerosol, INHALE 2 PUFFS BY MOUTH TWICE DAILY. USE WITH SPACER. RINSE MOUTH AFTER EACH USE, Disp: 1 g, Rfl: 5  •  clindamycin (CLEOCIN) 150 MG Cap, TK ONE C PO  BID FOR 10 DAYS, Disp: , Rfl: 0  •  NON SPECIFIED, (hydration orders)  please infuse 2 L NS per week. Infuse over one hour QT: 2 L REFILL: 56 fax [phone], Disp: 2 Each, Rfl: 56  •  alprazolam (XANAX) 0.25 MG Tab, Take 1 Tab by mouth 2 times a day., Disp: 60 Tab, Rfl: 0  •  CLARINEX 5 MG tablet, TAKE 1 TABLET BY MOUTH EVERY DAY, Disp: 90  Tab, Rfl: 3  •  KLONOPIN 0.5 MG Tab, TAKE TWO TABLETS BY MOUTH EVERY MORNING AND ONE TABLET BY MOUTH EVERY NIGHT AT BEDTIME, Disp: 90 Tab, Rfl: 1  •  pneumococcal vaccine (PNEUMOVAX-23) 25 MCG/0.5ML Injection, 0.5 mL by Intramuscular route Once PRN., Disp: , Rfl:   •  montelukast (SINGULAIR) 10 MG Tab, TAKE 1 TABLET BY MOUTH EVERY DAY, Disp: 90 Tab, Rfl: 3  •  pravastatin (PRAVACHOL) 40 MG tablet, TAKE 1 TABLET BY MOUTH EVERY DAY, Disp: 90 Tab, Rfl: 3  •  XOPENEX HFA 45 MCG/ACT inhaler, INHALE ONE TO TWO PUFFS BY MOUTH EVERY 4 HOURS AS NEEDED FOR SHORTNESS OF BREATH, Disp: 15 g, Rfl: 11  •  Magnesium 100 MG Cap, Take 480 mg by mouth., Disp: , Rfl:   •  levalbuterol (XOPENEX) 1.25 MG/3ML Nebu Soln, 3 mL by Nebulization route every four hours as needed for Shortness of Breath., Disp: 24 mL, Rfl: 11  •  nicotine polacrilex (NICORETTE) 2 MG Gum, Take 1 Each by mouth as needed for Smoking Cessation., Disp: 150 Each, Rfl: 3  •  tiotropium (SPIRIVA HANDIHALER) 18 MCG Cap, Inhale 1 Cap by mouth every day. AT THE SAME TIME EVERY DAY, Disp: 90 Cap, Rfl: 3  •  acetaminophen (TYLENOL) 325 MG Tab, Take 650 mg by mouth every four hours as needed., Disp: , Rfl:   •  immune globulin (FLEBOGAMMA) 10 GM/100ML Solution, 5 g by Intravenous route Once. Indications: Pt now on 5 grams, Disp: , Rfl:   •  Oral Electrolytes (EMERGEN-C ELECTRO MIX PO), Take  by mouth every day., Disp: , Rfl:   •  Ca Phosphate-Cholecalciferol 250-400 MG-UNIT CHEW, Take  by mouth., Disp: , Rfl:   •  docosahexanoic acid (OMEGA 3 FA) 1000 MG CAPS, Take 1,000 mg by mouth 2 Times a Day., Disp: , Rfl:   •  aspirin (ASA) 81 MG CHEW, Take 162 mg by mouth every day., Disp: , Rfl:   •  MAGNESIUM GLYCINATE, Take 480 mg by mouth., Disp: , Rfl:   •  budesonide-formoterol (SYMBICORT) 80-4.5 MCG/ACT Aerosol, Inhale 2 Puffs by mouth 2 Times a Day. Use spacer. Rinse mouth after each use., Disp: 1 Inhaler, Rfl: 5  •  azithromycin (ZITHROMAX) 250 MG Tab, Two day one then qd x  "4., Disp: 6 Tab, Rfl: 0    PROBLEMS:  Patient Active Problem List    Diagnosis Date Noted   • Nocturnal hypoxemia 06/06/2017   • Supplemental oxygen dependent 02/07/2017   • Abnormal CT scan of lung 02/07/2017   • Tachycardia 07/15/2015   • Hypotension 07/02/2015   • COPD (chronic obstructive pulmonary disease) (CMS-HCC) 03/01/2013   • Lyme disease 03/01/2013   • Immunologic deficiency syndrome (CMS-HCC) 03/01/2013   • Chronic fatigue syndrome 12/07/2012   • Hyperlipidemia with target LDL less than 100 06/06/2012   • Smoking 06/06/2012   • Anxiety 06/06/2012       REVIEW OF SYSTEMS:  Gen.:  No Nausea, Vomiting, fever, Chills.  Heart: No chest pain.  Lungs:  No shortness of Breath.  Psychological: Girish unusual Anxiety depression     PHYSICAL EXAM   Constitutional: Alert, cooperative, not in acute distress.  Cardiovascular:  Rate Rhythm is regular without murmurs rubs clicks.     Thorax & Lungs: Clear to auscultation, no wheezing, rhonchi, or rales  HENT: Normocephalic, Atraumatic.  Eyes: PERRLA, EOMI, Conjunctiva normal.   Neck: Trachia is midline no swelling of the thyroid.   Lymphatic: No lymphadenopathy noted.   Musculoskeletal: Pain to palpation lower lumbar spine also sacroiliac joints negative straight leg raising test  Neurologic: Alert & oriented x 3, cranial nerves II through XII are intact, Normal motor function, Normal sensory function, No focal deficits noted.   Psychiatric: Affect normal, Judgment normal, Mood normal.     VITAL SIGNS:/66 mmHg  Pulse 80  Temp(Src) 37.2 °C (98.9 °F)  Resp 18  Ht 1.613 m (5' 3.5\")  Wt 52.481 kg (115 lb 11.2 oz)  BMI 20.17 kg/m2  SpO2 97%  Breastfeeding? No    Labs: Reviewed    Assessment:                                                     Plan:    1. Chronic fatigue syndrome  Recheck conference metabolic panel  - COMP METABOLIC PANEL; Future    2. Chronic obstructive pulmonary disease with acute exacerbation (CMS-HCC)  Continue on oxygen replacement " therapy  - COMP METABOLIC PANEL; Future    3. Chronic low back pain without sciatica, unspecified back pain laterality  X-ray ordered  - DX-LUMBAR SPINE-2 OR 3 VIEWS; Future  - COMP METABOLIC PANEL; Future    4. H/O screening mammography  Completed at [facility]    5. Pain in both lower extremities  Labs ordered  - VITAMIN B12; Future  - FOLATE; Future  - COMP METABOLIC PANEL; Future    6. Low magnesium level  Recheck magnesium  - MAGNESIUM; Future

## 2017-06-28 NOTE — TELEPHONE ENCOUNTER
The pt called and she stated that she got very short of breath from the heat yesterday afternoon while she was out.  She wasn't sure what to do and she panicked.  I told her to use her rescue inhaler at those times and try to relax and breathe slowly.  She said she would do that.  She also asked if she could and should use her nebulizer.  I told her that her can use it with her Symbicort and Spiriva.  She said it had been a long time since she had used it and she forgot if she could use it with the other meds.  I told her that she could.  She said that she would call if she runs into anymore problems.

## 2017-06-29 ENCOUNTER — OUTPATIENT INFUSION SERVICES (OUTPATIENT)
Dept: ONCOLOGY | Facility: MEDICAL CENTER | Age: 72
End: 2017-06-29
Attending: INTERNAL MEDICINE
Payer: MEDICARE

## 2017-06-29 VITALS
HEIGHT: 63 IN | OXYGEN SATURATION: 92 % | DIASTOLIC BLOOD PRESSURE: 49 MMHG | HEART RATE: 91 BPM | TEMPERATURE: 99 F | WEIGHT: 117.06 LBS | BODY MASS INDEX: 20.74 KG/M2 | SYSTOLIC BLOOD PRESSURE: 92 MMHG | RESPIRATION RATE: 20 BRPM

## 2017-06-29 DIAGNOSIS — M79.604 BILATERAL LOWER EXTREMITY PAIN: ICD-10-CM

## 2017-06-29 DIAGNOSIS — M79.605 BILATERAL LOWER EXTREMITY PAIN: ICD-10-CM

## 2017-06-29 DIAGNOSIS — R79.0 LOW MAGNESIUM LEVELS: ICD-10-CM

## 2017-06-29 LAB
ALBUMIN SERPL BCP-MCNC: 4.1 G/DL (ref 3.2–4.9)
ALBUMIN/GLOB SERPL: 1.4 G/DL
ALP SERPL-CCNC: 59 U/L (ref 30–99)
ALT SERPL-CCNC: 19 U/L (ref 2–50)
ANION GAP SERPL CALC-SCNC: 7 MMOL/L (ref 0–11.9)
AST SERPL-CCNC: 21 U/L (ref 12–45)
BILIRUB SERPL-MCNC: 0.5 MG/DL (ref 0.1–1.5)
BUN SERPL-MCNC: 8 MG/DL (ref 8–22)
CALCIUM SERPL-MCNC: 9.3 MG/DL (ref 8.5–10.5)
CHLORIDE SERPL-SCNC: 104 MMOL/L (ref 96–112)
CO2 SERPL-SCNC: 30 MMOL/L (ref 20–33)
CREAT SERPL-MCNC: 0.53 MG/DL (ref 0.5–1.4)
FOLATE SERPL-MCNC: 23.4 NG/ML
GFR SERPL CREATININE-BSD FRML MDRD: >60 ML/MIN/1.73 M 2
GLOBULIN SER CALC-MCNC: 3 G/DL (ref 1.9–3.5)
GLUCOSE SERPL-MCNC: 84 MG/DL (ref 65–99)
MAGNESIUM SERPL-MCNC: 2.1 MG/DL (ref 1.5–2.5)
POTASSIUM SERPL-SCNC: 3.7 MMOL/L (ref 3.6–5.5)
PROT SERPL-MCNC: 7.1 G/DL (ref 6–8.2)
SODIUM SERPL-SCNC: 141 MMOL/L (ref 135–145)
VIT B12 SERPL-MCNC: 775 PG/ML (ref 211–911)

## 2017-06-29 PROCEDURE — 36415 COLL VENOUS BLD VENIPUNCTURE: CPT

## 2017-06-29 PROCEDURE — 700105 HCHG RX REV CODE 258: Performed by: INTERNAL MEDICINE

## 2017-06-29 PROCEDURE — 83735 ASSAY OF MAGNESIUM: CPT

## 2017-06-29 PROCEDURE — 96360 HYDRATION IV INFUSION INIT: CPT

## 2017-06-29 PROCEDURE — 80053 COMPREHEN METABOLIC PANEL: CPT

## 2017-06-29 PROCEDURE — 82746 ASSAY OF FOLIC ACID SERUM: CPT

## 2017-06-29 PROCEDURE — 82607 VITAMIN B-12: CPT

## 2017-06-29 RX ORDER — SODIUM CHLORIDE 9 MG/ML
INJECTION, SOLUTION INTRAVENOUS ONCE
Status: COMPLETED | OUTPATIENT
Start: 2017-06-29 | End: 2017-06-29

## 2017-06-29 RX ADMIN — SODIUM CHLORIDE 1000 ML: 9 INJECTION, SOLUTION INTRAVENOUS at 14:06

## 2017-06-29 ASSESSMENT — PAIN SCALES - GENERAL: PAINLEVEL: 2=MINIMAL-SLIGHT

## 2017-06-29 NOTE — PROGRESS NOTES
Pt arrived to IS ambulatory, oxygen dependent, here for hydration. IV access established. Pt hand-carried in labs, drawn with IV start. Hydration infused as requested by pt. At completion of infusion, VS assessed and pt determined she would not need remaining fluid. Discharged home under self care in no apparent distress. Knows when to follow up.

## 2017-07-03 ENCOUNTER — OUTPATIENT INFUSION SERVICES (OUTPATIENT)
Dept: ONCOLOGY | Facility: MEDICAL CENTER | Age: 72
End: 2017-07-03
Attending: INTERNAL MEDICINE
Payer: MEDICARE

## 2017-07-03 VITALS
HEIGHT: 63 IN | OXYGEN SATURATION: 93 % | RESPIRATION RATE: 20 BRPM | BODY MASS INDEX: 20.2 KG/M2 | HEART RATE: 99 BPM | DIASTOLIC BLOOD PRESSURE: 65 MMHG | WEIGHT: 113.98 LBS | TEMPERATURE: 98.7 F | SYSTOLIC BLOOD PRESSURE: 104 MMHG

## 2017-07-03 PROCEDURE — 96360 HYDRATION IV INFUSION INIT: CPT

## 2017-07-03 PROCEDURE — 96361 HYDRATE IV INFUSION ADD-ON: CPT

## 2017-07-03 PROCEDURE — 700105 HCHG RX REV CODE 258: Performed by: INTERNAL MEDICINE

## 2017-07-03 RX ORDER — SODIUM CHLORIDE 9 MG/ML
1000 INJECTION, SOLUTION INTRAVENOUS ONCE
Status: COMPLETED | OUTPATIENT
Start: 2017-07-03 | End: 2017-07-03

## 2017-07-03 RX ADMIN — SODIUM CHLORIDE 1000 ML: 9 INJECTION, SOLUTION INTRAVENOUS at 13:51

## 2017-07-03 ASSESSMENT — PAIN SCALES - GENERAL: PAINLEVEL: 4=SLIGHT-MODERATE PAIN

## 2017-07-03 NOTE — PROGRESS NOTES
Pt arrived to infusion for hydration. PIV access est, brisk blood return. Hydration administered, pt refusing full dose, requesting 500ml at 400ml/hr. Pt tolerated well. PIV flushed and removed. Pt dc'd home self care in no acute distress. Future appointment confirmed.

## 2017-07-06 ENCOUNTER — HOSPITAL ENCOUNTER (OUTPATIENT)
Dept: RADIOLOGY | Facility: MEDICAL CENTER | Age: 72
End: 2017-07-06
Attending: INTERNAL MEDICINE
Payer: MEDICARE

## 2017-07-06 ENCOUNTER — OUTPATIENT INFUSION SERVICES (OUTPATIENT)
Dept: ONCOLOGY | Facility: MEDICAL CENTER | Age: 72
End: 2017-07-06
Attending: INTERNAL MEDICINE
Payer: MEDICARE

## 2017-07-06 VITALS
TEMPERATURE: 99 F | WEIGHT: 113.32 LBS | DIASTOLIC BLOOD PRESSURE: 70 MMHG | RESPIRATION RATE: 18 BRPM | HEART RATE: 100 BPM | SYSTOLIC BLOOD PRESSURE: 107 MMHG | HEIGHT: 63 IN | OXYGEN SATURATION: 96 % | BODY MASS INDEX: 20.08 KG/M2

## 2017-07-06 DIAGNOSIS — G89.29 CHRONIC LOW BACK PAIN WITHOUT SCIATICA, UNSPECIFIED BACK PAIN LATERALITY: ICD-10-CM

## 2017-07-06 DIAGNOSIS — M54.50 CHRONIC LOW BACK PAIN WITHOUT SCIATICA, UNSPECIFIED BACK PAIN LATERALITY: ICD-10-CM

## 2017-07-06 PROCEDURE — 96360 HYDRATION IV INFUSION INIT: CPT

## 2017-07-06 PROCEDURE — 700105 HCHG RX REV CODE 258: Performed by: INTERNAL MEDICINE

## 2017-07-06 RX ORDER — SODIUM CHLORIDE 9 MG/ML
1000 INJECTION, SOLUTION INTRAVENOUS ONCE
Status: COMPLETED | OUTPATIENT
Start: 2017-07-06 | End: 2017-07-06

## 2017-07-06 RX ADMIN — SODIUM CHLORIDE 500 ML: 9 INJECTION, SOLUTION INTRAVENOUS at 15:27

## 2017-07-06 ASSESSMENT — PAIN SCALES - GENERAL: PAINLEVEL: 5=MODERATE PAIN

## 2017-07-10 ENCOUNTER — OUTPATIENT INFUSION SERVICES (OUTPATIENT)
Dept: ONCOLOGY | Facility: MEDICAL CENTER | Age: 72
End: 2017-07-10
Attending: INTERNAL MEDICINE
Payer: MEDICARE

## 2017-07-10 VITALS
HEART RATE: 89 BPM | DIASTOLIC BLOOD PRESSURE: 62 MMHG | RESPIRATION RATE: 18 BRPM | WEIGHT: 114.2 LBS | TEMPERATURE: 98.8 F | BODY MASS INDEX: 20.23 KG/M2 | SYSTOLIC BLOOD PRESSURE: 102 MMHG | HEIGHT: 63 IN | OXYGEN SATURATION: 94 %

## 2017-07-10 PROCEDURE — 700105 HCHG RX REV CODE 258: Performed by: INTERNAL MEDICINE

## 2017-07-10 PROCEDURE — 96360 HYDRATION IV INFUSION INIT: CPT

## 2017-07-10 RX ORDER — SODIUM CHLORIDE 9 MG/ML
1000 INJECTION, SOLUTION INTRAVENOUS ONCE
Status: COMPLETED | OUTPATIENT
Start: 2017-07-10 | End: 2017-07-10

## 2017-07-10 RX ADMIN — SODIUM CHLORIDE 1000 ML: 9 INJECTION, SOLUTION INTRAVENOUS at 14:42

## 2017-07-10 ASSESSMENT — PAIN SCALES - GENERAL: PAINLEVEL: 5=MODERATE PAIN

## 2017-07-10 NOTE — PROGRESS NOTES
Pt returns to infusion center for hydration.  PIV established, brisk blood return noted.  Pt requested 500ml of NS at a rate of 400ml/hr.  Pt tolerated hydration without incident.  PIV flushed with saline per policy, removed, gauze dressing placed.  Pt left infusion center ambulatory and in good condition.  Returns Thursday, appointment scheduled.

## 2017-07-12 DIAGNOSIS — F41.9 ANXIETY: ICD-10-CM

## 2017-07-12 RX ORDER — ALPRAZOLAM 0.25 MG/1
0.25 TABLET ORAL 2 TIMES DAILY
Qty: 60 TAB | Refills: 0 | Status: CANCELLED | OUTPATIENT
Start: 2017-07-12

## 2017-07-13 ENCOUNTER — OUTPATIENT INFUSION SERVICES (OUTPATIENT)
Dept: ONCOLOGY | Facility: MEDICAL CENTER | Age: 72
End: 2017-07-13
Attending: INTERNAL MEDICINE
Payer: MEDICARE

## 2017-07-13 VITALS
TEMPERATURE: 97.8 F | BODY MASS INDEX: 20.12 KG/M2 | HEART RATE: 100 BPM | RESPIRATION RATE: 18 BRPM | DIASTOLIC BLOOD PRESSURE: 66 MMHG | WEIGHT: 113.54 LBS | SYSTOLIC BLOOD PRESSURE: 96 MMHG | OXYGEN SATURATION: 98 % | HEIGHT: 63 IN

## 2017-07-13 PROCEDURE — 96360 HYDRATION IV INFUSION INIT: CPT

## 2017-07-13 PROCEDURE — 700105 HCHG RX REV CODE 258: Performed by: INTERNAL MEDICINE

## 2017-07-13 RX ORDER — SODIUM CHLORIDE 9 MG/ML
1000 INJECTION, SOLUTION INTRAVENOUS ONCE
Status: COMPLETED | OUTPATIENT
Start: 2017-07-13 | End: 2017-07-13

## 2017-07-13 RX ADMIN — SODIUM CHLORIDE 1000 ML: 9 INJECTION, SOLUTION INTRAVENOUS at 14:49

## 2017-07-13 ASSESSMENT — PAIN SCALES - GENERAL: PAINLEVEL: 4=SLIGHT-MODERATE PAIN

## 2017-07-17 ENCOUNTER — OUTPATIENT INFUSION SERVICES (OUTPATIENT)
Dept: ONCOLOGY | Facility: MEDICAL CENTER | Age: 72
End: 2017-07-17
Attending: INTERNAL MEDICINE
Payer: MEDICARE

## 2017-07-17 VITALS
OXYGEN SATURATION: 93 % | BODY MASS INDEX: 20.35 KG/M2 | DIASTOLIC BLOOD PRESSURE: 63 MMHG | HEART RATE: 99 BPM | SYSTOLIC BLOOD PRESSURE: 103 MMHG | RESPIRATION RATE: 20 BRPM | WEIGHT: 114.86 LBS | TEMPERATURE: 99.5 F | HEIGHT: 63 IN

## 2017-07-17 PROCEDURE — 96360 HYDRATION IV INFUSION INIT: CPT

## 2017-07-17 PROCEDURE — 700105 HCHG RX REV CODE 258: Performed by: INTERNAL MEDICINE

## 2017-07-17 RX ORDER — SODIUM CHLORIDE 9 MG/ML
1000 INJECTION, SOLUTION INTRAVENOUS ONCE
Status: COMPLETED | OUTPATIENT
Start: 2017-07-17 | End: 2017-07-17

## 2017-07-17 RX ADMIN — SODIUM CHLORIDE 500 ML: 9 INJECTION, SOLUTION INTRAVENOUS at 14:03

## 2017-07-17 ASSESSMENT — PAIN SCALES - GENERAL: PAINLEVEL: 4=SLIGHT-MODERATE PAIN

## 2017-07-17 NOTE — PROGRESS NOTES
Patient arrived ambulatory with use of O2 2L/NC to the [facility] for hydration. Reviewed vital signs, labs, and physician order. Patient requests partial dose of 500ml of NS at rate of 400ml/hr. Initiated IV access to left forearm, obtained brisk blood return. Administered infusion, with assist of another RN, IV was removed, gauze dressing was placed, patient left the clinic in no signs of distress at 1529. No vital signs were assessed prior to departure. Patient aware of upcoming apt dates and times, has handout.

## 2017-07-20 ENCOUNTER — OUTPATIENT INFUSION SERVICES (OUTPATIENT)
Dept: ONCOLOGY | Facility: MEDICAL CENTER | Age: 72
End: 2017-07-20
Attending: INTERNAL MEDICINE
Payer: MEDICARE

## 2017-07-20 VITALS
TEMPERATURE: 99.2 F | HEIGHT: 63 IN | HEART RATE: 82 BPM | SYSTOLIC BLOOD PRESSURE: 94 MMHG | WEIGHT: 114.2 LBS | BODY MASS INDEX: 20.23 KG/M2 | RESPIRATION RATE: 18 BRPM | OXYGEN SATURATION: 95 % | DIASTOLIC BLOOD PRESSURE: 52 MMHG

## 2017-07-20 PROCEDURE — 700105 HCHG RX REV CODE 258: Performed by: INTERNAL MEDICINE

## 2017-07-20 PROCEDURE — 96360 HYDRATION IV INFUSION INIT: CPT

## 2017-07-20 RX ORDER — SODIUM CHLORIDE 9 MG/ML
1000 INJECTION, SOLUTION INTRAVENOUS ONCE
Status: COMPLETED | OUTPATIENT
Start: 2017-07-20 | End: 2017-07-20

## 2017-07-20 RX ORDER — ACYCLOVIR 400 MG/1
TABLET ORAL
Refills: 2 | COMMUNITY
Start: 2017-07-07 | End: 2017-10-19

## 2017-07-20 RX ADMIN — SODIUM CHLORIDE 1000 ML: 9 INJECTION, SOLUTION INTRAVENOUS at 13:59

## 2017-07-20 ASSESSMENT — PAIN SCALES - GENERAL: PAINLEVEL: 4=SLIGHT-MODERATE PAIN

## 2017-07-21 NOTE — PROGRESS NOTES
LATE ENTRY:    Pt ambulated into department for her bi-weekly hydration for Tachycardia. Pt connected to [facility] oxygen by CNA upon arrival. PIV started, had + return, flushed briskly. Pt requested that fluid be given at 400 ml/hr and that only 500 ml of NS be given. Pt refused that vitals be taken after. IV discontinued, bleeding controlled with gauze and coban. Future appointments confirmed with Pt prior to leaving, left department by self appearing in good spirits and NAD.

## 2017-07-23 ENCOUNTER — OUTPATIENT INFUSION SERVICES (OUTPATIENT)
Dept: ONCOLOGY | Facility: MEDICAL CENTER | Age: 72
End: 2017-07-23
Attending: INTERNAL MEDICINE
Payer: MEDICARE

## 2017-07-23 VITALS
WEIGHT: 114.64 LBS | OXYGEN SATURATION: 98 % | RESPIRATION RATE: 18 BRPM | DIASTOLIC BLOOD PRESSURE: 60 MMHG | TEMPERATURE: 98.8 F | SYSTOLIC BLOOD PRESSURE: 99 MMHG | HEART RATE: 98 BPM | HEIGHT: 63 IN | BODY MASS INDEX: 20.31 KG/M2

## 2017-07-23 PROCEDURE — 700105 HCHG RX REV CODE 258: Performed by: INTERNAL MEDICINE

## 2017-07-23 PROCEDURE — 96360 HYDRATION IV INFUSION INIT: CPT

## 2017-07-23 RX ORDER — SODIUM CHLORIDE 9 MG/ML
1000 INJECTION, SOLUTION INTRAVENOUS ONCE
Status: COMPLETED | OUTPATIENT
Start: 2017-07-23 | End: 2017-07-23

## 2017-07-23 RX ADMIN — SODIUM CHLORIDE 1000 ML: 9 INJECTION, SOLUTION INTRAVENOUS at 13:53

## 2017-07-23 ASSESSMENT — PAIN SCALES - GENERAL: PAINLEVEL: 4=SLIGHT-MODERATE PAIN

## 2017-07-23 NOTE — PROGRESS NOTES
Pt is here for her scheduled hydration. Oxygen dependent. Heating pad in use to ease chronic lower back pain. 500ml of NS infused at 400ml/h per pt request. Treatment well tolerated. Vitals taken upon the completion of the hydration (see EPIC).  Discharged home to self care. Next appointment verified.

## 2017-07-27 ENCOUNTER — OUTPATIENT INFUSION SERVICES (OUTPATIENT)
Dept: ONCOLOGY | Facility: MEDICAL CENTER | Age: 72
End: 2017-07-27
Attending: INTERNAL MEDICINE
Payer: MEDICARE

## 2017-07-27 VITALS
SYSTOLIC BLOOD PRESSURE: 106 MMHG | RESPIRATION RATE: 18 BRPM | TEMPERATURE: 99 F | DIASTOLIC BLOOD PRESSURE: 73 MMHG | HEIGHT: 63 IN | OXYGEN SATURATION: 96 % | HEART RATE: 104 BPM | BODY MASS INDEX: 19.92 KG/M2 | WEIGHT: 112.43 LBS

## 2017-07-27 PROCEDURE — 700105 HCHG RX REV CODE 258: Performed by: INTERNAL MEDICINE

## 2017-07-27 PROCEDURE — 96360 HYDRATION IV INFUSION INIT: CPT

## 2017-07-27 RX ORDER — SODIUM CHLORIDE 9 MG/ML
1000 INJECTION, SOLUTION INTRAVENOUS ONCE
Status: COMPLETED | OUTPATIENT
Start: 2017-07-27 | End: 2017-07-27

## 2017-07-27 RX ADMIN — SODIUM CHLORIDE 1000 ML: 9 INJECTION, SOLUTION INTRAVENOUS at 14:27

## 2017-07-27 ASSESSMENT — PAIN SCALES - GENERAL: PAINLEVEL: 4=SLIGHT-MODERATE PAIN

## 2017-07-27 NOTE — PROGRESS NOTES
Pt arrived to IS, ambulatory, for hydration. Pt voices no complaints. 24g PIV established in the L-FA, positive blood return noted. Pt requesting 500 mL NS at 400 mL/hr. Hydration infused with no s/sx of adverse reaction. Pt refusing vital signs post hydration. PIV flushed and removed. Pt left IS with no s/sx of distress. Follow up appointment confirmed.

## 2017-07-30 ENCOUNTER — OUTPATIENT INFUSION SERVICES (OUTPATIENT)
Dept: ONCOLOGY | Facility: MEDICAL CENTER | Age: 72
End: 2017-07-30
Attending: INTERNAL MEDICINE
Payer: MEDICARE

## 2017-07-30 VITALS
TEMPERATURE: 99.1 F | SYSTOLIC BLOOD PRESSURE: 98 MMHG | OXYGEN SATURATION: 95 % | DIASTOLIC BLOOD PRESSURE: 67 MMHG | BODY MASS INDEX: 20.23 KG/M2 | HEART RATE: 96 BPM | WEIGHT: 114.2 LBS | RESPIRATION RATE: 18 BRPM | HEIGHT: 63 IN

## 2017-07-30 DIAGNOSIS — J44.1 CHRONIC OBSTRUCTIVE PULMONARY DISEASE WITH ACUTE EXACERBATION (HCC): ICD-10-CM

## 2017-07-30 PROCEDURE — 96360 HYDRATION IV INFUSION INIT: CPT

## 2017-07-30 PROCEDURE — 700105 HCHG RX REV CODE 258: Performed by: INTERNAL MEDICINE

## 2017-07-30 RX ORDER — SODIUM CHLORIDE 9 MG/ML
1000 INJECTION, SOLUTION INTRAVENOUS ONCE
Status: COMPLETED | OUTPATIENT
Start: 2017-07-30 | End: 2017-07-30

## 2017-07-30 RX ADMIN — SODIUM CHLORIDE 1000 ML: 9 INJECTION, SOLUTION INTRAVENOUS at 13:59

## 2017-07-30 ASSESSMENT — PAIN SCALES - GENERAL: PAINLEVEL: 4=SLIGHT-MODERATE PAIN

## 2017-08-03 ENCOUNTER — OUTPATIENT INFUSION SERVICES (OUTPATIENT)
Dept: ONCOLOGY | Facility: MEDICAL CENTER | Age: 72
End: 2017-08-03
Attending: INTERNAL MEDICINE
Payer: MEDICARE

## 2017-08-03 VITALS
OXYGEN SATURATION: 97 % | DIASTOLIC BLOOD PRESSURE: 79 MMHG | SYSTOLIC BLOOD PRESSURE: 115 MMHG | HEART RATE: 89 BPM | BODY MASS INDEX: 20.16 KG/M2 | RESPIRATION RATE: 20 BRPM | WEIGHT: 113.76 LBS | TEMPERATURE: 99.6 F | HEIGHT: 63 IN

## 2017-08-03 PROCEDURE — 96360 HYDRATION IV INFUSION INIT: CPT

## 2017-08-03 PROCEDURE — 700105 HCHG RX REV CODE 258: Performed by: INTERNAL MEDICINE

## 2017-08-03 RX ORDER — SODIUM CHLORIDE 9 MG/ML
1000 INJECTION, SOLUTION INTRAVENOUS ONCE
Status: COMPLETED | OUTPATIENT
Start: 2017-08-03 | End: 2017-08-03

## 2017-08-03 RX ADMIN — SODIUM CHLORIDE 1000 ML: 9 INJECTION, SOLUTION INTRAVENOUS at 13:44

## 2017-08-03 ASSESSMENT — PAIN SCALES - GENERAL: PAINLEVEL: 4=SLIGHT-MODERATE PAIN

## 2017-08-03 NOTE — PROGRESS NOTES
Pt arrived to IS, ambulatory, for hydration. Pt voices no complaints. 24g PIV established in the R-FA, positive blood return noted. Pt requesting 500 mL NS at 400 mL/hr. Hydration infused with no s/sx of adverse reaction. Pt refusing vital signs post hydration. PIV flushed and removed. Pt left IS with no s/sx of distress. Follow up appointment confirmed.

## 2017-08-06 ENCOUNTER — OUTPATIENT INFUSION SERVICES (OUTPATIENT)
Dept: ONCOLOGY | Facility: MEDICAL CENTER | Age: 72
End: 2017-08-06
Attending: INTERNAL MEDICINE
Payer: MEDICARE

## 2017-08-06 VITALS
HEIGHT: 63 IN | WEIGHT: 113.54 LBS | SYSTOLIC BLOOD PRESSURE: 111 MMHG | BODY MASS INDEX: 20.12 KG/M2 | HEART RATE: 102 BPM | RESPIRATION RATE: 18 BRPM | TEMPERATURE: 98.7 F | DIASTOLIC BLOOD PRESSURE: 66 MMHG | OXYGEN SATURATION: 93 %

## 2017-08-06 PROCEDURE — 96360 HYDRATION IV INFUSION INIT: CPT

## 2017-08-06 PROCEDURE — 700105 HCHG RX REV CODE 258: Performed by: INTERNAL MEDICINE

## 2017-08-06 PROCEDURE — 96361 HYDRATE IV INFUSION ADD-ON: CPT

## 2017-08-06 RX ORDER — VITAMIN C
1 TAB ORAL DAILY
COMMUNITY
End: 2017-10-05

## 2017-08-06 RX ORDER — SODIUM CHLORIDE 9 MG/ML
1000 INJECTION, SOLUTION INTRAVENOUS ONCE
Status: COMPLETED | OUTPATIENT
Start: 2017-08-06 | End: 2017-08-06

## 2017-08-06 RX ADMIN — SODIUM CHLORIDE 1000 ML: 9 INJECTION, SOLUTION INTRAVENOUS at 14:00

## 2017-08-06 ASSESSMENT — PAIN SCALES - GENERAL: PAINLEVEL: 4=SLIGHT-MODERATE PAIN

## 2017-08-10 ENCOUNTER — OUTPATIENT INFUSION SERVICES (OUTPATIENT)
Dept: ONCOLOGY | Facility: MEDICAL CENTER | Age: 72
End: 2017-08-10
Attending: INTERNAL MEDICINE
Payer: MEDICARE

## 2017-08-10 VITALS
HEIGHT: 63 IN | WEIGHT: 113.98 LBS | TEMPERATURE: 99.3 F | SYSTOLIC BLOOD PRESSURE: 107 MMHG | OXYGEN SATURATION: 93 % | DIASTOLIC BLOOD PRESSURE: 87 MMHG | RESPIRATION RATE: 18 BRPM | BODY MASS INDEX: 20.2 KG/M2 | HEART RATE: 102 BPM

## 2017-08-10 DIAGNOSIS — G90.3 NEUROGENIC ORTHOSTATIC HYPOTENSION (HCC): ICD-10-CM

## 2017-08-10 PROCEDURE — 96360 HYDRATION IV INFUSION INIT: CPT

## 2017-08-10 PROCEDURE — 700105 HCHG RX REV CODE 258: Performed by: INTERNAL MEDICINE

## 2017-08-10 RX ORDER — SODIUM CHLORIDE 9 MG/ML
1000 INJECTION, SOLUTION INTRAVENOUS ONCE
Status: COMPLETED | OUTPATIENT
Start: 2017-08-10 | End: 2017-08-10

## 2017-08-10 RX ADMIN — SODIUM CHLORIDE 1000 ML: 9 INJECTION, SOLUTION INTRAVENOUS at 13:56

## 2017-08-10 ASSESSMENT — PAIN SCALES - GENERAL: PAINLEVEL: 3=SLIGHT PAIN

## 2017-08-10 NOTE — PROGRESS NOTES
"Pt presents ambulatory for hydration. IV established and hydration given. Pt requests hydration stopped after 500 ml, \"I feel good\". Pt declined repeat VS. IV flushed with saline and d/c'd with gauze and coband applied to site. Pt scheduled to return, appt confirmed with pt. Pt left ambulatory in no distress at 1525  "

## 2017-08-13 ENCOUNTER — OUTPATIENT INFUSION SERVICES (OUTPATIENT)
Dept: ONCOLOGY | Facility: MEDICAL CENTER | Age: 72
End: 2017-08-13
Attending: INTERNAL MEDICINE
Payer: MEDICARE

## 2017-08-13 VITALS
OXYGEN SATURATION: 92 % | DIASTOLIC BLOOD PRESSURE: 61 MMHG | HEART RATE: 101 BPM | WEIGHT: 114.86 LBS | BODY MASS INDEX: 20.35 KG/M2 | RESPIRATION RATE: 18 BRPM | HEIGHT: 63 IN | TEMPERATURE: 99.1 F | SYSTOLIC BLOOD PRESSURE: 105 MMHG

## 2017-08-13 DIAGNOSIS — G90.3 NEUROGENIC ORTHOSTATIC HYPOTENSION (HCC): ICD-10-CM

## 2017-08-13 PROCEDURE — 96360 HYDRATION IV INFUSION INIT: CPT

## 2017-08-13 PROCEDURE — 700105 HCHG RX REV CODE 258: Performed by: INTERNAL MEDICINE

## 2017-08-13 PROCEDURE — 96365 THER/PROPH/DIAG IV INF INIT: CPT

## 2017-08-13 RX ORDER — SODIUM CHLORIDE 9 MG/ML
500 INJECTION, SOLUTION INTRAVENOUS ONCE
Status: COMPLETED | OUTPATIENT
Start: 2017-08-13 | End: 2017-08-13

## 2017-08-13 RX ADMIN — SODIUM CHLORIDE 500 ML: 9 INJECTION, SOLUTION INTRAVENOUS at 13:53

## 2017-08-13 ASSESSMENT — PAIN SCALES - GENERAL: PAINLEVEL: 4=SLIGHT-MODERATE PAIN

## 2017-08-13 NOTE — PROGRESS NOTES
Pt presents ambulatory for hydration. IV established and hydration given. Pt declined repeat VS. IV flushed with saline and d/c'd with gauze and coband applied to site. Pt scheduled to return, appt confirmed with pt. Pt left ambulatory in no distress at 1519

## 2017-08-17 ENCOUNTER — OUTPATIENT INFUSION SERVICES (OUTPATIENT)
Dept: ONCOLOGY | Facility: MEDICAL CENTER | Age: 72
End: 2017-08-17
Attending: INTERNAL MEDICINE
Payer: MEDICARE

## 2017-08-17 VITALS
DIASTOLIC BLOOD PRESSURE: 67 MMHG | SYSTOLIC BLOOD PRESSURE: 115 MMHG | HEIGHT: 63 IN | RESPIRATION RATE: 18 BRPM | OXYGEN SATURATION: 96 % | BODY MASS INDEX: 20.16 KG/M2 | WEIGHT: 113.76 LBS | TEMPERATURE: 97.2 F | HEART RATE: 95 BPM

## 2017-08-17 DIAGNOSIS — G90.3 NEUROGENIC ORTHOSTATIC HYPOTENSION (HCC): ICD-10-CM

## 2017-08-17 PROCEDURE — 700105 HCHG RX REV CODE 258: Performed by: INTERNAL MEDICINE

## 2017-08-17 PROCEDURE — 96360 HYDRATION IV INFUSION INIT: CPT

## 2017-08-17 RX ORDER — SODIUM CHLORIDE 9 MG/ML
1000 INJECTION, SOLUTION INTRAVENOUS ONCE
Status: COMPLETED | OUTPATIENT
Start: 2017-08-17 | End: 2017-08-17

## 2017-08-17 RX ADMIN — SODIUM CHLORIDE 1000 ML: 9 INJECTION, SOLUTION INTRAVENOUS at 14:48

## 2017-08-17 ASSESSMENT — PAIN SCALES - GENERAL: PAINLEVEL: 5=MODERATE PAIN

## 2017-08-18 NOTE — PROGRESS NOTES
"Late entry for 1610:  Pt to infusion services ambulatory per self.  Pt here for scheduled IV NS hydration.  Plan of care reviewed.  Pt verbalizes understanding.  PIV established to LAC, #24G.  IV flushes easily; + blood return verified.  IV hydration administered per MD orders.  Pt requests and states she will take \"500 mL at a rate of 400 mL/hr\".  1605:  Hydration stopped.  VS:  BP = 90/58, pulse = 89.  Pt refuses remaining hydration.  PIV d/c's per patient request.  Pressure dressing applied.  Pt released to self care in no apparent distress after completion of treatment, ambulatory.  Pt to knows when to return, next appointment scheduled.   "

## 2017-08-21 ENCOUNTER — OUTPATIENT INFUSION SERVICES (OUTPATIENT)
Dept: ONCOLOGY | Facility: MEDICAL CENTER | Age: 72
End: 2017-08-21
Attending: INTERNAL MEDICINE
Payer: MEDICARE

## 2017-08-21 VITALS
BODY MASS INDEX: 19.88 KG/M2 | RESPIRATION RATE: 20 BRPM | WEIGHT: 112.21 LBS | TEMPERATURE: 99.4 F | HEIGHT: 63 IN | HEART RATE: 110 BPM | SYSTOLIC BLOOD PRESSURE: 118 MMHG | DIASTOLIC BLOOD PRESSURE: 67 MMHG | OXYGEN SATURATION: 95 %

## 2017-08-21 DIAGNOSIS — G90.3 NEUROGENIC ORTHOSTATIC HYPOTENSION (HCC): ICD-10-CM

## 2017-08-21 PROCEDURE — 700105 HCHG RX REV CODE 258: Performed by: INTERNAL MEDICINE

## 2017-08-21 PROCEDURE — 96360 HYDRATION IV INFUSION INIT: CPT

## 2017-08-21 RX ORDER — SODIUM CHLORIDE 9 MG/ML
1000 INJECTION, SOLUTION INTRAVENOUS ONCE
Status: COMPLETED | OUTPATIENT
Start: 2017-08-21 | End: 2017-08-21

## 2017-08-21 RX ADMIN — SODIUM CHLORIDE 400 ML: 9 INJECTION, SOLUTION INTRAVENOUS at 14:14

## 2017-08-21 ASSESSMENT — PAIN SCALES - GENERAL: PAINLEVEL: 6=MODERATE PAIN

## 2017-08-21 NOTE — PROGRESS NOTES
Pt presents to IS for IV NS hydration.  Pt reports feeling well.  PIV established to LFA; brisk blood return noted.  Hydration infused at rate of 400 mL/hr, per pt's request.  Hydration completed without incident.  PIC removed and site covered with gauze and tape.  Pt left IS in NAD under self care.

## 2017-08-22 RX ORDER — CLONAZEPAM 0.5 MG
TABLET ORAL
Refills: 0 | OUTPATIENT
Start: 2017-08-22

## 2017-08-24 ENCOUNTER — OUTPATIENT INFUSION SERVICES (OUTPATIENT)
Dept: ONCOLOGY | Facility: MEDICAL CENTER | Age: 72
End: 2017-08-24
Attending: INTERNAL MEDICINE
Payer: MEDICARE

## 2017-08-24 VITALS
SYSTOLIC BLOOD PRESSURE: 85 MMHG | HEIGHT: 63 IN | BODY MASS INDEX: 20.12 KG/M2 | TEMPERATURE: 98.6 F | RESPIRATION RATE: 18 BRPM | WEIGHT: 113.54 LBS | DIASTOLIC BLOOD PRESSURE: 49 MMHG | HEART RATE: 98 BPM | OXYGEN SATURATION: 90 %

## 2017-08-24 DIAGNOSIS — G90.3 NEUROGENIC ORTHOSTATIC HYPOTENSION (HCC): ICD-10-CM

## 2017-08-24 PROCEDURE — 700105 HCHG RX REV CODE 258: Performed by: INTERNAL MEDICINE

## 2017-08-24 PROCEDURE — 96360 HYDRATION IV INFUSION INIT: CPT

## 2017-08-24 RX ORDER — SODIUM CHLORIDE 9 MG/ML
1000 INJECTION, SOLUTION INTRAVENOUS ONCE
Status: COMPLETED | OUTPATIENT
Start: 2017-08-24 | End: 2017-08-24

## 2017-08-24 RX ADMIN — SODIUM CHLORIDE 1000 ML: 9 INJECTION, SOLUTION INTRAVENOUS at 14:05

## 2017-08-25 ENCOUNTER — TELEPHONE (OUTPATIENT)
Dept: PULMONOLOGY | Facility: HOSPICE | Age: 72
End: 2017-08-25

## 2017-08-25 NOTE — TELEPHONE ENCOUNTER
Pt was driving over [employer] and panicked because she got SOB. She was wearing her O2 but didn't know if she could turn it up. Her sats were in the 80s. Is it okay if I tell her to turn O2 up so that she maintains a sat above 90?/ap    Last Seen: 06/06/17-Manuel  Next Visit: 12/15/17-Yam  DX: Emphysema

## 2017-08-25 NOTE — TELEPHONE ENCOUNTER
"Patient is suppose to be using 2L 24/7... No \"weaning\". As she goes to higher altitudes she may need to increase her oxygen to keep above 90%. This is fine.  "

## 2017-08-28 ENCOUNTER — APPOINTMENT (OUTPATIENT)
Dept: ONCOLOGY | Facility: MEDICAL CENTER | Age: 72
End: 2017-08-28
Attending: INTERNAL MEDICINE
Payer: MEDICARE

## 2017-08-28 ENCOUNTER — TELEPHONE (OUTPATIENT)
Dept: MEDICAL GROUP | Facility: CLINIC | Age: 72
End: 2017-08-28

## 2017-08-28 NOTE — TELEPHONE ENCOUNTER
Future Appointments       Provider Department Center    8/29/2017 3:20 PM FELICITAS [facility]    8/31/2017 1:30 PM RN 2 Infusion Services [facility]    9/4/2017 2:00 PM RN 3 Infusion Services [facility]    9/7/2017 1:30 PM RN 2 Infusion Services [facility]    9/11/2017 2:00 PM RN 3 Infusion Services [facility]    9/14/2017 1:30 PM RN 1 Infusion Services [facility]    9/18/2017 2:00 PM RN 3 Infusion Services [facility]    9/21/2017 1:30 PM RN 2 Infusion Services [facility]    9/25/2017 1:30 PM RN 2 Infusion [facility]    9/28/2017 2:00 PM RN 3 Infusion [facility]    10/2/2017 2:00 PM RN 3 Infusion [facility]    10/5/2017 1:30 PM RN 2 Infusion [facility]    10/9/2017 2:00 PM RN 3 Infusion [facility]    10/12/2017 1:30 PM RN 2 Infusion [facility]    10/16/2017 2:00 PM RN 3 Infusion [facility]    10/19/2017 1:30 PM RN 2 Infusion Services [facility]    10/23/2017 2:00 PM RN 3 Infusion [facility]    10/26/2017 1:30 PM RN 2 Infusion [facility]    10/30/2017 1:30 PM RN 2 Infusion Services [facility]    12/15/2017 1:40 PM FELICITAS [facility]           ESTABLISHED PATIENT PRE-VISIT PLANNING     Note: Patient will not be contacted if there is no indication to call. PT was not Contacted.    1.    Reviewed note from last office visit with PCP: YES Last office visit: 6/27/17    2.  If any orders were placed at last visit, do we have Results/Consult Notes?        •  Labs - Labs ordered, completed and results are in chart. CMP,B-12,FOLATE,CMP,MAGNESIUM        •  Imaging - Imaging ordered, completed and results are in chart. L-SPINE       •  Referrals - No referrals were ordered at last office visit.     3.  Immunizations were updated in Epic using WebIZ?: No WebIZ record       4.  Patient is due for the following Health Maintenance Topics:    Health Maintenance Due   Topic Date Due   • IMM DTaP/Tdap/Td Vaccine (1 - Tdap) 10/19/1964   • PAP SMEAR  10/19/1966   • IMM ZOSTER VACCINE  10/19/2005   • IMM INFLUENZA (1) 09/01/2017           5.  Patient was not informed to arrive 15 min prior to their scheduled appointment and bring in their medication bottles.

## 2017-08-29 ENCOUNTER — OFFICE VISIT (OUTPATIENT)
Dept: MEDICAL GROUP | Facility: CLINIC | Age: 72
End: 2017-08-29
Payer: MEDICARE

## 2017-08-29 VITALS
HEART RATE: 101 BPM | OXYGEN SATURATION: 96 % | WEIGHT: 112 LBS | DIASTOLIC BLOOD PRESSURE: 68 MMHG | RESPIRATION RATE: 18 BRPM | BODY MASS INDEX: 19.84 KG/M2 | TEMPERATURE: 98.6 F | HEIGHT: 63 IN | SYSTOLIC BLOOD PRESSURE: 126 MMHG

## 2017-08-29 DIAGNOSIS — J34.89 NOSE DRYNESS: ICD-10-CM

## 2017-08-29 DIAGNOSIS — F51.01 PRIMARY INSOMNIA: ICD-10-CM

## 2017-08-29 DIAGNOSIS — F41.9 ANXIETY: ICD-10-CM

## 2017-08-29 DIAGNOSIS — R25.2 CRAMP OF BOTH LOWER EXTREMITIES: ICD-10-CM

## 2017-08-29 DIAGNOSIS — F13.20 BENZODIAZEPINE DEPENDENCE (HCC): ICD-10-CM

## 2017-08-29 DIAGNOSIS — H92.02 EARACHE SYMPTOMS IN LEFT EAR: ICD-10-CM

## 2017-08-29 DIAGNOSIS — G62.9 NEUROPATHY: ICD-10-CM

## 2017-08-29 PROCEDURE — 99214 OFFICE O/P EST MOD 30 MIN: CPT | Performed by: NURSE PRACTITIONER

## 2017-08-29 RX ORDER — ALPRAZOLAM 0.25 MG/1
0.25 TABLET ORAL 2 TIMES DAILY PRN
Qty: 180 TAB | Refills: 0 | Status: SHIPPED
Start: 2017-08-29 | End: 2017-11-21 | Stop reason: SDUPTHER

## 2017-08-29 RX ORDER — CLONAZEPAM 0.5 MG/1
TABLET ORAL
Qty: 90 TAB | Refills: 0 | Status: SHIPPED
Start: 2017-08-29 | End: 2017-11-21 | Stop reason: SDUPTHER

## 2017-08-30 NOTE — PROGRESS NOTES
"CC: Medication Refill (Clonapin medication refill; ) and Ear Fullness (Left ear fullness but was told she had ear wax; )        HPI:     Deb is a patient of Dr. Mccullough, presents today for the followin. Anxiety/ Benzodiazepine dependence (CMS-HCC)/Primary insomnia  Patient is requesting refills of Klonopin and alprazolam. She uses Klonopin specifically one pill at night and has reduced down to one pill at night from 2-3 times a day historically. She uses alprazolam once in the day and in the second occasional pill in the afternoon only if she's having any anxiety attack.  She denies these medications or adverse reactions using these medications together    4. Cramp of both lower extremities/Neuropathy (CMS-HCC)  Has a diagnosis of fibromyalgia and chronic fatigue. She states over the last several years she's had cramping in her legs and her hands. She reviewed this with her specialist, Dr. Gutierrez, who did not have much further treatment however did recommend an MRI of her head for \"MS\".. She wishes to avoid any additional medications today history of poor tolerance to medications.    6. Earache symptoms in left ear  Complaints of pain in her left ear. States she saw specialist who told her she had wax in there. She put D proximal drops and had pain several hours later. Feels that there some pressure in that ear    7. Nose dryness  Does have chronic nose dryness and occasional nosebleeds. She does use 24-hour oxygen per nasal cannula. She states she has tried gels for hydration. Currently gets good relief with ponaris    Current Outpatient Prescriptions   Medication Sig Dispense Refill   • clonazepam (KLONOPIN) 0.5 MG Tab TAKE ONE TABLET BY MOUTH EVERY NIGHT AT BEDTIME 90 Tab 0   • alprazolam (XANAX) 0.25 MG Tab Take 1 Tab by mouth 2 times a day as needed for Anxiety. 180 Tab 0   • SPIRIVA HANDIHALER 18 MCG Cap INHALE 1 CAPSULE BY MOUTH AT THE SAME TIME EVERY DAY 90 Cap 3   • vitamin D (CHOLECALCIFEROL) " 1000 UNIT Tab Take 1,000 Units by mouth every day.     • vitamin B comp+C (ALLBEE WITH C) Tab Take 1 Tab by mouth every day.     • acyclovir (ZOVIRAX) 400 MG tablet TK 1 T PO  TID FOR 5 DAYS  2   • SYMBICORT 80-4.5 MCG/ACT Aerosol INHALE 2 PUFFS BY MOUTH TWICE DAILY. USE WITH SPACER. RINSE MOUTH AFTER EACH USE 1 g 5   • clindamycin (CLEOCIN) 150 MG Cap TK ONE C PO  BID FOR 10 DAYS  0   • NON SPECIFIED (hydration orders)  please infuse 2 L NS per week. Infuse over one hour QT: 2 L REFILL: 56 fax [phone] 2 Each 56   • CLARINEX 5 MG tablet TAKE 1 TABLET BY MOUTH EVERY DAY 90 Tab 3   • pneumococcal vaccine (PNEUMOVAX-23) 25 MCG/0.5ML Injection 0.5 mL by Intramuscular route Once PRN.     • budesonide-formoterol (SYMBICORT) 80-4.5 MCG/ACT Aerosol Inhale 2 Puffs by mouth 2 Times a Day. Use spacer. Rinse mouth after each use. 1 Inhaler 5   • montelukast (SINGULAIR) 10 MG Tab TAKE 1 TABLET BY MOUTH EVERY DAY 90 Tab 3   • pravastatin (PRAVACHOL) 40 MG tablet TAKE 1 TABLET BY MOUTH EVERY DAY 90 Tab 3   • XOPENEX HFA 45 MCG/ACT inhaler INHALE ONE TO TWO PUFFS BY MOUTH EVERY 4 HOURS AS NEEDED FOR SHORTNESS OF BREATH 15 g 11   • Magnesium 100 MG Cap Take 480 mg by mouth.     • levalbuterol (XOPENEX) 1.25 MG/3ML Nebu Soln 3 mL by Nebulization route every four hours as needed for Shortness of Breath. 24 mL 11   • nicotine polacrilex (NICORETTE) 2 MG Gum Take 1 Each by mouth as needed for Smoking Cessation. 150 Each 3   • azithromycin (ZITHROMAX) 250 MG Tab Two day one then qd x 4. 6 Tab 0   • acetaminophen (TYLENOL) 325 MG Tab Take 650 mg by mouth every four hours as needed.     • immune globulin (FLEBOGAMMA) 10 GM/100ML Solution 5 g by Intravenous route Once. Indications: Pt now on 5 grams     • Oral Electrolytes (EMERGEN-C ELECTRO MIX PO) Take  by mouth every day.     • Ca Phosphate-Cholecalciferol 250-400 MG-UNIT CHEW Take  by mouth.     • docosahexanoic acid (OMEGA 3 FA) 1000 MG CAPS Take 1,000 mg by mouth 2 Times a Day.    "  • aspirin (ASA) 81 MG CHEW Take 162 mg by mouth every day.     • MAGNESIUM GLYCINATE Take 480 mg by mouth.       No current facility-administered medications for this visit.      Social History   Substance Use Topics   • Smoking status: Current Every Day Smoker     Packs/day: 0.25     Years: 55.00     Types: Cigarettes   • Smokeless tobacco: Never Used      Comment: patient smokes 6 to 8 cigarettes a day    • Alcohol use No      Comment: patient states she has not had a drink in 2 years 10/09/2015     I reviewed patients allergies, problem list and medications today in [facility].    ROS: Any/all pertinent positives listed in the HPI, otherwise all others reviewed are negative today.      /68   Pulse (!) 101   Temp 37 °C (98.6 °F)   Resp 18   Ht 1.59 m (5' 2.6\")   Wt 50.8 kg (112 lb)   SpO2 96%   Breastfeeding? No   BMI 20.09 kg/m²     Exam:    Gen: Alert and oriented, No apparent distress. WDWN  Psych: A+Ox3, normal affect and mood  Skin: Warm, dry and intact. Good turgor   No rashes in visible areas.  Eye: Conjunctiva clear, lids normal  ENMT: Lips without lesions, good dentition  Bilateral nasal turbinates appear slightly dry. There is no active bleeding. This no ulcerations.  Neck: No Lymphadenopathy, Thyromegaly, Bruits.   Trachea midline, no masses  Lungs: Clear However decreased to auscultation bilaterally, no rales or rhonchi   Unlabored respiratory effort.   CV: Regular rate and rhythm, S1, S2. No murmurs.   No Edema  On oxygen      Assessment and Plan.   71 y.o. female with the following issues.    1. Anxiety/Benzodiazepine dependence (CMS-HCC)/. Primary insomnia   reviewed from state pharmacy database-Medications found to be medically necessary/appropriate.  She did sign a controlled substance agreement. I did change the medications to reflect how she is taking them. She understands not to take these medications together and to use them only as needed.  - clonazepam (KLONOPIN) 0.5 MG Tab; " TAKE ONE TABLET BY MOUTH EVERY NIGHT AT BEDTIME  Dispense: 90 Tab; Refill: 0  - Controlled Substance Treatment Agreement    4. Cramp of both lower extremities/ Neuropathy (CMS-HCC)  Stable. Chronic. She has followed up with her specialist for fibromyalgia, she has seen her primary care and that were normal. This is distressing to her  - REFERRAL TO NEUROLOGY    6. Earache symptoms in left ear  Stable. No wax. Will restart using intermittent Flonase, caution for worsening nose dryness    7. Nose dryness  Discussed using nasal gel, saline spray/ rinse.

## 2017-08-31 ENCOUNTER — OUTPATIENT INFUSION SERVICES (OUTPATIENT)
Dept: ONCOLOGY | Facility: MEDICAL CENTER | Age: 72
End: 2017-08-31
Attending: INTERNAL MEDICINE
Payer: MEDICARE

## 2017-08-31 VITALS
TEMPERATURE: 98.7 F | HEART RATE: 97 BPM | BODY MASS INDEX: 19.84 KG/M2 | OXYGEN SATURATION: 95 % | SYSTOLIC BLOOD PRESSURE: 106 MMHG | RESPIRATION RATE: 18 BRPM | HEIGHT: 63 IN | DIASTOLIC BLOOD PRESSURE: 67 MMHG | WEIGHT: 111.99 LBS

## 2017-08-31 DIAGNOSIS — G90.3 NEUROGENIC ORTHOSTATIC HYPOTENSION (HCC): ICD-10-CM

## 2017-08-31 PROCEDURE — 700105 HCHG RX REV CODE 258: Performed by: INTERNAL MEDICINE

## 2017-08-31 PROCEDURE — 96360 HYDRATION IV INFUSION INIT: CPT

## 2017-08-31 RX ORDER — SODIUM CHLORIDE 9 MG/ML
1000 INJECTION, SOLUTION INTRAVENOUS ONCE
Status: COMPLETED | OUTPATIENT
Start: 2017-08-31 | End: 2017-08-31

## 2017-08-31 RX ADMIN — SODIUM CHLORIDE 1000 ML: 9 INJECTION, SOLUTION INTRAVENOUS at 14:06

## 2017-08-31 ASSESSMENT — PAIN SCALES - GENERAL: PAINLEVEL: 4=SLIGHT-MODERATE PAIN

## 2017-09-04 ENCOUNTER — OUTPATIENT INFUSION SERVICES (OUTPATIENT)
Dept: ONCOLOGY | Facility: MEDICAL CENTER | Age: 72
End: 2017-09-04
Attending: INTERNAL MEDICINE
Payer: MEDICARE

## 2017-09-04 VITALS
OXYGEN SATURATION: 95 % | TEMPERATURE: 99 F | DIASTOLIC BLOOD PRESSURE: 76 MMHG | WEIGHT: 112.21 LBS | HEIGHT: 63 IN | RESPIRATION RATE: 18 BRPM | HEART RATE: 111 BPM | BODY MASS INDEX: 19.88 KG/M2 | SYSTOLIC BLOOD PRESSURE: 109 MMHG

## 2017-09-04 DIAGNOSIS — Z79.899 CHRONIC USE OF BENZODIAZEPINE FOR THERAPEUTIC PURPOSE: ICD-10-CM

## 2017-09-04 DIAGNOSIS — G90.3 NEUROGENIC ORTHOSTATIC HYPOTENSION (HCC): ICD-10-CM

## 2017-09-04 PROCEDURE — 700105 HCHG RX REV CODE 258: Performed by: INTERNAL MEDICINE

## 2017-09-04 PROCEDURE — 36415 COLL VENOUS BLD VENIPUNCTURE: CPT

## 2017-09-04 PROCEDURE — 96360 HYDRATION IV INFUSION INIT: CPT

## 2017-09-04 RX ORDER — SODIUM CHLORIDE 9 MG/ML
500 INJECTION, SOLUTION INTRAVENOUS ONCE
Status: COMPLETED | OUTPATIENT
Start: 2017-09-04 | End: 2017-09-04

## 2017-09-04 RX ADMIN — SODIUM CHLORIDE 500 ML: 9 INJECTION, SOLUTION INTRAVENOUS at 14:20

## 2017-09-04 ASSESSMENT — PAIN SCALES - GENERAL: PAINLEVEL: 4=SLIGHT-MODERATE PAIN

## 2017-09-04 NOTE — PROGRESS NOTES
Patient arrived for hydration; reports no changes or concerns from last week.  PIV started, hydration infused per pt preference of 500ml at 400ml/hr. PIV flushed, removed and gauze pressure dressing applied. Confirmed next appt; pt discharged home in good spirits under no apparent distress.

## 2017-09-07 ENCOUNTER — OUTPATIENT INFUSION SERVICES (OUTPATIENT)
Dept: ONCOLOGY | Facility: MEDICAL CENTER | Age: 72
End: 2017-09-07
Attending: INTERNAL MEDICINE
Payer: MEDICARE

## 2017-09-07 VITALS
HEART RATE: 100 BPM | TEMPERATURE: 98.9 F | WEIGHT: 112.21 LBS | OXYGEN SATURATION: 93 % | RESPIRATION RATE: 18 BRPM | BODY MASS INDEX: 19.88 KG/M2 | DIASTOLIC BLOOD PRESSURE: 69 MMHG | HEIGHT: 63 IN | SYSTOLIC BLOOD PRESSURE: 100 MMHG

## 2017-09-07 DIAGNOSIS — G90.3 NEUROGENIC ORTHOSTATIC HYPOTENSION (HCC): ICD-10-CM

## 2017-09-07 PROCEDURE — 96360 HYDRATION IV INFUSION INIT: CPT

## 2017-09-07 PROCEDURE — 700105 HCHG RX REV CODE 258: Performed by: INTERNAL MEDICINE

## 2017-09-07 RX ORDER — SODIUM CHLORIDE 9 MG/ML
500 INJECTION, SOLUTION INTRAVENOUS ONCE
Status: COMPLETED | OUTPATIENT
Start: 2017-09-07 | End: 2017-09-07

## 2017-09-07 RX ADMIN — SODIUM CHLORIDE 500 ML: 9 INJECTION, SOLUTION INTRAVENOUS at 13:58

## 2017-09-07 ASSESSMENT — PAIN SCALES - GENERAL: PAINLEVEL: 8=MODERATE-SEVERE PAIN

## 2017-09-07 NOTE — PROGRESS NOTES
Pt presents ambulatory for hydration. IV established and hydration given. Pt declines full infusion and requests to be stopped after 500 mL infused. IV flushed with saline and gauze with coband applied to site. Pt to return and appt confirmed with pt. Pt left ambulatory in no distress at 1515

## 2017-09-08 ENCOUNTER — PATIENT MESSAGE (OUTPATIENT)
Dept: MEDICAL GROUP | Facility: CLINIC | Age: 72
End: 2017-09-08

## 2017-09-11 ENCOUNTER — APPOINTMENT (OUTPATIENT)
Dept: ONCOLOGY | Facility: MEDICAL CENTER | Age: 72
End: 2017-09-11
Attending: INTERNAL MEDICINE
Payer: MEDICARE

## 2017-09-13 PROBLEM — D22.61 MELANOCYTIC NEVI OF RIGHT UPPER LIMB, INCLUDING SHOULDER: Status: ACTIVE | Noted: 2017-09-13

## 2017-09-14 ENCOUNTER — OUTPATIENT INFUSION SERVICES (OUTPATIENT)
Dept: ONCOLOGY | Facility: MEDICAL CENTER | Age: 72
End: 2017-09-14
Attending: INTERNAL MEDICINE
Payer: MEDICARE

## 2017-09-14 VITALS
OXYGEN SATURATION: 91 % | WEIGHT: 113.1 LBS | BODY MASS INDEX: 20.04 KG/M2 | HEIGHT: 63 IN | SYSTOLIC BLOOD PRESSURE: 105 MMHG | TEMPERATURE: 98.8 F | HEART RATE: 89 BPM | DIASTOLIC BLOOD PRESSURE: 63 MMHG | RESPIRATION RATE: 18 BRPM

## 2017-09-14 PROCEDURE — 700105 HCHG RX REV CODE 258: Performed by: INTERNAL MEDICINE

## 2017-09-14 PROCEDURE — 96360 HYDRATION IV INFUSION INIT: CPT

## 2017-09-14 RX ORDER — SODIUM CHLORIDE 9 MG/ML
INJECTION, SOLUTION INTRAVENOUS ONCE
Status: COMPLETED | OUTPATIENT
Start: 2017-09-14 | End: 2017-09-14

## 2017-09-14 RX ADMIN — SODIUM CHLORIDE: 9 INJECTION, SOLUTION INTRAVENOUS at 14:00

## 2017-09-14 ASSESSMENT — PAIN SCALES - GENERAL: PAINLEVEL: 4=SLIGHT-MODERATE PAIN

## 2017-09-14 NOTE — PROGRESS NOTES
Patient presents for hydration. IV started, flushes well with brisk blood return. Patient refuses full infusion and requests to stop after receiving 500 mL. IV discontinued, catheter intact, compression dressing to site. Patient scheduled for next appointment and released in no acute distress.

## 2017-09-18 ENCOUNTER — OUTPATIENT INFUSION SERVICES (OUTPATIENT)
Dept: ONCOLOGY | Facility: MEDICAL CENTER | Age: 72
End: 2017-09-18
Attending: INTERNAL MEDICINE
Payer: MEDICARE

## 2017-09-18 VITALS
RESPIRATION RATE: 18 BRPM | OXYGEN SATURATION: 94 % | TEMPERATURE: 99 F | BODY MASS INDEX: 20 KG/M2 | SYSTOLIC BLOOD PRESSURE: 90 MMHG | DIASTOLIC BLOOD PRESSURE: 62 MMHG | HEART RATE: 98 BPM | WEIGHT: 112.88 LBS | HEIGHT: 63 IN

## 2017-09-18 PROCEDURE — 96360 HYDRATION IV INFUSION INIT: CPT

## 2017-09-18 PROCEDURE — 700105 HCHG RX REV CODE 258: Performed by: INTERNAL MEDICINE

## 2017-09-18 RX ORDER — SODIUM CHLORIDE 9 MG/ML
INJECTION, SOLUTION INTRAVENOUS ONCE
Status: COMPLETED | OUTPATIENT
Start: 2017-09-18 | End: 2017-09-18

## 2017-09-18 RX ADMIN — SODIUM CHLORIDE: 9 INJECTION, SOLUTION INTRAVENOUS at 14:37

## 2017-09-18 ASSESSMENT — PAIN SCALES - GENERAL: PAINLEVEL: 3=SLIGHT PAIN

## 2017-09-21 ENCOUNTER — OUTPATIENT INFUSION SERVICES (OUTPATIENT)
Dept: ONCOLOGY | Facility: MEDICAL CENTER | Age: 72
End: 2017-09-21
Attending: INTERNAL MEDICINE
Payer: MEDICARE

## 2017-09-21 VITALS
DIASTOLIC BLOOD PRESSURE: 55 MMHG | BODY MASS INDEX: 20.04 KG/M2 | WEIGHT: 113.1 LBS | RESPIRATION RATE: 18 BRPM | OXYGEN SATURATION: 96 % | HEIGHT: 63 IN | HEART RATE: 89 BPM | TEMPERATURE: 98.8 F | SYSTOLIC BLOOD PRESSURE: 108 MMHG

## 2017-09-21 PROCEDURE — 96360 HYDRATION IV INFUSION INIT: CPT

## 2017-09-21 PROCEDURE — 700105 HCHG RX REV CODE 258: Performed by: INTERNAL MEDICINE

## 2017-09-21 RX ORDER — SODIUM CHLORIDE 9 MG/ML
INJECTION, SOLUTION INTRAVENOUS ONCE
Status: COMPLETED | OUTPATIENT
Start: 2017-09-21 | End: 2017-09-21

## 2017-09-21 RX ADMIN — SODIUM CHLORIDE: 9 INJECTION, SOLUTION INTRAVENOUS at 13:54

## 2017-09-21 ASSESSMENT — PAIN SCALES - GENERAL: PAINLEVEL: 3=SLIGHT PAIN

## 2017-09-21 NOTE — PROGRESS NOTES
Patient presents ambulatory for hydration.  Patient reports feeling well and denies any s/s of infection at this time.  PIV established, and brisk blood return noted.  500mL infused at a rate of 400 mL/hr with no complications or adverse reactions.  Patient to return 9/25/17, confirmed with patient.  PIV flushed, brisk blood return noted, discontinued, gauze, and coban applied to site.  Patient left ambulatory in no distress.

## 2017-09-22 ENCOUNTER — PATIENT MESSAGE (OUTPATIENT)
Dept: HEALTH INFORMATION MANAGEMENT | Facility: OTHER | Age: 72
End: 2017-09-22

## 2017-09-25 ENCOUNTER — APPOINTMENT (OUTPATIENT)
Dept: ONCOLOGY | Facility: MEDICAL CENTER | Age: 72
End: 2017-09-25
Attending: INTERNAL MEDICINE
Payer: MEDICARE

## 2017-09-27 PROBLEM — D49.2 NEOPLASM OF UNSPECIFIED BEHAVIOR OF BONE, SOFT TISSUE, AND SKIN: Status: RESOLVED | Noted: 2017-09-13 | Resolved: 2017-09-27

## 2017-09-28 ENCOUNTER — OUTPATIENT INFUSION SERVICES (OUTPATIENT)
Dept: ONCOLOGY | Facility: MEDICAL CENTER | Age: 72
End: 2017-09-28
Attending: INTERNAL MEDICINE
Payer: MEDICARE

## 2017-09-28 VITALS
WEIGHT: 112.43 LBS | HEART RATE: 98 BPM | HEIGHT: 63 IN | OXYGEN SATURATION: 93 % | DIASTOLIC BLOOD PRESSURE: 66 MMHG | SYSTOLIC BLOOD PRESSURE: 108 MMHG | BODY MASS INDEX: 19.92 KG/M2 | TEMPERATURE: 98.5 F | RESPIRATION RATE: 18 BRPM

## 2017-09-28 PROCEDURE — 700105 HCHG RX REV CODE 258: Performed by: INTERNAL MEDICINE

## 2017-09-28 PROCEDURE — 96360 HYDRATION IV INFUSION INIT: CPT

## 2017-09-28 RX ORDER — SODIUM CHLORIDE 9 MG/ML
INJECTION, SOLUTION INTRAVENOUS ONCE
Status: COMPLETED | OUTPATIENT
Start: 2017-09-28 | End: 2017-09-28

## 2017-09-28 RX ADMIN — SODIUM CHLORIDE: 9 INJECTION, SOLUTION INTRAVENOUS at 14:15

## 2017-09-28 ASSESSMENT — PAIN SCALES - GENERAL: PAINLEVEL: 2=MINIMAL-SLIGHT

## 2017-10-02 ENCOUNTER — OUTPATIENT INFUSION SERVICES (OUTPATIENT)
Dept: ONCOLOGY | Facility: MEDICAL CENTER | Age: 72
End: 2017-10-02
Attending: INTERNAL MEDICINE
Payer: MEDICARE

## 2017-10-02 VITALS
BODY MASS INDEX: 19.92 KG/M2 | HEART RATE: 97 BPM | RESPIRATION RATE: 18 BRPM | TEMPERATURE: 99.4 F | OXYGEN SATURATION: 95 % | DIASTOLIC BLOOD PRESSURE: 46 MMHG | HEIGHT: 63 IN | SYSTOLIC BLOOD PRESSURE: 84 MMHG | WEIGHT: 112.43 LBS

## 2017-10-02 PROCEDURE — 700105 HCHG RX REV CODE 258: Performed by: INTERNAL MEDICINE

## 2017-10-02 PROCEDURE — 96360 HYDRATION IV INFUSION INIT: CPT

## 2017-10-02 RX ORDER — SODIUM CHLORIDE 9 MG/ML
INJECTION, SOLUTION INTRAVENOUS ONCE
Status: COMPLETED | OUTPATIENT
Start: 2017-10-02 | End: 2017-10-02

## 2017-10-02 RX ADMIN — SODIUM CHLORIDE: 9 INJECTION, SOLUTION INTRAVENOUS at 14:29

## 2017-10-02 ASSESSMENT — PAIN SCALES - GENERAL: PAINLEVEL: 3=SLIGHT PAIN

## 2017-10-05 ENCOUNTER — OUTPATIENT INFUSION SERVICES (OUTPATIENT)
Dept: ONCOLOGY | Facility: MEDICAL CENTER | Age: 72
End: 2017-10-05
Attending: INTERNAL MEDICINE
Payer: MEDICARE

## 2017-10-05 VITALS
RESPIRATION RATE: 18 BRPM | SYSTOLIC BLOOD PRESSURE: 99 MMHG | DIASTOLIC BLOOD PRESSURE: 60 MMHG | OXYGEN SATURATION: 96 % | TEMPERATURE: 99.2 F | HEART RATE: 90 BPM | WEIGHT: 112.66 LBS | HEIGHT: 63 IN | BODY MASS INDEX: 19.96 KG/M2

## 2017-10-05 PROCEDURE — 96360 HYDRATION IV INFUSION INIT: CPT

## 2017-10-05 PROCEDURE — 700105 HCHG RX REV CODE 258: Performed by: INTERNAL MEDICINE

## 2017-10-05 RX ORDER — SODIUM CHLORIDE 9 MG/ML
INJECTION, SOLUTION INTRAVENOUS ONCE
Status: COMPLETED | OUTPATIENT
Start: 2017-10-05 | End: 2017-10-05

## 2017-10-05 RX ADMIN — SODIUM CHLORIDE: 9 INJECTION, SOLUTION INTRAVENOUS at 13:57

## 2017-10-05 ASSESSMENT — PAIN SCALES - GENERAL: PAINLEVEL: 4=SLIGHT-MODERATE PAIN

## 2017-10-05 NOTE — PROGRESS NOTES
Patient arrived ambulatory to the \[facility]\"" for IV hydration. Reviewed vital signs, and physician order. Patient wears O2 2L/NC, denies S&S of infection. IV accessed established in left forearm, visualized brisk blood return. IV hydration administered, no adverse reaction noted. IV flushed per protocol, catheter removed with tip intact, placed guaze and coban dressing. Vital signs evaluated after infusion, document. Patient able to state date and time of next apt. Patient left the \[facility]\"" ambulatory in no signs of distress.

## 2017-10-09 ENCOUNTER — OUTPATIENT INFUSION SERVICES (OUTPATIENT)
Dept: ONCOLOGY | Facility: MEDICAL CENTER | Age: 72
End: 2017-10-09
Attending: INTERNAL MEDICINE
Payer: MEDICARE

## 2017-10-09 NOTE — PROGRESS NOTES
Patient contacted  to notify PAR that patient doesn't have enough O2 to get to apt, and is awaiting arrival of O2 delivery. Patient re-scheduled apt for hydration on 10/9/17.

## 2017-10-11 ENCOUNTER — TELEPHONE (OUTPATIENT)
Dept: MEDICAL GROUP | Facility: CLINIC | Age: 72
End: 2017-10-11

## 2017-10-11 DIAGNOSIS — G93.32 CHRONIC FATIGUE SYNDROME: ICD-10-CM

## 2017-10-11 NOTE — TELEPHONE ENCOUNTER
Fax message received:    From [facility] [place].    2. Message: Please fax new order for (hydration orders)  please infuse 2 L NS per week. Infuse over one hour QT: 2 L REFILL: 56 fax [phone]    3. Patient approves office to leave a detailed voicemail/MyChart message: N\A    Thank you!

## 2017-10-12 ENCOUNTER — OUTPATIENT INFUSION SERVICES (OUTPATIENT)
Dept: ONCOLOGY | Facility: MEDICAL CENTER | Age: 72
End: 2017-10-12
Attending: INTERNAL MEDICINE
Payer: MEDICARE

## 2017-10-12 VITALS
DIASTOLIC BLOOD PRESSURE: 61 MMHG | BODY MASS INDEX: 19.8 KG/M2 | TEMPERATURE: 97.8 F | RESPIRATION RATE: 18 BRPM | HEIGHT: 63 IN | HEART RATE: 92 BPM | WEIGHT: 111.77 LBS | SYSTOLIC BLOOD PRESSURE: 106 MMHG | OXYGEN SATURATION: 93 %

## 2017-10-12 PROCEDURE — 96360 HYDRATION IV INFUSION INIT: CPT

## 2017-10-12 PROCEDURE — 700105 HCHG RX REV CODE 258: Performed by: INTERNAL MEDICINE

## 2017-10-12 RX ORDER — SODIUM CHLORIDE 9 MG/ML
INJECTION, SOLUTION INTRAVENOUS ONCE
Status: COMPLETED | OUTPATIENT
Start: 2017-10-12 | End: 2017-10-12

## 2017-10-12 RX ADMIN — SODIUM CHLORIDE: 9 INJECTION, SOLUTION INTRAVENOUS at 13:53

## 2017-10-12 ASSESSMENT — PAIN SCALES - GENERAL: PAINLEVEL: 3=SLIGHT PAIN

## 2017-10-12 NOTE — PROGRESS NOTES
LATE ENTRY:    Pt ambulated into department for her bi-weekly hydration for Tachycardia. Pt declined having any new or acute symptoms, VSS upon arrival.  Pt connected to [facility] oxygen by CNA upon arrival. PIV started, had + return, flushed briskly. Pt requested that fluid be given at 400 ml/hr and that only 500 ml of NS be given. Pt refused that vitals be taken after. IV discontinued, bleeding controlled with gauze and coban. Future appointments confirmed with Pt prior to leaving, left department by self appearing in good spirits and NAD.

## 2017-10-16 ENCOUNTER — APPOINTMENT (OUTPATIENT)
Dept: ONCOLOGY | Facility: MEDICAL CENTER | Age: 72
End: 2017-10-16
Attending: INTERNAL MEDICINE
Payer: MEDICARE

## 2017-10-19 ENCOUNTER — OUTPATIENT INFUSION SERVICES (OUTPATIENT)
Dept: ONCOLOGY | Facility: MEDICAL CENTER | Age: 72
End: 2017-10-19
Attending: INTERNAL MEDICINE
Payer: MEDICARE

## 2017-10-19 VITALS
TEMPERATURE: 98.8 F | HEART RATE: 92 BPM | WEIGHT: 111.99 LBS | OXYGEN SATURATION: 95 % | BODY MASS INDEX: 19.84 KG/M2 | HEIGHT: 63 IN | RESPIRATION RATE: 18 BRPM | DIASTOLIC BLOOD PRESSURE: 65 MMHG | SYSTOLIC BLOOD PRESSURE: 110 MMHG

## 2017-10-19 PROCEDURE — 700105 HCHG RX REV CODE 258: Performed by: INTERNAL MEDICINE

## 2017-10-19 PROCEDURE — 96360 HYDRATION IV INFUSION INIT: CPT

## 2017-10-19 RX ORDER — SODIUM CHLORIDE 9 MG/ML
INJECTION, SOLUTION INTRAVENOUS ONCE
Status: COMPLETED | OUTPATIENT
Start: 2017-10-19 | End: 2017-10-19

## 2017-10-19 RX ADMIN — SODIUM CHLORIDE: 9 INJECTION, SOLUTION INTRAVENOUS at 14:00

## 2017-10-19 ASSESSMENT — PAIN SCALES - GENERAL: PAINLEVEL: NO PAIN

## 2017-10-20 NOTE — PROGRESS NOTES
Here for hydration. In good spirits. Voices no new complaints. On IV Ig's at home E O Week . Teaching and education reinforcement provided, also emotional support. Treatment well tolerated ( 400 cc given per patient's request). Discharge home to self care. Appointment given for next treatment.

## 2017-10-23 ENCOUNTER — OUTPATIENT INFUSION SERVICES (OUTPATIENT)
Dept: ONCOLOGY | Facility: MEDICAL CENTER | Age: 72
End: 2017-10-23
Attending: INTERNAL MEDICINE
Payer: MEDICARE

## 2017-10-23 VITALS
TEMPERATURE: 98.3 F | BODY MASS INDEX: 19.77 KG/M2 | RESPIRATION RATE: 18 BRPM | WEIGHT: 111.55 LBS | HEIGHT: 63 IN | DIASTOLIC BLOOD PRESSURE: 52 MMHG | OXYGEN SATURATION: 96 % | SYSTOLIC BLOOD PRESSURE: 98 MMHG | HEART RATE: 90 BPM

## 2017-10-23 PROCEDURE — 96360 HYDRATION IV INFUSION INIT: CPT

## 2017-10-23 PROCEDURE — 700105 HCHG RX REV CODE 258: Performed by: INTERNAL MEDICINE

## 2017-10-23 RX ORDER — SODIUM CHLORIDE 9 MG/ML
1000 INJECTION, SOLUTION INTRAVENOUS CONTINUOUS
Status: DISCONTINUED | OUTPATIENT
Start: 2017-10-23 | End: 2017-10-23 | Stop reason: HOSPADM

## 2017-10-23 RX ADMIN — SODIUM CHLORIDE 500 ML: 9 INJECTION, SOLUTION INTRAVENOUS at 14:15

## 2017-10-23 ASSESSMENT — PAIN SCALES - GENERAL: PAINLEVEL: 2=MINIMAL-SLIGHT

## 2017-10-23 NOTE — PROGRESS NOTES
Patient arrived for hydration; requests 500ml infused at rate of 400ml/hr.  PIV started, NS infused per pt preference.  PIV flushed, removed.  Confirmed next appt. Pt discharged in no acute distress.

## 2017-10-26 ENCOUNTER — OUTPATIENT INFUSION SERVICES (OUTPATIENT)
Dept: ONCOLOGY | Facility: MEDICAL CENTER | Age: 72
End: 2017-10-26
Attending: INTERNAL MEDICINE
Payer: MEDICARE

## 2017-10-26 VITALS
DIASTOLIC BLOOD PRESSURE: 62 MMHG | SYSTOLIC BLOOD PRESSURE: 94 MMHG | TEMPERATURE: 98.8 F | OXYGEN SATURATION: 95 % | WEIGHT: 111.99 LBS | BODY MASS INDEX: 19.84 KG/M2 | HEART RATE: 87 BPM | RESPIRATION RATE: 18 BRPM | HEIGHT: 63 IN

## 2017-10-26 PROCEDURE — 700105 HCHG RX REV CODE 258: Performed by: INTERNAL MEDICINE

## 2017-10-26 PROCEDURE — 96360 HYDRATION IV INFUSION INIT: CPT

## 2017-10-26 RX ORDER — SODIUM CHLORIDE 9 MG/ML
INJECTION, SOLUTION INTRAVENOUS ONCE
Status: COMPLETED | OUTPATIENT
Start: 2017-10-26 | End: 2017-10-26

## 2017-10-26 RX ADMIN — SODIUM CHLORIDE: 9 INJECTION, SOLUTION INTRAVENOUS at 14:00

## 2017-10-26 ASSESSMENT — PAIN SCALES - GENERAL: PAINLEVEL: 2=MINIMAL-SLIGHT

## 2017-10-27 NOTE — PROGRESS NOTES
Patient here for hydration. PIV established. Requesting only 500 ml NS at a rate of 400 ml/hr. 500 ml NS given per MAR. PIV removed; gauze/coban applied over site. Next appointment scheduled. Discharged to self care; no apparent distress noted.

## 2017-10-30 ENCOUNTER — APPOINTMENT (OUTPATIENT)
Dept: ONCOLOGY | Facility: MEDICAL CENTER | Age: 72
End: 2017-10-30
Attending: INTERNAL MEDICINE
Payer: MEDICARE

## 2017-11-02 ENCOUNTER — OUTPATIENT INFUSION SERVICES (OUTPATIENT)
Dept: ONCOLOGY | Facility: MEDICAL CENTER | Age: 72
End: 2017-11-02
Attending: INTERNAL MEDICINE
Payer: MEDICARE

## 2017-11-02 VITALS
RESPIRATION RATE: 18 BRPM | HEART RATE: 92 BPM | SYSTOLIC BLOOD PRESSURE: 99 MMHG | BODY MASS INDEX: 19.84 KG/M2 | DIASTOLIC BLOOD PRESSURE: 67 MMHG | OXYGEN SATURATION: 93 % | WEIGHT: 111.99 LBS | HEIGHT: 63 IN | TEMPERATURE: 99 F

## 2017-11-02 PROCEDURE — 700105 HCHG RX REV CODE 258: Performed by: INTERNAL MEDICINE

## 2017-11-02 PROCEDURE — 96360 HYDRATION IV INFUSION INIT: CPT

## 2017-11-02 RX ORDER — SODIUM CHLORIDE 9 MG/ML
INJECTION, SOLUTION INTRAVENOUS ONCE
Status: COMPLETED | OUTPATIENT
Start: 2017-11-02 | End: 2017-11-02

## 2017-11-02 RX ADMIN — SODIUM CHLORIDE: 9 INJECTION, SOLUTION INTRAVENOUS at 14:10

## 2017-11-02 ASSESSMENT — PAIN SCALES - GENERAL: PAINLEVEL: 2=MINIMAL-SLIGHT

## 2017-11-02 NOTE — PROGRESS NOTES
Patient presents ambulatory for hydration.  Patient reports feeling well and denies any s/s of infection at this time.  PIV established and brisk blood return noted.  Hydration of 500 mL NS at rate of 450 mL/hr infused with no complications or adverse reactions.  Patient to return 11/6/17, confirmed with patient.  PIV flushed, brisk blood return noted, discontinued, gauze, and coban applied to site.  Patient left ambulatory in no distress.

## 2017-11-06 ENCOUNTER — APPOINTMENT (OUTPATIENT)
Dept: ONCOLOGY | Facility: MEDICAL CENTER | Age: 72
End: 2017-11-06
Attending: INTERNAL MEDICINE
Payer: MEDICARE

## 2017-11-08 ENCOUNTER — TELEPHONE (OUTPATIENT)
Dept: PULMONOLOGY | Facility: HOSPICE | Age: 72
End: 2017-11-08

## 2017-11-08 NOTE — TELEPHONE ENCOUNTER
FINDINGS: The study is limited due to non-use of intravenous contrast.  The mediastinum and hilum is not well evaluated.    Limited evaluation of the mediastinum and hilar without contrast.      SEE ABOVE- Pt read her CT scan and now thinks she needs contrast to follow up on her Pulmonary Nodules, Please advise.     Last CT: 06/02/17   Next OV:12/15/17-Yevgeniy  Last OV:06/06/17-Binu

## 2017-11-09 ENCOUNTER — OUTPATIENT INFUSION SERVICES (OUTPATIENT)
Dept: ONCOLOGY | Facility: MEDICAL CENTER | Age: 72
End: 2017-11-09
Attending: INTERNAL MEDICINE
Payer: MEDICARE

## 2017-11-09 VITALS
OXYGEN SATURATION: 94 % | HEIGHT: 63 IN | WEIGHT: 112.21 LBS | BODY MASS INDEX: 19.88 KG/M2 | RESPIRATION RATE: 18 BRPM | TEMPERATURE: 99 F | SYSTOLIC BLOOD PRESSURE: 104 MMHG | HEART RATE: 95 BPM | DIASTOLIC BLOOD PRESSURE: 66 MMHG

## 2017-11-09 PROCEDURE — 700105 HCHG RX REV CODE 258: Performed by: INTERNAL MEDICINE

## 2017-11-09 PROCEDURE — 96360 HYDRATION IV INFUSION INIT: CPT

## 2017-11-09 RX ORDER — SODIUM CHLORIDE 9 MG/ML
INJECTION, SOLUTION INTRAVENOUS ONCE
Status: COMPLETED | OUTPATIENT
Start: 2017-11-09 | End: 2017-11-09

## 2017-11-09 RX ADMIN — SODIUM CHLORIDE: 9 INJECTION, SOLUTION INTRAVENOUS at 14:00

## 2017-11-09 ASSESSMENT — PAIN SCALES - GENERAL: PAINLEVEL: NO PAIN

## 2017-11-09 NOTE — PROGRESS NOTES
Here for hydration. In good spirits. Voices no new complaints. On IV Ig's at home E O Week . Teaching and education reinforcement provided, also emotional support. Treatment well tolerated ( 500 cc given per patient's request). Discharge home to self care. Appointment given for next treatment.

## 2017-11-13 ENCOUNTER — OUTPATIENT INFUSION SERVICES (OUTPATIENT)
Dept: ONCOLOGY | Facility: MEDICAL CENTER | Age: 72
End: 2017-11-13
Attending: INTERNAL MEDICINE
Payer: MEDICARE

## 2017-11-13 VITALS
SYSTOLIC BLOOD PRESSURE: 76 MMHG | HEART RATE: 103 BPM | BODY MASS INDEX: 19.69 KG/M2 | HEIGHT: 63 IN | DIASTOLIC BLOOD PRESSURE: 43 MMHG | OXYGEN SATURATION: 91 % | WEIGHT: 111.11 LBS | TEMPERATURE: 99.1 F | RESPIRATION RATE: 19 BRPM

## 2017-11-13 PROCEDURE — 700105 HCHG RX REV CODE 258: Performed by: INTERNAL MEDICINE

## 2017-11-13 PROCEDURE — 96360 HYDRATION IV INFUSION INIT: CPT

## 2017-11-13 RX ORDER — SODIUM CHLORIDE 9 MG/ML
INJECTION, SOLUTION INTRAVENOUS ONCE
Status: COMPLETED | OUTPATIENT
Start: 2017-11-13 | End: 2017-11-13

## 2017-11-13 RX ORDER — LEVALBUTEROL TARTRATE 45 UG/1
AEROSOL, METERED ORAL
Refills: 10 | COMMUNITY
Start: 2017-08-18 | End: 2018-01-04

## 2017-11-13 RX ADMIN — SODIUM CHLORIDE: 9 INJECTION, SOLUTION INTRAVENOUS at 13:51

## 2017-11-16 ENCOUNTER — OUTPATIENT INFUSION SERVICES (OUTPATIENT)
Dept: ONCOLOGY | Facility: MEDICAL CENTER | Age: 72
End: 2017-11-16
Attending: INTERNAL MEDICINE
Payer: MEDICARE

## 2017-11-16 VITALS
HEIGHT: 63 IN | OXYGEN SATURATION: 95 % | TEMPERATURE: 98.7 F | BODY MASS INDEX: 19.8 KG/M2 | SYSTOLIC BLOOD PRESSURE: 107 MMHG | DIASTOLIC BLOOD PRESSURE: 61 MMHG | RESPIRATION RATE: 18 BRPM | WEIGHT: 111.77 LBS | HEART RATE: 90 BPM

## 2017-11-16 PROCEDURE — 96360 HYDRATION IV INFUSION INIT: CPT

## 2017-11-16 PROCEDURE — 700105 HCHG RX REV CODE 258: Performed by: INTERNAL MEDICINE

## 2017-11-16 RX ORDER — SODIUM CHLORIDE 9 MG/ML
INJECTION, SOLUTION INTRAVENOUS ONCE
Status: COMPLETED | OUTPATIENT
Start: 2017-11-16 | End: 2017-11-16

## 2017-11-16 RX ADMIN — SODIUM CHLORIDE 500 ML: 9 INJECTION, SOLUTION INTRAVENOUS at 14:34

## 2017-11-16 ASSESSMENT — PAIN SCALES - GENERAL: PAINLEVEL: NO PAIN

## 2017-11-17 ENCOUNTER — HOSPITAL ENCOUNTER (OUTPATIENT)
Dept: RADIOLOGY | Facility: MEDICAL CENTER | Age: 72
End: 2017-11-17
Attending: NURSE PRACTITIONER
Payer: MEDICARE

## 2017-11-17 DIAGNOSIS — J43.8 OTHER EMPHYSEMA (HCC): ICD-10-CM

## 2017-11-17 DIAGNOSIS — R91.8 ABNORMAL CT SCAN OF LUNG: ICD-10-CM

## 2017-11-17 PROCEDURE — 71250 CT THORAX DX C-: CPT

## 2017-11-17 NOTE — PROGRESS NOTES
Pt here for scheduled hydration. Reports no change in condition since last visit. Assessment complete. POC reviewed. PIV established in R FA; brisk blood return observed. Pt requesting 500mL volume of NS at a rate of 400mL/hr. Hydration administered per pt request. Per Bebe BENITO the pt refused vitals at end of treatment, RN removed PIV, and d/c'd the patient in good condition. Future appt confirmed.

## 2017-11-20 ENCOUNTER — APPOINTMENT (OUTPATIENT)
Dept: ONCOLOGY | Facility: MEDICAL CENTER | Age: 72
End: 2017-11-20
Attending: INTERNAL MEDICINE
Payer: MEDICARE

## 2017-11-20 DIAGNOSIS — R93.89 ABNORMAL CAT SCAN: ICD-10-CM

## 2017-11-21 ENCOUNTER — OFFICE VISIT (OUTPATIENT)
Dept: MEDICAL GROUP | Facility: CLINIC | Age: 72
End: 2017-11-21
Payer: MEDICARE

## 2017-11-21 VITALS
DIASTOLIC BLOOD PRESSURE: 66 MMHG | HEART RATE: 88 BPM | BODY MASS INDEX: 19.7 KG/M2 | OXYGEN SATURATION: 96 % | HEIGHT: 63 IN | WEIGHT: 111.2 LBS | RESPIRATION RATE: 14 BRPM | SYSTOLIC BLOOD PRESSURE: 102 MMHG | TEMPERATURE: 98.6 F

## 2017-11-21 DIAGNOSIS — F13.20 BENZODIAZEPINE DEPENDENCE (HCC): ICD-10-CM

## 2017-11-21 DIAGNOSIS — F51.01 PRIMARY INSOMNIA: ICD-10-CM

## 2017-11-21 DIAGNOSIS — R91.8 ABNORMAL CT SCAN OF LUNG: ICD-10-CM

## 2017-11-21 DIAGNOSIS — Z28.21 INFLUENZA VACCINATION DECLINED: ICD-10-CM

## 2017-11-21 DIAGNOSIS — F41.9 ANXIETY: ICD-10-CM

## 2017-11-21 PROCEDURE — 99214 OFFICE O/P EST MOD 30 MIN: CPT | Performed by: NURSE PRACTITIONER

## 2017-11-21 RX ORDER — ALPRAZOLAM 0.25 MG/1
0.25 TABLET ORAL 2 TIMES DAILY PRN
Qty: 180 TAB | Refills: 1 | Status: SHIPPED
Start: 2017-11-21 | End: 2018-05-29 | Stop reason: SDUPTHER

## 2017-11-21 RX ORDER — CLONAZEPAM 0.5 MG/1
TABLET ORAL
Qty: 90 TAB | Refills: 1 | Status: SHIPPED
Start: 2017-11-21 | End: 2018-05-23 | Stop reason: SDUPTHER

## 2017-11-21 RX ORDER — AZELASTINE 1 MG/ML
1 SPRAY, METERED NASAL 2 TIMES DAILY
Qty: 30 ML | Refills: 11 | Status: SHIPPED | OUTPATIENT
Start: 2017-11-21 | End: 2018-07-05

## 2017-11-21 ASSESSMENT — PATIENT HEALTH QUESTIONNAIRE - PHQ9: CLINICAL INTERPRETATION OF PHQ2 SCORE: 0

## 2017-11-21 NOTE — PROGRESS NOTES
CC: Follow-Up (medication management )        HPI:     Deb presents today for the followin. Benzodiazepine dependence (CMS-HCC)/Primary insomnia/Anxiety  Her today for refills of her benzodiazepines. She's been on it since the 1980s. She has had her dose slowly weaned including a 50% to reduction in her Klonopin. She uses Xanax only intermittently/as needed and she did bring her bottle today which appears quite full at least 30 pills.  She is requesting refills of this medication.    4. Influenza vaccination declined      Current Outpatient Prescriptions   Medication Sig Dispense Refill   • clonazepam (KLONOPIN) 0.5 MG Tab TAKE ONE TABLET BY MOUTH EVERY NIGHT AT BEDTIME 90 Tab 1   • alprazolam (XANAX) 0.25 MG Tab Take 1 Tab by mouth 2 times a day as needed for Anxiety. 180 Tab 1   • azelastine (ASTELIN) 137 MCG/SPRAY nasal spray [place] 1 Spray in nose 2 times a day. 30 mL 11   • XOPENEX HFA 45 MCG/ACT inhaler INHALE ONE TO TWO PUFFS BY MOUTH EVERY 4 HOURS AS NEEDED FOR SHORTNESS OF BREATH 15 g 11   • XOPENEX HFA 45 MCG/ACT inhaler INHALE ONE TO TWO PUFFS PO Q 4 H PRF SOB  10   • montelukast (SINGULAIR) 10 MG Tab TAKE 1 TABLET BY MOUTH EVERY DAY 90 Tab 3   • pravastatin (PRAVACHOL) 40 MG tablet TAKE 1 TABLET BY MOUTH EVERY DAY 90 Tab 3   • SPIRIVA HANDIHALER 18 MCG Cap INHALE 1 CAPSULE BY MOUTH AT THE SAME TIME EVERY DAY 90 Cap 3   • vitamin D (CHOLECALCIFEROL) 1000 UNIT Tab Take 1,000 Units by mouth every day.     • CLARINEX 5 MG tablet TAKE 1 TABLET BY MOUTH EVERY DAY 90 Tab 3   • budesonide-formoterol (SYMBICORT) 80-4.5 MCG/ACT Aerosol Inhale 2 Puffs by mouth 2 Times a Day. Use spacer. Rinse mouth after each use. 1 Inhaler 5   • levalbuterol (XOPENEX) 1.25 MG/3ML Nebu Soln 3 mL by Nebulization route every four hours as needed for Shortness of Breath. 24 mL 11   • acetaminophen (TYLENOL) 325 MG Tab Take 650 mg by mouth every four hours as needed.     • immune globulin (FLEBOGAMMA) 10 GM/100ML Solution 5 g by  "Intravenous route Once. Indications: Pt now on 5 grams     • Oral Electrolytes (EMERGEN-C ELECTRO MIX PO) Take  by mouth every day.     • docosahexanoic acid (OMEGA 3 FA) 1000 MG CAPS Take 1,000 mg by mouth 2 Times a Day.     • aspirin (ASA) 81 MG CHEW Take 162 mg by mouth every day.     • MAGNESIUM GLYCINATE Take 480 mg by mouth.       No current facility-administered medications for this visit.      Social History   Substance Use Topics   • Smoking status: Current Every Day Smoker     Packs/day: 0.25     Years: 55.00     Types: Cigarettes   • Smokeless tobacco: Never Used      Comment: patient smokes 6 to 8 cigarettes a day    • Alcohol use No      Comment: patient states she has not had a drink in 2 years 10/09/2015     I reviewed patients allergies, problem list and medications today in [facility].    ROS: Any/all pertinent positives listed in the HPI, otherwise all others reviewed are negative today.      /66   Pulse 88   Temp 37 °C (98.6 °F)   Resp 14   Ht 1.59 m (5' 2.6\")   Wt 50.4 kg (111 lb 3.2 oz)   SpO2 96%   Breastfeeding? No   BMI 19.95 kg/m²     Exam:  Gen: Alert and oriented, No apparent distress. WDWN  Psych: A+Ox3, normal affect and mood  Skin: Warm, dry and intact. Good turgor   No rashes in visible areas.  Eye: Conjunctiva clear, lids normal  ENMT: Lips without lesions, good dentition  Lungs: Clear to auscultation bilaterally, no rales or rhonchi   Unlabored respiratory effort.   CV: Regular rate and rhythm, S1, S2. No murmurs.   No Edema      Assessment and Plan.   72 y.o. female with the following issues.    1. Benzodiazepine dependence (CMS-[facility])/ Primary insomnia   reviewed from state pharmacy database-Medications found to be medically necessary/appropriate.  Typically required for an every six-month appointment. She will follow-up with her primary care  - clonazepam (KLONOPIN) 0.5 MG Tab; TAKE ONE TABLET BY MOUTH EVERY NIGHT AT BEDTIME  Dispense: 90 Tab; Refill: 1    3. Anxiety   " reviewed from state pharmacy database-Medications found to be medically necessary/appropriate.    - alprazolam (XANAX) 0.25 MG Tab; Take 1 Tab by mouth 2 times a day as needed for Anxiety.  Dispense: 180 Tab; Refill: 1    4. Influenza vaccination declined      5. Abnormal CT scan of lung  Followed by pulmonology for lung mass. She is at this point post repeat the CT scan in 6 months. She has an upcoming appointment with them. She has no changes in symptoms. She is on oxygen 20/70 with significant emphysema and no changes in the last 6 months on her CT scan. Again this is followed by pulmonology, they have a plan of care and have ordered a repeat CT scan for her to do in the future

## 2017-11-23 ENCOUNTER — APPOINTMENT (OUTPATIENT)
Dept: ONCOLOGY | Facility: MEDICAL CENTER | Age: 72
End: 2017-11-23
Attending: INTERNAL MEDICINE
Payer: MEDICARE

## 2017-11-24 ENCOUNTER — PATIENT MESSAGE (OUTPATIENT)
Dept: MEDICAL GROUP | Facility: CLINIC | Age: 72
End: 2017-11-24

## 2017-11-24 DIAGNOSIS — F51.01 PRIMARY INSOMNIA: ICD-10-CM

## 2017-11-24 DIAGNOSIS — F13.20 BENZODIAZEPINE DEPENDENCE (HCC): ICD-10-CM

## 2017-11-27 ENCOUNTER — OUTPATIENT INFUSION SERVICES (OUTPATIENT)
Dept: ONCOLOGY | Facility: MEDICAL CENTER | Age: 72
End: 2017-11-27
Attending: INTERNAL MEDICINE
Payer: MEDICARE

## 2017-11-27 VITALS
HEART RATE: 100 BPM | HEIGHT: 63 IN | TEMPERATURE: 98.9 F | DIASTOLIC BLOOD PRESSURE: 62 MMHG | OXYGEN SATURATION: 92 % | RESPIRATION RATE: 20 BRPM | BODY MASS INDEX: 19.73 KG/M2 | WEIGHT: 111.33 LBS | SYSTOLIC BLOOD PRESSURE: 114 MMHG

## 2017-11-27 PROCEDURE — 96360 HYDRATION IV INFUSION INIT: CPT

## 2017-11-27 PROCEDURE — 700105 HCHG RX REV CODE 258: Performed by: INTERNAL MEDICINE

## 2017-11-27 RX ORDER — SODIUM CHLORIDE 9 MG/ML
INJECTION, SOLUTION INTRAVENOUS ONCE
Status: COMPLETED | OUTPATIENT
Start: 2017-11-27 | End: 2017-11-27

## 2017-11-27 RX ORDER — CLONAZEPAM 0.5 MG/1
0.5 TABLET ORAL
Qty: 90 TAB | Refills: 1 | Status: SHIPPED | OUTPATIENT
Start: 2017-11-27 | End: 2018-01-01

## 2017-11-27 RX ADMIN — SODIUM CHLORIDE: 9 INJECTION, SOLUTION INTRAVENOUS at 14:35

## 2017-11-27 ASSESSMENT — PAIN SCALES - GENERAL: PAINLEVEL: NO PAIN

## 2017-11-28 NOTE — PROGRESS NOTES
Patient arrived to clinic for scheduled hydration.  Reports no changes and denies any discomfort.  On home O2.  PIV established with good blood return noted.  Pt requested 500ml volume to be infused at a rate of 400ml/hr.  Hydration given, pt tolerated well.  PIV removed, gauze/coban  Placed.  Pt ambulated out of clinic with future appointments scheduled.

## 2017-11-30 ENCOUNTER — OUTPATIENT INFUSION SERVICES (OUTPATIENT)
Dept: ONCOLOGY | Facility: MEDICAL CENTER | Age: 72
End: 2017-11-30
Attending: INTERNAL MEDICINE
Payer: MEDICARE

## 2017-11-30 VITALS
WEIGHT: 110.67 LBS | OXYGEN SATURATION: 92 % | HEIGHT: 63 IN | DIASTOLIC BLOOD PRESSURE: 66 MMHG | TEMPERATURE: 99 F | SYSTOLIC BLOOD PRESSURE: 112 MMHG | HEART RATE: 100 BPM | RESPIRATION RATE: 20 BRPM | BODY MASS INDEX: 19.61 KG/M2

## 2017-11-30 PROCEDURE — 96360 HYDRATION IV INFUSION INIT: CPT

## 2017-11-30 PROCEDURE — 700105 HCHG RX REV CODE 258: Performed by: INTERNAL MEDICINE

## 2017-11-30 RX ORDER — SODIUM CHLORIDE 9 MG/ML
INJECTION, SOLUTION INTRAVENOUS ONCE
Status: COMPLETED | OUTPATIENT
Start: 2017-11-30 | End: 2017-11-30

## 2017-11-30 RX ADMIN — SODIUM CHLORIDE: 9 INJECTION, SOLUTION INTRAVENOUS at 14:45

## 2017-11-30 ASSESSMENT — PAIN SCALES - GENERAL: PAINLEVEL: 2=MINIMAL-SLIGHT

## 2017-12-01 NOTE — PROGRESS NOTES
Pt presents to IS ambulatory for hydration.  She denies acute complaints at this time.  Her fatigue is ongoing.  Plan of care discussed and she verbalizes agreement.  PIV to RFA established with brisk blood return.  500 mL infused per patient request at rate of 400 ml/hr.  Pt tolerated well.  PIV removed, site covered with gauze and coban.  Next appointment confirmed.  Pt discharged from IS in NAD under self care.

## 2017-12-04 ENCOUNTER — APPOINTMENT (OUTPATIENT)
Dept: ONCOLOGY | Facility: MEDICAL CENTER | Age: 72
End: 2017-12-04
Attending: INTERNAL MEDICINE
Payer: MEDICARE

## 2017-12-07 ENCOUNTER — OUTPATIENT INFUSION SERVICES (OUTPATIENT)
Dept: ONCOLOGY | Facility: MEDICAL CENTER | Age: 72
End: 2017-12-07
Attending: INTERNAL MEDICINE
Payer: MEDICARE

## 2017-12-11 ENCOUNTER — OUTPATIENT INFUSION SERVICES (OUTPATIENT)
Dept: ONCOLOGY | Facility: MEDICAL CENTER | Age: 72
End: 2017-12-11
Attending: INTERNAL MEDICINE
Payer: MEDICARE

## 2017-12-11 VITALS
TEMPERATURE: 98.2 F | RESPIRATION RATE: 20 BRPM | WEIGHT: 110.89 LBS | BODY MASS INDEX: 19.65 KG/M2 | DIASTOLIC BLOOD PRESSURE: 75 MMHG | HEIGHT: 63 IN | OXYGEN SATURATION: 91 % | HEART RATE: 96 BPM | SYSTOLIC BLOOD PRESSURE: 106 MMHG

## 2017-12-11 PROCEDURE — 96360 HYDRATION IV INFUSION INIT: CPT

## 2017-12-11 PROCEDURE — 700105 HCHG RX REV CODE 258: Performed by: INTERNAL MEDICINE

## 2017-12-11 RX ORDER — SODIUM CHLORIDE 9 MG/ML
INJECTION, SOLUTION INTRAVENOUS ONCE
Status: COMPLETED | OUTPATIENT
Start: 2017-12-11 | End: 2017-12-11

## 2017-12-11 RX ADMIN — SODIUM CHLORIDE 500 ML: 9 INJECTION, SOLUTION INTRAVENOUS at 14:00

## 2017-12-11 ASSESSMENT — PAIN SCALES - GENERAL: PAINLEVEL: 2=MINIMAL-SLIGHT

## 2017-12-12 NOTE — PROGRESS NOTES
Pt returns to infusion center for scheduled hydration.  PIV established, brisk blood return noted.  Pt tolerated 500ml normal saline hydration at rate of 400ml/hr without incident.  PIV flushed with saline per policy, removed, gauze dressing placed.  Pt left infusion center ambulatory and in good condition.  Returns Thursday, appointment scheduled.

## 2017-12-14 ENCOUNTER — OUTPATIENT INFUSION SERVICES (OUTPATIENT)
Dept: ONCOLOGY | Facility: MEDICAL CENTER | Age: 72
End: 2017-12-14
Attending: INTERNAL MEDICINE
Payer: MEDICARE

## 2017-12-14 VITALS
SYSTOLIC BLOOD PRESSURE: 120 MMHG | HEIGHT: 63 IN | WEIGHT: 110.23 LBS | BODY MASS INDEX: 19.53 KG/M2 | OXYGEN SATURATION: 94 % | HEART RATE: 104 BPM | DIASTOLIC BLOOD PRESSURE: 67 MMHG | TEMPERATURE: 97 F | RESPIRATION RATE: 20 BRPM

## 2017-12-14 PROCEDURE — 700105 HCHG RX REV CODE 258: Performed by: INTERNAL MEDICINE

## 2017-12-14 PROCEDURE — 96360 HYDRATION IV INFUSION INIT: CPT

## 2017-12-14 RX ORDER — SODIUM CHLORIDE 9 MG/ML
1000 INJECTION, SOLUTION INTRAVENOUS ONCE
Status: COMPLETED | OUTPATIENT
Start: 2017-12-14 | End: 2017-12-14

## 2017-12-14 RX ADMIN — SODIUM CHLORIDE 500 ML: 9 INJECTION, SOLUTION INTRAVENOUS at 14:24

## 2017-12-14 ASSESSMENT — PAIN SCALES - GENERAL: PAINLEVEL: NO PAIN

## 2017-12-15 ENCOUNTER — OFFICE VISIT (OUTPATIENT)
Dept: PULMONOLOGY | Facility: HOSPICE | Age: 72
End: 2017-12-15
Payer: MEDICARE

## 2017-12-15 VITALS
SYSTOLIC BLOOD PRESSURE: 130 MMHG | HEART RATE: 99 BPM | RESPIRATION RATE: 16 BRPM | OXYGEN SATURATION: 92 % | DIASTOLIC BLOOD PRESSURE: 72 MMHG | TEMPERATURE: 97.5 F

## 2017-12-15 DIAGNOSIS — J44.9 CHRONIC OBSTRUCTIVE PULMONARY DISEASE, UNSPECIFIED COPD TYPE (HCC): ICD-10-CM

## 2017-12-15 PROCEDURE — 99214 OFFICE O/P EST MOD 30 MIN: CPT | Performed by: NURSE PRACTITIONER

## 2017-12-15 NOTE — PATIENT INSTRUCTIONS
"1) Continue oxygen at 24/7 at 2L  2) Continue Symbicort 80/4.5,   3) Encourage light conditioning   4) Add Mucinex found OTC and flutter valve to help with mucus clearance  5) Continue IgG infusion every other week  6) Vaccines: Allergic vaccines  7) Smoking cessation discussed  8) Return in about 3 months (around 3/15/2018), or IAVN Schmid, for review of symptoms, if not sooner.  Smoking Cessation, Tips for Success  If you are ready to quit smoking, congratulations! You have chosen to help yourself be healthier. Cigarettes bring nicotine, tar, carbon monoxide, and other irritants into your body. Your lungs, heart, and blood vessels will be able to work better without these poisons. There are many different ways to quit smoking. Nicotine gum, nicotine patches, a nicotine inhaler, or nicotine nasal spray can help with physical craving. Hypnosis, support groups, and medicines help break the habit of smoking.  WHAT THINGS CAN I DO TO MAKE QUITTING EASIER?   Here are some tips to help you quit for good:  · Pick a date when you will quit smoking completely. Tell all of your friends and family about your plan to quit on that date.  · Do not try to slowly cut down on the number of cigarettes you are smoking. Pick a quit date and quit smoking completely starting on that day.  · Throw away all cigarettes.    · Clean and remove all ashtrays from your home, work, and car.  · On a card, write down your reasons for quitting. Carry the card with you and read it when you get the urge to smoke.  · Cleanse your body of nicotine. Drink enough water and fluids to keep your urine clear or pale yellow. Do this after quitting to flush the nicotine from your body.  · Learn to predict your moods. Do not let a bad situation be your excuse to have a cigarette. Some situations in your life might tempt you into wanting a cigarette.  · Never have \"just one\" cigarette. It leads to wanting another and another. Remind yourself of your decision to " "quit.  · Change habits associated with smoking. If you smoked while driving or when feeling stressed, try other activities to replace smoking. Stand up when drinking your coffee. Brush your teeth after eating. Sit in a different chair when you read the paper. Avoid alcohol while trying to quit, and try to drink fewer caffeinated beverages. Alcohol and caffeine may urge you to smoke.  · Avoid foods and drinks that can trigger a desire to smoke, such as sugary or spicy foods and alcohol.  · Ask people who smoke not to smoke around you.  · Have something planned to do right after eating or having a cup of coffee. For example, plan to take a walk or exercise.  · Try a relaxation exercise to calm you down and decrease your stress. Remember, you may be tense and nervous for the first 2 weeks after you quit, but this will pass.  · Find new activities to keep your hands busy. Play with a pen, coin, or rubber band. Doodle or draw things on paper.  · Brush your teeth right after eating. This will help cut down on the craving for the taste of tobacco after meals. You can also try mouthwash.    · Use oral substitutes in place of cigarettes. Try using lemon drops, carrots, cinnamon sticks, or chewing gum. Keep them handy so they are available when you have the urge to smoke.  · When you have the urge to smoke, try deep breathing.  · Designate your home as a nonsmoking area.  · If you are a heavy smoker, ask your health care provider about a prescription for nicotine chewing gum. It can ease your withdrawal from nicotine.  · Reward yourself. Set aside the cigarette money you save and buy yourself something nice.  · Look for support from others. Join a support group or smoking cessation program. Ask someone at home or at work to help you with your plan to quit smoking.  · Always ask yourself, \"Do I need this cigarette or is this just a reflex?\" Tell yourself, \"Today, I choose not to smoke,\" or \"I do not want to smoke.\" You are " reminding yourself of your decision to quit.  · Do not replace cigarette smoking with electronic cigarettes (commonly called e-cigarettes). The safety of e-cigarettes is unknown, and some may contain harmful chemicals.  · If you relapse, do not give up! Plan ahead and think about what you will do the next time you get the urge to smoke.  HOW WILL I FEEL WHEN I QUIT SMOKING?  You may have symptoms of withdrawal because your body is used to nicotine (the addictive substance in cigarettes). You may crave cigarettes, be irritable, feel very hungry, cough often, get headaches, or have difficulty concentrating. The withdrawal symptoms are only temporary. They are strongest when you first quit but will go away within 10-14 days. When withdrawal symptoms occur, stay in control. Think about your reasons for quitting. Remind yourself that these are signs that your body is healing and getting used to being without cigarettes. Remember that withdrawal symptoms are easier to treat than the major diseases that smoking can cause.   Even after the withdrawal is over, expect periodic urges to smoke. However, these cravings are generally short lived and will go away whether you smoke or not. Do not smoke!  WHAT RESOURCES ARE AVAILABLE TO HELP ME QUIT SMOKING?  Your health care provider can direct you to community resources or hospitals for support, which may include:  · Group support.  · Education.  · Hypnosis.  · Therapy.     This information is not intended to replace advice given to you by your health care provider. Make sure you discuss any questions you have with your health care provider.     Document Released: 09/15/2005 Document Revised: 01/08/2016 Document Reviewed: 06/05/2014  [employer] Interactive Patient Education ©2016 [employer]

## 2017-12-15 NOTE — PROGRESS NOTES
Patient arrived to clinic for hydration.  On home O2 at 2l.  PIV established and brisk blood return noted.  500ml infused at a rate of 400ml/hr per patient request.  Tolerated well.  PIV flushed and removed, gauze/coban placed.  Future appointment confirmed and pt ambulated out of clinic in no apparent distress.

## 2017-12-15 NOTE — PROGRESS NOTES
CC:  Here for f/u pulmonary issues as listed below    HPI:   Deb Vega is a 71 y.o. year old female here today for follow-up on COPD and abnormal CT scan. Past medical history chronic fatigue syndrome being followed by Dr. Ion Gutierrez at [facility]. 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 Also reports normal saline infusions on a regular basis due to immunodeficiency.    Alpha 1 negative. PFTs from 2/2017 FEV1 0.90 L or 42% predicted, FEV1/FVC ratio 40, % predicted and a DLCO of 52% predicted.  Patient has a history of smoking for the last 55 years. She continues to smoke 0-4 cigarettes a day. She is tried medications in the past with no benefit. She has been through [facility] smoking cessation program. She has difficulty completely stopping due to family member at home who continues to smoke daily. CT scan 11/23/2016 indicated moderate diffuse centrilobular emphysematous changes. A 2.0 x 0.6 cm opacity in the medial right lower lobe. No adenopathy noted. PET scan was performed 11/23/2016 indicating no metabolic activity in the right lower lobe opacity. Repeat CT 6/2/2017 indicates no change in 6.8 mm x 18.0 mm ill-defined opacity medial right lower lobe, markedly hyperexpanded emphysematous lungs and apical scarring. No pleural effusion. Updated Cat scan completed 11/17/2017: noting 18 x 6.8 mm focal opacification is again noted in the right lung base just above the right hemidiaphragm. Focal area of scarring or discoid atelectasis is most likely. Neoplasm such as adenocarcinoma in situ is in the differential diagnosis. Extensive emphysema again noted.      She is compliant Symbicort 160/4.5 µg 2 puffs twice a day, Spiriva once daily. Her insurance recently stopped covering Spiriva, however patient greatly benefits from the medication. She reports she is unsure when to use her Xopenex HFA inhaler and nebulizer which we reviewed extensively. She uses oxygen 2L 24/7. She started  using Ponaris due to chronic nasal dryness which has been very beneficial. She underwent sleep testing due to chronic fatigue which did not indicate sleep disordered breathing, but nocturnal hypoxemia only and PLMS index 45.0. She does complain of cramping in lower extremities that her primary is overseeing.    She reports her dyspnea is worse, especially in the morning when she is waking. She admits to being quite sedentary. She reports any type of exercise exacerbates her immune deficiency fatigue. However when she does increase her activity she is unable to go to bed until 2 or 3 AM because she has much more energy .She has difficulty coughing up sputum. She reports occasional wheeze and chest tightness. She notes seasonal allergies with sinus congestion. She denies fever, chills, night sweats, hemoptysis, chest pain.      Patient Active Problem List    Diagnosis Date Noted   • Chronic use of benzodiazepine for therapeutic purpose 09/04/2017   • Benzodiazepine dependence (CMS-HCC) 08/29/2017   • Nocturnal hypoxemia 06/06/2017   • Supplemental oxygen dependent 02/07/2017   • Abnormal CT scan of lung 02/07/2017   • Tachycardia 07/15/2015   • Hypotension 07/02/2015   • COPD (chronic obstructive pulmonary disease) (CMS-HCC) 03/01/2013   • Lyme disease 03/01/2013   • Immunologic deficiency syndrome (CMS-HCC) 03/01/2013   • Chronic fatigue syndrome 12/07/2012   • Hyperlipidemia with target LDL less than 100 06/06/2012   • Smoking 06/06/2012   • Anxiety 06/06/2012       Past Medical History:   Diagnosis Date   • Allergy    • Anxiety    • ASTHMA    • Back pain    • Bladder infection    • Bronchitis    • Cellulitis     (L) elbow    • Chronic use of benzodiazepine for therapeutic purpose 9/4/2017   • COPD    • COPD (chronic obstructive pulmonary disease) (CMS-HCC) 3/1/2013   • H/O blood transfusion reaction    • History of measles    • History of mumps    • Hives    • Hyperlipidemia    • Immunologic deficiency syndrome  (CMS-[facility]) 3/1/2013   • Lyme disease 3/1/2013   • Mononucleosis    • Pneumonia    • Tachycardia 7/15/2015   • Tachycardia 7/15/2015       Past Surgical History:   Procedure Laterality Date   • BREAST BIOPSY     • THYROID LOBECTOMY     • TONSILLECTOMY         Family History   Problem Relation Age of Onset   • Alcohol/Drug Mother    • Cancer Father    • Stroke Father    • Heart Disease Father 42       Social History   Substance Use Topics   • Smoking status: Former Smoker     Packs/day: 0.25     Years: 55.00     Types: Cigarettes   • Smokeless tobacco: Never Used      Comment: patient smokes 0 to 4 cigarettes a day    • Alcohol use No      Comment: patient states she has not had a drink in 2 years 10/09/2015       Current Outpatient Prescriptions   Medication Sig Dispense Refill   • clonazepam (KLONOPIN) 0.5 MG Tab Take 1 Tab by mouth every bedtime. 90 Tab 1   • clonazepam (KLONOPIN) 0.5 MG Tab TAKE ONE TABLET BY MOUTH EVERY NIGHT AT BEDTIME 90 Tab 1   • alprazolam (XANAX) 0.25 MG Tab Take 1 Tab by mouth 2 times a day as needed for Anxiety. 180 Tab 1   • azelastine (ASTELIN) 137 MCG/SPRAY nasal spray [place] 1 Spray in nose 2 times a day. 30 mL 11   • XOPENEX HFA 45 MCG/ACT inhaler INHALE ONE TO TWO PUFFS BY MOUTH EVERY 4 HOURS AS NEEDED FOR SHORTNESS OF BREATH 15 g 11   • XOPENEX HFA 45 MCG/ACT inhaler INHALE ONE TO TWO PUFFS PO Q 4 H PRF SOB  10   • montelukast (SINGULAIR) 10 MG Tab TAKE 1 TABLET BY MOUTH EVERY DAY 90 Tab 3   • pravastatin (PRAVACHOL) 40 MG tablet TAKE 1 TABLET BY MOUTH EVERY DAY 90 Tab 3   • SPIRIVA HANDIHALER 18 MCG Cap INHALE 1 CAPSULE BY MOUTH AT THE SAME TIME EVERY DAY 90 Cap 3   • vitamin D (CHOLECALCIFEROL) 1000 UNIT Tab Take 1,000 Units by mouth every day.     • CLARINEX 5 MG tablet TAKE 1 TABLET BY MOUTH EVERY DAY 90 Tab 3   • budesonide-formoterol (SYMBICORT) 80-4.5 MCG/ACT Aerosol Inhale 2 Puffs by mouth 2 Times a Day. Use spacer. Rinse mouth after each use. 1 Inhaler 5   • levalbuterol  (XOPENEX) 1.25 MG/3ML Nebu Soln 3 mL by Nebulization route every four hours as needed for Shortness of Breath. 24 mL 11   • acetaminophen (TYLENOL) 325 MG Tab Take 650 mg by mouth every four hours as needed.     • immune globulin (FLEBOGAMMA) 10 GM/100ML Solution 5 g by Intravenous route Once. Indications: Pt now on 5 grams     • Oral Electrolytes (EMERGEN-C ELECTRO MIX PO) Take  by mouth every day.     • docosahexanoic acid (OMEGA 3 FA) 1000 MG CAPS Take 1,000 mg by mouth 2 Times a Day.     • aspirin (ASA) 81 MG CHEW Take 162 mg by mouth every day.     • MAGNESIUM GLYCINATE Take 480 mg by mouth.       No current facility-administered medications for this visit.           Allergies: Gadolinium; Influenza virus vaccine; Zyvox [linezolid]; Cefuroxime; Epinephrine; Other drug; Other misc; Prednisone; and Sulfa drugs          ROS   Gen: Denies fever, chills, unintentional weight loss, fatigue, night sweats  E/N/T: Denies nasal congestion, ear pain  Resp:Denies  hemoptysis  CV: Denies chest pain, chest tightness, palpitations  Sleep:Denies morning headache  Neuro: Denies frequent headaches, weakness, dizziness  GI: Denies acid reflux, N/V  See HPI.  All other systems reviewed and negative          Vital signs for this encounter:  Vitals:    12/15/17 1328   BP: 130/72   Pulse: 99   Resp: 16   Temp: 36.4 °C (97.5 °F)   O2 sat % on O2: 92 %   O2 Flow Rate (L/min): 2                 Physical Exam:   Appearance: well developed, well nourished, no acute distress.   Eyes: PERRL, EOM intact, sclere white, conjunctiva moist.  Ears: no lesions or deformities.  Hearing: grossly intact.  Nose: no lesions or deformities.  Dentition: good dentition.   Oropharynx: tongue normal, posterior pharynx without erythema or exudate.  Neck: supple, trachea midline, no masses.  Respiratory effort: no intercostal retractions or use of accessory muscles.  Lung auscultation: Poor air movement  Heart auscultation: no murmur, rub, or gallop    Extremities: no cyanosis or edema.  Abdomen: soft, non-tender, no masses.  Gait and station: without difficulty   Digits and Nails: no clubbing, cyanosis, petechiae, or nodes.  Cranial nerves: grossly normal.  Motor: no focal deficits observed.   Skin: no rashes, lesions, or ulcers noted.  Orientation: oriented to time, place, and person.  Mood and affect: mood and affect appropriate, normal interaction with examiner.      Assessment   1. Chronic obstructive pulmonary disease, unspecified COPD type (CMS-[facility])  CT-CHEST (THORAX) W/O    DME OTHER    DME OTHER       Patient was seen for 50 minutes, more than 50% of time spent in face to face review, counseling, and arranging future evaluation and follow up of medical conditions and care. Would greatly benefit from pulmonary rehab, but continues to smoke cigarettes.   PLAN:   Patient Instructions   1) Continue oxygen at 24/7 at 2L  2) Continue Symbicort 80/4.5,   3) Encourage light conditioning   4) Add Mucinex found OTC and flutter valve to help with mucus clearance  5) Continue IgG infusion every other week  6) Vaccines: Allergic vaccines  7) Smoking cessation discussed  8) Return in about 3 months (around 3/15/2018), or IVAN Schmid, for review of symptoms, if not sooner.  9) Cat scan due May 2018 - ordered    Smoking Cessation, Tips for Success  If you are ready to quit smoking, congratulations! You have chosen to help yourself be healthier. Cigarettes bring nicotine, tar, carbon monoxide, and other irritants into your body. Your lungs, heart, and blood vessels will be able to work better without these poisons. There are many different ways to quit smoking. Nicotine gum, nicotine patches, a nicotine inhaler, or nicotine nasal spray can help with physical craving. Hypnosis, support groups, and medicines help break the habit of smoking.  WHAT THINGS CAN I DO TO MAKE QUITTING EASIER?   Here are some tips to help you quit for good:  · Pick a date when you will quit smoking  "completely. Tell all of your friends and family about your plan to quit on that date.  · Do not try to slowly cut down on the number of cigarettes you are smoking. Pick a quit date and quit smoking completely starting on that day.  · Throw away all cigarettes.    · Clean and remove all ashtrays from your home, work, and car.  · On a card, write down your reasons for quitting. Carry the card with you and read it when you get the urge to smoke.  · Cleanse your body of nicotine. Drink enough water and fluids to keep your urine clear or pale yellow. Do this after quitting to flush the nicotine from your body.  · Learn to predict your moods. Do not let a bad situation be your excuse to have a cigarette. Some situations in your life might tempt you into wanting a cigarette.  · Never have \"just one\" cigarette. It leads to wanting another and another. Remind yourself of your decision to quit.  · Change habits associated with smoking. If you smoked while driving or when feeling stressed, try other activities to replace smoking. Stand up when drinking your coffee. Brush your teeth after eating. Sit in a different chair when you read the paper. Avoid alcohol while trying to quit, and try to drink fewer caffeinated beverages. Alcohol and caffeine may urge you to smoke.  · Avoid foods and drinks that can trigger a desire to smoke, such as sugary or spicy foods and alcohol.  · Ask people who smoke not to smoke around you.  · Have something planned to do right after eating or having a cup of coffee. For example, plan to take a walk or exercise.  · Try a relaxation exercise to calm you down and decrease your stress. Remember, you may be tense and nervous for the first 2 weeks after you quit, but this will pass.  · Find new activities to keep your hands busy. Play with a pen, coin, or rubber band. Doodle or draw things on paper.  · Brush your teeth right after eating. This will help cut down on the craving for the taste of tobacco " "after meals. You can also try mouthwash.    · Use oral substitutes in place of cigarettes. Try using lemon drops, carrots, cinnamon sticks, or chewing gum. Keep them handy so they are available when you have the urge to smoke.  · When you have the urge to smoke, try deep breathing.  · Designate your home as a nonsmoking area.  · If you are a heavy smoker, ask your health care provider about a prescription for nicotine chewing gum. It can ease your withdrawal from nicotine.  · Reward yourself. Set aside the cigarette money you save and buy yourself something nice.  · Look for support from others. Join a support group or smoking cessation program. Ask someone at home or at work to help you with your plan to quit smoking.  · Always ask yourself, \"Do I need this cigarette or is this just a reflex?\" Tell yourself, \"Today, I choose not to smoke,\" or \"I do not want to smoke.\" You are reminding yourself of your decision to quit.  · Do not replace cigarette smoking with electronic cigarettes (commonly called e-cigarettes). The safety of e-cigarettes is unknown, and some may contain harmful chemicals.  · If you relapse, do not give up! Plan ahead and think about what you will do the next time you get the urge to smoke.  HOW WILL I FEEL WHEN I QUIT SMOKING?  You may have symptoms of withdrawal because your body is used to nicotine (the addictive substance in cigarettes). You may crave cigarettes, be irritable, feel very hungry, cough often, get headaches, or have difficulty concentrating. The withdrawal symptoms are only temporary. They are strongest when you first quit but will go away within 10-14 days. When withdrawal symptoms occur, stay in control. Think about your reasons for quitting. Remind yourself that these are signs that your body is healing and getting used to being without cigarettes. Remember that withdrawal symptoms are easier to treat than the major diseases that smoking can cause.   Even after the withdrawal " is over, expect periodic urges to smoke. However, these cravings are generally short lived and will go away whether you smoke or not. Do not smoke!  WHAT RESOURCES ARE AVAILABLE TO HELP ME QUIT SMOKING?  Your health care provider can direct you to community resources or hospitals for support, which may include:  · Group support.  · Education.  · Hypnosis.  · Therapy.     This information is not intended to replace advice given to you by your health care provider. Make sure you discuss any questions you have with your health care provider.     Document Released: 09/15/2005 Document Revised: 01/08/2016 Document Reviewed: 06/05/2014  [employer] Interactive Patient Education ©2016 [employer]

## 2017-12-18 ENCOUNTER — APPOINTMENT (OUTPATIENT)
Dept: ONCOLOGY | Facility: MEDICAL CENTER | Age: 72
End: 2017-12-18
Attending: INTERNAL MEDICINE
Payer: MEDICARE

## 2017-12-19 ENCOUNTER — TELEPHONE (OUTPATIENT)
Dept: MEDICAL GROUP | Facility: CLINIC | Age: 72
End: 2017-12-19

## 2017-12-19 NOTE — TELEPHONE ENCOUNTER
1. Caller Name: Devi from Insurance                                         Call Back Number: [phone]      Patient approves a detailed voicemail message: N\A    Devi called to provide verbal notification of Spiriva approval. Authorization # [id number].   Requested fax notification and she will fax a determination letter today.

## 2017-12-21 ENCOUNTER — OUTPATIENT INFUSION SERVICES (OUTPATIENT)
Dept: ONCOLOGY | Facility: MEDICAL CENTER | Age: 72
End: 2017-12-21
Attending: INTERNAL MEDICINE
Payer: MEDICARE

## 2017-12-21 VITALS
RESPIRATION RATE: 18 BRPM | HEIGHT: 63 IN | OXYGEN SATURATION: 96 % | WEIGHT: 111.11 LBS | BODY MASS INDEX: 19.69 KG/M2 | HEART RATE: 85 BPM | SYSTOLIC BLOOD PRESSURE: 114 MMHG | DIASTOLIC BLOOD PRESSURE: 66 MMHG | TEMPERATURE: 98.2 F

## 2017-12-21 DIAGNOSIS — J44.9 CHRONIC OBSTRUCTIVE PULMONARY DISEASE, UNSPECIFIED COPD TYPE (HCC): ICD-10-CM

## 2017-12-21 PROCEDURE — 700105 HCHG RX REV CODE 258: Performed by: INTERNAL MEDICINE

## 2017-12-21 PROCEDURE — 96361 HYDRATE IV INFUSION ADD-ON: CPT

## 2017-12-21 PROCEDURE — 96360 HYDRATION IV INFUSION INIT: CPT

## 2017-12-21 RX ORDER — SODIUM CHLORIDE 9 MG/ML
1000 INJECTION, SOLUTION INTRAVENOUS ONCE
Status: COMPLETED | OUTPATIENT
Start: 2017-12-21 | End: 2017-12-21

## 2017-12-21 RX ADMIN — SODIUM CHLORIDE 500 ML: 9 INJECTION, SOLUTION INTRAVENOUS at 14:13

## 2017-12-21 ASSESSMENT — PAIN SCALES - GENERAL: PAINLEVEL: 4=SLIGHT-MODERATE PAIN

## 2017-12-21 NOTE — PROGRESS NOTES
Pt presents for weekly hydration appointment.  She reports usual fatigue.  PIV to LFA started, blood return verified.  500 mL infused at 400 ml/hr, per patient request.  Completed without incident.  PIV removed, site covered with gauze and coban.  Next appointment confirmed.  Pt discharged from IS in NAD under self care.

## 2017-12-22 RX ORDER — DILTIAZEM HYDROCHLORIDE 60 MG/1
TABLET, FILM COATED ORAL
Qty: 1 INHALER | Refills: 5 | Status: SHIPPED | OUTPATIENT
Start: 2017-12-22 | End: 2018-01-04

## 2017-12-22 NOTE — TELEPHONE ENCOUNTER
Have we ever prescribed this med? Yes.  If yes, what date? 11/17/16    Last OV: 12/15/17- Fiordaliza Mccall    Next OV: 3/23/18- Fiordaliza Mccall    DX: Copd    Medications: Symbicort

## 2017-12-25 ENCOUNTER — APPOINTMENT (OUTPATIENT)
Dept: ONCOLOGY | Facility: MEDICAL CENTER | Age: 72
End: 2017-12-25
Attending: INTERNAL MEDICINE
Payer: MEDICARE

## 2017-12-28 ENCOUNTER — OUTPATIENT INFUSION SERVICES (OUTPATIENT)
Dept: ONCOLOGY | Facility: MEDICAL CENTER | Age: 72
End: 2017-12-28
Attending: INTERNAL MEDICINE
Payer: MEDICARE

## 2017-12-28 VITALS
OXYGEN SATURATION: 91 % | BODY MASS INDEX: 19.61 KG/M2 | DIASTOLIC BLOOD PRESSURE: 65 MMHG | WEIGHT: 110.67 LBS | HEART RATE: 97 BPM | RESPIRATION RATE: 16 BRPM | TEMPERATURE: 98.6 F | HEIGHT: 63 IN | SYSTOLIC BLOOD PRESSURE: 107 MMHG

## 2017-12-28 DIAGNOSIS — G90.3 NEUROGENIC ORTHOSTATIC HYPOTENSION (HCC): ICD-10-CM

## 2017-12-28 PROCEDURE — 96360 HYDRATION IV INFUSION INIT: CPT

## 2017-12-28 PROCEDURE — 700105 HCHG RX REV CODE 258: Performed by: INTERNAL MEDICINE

## 2017-12-28 RX ORDER — SODIUM CHLORIDE 9 MG/ML
500 INJECTION, SOLUTION INTRAVENOUS ONCE
Status: COMPLETED | OUTPATIENT
Start: 2017-12-28 | End: 2017-12-28

## 2017-12-28 RX ADMIN — SODIUM CHLORIDE 500 ML: 9 INJECTION, SOLUTION INTRAVENOUS at 13:49

## 2017-12-28 ASSESSMENT — PAIN SCALES - GENERAL: PAINLEVEL: 3=SLIGHT PAIN

## 2017-12-28 NOTE — PROGRESS NOTES
Hydration completed without complications or adverse reactions. Pt declined VS post infusion. IV flushed with saline and d/c'd with gauze and coband applied to site. Pt to return in one week, appt confirmed with pt. Pt left ambulatory in no distress at 1511

## 2017-12-28 NOTE — PROGRESS NOTES
Pt presents ambulatory for hydration. IV established and infusion running and pt resting in chair with call light within reach.

## 2018-01-01 ENCOUNTER — OUTPATIENT INFUSION SERVICES (OUTPATIENT)
Dept: ONCOLOGY | Facility: MEDICAL CENTER | Age: 73
End: 2018-01-01
Attending: INTERNAL MEDICINE
Payer: MEDICARE

## 2018-01-01 VITALS
HEART RATE: 94 BPM | WEIGHT: 110.45 LBS | TEMPERATURE: 98.1 F | HEIGHT: 63 IN | SYSTOLIC BLOOD PRESSURE: 110 MMHG | RESPIRATION RATE: 18 BRPM | BODY MASS INDEX: 19.57 KG/M2 | DIASTOLIC BLOOD PRESSURE: 60 MMHG

## 2018-01-01 DIAGNOSIS — G90.3 NEUROGENIC ORTHOSTATIC HYPOTENSION (HCC): ICD-10-CM

## 2018-01-01 PROCEDURE — 96360 HYDRATION IV INFUSION INIT: CPT

## 2018-01-01 PROCEDURE — 700105 HCHG RX REV CODE 258: Performed by: INTERNAL MEDICINE

## 2018-01-01 RX ORDER — SODIUM CHLORIDE 9 MG/ML
500 INJECTION, SOLUTION INTRAVENOUS ONCE
Status: COMPLETED | OUTPATIENT
Start: 2018-01-01 | End: 2018-01-01

## 2018-01-01 RX ADMIN — SODIUM CHLORIDE 500 ML: 9 INJECTION, SOLUTION INTRAVENOUS at 14:07

## 2018-01-01 ASSESSMENT — PAIN SCALES - GENERAL: PAINLEVEL: 4=SLIGHT-MODERATE PAIN

## 2018-01-01 NOTE — PROGRESS NOTES
Patient arrived ambulatory to the [facility] for IV hydration. Reviewed vital signs, labs, and physician order. IV access established by Lyssa BENITO in left AC, visualized brisk blood return. Per patient request 500ml of hydration administered at rate of 400ml/hr, no adverse reaction observed. IV flushed per protocol, catheter removed with tip intact, gauze and coban dressing placed. Confirmed upcoming appointment date and time with patient. Patient left the [facility] ambulatory in no signs of distress.

## 2018-01-04 ENCOUNTER — OUTPATIENT INFUSION SERVICES (OUTPATIENT)
Dept: ONCOLOGY | Facility: MEDICAL CENTER | Age: 73
End: 2018-01-04
Attending: INTERNAL MEDICINE
Payer: MEDICARE

## 2018-01-04 VITALS
BODY MASS INDEX: 19.61 KG/M2 | TEMPERATURE: 99 F | OXYGEN SATURATION: 94 % | SYSTOLIC BLOOD PRESSURE: 110 MMHG | WEIGHT: 110.67 LBS | RESPIRATION RATE: 18 BRPM | DIASTOLIC BLOOD PRESSURE: 58 MMHG | HEIGHT: 63 IN | HEART RATE: 96 BPM

## 2018-01-04 PROCEDURE — 96360 HYDRATION IV INFUSION INIT: CPT

## 2018-01-04 PROCEDURE — 700105 HCHG RX REV CODE 258: Performed by: INTERNAL MEDICINE

## 2018-01-04 RX ORDER — SODIUM CHLORIDE 9 MG/ML
1000 INJECTION, SOLUTION INTRAVENOUS ONCE
Status: COMPLETED | OUTPATIENT
Start: 2018-01-04 | End: 2018-01-04

## 2018-01-04 RX ADMIN — SODIUM CHLORIDE 500 ML: 9 INJECTION, SOLUTION INTRAVENOUS at 14:02

## 2018-01-04 ASSESSMENT — PAIN SCALES - GENERAL: PAINLEVEL: 4=SLIGHT-MODERATE PAIN

## 2018-01-08 ENCOUNTER — APPOINTMENT (OUTPATIENT)
Dept: ONCOLOGY | Facility: MEDICAL CENTER | Age: 73
End: 2018-01-08
Attending: INTERNAL MEDICINE
Payer: MEDICARE

## 2018-01-11 ENCOUNTER — OUTPATIENT INFUSION SERVICES (OUTPATIENT)
Dept: ONCOLOGY | Facility: MEDICAL CENTER | Age: 73
End: 2018-01-11
Attending: INTERNAL MEDICINE
Payer: MEDICARE

## 2018-01-11 VITALS
HEART RATE: 107 BPM | TEMPERATURE: 98.4 F | SYSTOLIC BLOOD PRESSURE: 116 MMHG | HEIGHT: 63 IN | DIASTOLIC BLOOD PRESSURE: 74 MMHG | BODY MASS INDEX: 19.61 KG/M2 | OXYGEN SATURATION: 94 % | RESPIRATION RATE: 18 BRPM | WEIGHT: 110.67 LBS

## 2018-01-11 PROCEDURE — 96360 HYDRATION IV INFUSION INIT: CPT

## 2018-01-11 PROCEDURE — 700105 HCHG RX REV CODE 258: Performed by: INTERNAL MEDICINE

## 2018-01-11 RX ORDER — SODIUM CHLORIDE 9 MG/ML
1000 INJECTION, SOLUTION INTRAVENOUS ONCE
Status: COMPLETED | OUTPATIENT
Start: 2018-01-11 | End: 2018-01-11

## 2018-01-11 RX ADMIN — SODIUM CHLORIDE 500 ML: 9 INJECTION, SOLUTION INTRAVENOUS at 14:03

## 2018-01-11 ASSESSMENT — PAIN SCALES - GENERAL: PAINLEVEL: 4=SLIGHT-MODERATE PAIN

## 2018-01-12 NOTE — PROGRESS NOTES
Pt to infusion center for biweekly hydration. Assessment complete. POC reviewed. PIV established in L FA; brisk blood return observed. Pt requesting partial dose of hydration per preference. 500mL NS administered at a rate of 400mL/hr. Infusion completed. Declines vital sign assessment at completion of treatment. PIV flushed & removed per policy, gauze dressing applied. Future appt confirmed. Pt d/c'd in great spirits.

## 2018-01-15 ENCOUNTER — APPOINTMENT (OUTPATIENT)
Dept: ONCOLOGY | Facility: MEDICAL CENTER | Age: 73
End: 2018-01-15
Attending: INTERNAL MEDICINE
Payer: MEDICARE

## 2018-01-18 ENCOUNTER — APPOINTMENT (OUTPATIENT)
Dept: ONCOLOGY | Facility: MEDICAL CENTER | Age: 73
End: 2018-01-18
Attending: INTERNAL MEDICINE
Payer: MEDICARE

## 2018-01-22 ENCOUNTER — APPOINTMENT (OUTPATIENT)
Dept: ONCOLOGY | Facility: MEDICAL CENTER | Age: 73
End: 2018-01-22
Attending: INTERNAL MEDICINE
Payer: MEDICARE

## 2018-01-25 ENCOUNTER — APPOINTMENT (OUTPATIENT)
Dept: ONCOLOGY | Facility: MEDICAL CENTER | Age: 73
End: 2018-01-25
Attending: INTERNAL MEDICINE
Payer: MEDICARE

## 2018-01-28 ENCOUNTER — OUTPATIENT INFUSION SERVICES (OUTPATIENT)
Dept: ONCOLOGY | Facility: MEDICAL CENTER | Age: 73
End: 2018-01-28
Attending: INTERNAL MEDICINE
Payer: MEDICARE

## 2018-01-28 VITALS
DIASTOLIC BLOOD PRESSURE: 70 MMHG | RESPIRATION RATE: 20 BRPM | OXYGEN SATURATION: 98 % | BODY MASS INDEX: 19.8 KG/M2 | HEART RATE: 102 BPM | WEIGHT: 111.77 LBS | HEIGHT: 63 IN | TEMPERATURE: 97.8 F | SYSTOLIC BLOOD PRESSURE: 127 MMHG

## 2018-01-28 PROCEDURE — 700105 HCHG RX REV CODE 258: Performed by: INTERNAL MEDICINE

## 2018-01-28 PROCEDURE — 96361 HYDRATE IV INFUSION ADD-ON: CPT

## 2018-01-28 PROCEDURE — 96360 HYDRATION IV INFUSION INIT: CPT

## 2018-01-28 RX ORDER — SODIUM CHLORIDE 9 MG/ML
1000 INJECTION, SOLUTION INTRAVENOUS ONCE
Status: COMPLETED | OUTPATIENT
Start: 2018-01-28 | End: 2018-01-28

## 2018-01-28 RX ADMIN — SODIUM CHLORIDE 600 ML: 9 INJECTION, SOLUTION INTRAVENOUS at 14:32

## 2018-01-28 ASSESSMENT — PAIN SCALES - GENERAL: PAINLEVEL: 4=SLIGHT-MODERATE PAIN

## 2018-01-29 NOTE — PROGRESS NOTES
Pt presented to infusion center for hydration. No new acute complaints, just usual chronic issues for pt. PIV started, brisk blood return observed. 600 ml NS infused at 400 ml/hr per pt request, tolerated well without incident. PIV flushed and removed, gauze dressing placed. Left on foot in NAD, has next appt.

## 2018-02-01 ENCOUNTER — TELEPHONE (OUTPATIENT)
Dept: PULMONOLOGY | Facility: HOSPICE | Age: 73
End: 2018-02-01

## 2018-02-01 ENCOUNTER — APPOINTMENT (OUTPATIENT)
Dept: ONCOLOGY | Facility: MEDICAL CENTER | Age: 73
End: 2018-02-01
Attending: INTERNAL MEDICINE
Payer: MEDICARE

## 2018-02-01 NOTE — TELEPHONE ENCOUNTER
Acapella would be recommended, but if she finds something cheaper that is fine too, as long as it help get mucus out

## 2018-02-02 ENCOUNTER — PATIENT OUTREACH (OUTPATIENT)
Dept: HEALTH INFORMATION MANAGEMENT | Facility: OTHER | Age: 73
End: 2018-02-02

## 2018-02-02 NOTE — PROGRESS NOTES
1. Attempt #: 1    2. [facility] Verified: no    3. Verify PCP: yes    4. Care Team Updated:       •   DME Company (gait device, O2, CPAP, etc.): YES       •   Other Specialists (eye doctor, derm, GYN, cardiology, endo, etc): YES    5.  Reviewed/Updated the following with patient:       •   Communication Preference Obtained? YES       •   Preferred Pharmacy? YES       •   Preferred Lab? YES       •   Family History (document living status of immediate family members and if + hx of cancer, diabetes, hypertension, hyperlipidemia, heart attack, stroke) YES. Was Abstract Encounter opened and chart updated? YES    6. Synchronized Activation: already active    7. Synchronized Rinku: no    8. Annual Wellness Visit Scheduling  Scheduling Status:Scheduled      9. Care Gap Scheduling (Attempt to Schedule EACH Overdue Care Gap!)     Health Maintenance Due   Topic Date Due   • URINE DRUG SCREEN  1945   • IMM DTaP/Tdap/Td Vaccine (1 - Tdap) 10/19/1964   • PAP SMEAR  10/19/1966   • IMM ZOSTER VACCINE  10/19/2005   • IMM PNEUMOCOCCAL 65+ (ADULT) LOW/MEDIUM RISK SERIES (1 of 2 - PCV13) 10/19/2010   • Annual Wellness Visit  10/19/2017        Scheduled patient for Annual Wellness Visit    10. Patient was advised: “This is a free wellness visit. The provider will screen for medical conditions to help you stay healthy. If you have other concerns to address you may be asked to discuss these at a separate visit or there may be an additional fee.”     11. Patient was informed to arrive 15 min prior to their scheduled appointment and bring in their medication bottles.

## 2018-02-05 ENCOUNTER — APPOINTMENT (OUTPATIENT)
Dept: ONCOLOGY | Facility: MEDICAL CENTER | Age: 73
End: 2018-02-05
Attending: INTERNAL MEDICINE
Payer: MEDICARE

## 2018-02-06 ENCOUNTER — TELEPHONE (OUTPATIENT)
Dept: PULMONOLOGY | Facility: HOSPICE | Age: 73
End: 2018-02-06

## 2018-02-06 NOTE — TELEPHONE ENCOUNTER
1. Caller Name: Pt                      Call Back Number: [phone] (home)     2. Message: Patient would like to know if she needs another order for a nebulizer machine. She was given an nebulizer by [facility] but she is having difficulty using the device. She states that the medication is coming out of the tube from both sides. She has only started this device as of last night.    3. Patient approves office to leave a detailed voicemail/MyChart message: yes

## 2018-02-06 NOTE — TELEPHONE ENCOUNTER
Called patient and advised her to call [facility] since they are the ones that gave her the machine yesterday.  They are going to know exactly the equipment given and what the problem might be

## 2018-02-08 ENCOUNTER — APPOINTMENT (OUTPATIENT)
Dept: ONCOLOGY | Facility: MEDICAL CENTER | Age: 73
End: 2018-02-08
Attending: INTERNAL MEDICINE
Payer: MEDICARE

## 2018-02-11 ENCOUNTER — OUTPATIENT INFUSION SERVICES (OUTPATIENT)
Dept: ONCOLOGY | Facility: MEDICAL CENTER | Age: 73
End: 2018-02-11
Attending: INTERNAL MEDICINE
Payer: MEDICARE

## 2018-02-11 VITALS
HEIGHT: 63 IN | SYSTOLIC BLOOD PRESSURE: 90 MMHG | WEIGHT: 111.55 LBS | DIASTOLIC BLOOD PRESSURE: 54 MMHG | OXYGEN SATURATION: 97 % | TEMPERATURE: 99 F | HEART RATE: 98 BPM | RESPIRATION RATE: 20 BRPM | BODY MASS INDEX: 19.77 KG/M2

## 2018-02-11 PROCEDURE — 700105 HCHG RX REV CODE 258: Performed by: INTERNAL MEDICINE

## 2018-02-11 PROCEDURE — 96360 HYDRATION IV INFUSION INIT: CPT

## 2018-02-11 RX ORDER — SODIUM CHLORIDE 9 MG/ML
1000 INJECTION, SOLUTION INTRAVENOUS ONCE
Status: COMPLETED | OUTPATIENT
Start: 2018-02-11 | End: 2018-02-11

## 2018-02-11 RX ADMIN — SODIUM CHLORIDE 500 ML: 9 INJECTION, SOLUTION INTRAVENOUS at 13:50

## 2018-02-11 ASSESSMENT — PAIN SCALES - GENERAL: PAINLEVEL: 4=SLIGHT-MODERATE PAIN

## 2018-02-11 NOTE — PROGRESS NOTES
"Pt here for scheduled hydration. Assessment complete. POC reviewed. PIV established in L FA; brisk blood return observed. 500mL NS administered at a rate of 400mL/hr per pt request. Vitals assessed per pt request. Pt reports feeling \"fine\". PIV flushed & removed per policy, gauze dressing applied. Future appt confirmed. Pt d/c'd in great spirits no distress observed.   "

## 2018-02-15 ENCOUNTER — APPOINTMENT (OUTPATIENT)
Dept: ONCOLOGY | Facility: MEDICAL CENTER | Age: 73
End: 2018-02-15
Attending: INTERNAL MEDICINE
Payer: MEDICARE

## 2018-02-18 ENCOUNTER — APPOINTMENT (OUTPATIENT)
Dept: ONCOLOGY | Facility: MEDICAL CENTER | Age: 73
End: 2018-02-18
Attending: INTERNAL MEDICINE
Payer: MEDICARE

## 2018-02-22 ENCOUNTER — APPOINTMENT (OUTPATIENT)
Dept: ONCOLOGY | Facility: MEDICAL CENTER | Age: 73
End: 2018-02-22
Attending: INTERNAL MEDICINE
Payer: MEDICARE

## 2018-02-25 ENCOUNTER — OUTPATIENT INFUSION SERVICES (OUTPATIENT)
Dept: ONCOLOGY | Facility: MEDICAL CENTER | Age: 73
End: 2018-02-25
Attending: INTERNAL MEDICINE
Payer: MEDICARE

## 2018-02-25 VITALS
DIASTOLIC BLOOD PRESSURE: 58 MMHG | TEMPERATURE: 99 F | WEIGHT: 110.89 LBS | OXYGEN SATURATION: 95 % | BODY MASS INDEX: 19.65 KG/M2 | HEIGHT: 63 IN | SYSTOLIC BLOOD PRESSURE: 105 MMHG | RESPIRATION RATE: 24 BRPM | HEART RATE: 92 BPM

## 2018-02-25 PROCEDURE — 700105 HCHG RX REV CODE 258: Performed by: INTERNAL MEDICINE

## 2018-02-25 PROCEDURE — 96360 HYDRATION IV INFUSION INIT: CPT

## 2018-02-25 RX ORDER — SODIUM CHLORIDE 9 MG/ML
1000 INJECTION, SOLUTION INTRAVENOUS ONCE
Status: COMPLETED | OUTPATIENT
Start: 2018-02-25 | End: 2018-02-25

## 2018-02-25 RX ADMIN — SODIUM CHLORIDE 500 ML: 9 INJECTION, SOLUTION INTRAVENOUS at 15:23

## 2018-02-25 ASSESSMENT — PAIN SCALES - GENERAL: PAINLEVEL: 5=MODERATE PAIN

## 2018-02-26 NOTE — PROGRESS NOTES
Pt presented for twice weekly hydration. Pt reports she hasn't been coming past several Thursdays bc she doesn't want the  getting in her car due to infection fears. No new acute complaints today. PIV started, brisk blood return observed. 500cc NS infused at 400cc/hr per pt preference without issue. PIV flushed and removed, gauze dressing placed. Has next appt, left on foot in good spirits.

## 2018-03-04 ENCOUNTER — APPOINTMENT (OUTPATIENT)
Dept: ONCOLOGY | Facility: MEDICAL CENTER | Age: 73
End: 2018-03-04
Attending: INTERNAL MEDICINE
Payer: MEDICARE

## 2018-03-05 RX ORDER — DESLORATADINE 5 MG
TABLET ORAL
Qty: 90 TAB | Refills: 3 | Status: SHIPPED | OUTPATIENT
Start: 2018-03-05 | End: 2018-03-28 | Stop reason: SDUPTHER

## 2018-03-08 ENCOUNTER — APPOINTMENT (OUTPATIENT)
Dept: ONCOLOGY | Facility: MEDICAL CENTER | Age: 73
End: 2018-03-08
Attending: INTERNAL MEDICINE
Payer: MEDICARE

## 2018-03-11 ENCOUNTER — OUTPATIENT INFUSION SERVICES (OUTPATIENT)
Dept: ONCOLOGY | Facility: MEDICAL CENTER | Age: 73
End: 2018-03-11
Attending: INTERNAL MEDICINE
Payer: MEDICARE

## 2018-03-11 VITALS
WEIGHT: 111.11 LBS | SYSTOLIC BLOOD PRESSURE: 116 MMHG | HEART RATE: 64 BPM | RESPIRATION RATE: 20 BRPM | TEMPERATURE: 98.4 F | BODY MASS INDEX: 19.69 KG/M2 | DIASTOLIC BLOOD PRESSURE: 65 MMHG | OXYGEN SATURATION: 99 % | HEIGHT: 63 IN

## 2018-03-11 PROCEDURE — 96360 HYDRATION IV INFUSION INIT: CPT

## 2018-03-11 PROCEDURE — 700105 HCHG RX REV CODE 258: Performed by: INTERNAL MEDICINE

## 2018-03-11 PROCEDURE — 96361 HYDRATE IV INFUSION ADD-ON: CPT

## 2018-03-11 RX ORDER — SODIUM CHLORIDE 9 MG/ML
1000 INJECTION, SOLUTION INTRAVENOUS ONCE
Status: COMPLETED | OUTPATIENT
Start: 2018-03-11 | End: 2018-03-11

## 2018-03-11 RX ADMIN — SODIUM CHLORIDE 600 ML: 9 INJECTION, SOLUTION INTRAVENOUS at 14:12

## 2018-03-11 ASSESSMENT — PAIN SCALES - GENERAL: PAINLEVEL: 4=SLIGHT-MODERATE PAIN

## 2018-03-12 NOTE — PROGRESS NOTES
Patient arrived to clinic for IV hydration.   PIV established with good blood return noted.  600ml of NS infused at a rate of 400ml/hr per patient request.  Pt tolerated well.  PIV flushed, removed, and gauze/coban placed.  Confirmed next appointment and pt ambulated out of clinic in no apparent distress.

## 2018-03-15 ENCOUNTER — APPOINTMENT (OUTPATIENT)
Dept: ONCOLOGY | Facility: MEDICAL CENTER | Age: 73
End: 2018-03-15
Attending: INTERNAL MEDICINE
Payer: MEDICARE

## 2018-03-18 ENCOUNTER — APPOINTMENT (OUTPATIENT)
Dept: ONCOLOGY | Facility: MEDICAL CENTER | Age: 73
End: 2018-03-18
Attending: INTERNAL MEDICINE
Payer: MEDICARE

## 2018-03-20 ENCOUNTER — TELEPHONE (OUTPATIENT)
Dept: PULMONOLOGY | Facility: HOSPICE | Age: 73
End: 2018-03-20

## 2018-03-20 NOTE — TELEPHONE ENCOUNTER
Patient currently scheduled with [facility] 3/23/18 for CT results. Ct was ordered last OV 12/15/17 from [facility]. results not in the system.  Left message informing CT scan was ordered at last OV. Results not in the system, if completed at a different facility to call our clinic and inform. Advised if has not completed CT she can call [phone] to schedule and complete CT prior to OV.

## 2018-03-22 ENCOUNTER — APPOINTMENT (OUTPATIENT)
Dept: ONCOLOGY | Facility: MEDICAL CENTER | Age: 73
End: 2018-03-22
Attending: INTERNAL MEDICINE
Payer: MEDICARE

## 2018-03-23 ENCOUNTER — APPOINTMENT (OUTPATIENT)
Dept: PULMONOLOGY | Facility: HOSPICE | Age: 73
End: 2018-03-23
Payer: MEDICARE

## 2018-03-23 DIAGNOSIS — G93.32 CHRONIC FATIGUE SYNDROME: ICD-10-CM

## 2018-03-25 ENCOUNTER — OUTPATIENT INFUSION SERVICES (OUTPATIENT)
Dept: ONCOLOGY | Facility: MEDICAL CENTER | Age: 73
End: 2018-03-25
Attending: INTERNAL MEDICINE
Payer: MEDICARE

## 2018-03-25 VITALS
DIASTOLIC BLOOD PRESSURE: 64 MMHG | SYSTOLIC BLOOD PRESSURE: 103 MMHG | TEMPERATURE: 98.8 F | BODY MASS INDEX: 19.49 KG/M2 | OXYGEN SATURATION: 98 % | HEART RATE: 100 BPM | RESPIRATION RATE: 18 BRPM | HEIGHT: 63 IN | WEIGHT: 110.01 LBS

## 2018-03-25 PROCEDURE — 700105 HCHG RX REV CODE 258: Performed by: INTERNAL MEDICINE

## 2018-03-25 PROCEDURE — 96360 HYDRATION IV INFUSION INIT: CPT

## 2018-03-25 RX ORDER — SODIUM CHLORIDE 9 MG/ML
1000 INJECTION, SOLUTION INTRAVENOUS ONCE
Status: COMPLETED | OUTPATIENT
Start: 2018-03-25 | End: 2018-03-25

## 2018-03-25 RX ADMIN — SODIUM CHLORIDE 500 ML: 9 INJECTION, SOLUTION INTRAVENOUS at 14:02

## 2018-03-25 ASSESSMENT — PAIN SCALES - GENERAL: PAINLEVEL: 5=MODERATE PAIN

## 2018-03-25 NOTE — PROGRESS NOTES
Patient arrived ambulatory to the [facility] for IV hydration. Reviewed vital signs, labs, and physician order. Patient denies S&S of infection, reports mild fatigue. IV access established in left AC by Angi BENITO, visualized brisk blood return. 500cc's of NS administered per pt request, no adverse reaction observed. IV flushed per protocol, catheter removed with tip intact, gauze and coban dressing placed. Confirmed upcoming appointment date and time with patient. Patient left the [facility] ambulatory in no sign of distress.

## 2018-03-28 RX ORDER — DESLORATADINE 5 MG/1
5 TABLET ORAL
Qty: 90 TAB | Refills: 3 | Status: SHIPPED | OUTPATIENT
Start: 2018-03-28 | End: 2021-04-19

## 2018-03-29 ENCOUNTER — APPOINTMENT (OUTPATIENT)
Dept: ONCOLOGY | Facility: MEDICAL CENTER | Age: 73
End: 2018-03-29
Attending: INTERNAL MEDICINE
Payer: MEDICARE

## 2018-04-01 ENCOUNTER — APPOINTMENT (OUTPATIENT)
Dept: ONCOLOGY | Facility: MEDICAL CENTER | Age: 73
End: 2018-04-01
Attending: INTERNAL MEDICINE
Payer: MEDICARE

## 2018-04-05 ENCOUNTER — APPOINTMENT (OUTPATIENT)
Dept: ONCOLOGY | Facility: MEDICAL CENTER | Age: 73
End: 2018-04-05
Attending: INTERNAL MEDICINE
Payer: MEDICARE

## 2018-04-08 ENCOUNTER — OUTPATIENT INFUSION SERVICES (OUTPATIENT)
Dept: ONCOLOGY | Facility: MEDICAL CENTER | Age: 73
End: 2018-04-08
Attending: INTERNAL MEDICINE
Payer: MEDICARE

## 2018-04-08 VITALS
DIASTOLIC BLOOD PRESSURE: 70 MMHG | TEMPERATURE: 98.4 F | SYSTOLIC BLOOD PRESSURE: 114 MMHG | OXYGEN SATURATION: 96 % | BODY MASS INDEX: 19.65 KG/M2 | HEIGHT: 63 IN | HEART RATE: 104 BPM | RESPIRATION RATE: 18 BRPM | WEIGHT: 110.89 LBS

## 2018-04-08 PROCEDURE — 700105 HCHG RX REV CODE 258: Performed by: INTERNAL MEDICINE

## 2018-04-08 PROCEDURE — 96360 HYDRATION IV INFUSION INIT: CPT

## 2018-04-08 PROCEDURE — 96361 HYDRATE IV INFUSION ADD-ON: CPT

## 2018-04-08 RX ORDER — SODIUM CHLORIDE 9 MG/ML
1000 INJECTION, SOLUTION INTRAVENOUS ONCE
Status: COMPLETED | OUTPATIENT
Start: 2018-04-08 | End: 2018-04-08

## 2018-04-08 RX ADMIN — SODIUM CHLORIDE 1000 ML: 9 INJECTION, SOLUTION INTRAVENOUS at 14:01

## 2018-04-08 ASSESSMENT — PAIN SCALES - GENERAL: PAINLEVEL: 4=SLIGHT-MODERATE PAIN

## 2018-04-08 NOTE — PROGRESS NOTES
Patient arrived ambulatory to the [facility] for hydration with O2 2L/NC in place. Reviewed vital signs, labs, and physician order. IV access established in left forearm by Dora BENITO, visualized brisk blood return. NS infused per pt request at rate of 400ml/hr of 500 cc's, no adverse reaction observed. IV flushed per protocol, catheter removed with tip intact, gauze and coban dressing placed. Confirmed upcoming appointment date and time with patient. Patient left the [facility] ambulatory in no sign of distress.

## 2018-04-12 ENCOUNTER — APPOINTMENT (OUTPATIENT)
Dept: ONCOLOGY | Facility: MEDICAL CENTER | Age: 73
End: 2018-04-12
Attending: INTERNAL MEDICINE
Payer: MEDICARE

## 2018-04-15 ENCOUNTER — APPOINTMENT (OUTPATIENT)
Dept: ONCOLOGY | Facility: MEDICAL CENTER | Age: 73
End: 2018-04-15
Attending: INTERNAL MEDICINE
Payer: MEDICARE

## 2018-04-19 ENCOUNTER — APPOINTMENT (OUTPATIENT)
Dept: ONCOLOGY | Facility: MEDICAL CENTER | Age: 73
End: 2018-04-19
Attending: INTERNAL MEDICINE
Payer: MEDICARE

## 2018-04-22 ENCOUNTER — OUTPATIENT INFUSION SERVICES (OUTPATIENT)
Dept: ONCOLOGY | Facility: MEDICAL CENTER | Age: 73
End: 2018-04-22
Attending: INTERNAL MEDICINE
Payer: MEDICARE

## 2018-04-22 VITALS
DIASTOLIC BLOOD PRESSURE: 75 MMHG | HEART RATE: 98 BPM | TEMPERATURE: 99 F | BODY MASS INDEX: 19.26 KG/M2 | OXYGEN SATURATION: 95 % | RESPIRATION RATE: 18 BRPM | HEIGHT: 63 IN | WEIGHT: 108.69 LBS | SYSTOLIC BLOOD PRESSURE: 132 MMHG

## 2018-04-22 PROCEDURE — 700105 HCHG RX REV CODE 258: Performed by: INTERNAL MEDICINE

## 2018-04-22 PROCEDURE — 96360 HYDRATION IV INFUSION INIT: CPT

## 2018-04-22 PROCEDURE — 96361 HYDRATE IV INFUSION ADD-ON: CPT

## 2018-04-22 RX ORDER — SODIUM CHLORIDE 9 MG/ML
INJECTION, SOLUTION INTRAVENOUS ONCE
Status: COMPLETED | OUTPATIENT
Start: 2018-04-22 | End: 2018-04-22

## 2018-04-22 RX ADMIN — SODIUM CHLORIDE 1000 ML: 9 INJECTION, SOLUTION INTRAVENOUS at 14:00

## 2018-04-22 ASSESSMENT — PAIN SCALES - GENERAL: PAINLEVEL: 4=SLIGHT-MODERATE PAIN

## 2018-04-23 NOTE — PROGRESS NOTES
Patient in for IV fluids, no new concerns voiced at this time. PIV established and 500 mL NSS administered at a rate of 400 mL/hr per patient request; treatment well tolerated. Next appointment confirmed; patient discharged ambulatory in no apparent distress.

## 2018-04-26 ENCOUNTER — APPOINTMENT (OUTPATIENT)
Dept: ONCOLOGY | Facility: MEDICAL CENTER | Age: 73
End: 2018-04-26
Attending: INTERNAL MEDICINE
Payer: MEDICARE

## 2018-05-03 ENCOUNTER — APPOINTMENT (OUTPATIENT)
Dept: ONCOLOGY | Facility: MEDICAL CENTER | Age: 73
End: 2018-05-03
Attending: INTERNAL MEDICINE
Payer: MEDICARE

## 2018-05-06 ENCOUNTER — OUTPATIENT INFUSION SERVICES (OUTPATIENT)
Dept: ONCOLOGY | Facility: MEDICAL CENTER | Age: 73
End: 2018-05-06
Attending: INTERNAL MEDICINE
Payer: MEDICARE

## 2018-05-06 VITALS
WEIGHT: 108.91 LBS | SYSTOLIC BLOOD PRESSURE: 100 MMHG | BODY MASS INDEX: 19.3 KG/M2 | HEIGHT: 63 IN | DIASTOLIC BLOOD PRESSURE: 54 MMHG | TEMPERATURE: 98.7 F | RESPIRATION RATE: 20 BRPM | OXYGEN SATURATION: 96 % | HEART RATE: 87 BPM

## 2018-05-06 PROCEDURE — 96366 THER/PROPH/DIAG IV INF ADDON: CPT

## 2018-05-06 PROCEDURE — 96365 THER/PROPH/DIAG IV INF INIT: CPT

## 2018-05-06 PROCEDURE — 700105 HCHG RX REV CODE 258: Performed by: INTERNAL MEDICINE

## 2018-05-06 PROCEDURE — 96360 HYDRATION IV INFUSION INIT: CPT

## 2018-05-06 RX ORDER — SODIUM CHLORIDE 9 MG/ML
1000 INJECTION, SOLUTION INTRAVENOUS ONCE
Status: COMPLETED | OUTPATIENT
Start: 2018-05-06 | End: 2018-05-06

## 2018-05-06 RX ADMIN — SODIUM CHLORIDE 500 ML: 9 INJECTION, SOLUTION INTRAVENOUS at 14:02

## 2018-05-06 ASSESSMENT — PAIN SCALES - GENERAL: PAINLEVEL_OUTOF10: 5

## 2018-05-06 NOTE — PROGRESS NOTES
Patient arrived to clinic for IV hydration.  Denies any acute changes or discomfort.  PIV established with good blood return noted.  500ml of NS infused at a rate of 400ml/hr per patient request.  Tolerated well.  PIV flushed, removed, and gauze/coban placed.  Confirmed next appointment and pt ambulated out of clinic in no apparent distress.

## 2018-05-09 ENCOUNTER — OFFICE VISIT (OUTPATIENT)
Dept: NEUROLOGY | Facility: MEDICAL CENTER | Age: 73
End: 2018-05-09
Payer: MEDICARE

## 2018-05-09 VITALS
HEIGHT: 64 IN | WEIGHT: 108.58 LBS | BODY MASS INDEX: 18.54 KG/M2 | HEART RATE: 112 BPM | DIASTOLIC BLOOD PRESSURE: 58 MMHG | OXYGEN SATURATION: 90 % | RESPIRATION RATE: 17 BRPM | TEMPERATURE: 99 F | SYSTOLIC BLOOD PRESSURE: 100 MMHG

## 2018-05-09 DIAGNOSIS — R20.2 PARESTHESIAS: Primary | ICD-10-CM

## 2018-05-09 PROCEDURE — 99205 OFFICE O/P NEW HI 60 MIN: CPT | Performed by: PSYCHIATRY & NEUROLOGY

## 2018-05-09 RX ORDER — GABAPENTIN 100 MG/1
100 CAPSULE ORAL 3 TIMES DAILY
Qty: 120 CAP | Refills: 2 | Status: SHIPPED | OUTPATIENT
Start: 2018-05-09 | End: 2021-06-15

## 2018-05-09 ASSESSMENT — ENCOUNTER SYMPTOMS
MEMORY LOSS: 0
SPEECH CHANGE: 0
TINGLING: 1
NECK PAIN: 1
TREMORS: 0
SHORTNESS OF BREATH: 1
DIZZINESS: 0
SENSORY CHANGE: 1
FOCAL WEAKNESS: 0
MYALGIAS: 1

## 2018-05-09 ASSESSMENT — PAIN SCALES - GENERAL: PAINLEVEL: 3=SLIGHT PAIN

## 2018-05-10 ENCOUNTER — APPOINTMENT (OUTPATIENT)
Dept: ONCOLOGY | Facility: MEDICAL CENTER | Age: 73
End: 2018-05-10
Attending: INTERNAL MEDICINE
Payer: MEDICARE

## 2018-05-10 NOTE — PROGRESS NOTES
Subjective:      Deb Vega is a 72 y.o. female who presents from the office of Dr. Mccullough, for consultation, with a history of bilateral lower extremity paresthesias and cramping sensation suggestive of possible polyneuropathy.    FRANK Boyd is a pleasant, though unfortunate, 72-year-old right-handed  female whose neurologic symptoms began about 8 years ago. She described paresthesias beginning in the right lower extremity almost in isolation, the foot initially though it spread proximally to below the knee. There is no actual weakness at the time, simply significant discomfort. Over the next couple of years the left lower extremity became involved in similar fashion, now sensory symptoms have ascended to above both knees. She describes constant numb and tingling with burning sensations, there is no loss of feeling or actual hyperpathia in the feet. It is quite uncomfortable to walk as a result. She has no similar symptomatology in the upper extremities, especially her hands. Bowel and bladder function have not been adversely affected, but she has some chronic neck and back pain issues, these were not worsened, there is no perianal or perigenital numbness sensations.    More recently she began to have aching sensations and cramps both in the proximal as well as distal lower extremities. These tended to occur at nighttime while she was asleep, especially in the early morning hours, but also these can be brought on the longer she was walking. At times the legs would feel as if they are going to buckle underneath her when the pain was most severe. She describes a band of pain around the midriff which could radiate distally into both lower extremities. This also could drop her to her knees if she was not cautious. This occurred spontaneously, independent of degrees of activity, position, movements, etc. Between these muscle complaints, she denies tenderness of the muscles, joint swelling,  etc.    Throughout this time, she denies any issues of cognitive impairment, cranial nerve symptomatology, or upper extremity symptoms.    Neurophysiologic studies had been done prior to the symptoms beginning, though for other reasons. She was told that these were unremarkable. They have never been repeated. Imaging of the brain in 2013 revealed incidental Arnold-Chiari type I malformation only, imaging of the orbits later that year revealed the same without evidence of inflammation in the retro-orbital location. Lumbar spine MRI scan was done at 2013 and then repeated in 2016, revealing only degenerative changes. She has not been treated symptomatically.    She has a long and rather convoluted history of what is described as a immunodeficiency syndrome. She brought with her quite a record of her medical history, treatments offered, etc. Unfortunately, there was no mention of any blood test results as having been drawn, level alone what their values are. She's been treated with IVIG and also a newer immune modulator, this has helped cruz off a lot of the infections that she is susceptible to, but has had no effect one way or the other with the symptoms. She was diagnosed as suffering from Lyme disease dating back to , she has never had CSF studies done to see if there was penetration into the central nervous system, but she has been on antibiotics, etc. as well to keep this at bay.    Medical history is remarkable for dyslipidemia, COPD, and chronic fatigue, otherwise negative for malignancy, thyroid disease, diabetes, hypertension, PVD, MS, neurodegenerative disease, fibromyalgia, migraine headache, seizures, blood dyscrasia, liver or kidney disease, psychiatric disease, pulmonary disease, CVA, or CAD. There is no surgical history of note. Her father had cancer, her mother  at 47 years of age, her one sister is alive and well. She has no children. She is not aware of anyone in the family tree with a  "history of similar symptoms or neurodegenerative disease. She still smokes about one quarter pack per day, does not drink alcohol. Because of the immunodeficiency syndrome, she has basically had to lead a relatively isolated lifestyle, rarely venturing outside.    Review of Systems   Constitutional: Positive for malaise/fatigue.   Respiratory: Positive for shortness of breath.    Musculoskeletal: Positive for myalgias and neck pain. Negative for joint pain.   Neurological: Positive for tingling and sensory change. Negative for dizziness, tremors, speech change and focal weakness.   Psychiatric/Behavioral: Negative for memory loss.        Objective:     /58   Pulse (!) 112   Temp 37.2 °C (99 °F)   Resp 17   Ht 1.613 m (5' 3.5\")   Wt 49.2 kg (108 lb 9.2 oz)   SpO2 90%   BMI 18.93 kg/m²      Physical Exam    She appears in no acute distress. Her vital signs are stable, though she has a mild sinus tachycardia at 112. She is on chronic oxygen supplementation. There is no malar rash, to claudication, or allodynia. The neck is supple, range of motion is full, carotid pulses are present without asymmetry. Cardiac evaluation is unremarkable. There is no lower extremity edema, straight leg raising is negative bilaterally. There is some mild tenderness over the dorsum of the right foot only, to light touch. There are no significant changes in skin texture, color or turgor in the lower extremities, distal pulses are intact bilaterally.    Cognition is intact, fully oriented, there is no evidence of aphasia, apraxia, or inattention.    PERRLA/EOMI, visual fields are full to finger counting, facial movements are symmetric, sensory exams intact pinprick, temperature and light touch both sides of the face, the tongue and uvular midline, jaw jerk is absent, shoulder shrug and head rotation are intact.    Muscle skeletal exam reveals normal tone throughout, there is no evidence of significant focal atrophy in any " extremities. There are no fasciculations seen. Strength is actually intact throughout, and all 4 extremities, reflexes are present at all points including the ankles bilaterally, there are no asymmetries, both toes are downgoing.    She stands slowly but can do so independently. She does not need to look at the ground when she walks, station is normal, arm swing is symmetric. Repetitive movements with the feet and hands are intact and symmetric with normal amplitude and frequencies. There is no appendicular dystaxia.    Sensory exam is intact to vibration, temperature, pinprick and joint position sense in all 4 extremities. Romberg is absent.     Assessment/Plan:     1. Paresthesias  Her symptoms are quite significant, seeming almost out of proportion in degree to the complete absence of objective deficit on exam. Still, a small fiber sensory polyneuropathy could be entertained as a possible cause. She lacks any history of the typical risk factors for this type of small fiber neuropathy, if it exists. I am also not sure about what an Immunodeficiency Syndrome actually means, certainly have little knowledge as to whether or not there are neurologic manifestations. The treatments she is receiving for her IDS are not known to cause a peripheral neuropathy, Lyme disease certainly could, though it is not typically a symmetric sensory polyneuropathy. In any case, neurophysiologic studies do need to be repeated. Structural pathologies involving the entire spinal axis and even the brain have already been ruled out with unremarkable imaging throughout this time. Depending on neurophysiologic study results, additional diagnostic might need to be considered, though I would like to avoid additional blood work being drawn if it has already been done since her symptoms began.    Symptomatic relief can be offered, she is very uncomfortable given an apparent, extreme sensitivity to medications and their side effects, but she is  willing to try Neurontin 100 mg, 3 times a day, for 2 weeks, and then if tolerated, further dose adjustments upwards can be made depending on need. Side effects were reviewed. She was told she can easily stop the drug if it were poorly tolerated.    Face-to-face time was spent reviewing all the above. She was told that a clear answer needs to be obtained as to what is causing her symptoms prior to any type of prognostication being done. This condition has been around for quite some time, we does have some time to get to the right answer if anything can be found.    - REFERRAL TO NEURODIAGNOSTICS (EEG,EP,EMG/NCS/DBS) Modality Requested: EMG/NCS-Comment Extremities  - gabapentin (NEURONTIN) 100 MG Cap; Take 1 Cap by mouth 3 times a day. Increase to 2 tab tid in 2 weeks as directed  Dispense: 120 Cap; Refill: 2    Time: Evaluation of 60 minutes for exam, review, discussion, and education  Discussion: As mentioned in the assessment, over 60% of the time spent face-to-face counseling and coordinating care

## 2018-05-13 ENCOUNTER — APPOINTMENT (OUTPATIENT)
Dept: ONCOLOGY | Facility: MEDICAL CENTER | Age: 73
End: 2018-05-13
Attending: INTERNAL MEDICINE
Payer: MEDICARE

## 2018-05-17 ENCOUNTER — APPOINTMENT (OUTPATIENT)
Dept: ONCOLOGY | Facility: MEDICAL CENTER | Age: 73
End: 2018-05-17
Attending: INTERNAL MEDICINE
Payer: MEDICARE

## 2018-05-20 ENCOUNTER — OUTPATIENT INFUSION SERVICES (OUTPATIENT)
Dept: ONCOLOGY | Facility: MEDICAL CENTER | Age: 73
End: 2018-05-20
Attending: INTERNAL MEDICINE
Payer: MEDICARE

## 2018-05-20 VITALS
WEIGHT: 109.57 LBS | OXYGEN SATURATION: 98 % | TEMPERATURE: 98.5 F | HEART RATE: 109 BPM | SYSTOLIC BLOOD PRESSURE: 113 MMHG | DIASTOLIC BLOOD PRESSURE: 64 MMHG | HEIGHT: 63 IN | BODY MASS INDEX: 19.41 KG/M2 | RESPIRATION RATE: 18 BRPM

## 2018-05-20 DIAGNOSIS — J44.9 CHRONIC OBSTRUCTIVE PULMONARY DISEASE, UNSPECIFIED COPD TYPE (HCC): ICD-10-CM

## 2018-05-20 PROCEDURE — 700105 HCHG RX REV CODE 258: Performed by: INTERNAL MEDICINE

## 2018-05-20 PROCEDURE — 96360 HYDRATION IV INFUSION INIT: CPT

## 2018-05-20 RX ORDER — SODIUM CHLORIDE 9 MG/ML
2000 INJECTION, SOLUTION INTRAVENOUS ONCE
Status: COMPLETED | OUTPATIENT
Start: 2018-05-20 | End: 2018-05-20

## 2018-05-20 RX ADMIN — SODIUM CHLORIDE 500 ML: 9 INJECTION, SOLUTION INTRAVENOUS at 14:55

## 2018-05-20 ASSESSMENT — PAIN SCALES - GENERAL: PAINLEVEL_OUTOF10: 4

## 2018-05-20 NOTE — PROGRESS NOTES
Pt presented to infusion center for hydration. Denies any acute complaints, typical chronic issues. PIV started, brisk blood return observed. 500 ml NS infused at 400 ml/hr per pt request without issue. PIV flushed and removed, gauze drsg placed. Has next appt, left on foot in good spirits.

## 2018-05-21 RX ORDER — DILTIAZEM HYDROCHLORIDE 60 MG/1
TABLET, FILM COATED ORAL
Qty: 1 INHALER | Refills: 5 | Status: SHIPPED | OUTPATIENT
Start: 2018-05-21 | End: 2018-05-25

## 2018-05-21 NOTE — TELEPHONE ENCOUNTER
Caller Name: Deb Vega                 Call Back Number: [phone] (home)         Patient approves a detailed voicemail message: N/A    Have we ever prescribed this med? Yes.  If yes, what date? 11/17/16    Last OV: 12/15/17 [facility]     Next OV: 5/25/18 [facility] Ho     DX: COPD    Medications:  Current Outpatient Prescriptions   Medication Sig Dispense Refill   • gabapentin (NEURONTIN) 100 MG Cap Take 1 Cap by mouth 3 times a day. Increase to 2 tab tid in 2 weeks as directed 120 Cap 2   • desloratadine (CLARINEX) 5 MG tablet Take 1 Tab by mouth every day. 90 Tab 3   • NON SPECIFIED (hydration orders)  please infuse 2 L NS per week. Infuse over one hour QT: 2 L REFILL: 56 fax [phone] 2 Each 56   • clonazepam (KLONOPIN) 0.5 MG Tab TAKE ONE TABLET BY MOUTH EVERY NIGHT AT BEDTIME 90 Tab 1   • alprazolam (XANAX) 0.25 MG Tab Take 1 Tab by mouth 2 times a day as needed for Anxiety. 180 Tab 1   • azelastine (ASTELIN) 137 MCG/SPRAY nasal spray Cecilia 1 Spray in nose 2 times a day. 30 mL 11   • XOPENEX HFA 45 MCG/ACT inhaler INHALE ONE TO TWO PUFFS BY MOUTH EVERY 4 HOURS AS NEEDED FOR SHORTNESS OF BREATH 15 g 11   • montelukast (SINGULAIR) 10 MG Tab TAKE 1 TABLET BY MOUTH EVERY DAY 90 Tab 3   • pravastatin (PRAVACHOL) 40 MG tablet TAKE 1 TABLET BY MOUTH EVERY DAY 90 Tab 3   • SPIRIVA HANDIHALER 18 MCG Cap INHALE 1 CAPSULE BY MOUTH AT THE SAME TIME EVERY DAY 90 Cap 3   • vitamin D (CHOLECALCIFEROL) 1000 UNIT Tab Take 1,000 Units by mouth every day.     • budesonide-formoterol (SYMBICORT) 80-4.5 MCG/ACT Aerosol Inhale 2 Puffs by mouth 2 Times a Day. Use spacer. Rinse mouth after each use. 1 Inhaler 5   • levalbuterol (XOPENEX) 1.25 MG/3ML Nebu Soln 3 mL by Nebulization route every four hours as needed for Shortness of Breath. 24 mL 11   • acetaminophen (TYLENOL) 325 MG Tab Take 650 mg by mouth every four hours as needed.     • immune globulin (FLEBOGAMMA) 10 GM/100ML Solution 5 g by Intravenous route  Once. Indications: Pt now on 5 grams     • Oral Electrolytes (EMERGEN-C ELECTRO MIX PO) Take  by mouth every day.     • docosahexanoic acid (OMEGA 3 FA) 1000 MG CAPS Take 1,000 mg by mouth 2 Times a Day.     • aspirin (ASA) 81 MG CHEW Take 162 mg by mouth every day.     • MAGNESIUM GLYCINATE Take 480 mg by mouth.       No current facility-administered medications for this visit.

## 2018-05-22 ENCOUNTER — HOSPITAL ENCOUNTER (OUTPATIENT)
Dept: RADIOLOGY | Facility: MEDICAL CENTER | Age: 73
End: 2018-05-22
Attending: NURSE PRACTITIONER
Payer: MEDICARE

## 2018-05-22 DIAGNOSIS — R93.89 ABNORMAL CAT SCAN: ICD-10-CM

## 2018-05-22 PROCEDURE — 71250 CT THORAX DX C-: CPT

## 2018-05-23 ENCOUNTER — TELEPHONE (OUTPATIENT)
Dept: MEDICAL GROUP | Facility: CLINIC | Age: 73
End: 2018-05-23

## 2018-05-23 DIAGNOSIS — F13.20 BENZODIAZEPINE DEPENDENCE (HCC): ICD-10-CM

## 2018-05-23 DIAGNOSIS — F51.01 PRIMARY INSOMNIA: ICD-10-CM

## 2018-05-23 RX ORDER — CLONAZEPAM 0.5 MG/1
TABLET ORAL
Qty: 10 TAB | Refills: 0 | Status: SHIPPED
Start: 2018-05-23 | End: 2018-06-02

## 2018-05-23 NOTE — TELEPHONE ENCOUNTER
1. Caller Name: Deb                                         Call Back Number: [phone]      Patient approves a detailed voicemail message: N\A    Deb is calling in regards to her Clonazepam Rx.  Her appt is not until 5/29 and she will be out of Clonazepam.  She is asking for a small amount to get her through to her appt.

## 2018-05-25 ENCOUNTER — OFFICE VISIT (OUTPATIENT)
Dept: PULMONOLOGY | Facility: HOSPICE | Age: 73
End: 2018-05-25
Payer: MEDICARE

## 2018-05-25 VITALS
RESPIRATION RATE: 16 BRPM | SYSTOLIC BLOOD PRESSURE: 120 MMHG | WEIGHT: 108 LBS | HEIGHT: 63 IN | TEMPERATURE: 97.5 F | BODY MASS INDEX: 19.14 KG/M2 | DIASTOLIC BLOOD PRESSURE: 72 MMHG

## 2018-05-25 DIAGNOSIS — F17.200 SMOKING: Chronic | ICD-10-CM

## 2018-05-25 DIAGNOSIS — Z99.81 SUPPLEMENTAL OXYGEN DEPENDENT: ICD-10-CM

## 2018-05-25 DIAGNOSIS — J43.9 PULMONARY EMPHYSEMA, UNSPECIFIED EMPHYSEMA TYPE (HCC): ICD-10-CM

## 2018-05-25 DIAGNOSIS — G93.32 CHRONIC FATIGUE SYNDROME: ICD-10-CM

## 2018-05-25 DIAGNOSIS — D84.9 IMMUNOLOGIC DEFICIENCY SYNDROME (HCC): ICD-10-CM

## 2018-05-25 DIAGNOSIS — R91.8 ABNORMAL CT SCAN OF LUNG: ICD-10-CM

## 2018-05-25 PROCEDURE — 99214 OFFICE O/P EST MOD 30 MIN: CPT | Performed by: NURSE PRACTITIONER

## 2018-05-25 RX ORDER — BUDESONIDE AND FORMOTEROL FUMARATE DIHYDRATE 160; 4.5 UG/1; UG/1
2 AEROSOL RESPIRATORY (INHALATION) 2 TIMES DAILY
Qty: 1 INHALER | Refills: 5 | Status: SHIPPED | OUTPATIENT
Start: 2018-05-25 | End: 2018-12-03 | Stop reason: SDUPTHER

## 2018-05-25 RX ORDER — BUDESONIDE AND FORMOTEROL FUMARATE DIHYDRATE 160; 4.5 UG/1; UG/1
2 AEROSOL RESPIRATORY (INHALATION) 2 TIMES DAILY
Qty: 1 INHALER | Refills: 0 | Status: SHIPPED | OUTPATIENT
Start: 2018-05-25 | End: 2018-05-25 | Stop reason: SDUPTHER

## 2018-05-25 NOTE — PROGRESS NOTES
CC:  Here for f/u pulmonary issues as listed below    HPI:     Deb Vega is a 71 y.o. year old female here today for follow-up on COPD and abnormal CT scan. Past medical history chronic fatigue syndrome being followed by Dr. Ion Gutierrez at [facility], [place], operated by [facility]. 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 Also reports normal saline infusions on a regular basis due to immunodeficiency.     Alpha 1 negative. PFTs from 2/2017 FEV1 0.90 L or 42% predicted, FEV1/FVC ratio 40, % predicted and a DLCO of 52% predicted.  Patient has a history of smoking for the last 55 years. She continues to smoke 0-4 cigarettes a day. She is tried medications in the past with no benefit. She has been through [facility] smoking cessation program. She has difficulty completely stopping due to family member at home who continues to smoke daily.  She plans to establish with a therapist in the next few weeks with life stressors and to help with smoking cessation.    CT scan 11/23/2016 indicated moderate diffuse centrilobular emphysematous changes. A 2.0 x 0.6 cm opacity in the medial right lower lobe. No adenopathy noted. PET scan was performed 11/23/2016 indicating no metabolic activity. Repeat CT 6/2/2017 indicates no change in 6.8 mm x 18.0 mm ill-defined opacity medial right lower lobe, markedly hyperexpanded emphysematous lungs and apical scarring. No pleural effusion. Updated Cat scan completed 11/17/2017: noting 18 x 6.8 mm focal opacification is again noted in the right lung base just above the right hemidiaphragm. Focal area of scarring or discoid atelectasis is most likely. Neoplasm such as adenocarcinoma in situ is in the differential diagnosis. Extensive emphysema again noted. Updated cat scn from 5/22/2018 is stable.      She is compliant Symbicort 80/4.5 µg 2 puffs twice a day, Spiriva once daily. Her insurance recently stopped covering Spiriva, however patient greatly benefits from the medication. She uses  her Xopenex HFA inhaler rarely. She does not find nebulizer subjective beneficial. She uses oxygen 2L 24/7. She started using Ponaris due to chronic nasal dryness which has been very beneficial. She has postnasal drip and having difficulty coughing up phlegm and feels it is sitting in the top of her chest.  She uses Baxter [place] nasal spray and then tries to blow her nose very hard and is unable to get any mucus up and then plucks her ears.    She reports her dyspnea is worse, especially in the morning when she is waking. She admits to being quite sedentary. She reports any type of exercise exacerbates her immune deficiency fatigue.  Reviewed the importance of regular exercise.  However when she does increase her activity she is unable to go to bed until 2 or 3 AM because she has much more energy. She reports occasional wheeze and chest tightness. She notes seasonal allergies with sinus congestion. She denies fever, chills, night sweats, hemoptysis, chest pain.      She underwent sleep testing due to chronic fatigue which did not indicate sleep disordered breathing, but nocturnal hypoxemia only and PLMS index 45.0. She does complain of cramping in lower extremities that her primary is overseeing.            Patient Active Problem List    Diagnosis Date Noted   • Chronic use of benzodiazepine for therapeutic purpose 09/04/2017   • Benzodiazepine dependence (HCC) 08/29/2017   • Nocturnal hypoxemia 06/06/2017   • Supplemental oxygen dependent 02/07/2017   • Abnormal CT scan of lung 02/07/2017   • Tachycardia 07/15/2015   • Hypotension 07/02/2015   • COPD (chronic obstructive pulmonary disease) ([facility]) 03/01/2013   • Lyme disease 03/01/2013   • Immunologic deficiency syndrome (HCC) 03/01/2013   • Chronic fatigue syndrome 12/07/2012   • Hyperlipidemia with target LDL less than 100 06/06/2012   • Smoking 06/06/2012   • Anxiety 06/06/2012       Past Medical History:   Diagnosis Date   • Allergy    • Anxiety    • ASTHMA    •  Back pain    • Bladder infection    • Bronchitis    • Cellulitis     (L) elbow    • Chronic use of benzodiazepine for therapeutic purpose 9/4/2017   • COPD    • COPD (chronic obstructive pulmonary disease) ([facility]) 3/1/2013   • H/O blood transfusion reaction    • History of measles    • History of mumps    • Hives    • Hyperlipidemia    • Immunologic deficiency syndrome (HCC) 3/1/2013   • Lyme disease 3/1/2013   • Mononucleosis    • Pneumonia    • Tachycardia 7/15/2015   • Tachycardia 7/15/2015       Past Surgical History:   Procedure Laterality Date   • BREAST BIOPSY     • THYROID LOBECTOMY     • TONSILLECTOMY         Family History   Problem Relation Age of Onset   • Alcohol/Drug Mother    • Cancer Father    • Stroke Father    • Heart Disease Father 42       Social History   Substance Use Topics   • Smoking status: Current Some Day Smoker     Packs/day: 0.25     Years: 55.00     Types: Cigarettes   • Smokeless tobacco: Never Used      Comment: patient smokes 0 to 4 cigarettes a day    • Alcohol use No      Comment: patient states she has not had a drink in 2 years 10/09/2015       Current Outpatient Prescriptions   Medication Sig Dispense Refill   • budesonide-formoterol (SYMBICORT) 160-4.5 MCG/ACT Aerosol Inhale 2 Puffs by mouth 2 Times a Day. Use spacer. Rinse mouth after each use. 1 Inhaler 5   • clonazePAM (KLONOPIN) 0.5 MG Tab TAKE ONE TABLET BY MOUTH EVERY NIGHT AT BEDTIME 10 Tab 0   • desloratadine (CLARINEX) 5 MG tablet Take 1 Tab by mouth every day. 90 Tab 3   • alprazolam (XANAX) 0.25 MG Tab Take 1 Tab by mouth 2 times a day as needed for Anxiety. (Patient taking differently: Take 0.25 mg by mouth 1 time daily as needed for Anxiety.) 180 Tab 1   • azelastine (ASTELIN) 137 MCG/SPRAY nasal spray [place] 1 Spray in nose 2 times a day. 30 mL 11   • XOPENEX HFA 45 MCG/ACT inhaler INHALE ONE TO TWO PUFFS BY MOUTH EVERY 4 HOURS AS NEEDED FOR SHORTNESS OF BREATH 15 g 11   • montelukast (SINGULAIR) 10 MG Tab TAKE 1  "TABLET BY MOUTH EVERY DAY 90 Tab 3   • pravastatin (PRAVACHOL) 40 MG tablet TAKE 1 TABLET BY MOUTH EVERY DAY 90 Tab 3   • SPIRIVA HANDIHALER 18 MCG Cap INHALE 1 CAPSULE BY MOUTH AT THE SAME TIME EVERY DAY 90 Cap 3   • vitamin D (CHOLECALCIFEROL) 1000 UNIT Tab Take 1,000 Units by mouth every day.     • immune globulin (FLEBOGAMMA) 10 GM/100ML Solution 5 g by Intravenous route Once. Indications: Pt now on 5 grams     • Oral Electrolytes (EMERGEN-C ELECTRO MIX PO) Take  by mouth every day.     • docosahexanoic acid (OMEGA 3 FA) 1000 MG CAPS Take 1,000 mg by mouth 2 Times a Day.     • aspirin (ASA) 81 MG CHEW Take 162 mg by mouth every day.     • MAGNESIUM GLYCINATE Take 480 mg by mouth.     • gabapentin (NEURONTIN) 100 MG Cap Take 1 Cap by mouth 3 times a day. Increase to 2 tab tid in 2 weeks as directed 120 Cap 2   • NON SPECIFIED (hydration orders)  please infuse 2 L NS per week. Infuse over one hour QT: 2 L REFILL: 56 fax [phone] 2 Each 56   • levalbuterol (XOPENEX) 1.25 MG/3ML Nebu Soln 3 mL by Nebulization route every four hours as needed for Shortness of Breath. 24 mL 11   • acetaminophen (TYLENOL) 325 MG Tab Take 650 mg by mouth every four hours as needed.       No current facility-administered medications for this visit.           Allergies: Gadolinium; Influenza virus vaccine; Zyvox [linezolid]; Cefuroxime; Epinephrine; Other drug; Other misc; Prednisone; and Sulfa drugs          ROS   Gen: Denies fever, chills, unintentional weight loss, fatigue, night sweats  E/N/T: Denies ear pain  Resp:Denies sputum production, hemoptysis  CV: Denies chest pain, chest tightness, palpitations  Sleep:Denies morning headache  Neuro: Denies frequent headaches, weakness, dizziness  GI: Denies acid reflux, N/V  See HPI.  All other systems reviewed and negative          Vital signs for this encounter:  Vitals:    05/25/18 1433   Height: 1.59 m (5' 2.6\")   Weight: 49 kg (108 lb)   Weight % change since last entry.: 0 %   BP: " 120/72   BMI (Calculated): 19.38   Resp: 16   Temp: 36.4 °C (97.5 °F)   O2 Flow Rate (L/min): 2                 Physical Exam:   Appearance: well developed, well nourished, no acute distress.   Eyes: PERRL, EOM intact, sclere white, conjunctiva moist.  Ears: no lesions or deformities.  Hearing: grossly intact.  Nose: no lesions or deformities.  Dentition: good dentition.   Oropharynx: tongue normal, posterior pharynx without erythema or exudate.  Neck: supple, trachea midline, no masses.  Respiratory effort: no intercostal retractions or use of accessory muscles.  Lung auscultation: poor air movement  Heart auscultation: no murmur, rub, or gallop   Extremities: no cyanosis or edema.  Abdomen: soft, non-tender, no masses.  Gait and station: without difficulty   Digits and Nails: no clubbing, cyanosis, petechiae, or nodes.  Cranial nerves: grossly normal.  Motor: no focal deficits observed.   Skin: no rashes, lesions, or ulcers noted.  Orientation: oriented to time, place, and person.  Mood and affect: mood and affect appropriate, normal interaction with examiner.      Assessment   1. Pulmonary emphysema, unspecified emphysema type (HCC)     2. Chronic fatigue syndrome     3. Abnormal CT scan of lung     4. Immunologic deficiency syndrome (HCC)     5. Smoking     6. Supplemental oxygen dependent         Patient was seen for 35 minutes, more than 50% of time spent in face to face review, counseling, and arranging future evaluation and follow up of medical conditions and care relating to Severe emphysema, progression of lung disease, review of reference of acapella devices, realistic use of medication. Patient is clinically declined and will have the following changes per plan.     PLAN:   Patient Instructions   1) Continue oxygen at 24/7 at 2L  2) Continue Symbicort and increase 160/4.5, given chamber, Spiriva, rescue inhaler . Given chamber   3) Encourage light conditioning   4) Add Mucinex found OTC and flutter valve  to help with mucus clearance  5) Continue IgG infusion every other week  6) Vaccines: Allergic to vaccines  7) Smoking cessation discussed  8) Add sinus rinse - would greatly benefit post nasal drip  9) Update cat scan May 2019  5) Return in about 3 months (around 8/25/2018) for Compliance, if not sooner, review of symptoms.

## 2018-05-25 NOTE — PATIENT INSTRUCTIONS
1) Continue oxygen at 24/7 at 2L  2) Continue Symbicort 160/4.5, given chamber, Spiriva, rescue inhaler   3) Encourage light conditioning   4) Add Mucinex found OTC and flutter valve to help with mucus clearance  5) Continue IgG infusion every other week  6) Vaccines: Allergic to vaccines  7) Smoking cessation discussed  8) Add sinus rinse   9) Update cat scan May 2019  5) Return in about 3 months (around 8/25/2018) for Compliance, if not sooner, review of symptoms.

## 2018-05-27 ENCOUNTER — OUTPATIENT INFUSION SERVICES (OUTPATIENT)
Dept: ONCOLOGY | Facility: MEDICAL CENTER | Age: 73
End: 2018-05-27
Attending: INTERNAL MEDICINE
Payer: MEDICARE

## 2018-05-27 VITALS
BODY MASS INDEX: 19.1 KG/M2 | SYSTOLIC BLOOD PRESSURE: 104 MMHG | DIASTOLIC BLOOD PRESSURE: 58 MMHG | TEMPERATURE: 99 F | HEIGHT: 63 IN | RESPIRATION RATE: 20 BRPM | HEART RATE: 95 BPM | OXYGEN SATURATION: 96 % | WEIGHT: 107.81 LBS

## 2018-05-27 PROCEDURE — 700105 HCHG RX REV CODE 258: Performed by: INTERNAL MEDICINE

## 2018-05-27 PROCEDURE — 96360 HYDRATION IV INFUSION INIT: CPT

## 2018-05-27 RX ORDER — SODIUM CHLORIDE 9 MG/ML
1000 INJECTION, SOLUTION INTRAVENOUS ONCE
Status: COMPLETED | OUTPATIENT
Start: 2018-05-27 | End: 2018-05-27

## 2018-05-27 RX ADMIN — SODIUM CHLORIDE 1000 ML: 9 INJECTION, SOLUTION INTRAVENOUS at 14:15

## 2018-05-27 ASSESSMENT — PAIN SCALES - GENERAL: PAINLEVEL_OUTOF10: 5

## 2018-05-28 NOTE — PROGRESS NOTES
Patient arrived ambulatory to the [facility] for IV hydration. Reviewed vital signs, labs, and physician order. Patient denies S&S of infection. IV access established in left AC, visualized brisk blood return. NS 500cc's infused at a rate of 400ml/hr per pt request. IV flushed per protocol, catheter removed with tip intact, gauze and coban dressing placed. Confirmed upcoming appointment date and time with patient. Patient left the [facility] ambulatory in no sign of distress.

## 2018-05-29 ENCOUNTER — OFFICE VISIT (OUTPATIENT)
Dept: MEDICAL GROUP | Facility: CLINIC | Age: 73
End: 2018-05-29
Payer: MEDICARE

## 2018-05-29 VITALS
WEIGHT: 108 LBS | BODY MASS INDEX: 19.14 KG/M2 | HEIGHT: 63 IN | OXYGEN SATURATION: 96 % | SYSTOLIC BLOOD PRESSURE: 110 MMHG | HEART RATE: 106 BPM | RESPIRATION RATE: 14 BRPM | TEMPERATURE: 98.2 F | DIASTOLIC BLOOD PRESSURE: 76 MMHG

## 2018-05-29 DIAGNOSIS — E78.5 HYPERLIPIDEMIA WITH TARGET LDL LESS THAN 100: Chronic | ICD-10-CM

## 2018-05-29 DIAGNOSIS — A69.20 LYME DISEASE: ICD-10-CM

## 2018-05-29 DIAGNOSIS — G93.32 CHRONIC FATIGUE SYNDROME: ICD-10-CM

## 2018-05-29 DIAGNOSIS — D84.9 IMMUNOLOGIC DEFICIENCY SYNDROME (HCC): ICD-10-CM

## 2018-05-29 DIAGNOSIS — Z79.899 CHRONIC USE OF BENZODIAZEPINE FOR THERAPEUTIC PURPOSE: ICD-10-CM

## 2018-05-29 DIAGNOSIS — F13.20 BENZODIAZEPINE DEPENDENCE (HCC): ICD-10-CM

## 2018-05-29 DIAGNOSIS — F41.9 ANXIETY: ICD-10-CM

## 2018-05-29 DIAGNOSIS — F17.200 SMOKING: Chronic | ICD-10-CM

## 2018-05-29 PROCEDURE — 99214 OFFICE O/P EST MOD 30 MIN: CPT | Performed by: INTERNAL MEDICINE

## 2018-05-29 RX ORDER — CLONAZEPAM 0.5 MG/1
0.5 TABLET ORAL
Qty: 90 TAB | Refills: 1 | Status: SHIPPED | OUTPATIENT
Start: 2018-05-29 | End: 2018-11-25

## 2018-05-29 RX ORDER — ALPRAZOLAM 0.25 MG/1
0.25 TABLET ORAL 2 TIMES DAILY PRN
Qty: 180 TAB | Refills: 1 | Status: SHIPPED | OUTPATIENT
Start: 2018-05-29 | End: 2018-11-25

## 2018-05-29 ASSESSMENT — PATIENT HEALTH QUESTIONNAIRE - PHQ9
SUM OF ALL RESPONSES TO PHQ QUESTIONS 1-9: 21
5. POOR APPETITE OR OVEREATING: 2 - MORE THAN HALF THE DAYS
CLINICAL INTERPRETATION OF PHQ2 SCORE: 4

## 2018-05-29 ASSESSMENT — ENCOUNTER SYMPTOMS: GENERAL WELL-BEING: POOR

## 2018-05-29 ASSESSMENT — ACTIVITIES OF DAILY LIVING (ADL): BATHING_REQUIRES_ASSISTANCE: 0

## 2018-05-29 ASSESSMENT — PAIN SCALES - GENERAL: PAINLEVEL: 5=MODERATE PAIN

## 2018-05-29 NOTE — PROGRESS NOTES
Chief Complaint   Patient presents with   • Annual Wellness Visit         HPI:  Deb is a 72 y.o. here for Medicare Annual Wellness Visit         Patient Active Problem List    Diagnosis Date Noted   • Chronic use of benzodiazepine for therapeutic purpose 09/04/2017   • Benzodiazepine dependence ([facility]) 08/29/2017   • Nocturnal hypoxemia 06/06/2017   • Supplemental oxygen dependent 02/07/2017   • Abnormal CT scan of lung 02/07/2017   • Tachycardia 07/15/2015   • Hypotension 07/02/2015   • COPD (chronic obstructive pulmonary disease) ([facility]) 03/01/2013   • Lyme disease 03/01/2013   • Immunologic deficiency syndrome ([facility]) 03/01/2013   • Chronic fatigue syndrome 12/07/2012   • Hyperlipidemia with target LDL less than 100 06/06/2012   • Smoking 06/06/2012   • Anxiety 06/06/2012       Current Outpatient Prescriptions   Medication Sig Dispense Refill   • clonazePAM (KLONOPIN) 0.5 MG Tab TAKE ONE TABLET BY MOUTH EVERY NIGHT AT BEDTIME 10 Tab 0   • desloratadine (CLARINEX) 5 MG tablet Take 1 Tab by mouth every day. 90 Tab 3   • NON SPECIFIED (hydration orders)  please infuse 2 L NS per week. Infuse over one hour QT: 2 L REFILL: 56 fax [phone] 2 Each 56   • XOPENEX HFA 45 MCG/ACT inhaler INHALE ONE TO TWO PUFFS BY MOUTH EVERY 4 HOURS AS NEEDED FOR SHORTNESS OF BREATH 15 g 11   • montelukast (SINGULAIR) 10 MG Tab TAKE 1 TABLET BY MOUTH EVERY DAY 90 Tab 3   • pravastatin (PRAVACHOL) 40 MG tablet TAKE 1 TABLET BY MOUTH EVERY DAY 90 Tab 3   • SPIRIVA HANDIHALER 18 MCG Cap INHALE 1 CAPSULE BY MOUTH AT THE SAME TIME EVERY DAY 90 Cap 3   • Oral Electrolytes (EMERGEN-C ELECTRO MIX PO) Take  by mouth every day.     • docosahexanoic acid (OMEGA 3 FA) 1000 MG CAPS Take 1,000 mg by mouth 2 Times a Day.     • aspirin (ASA) 81 MG CHEW Take 162 mg by mouth every day.     • MAGNESIUM GLYCINATE Take 480 mg by mouth.     • budesonide-formoterol (SYMBICORT) 160-4.5 MCG/ACT Aerosol Inhale 2 Puffs by mouth 2 Times a Day. Use spacer. Rinse  mouth after each use. 1 Inhaler 5   • gabapentin (NEURONTIN) 100 MG Cap Take 1 Cap by mouth 3 times a day. Increase to 2 tab tid in 2 weeks as directed 120 Cap 2   • alprazolam (XANAX) 0.25 MG Tab Take 1 Tab by mouth 2 times a day as needed for Anxiety. (Patient taking differently: Take 0.25 mg by mouth 1 time daily as needed for Anxiety.) 180 Tab 1   • azelastine (ASTELIN) 137 MCG/SPRAY nasal spray [place] 1 Spray in nose 2 times a day. 30 mL 11   • vitamin D (CHOLECALCIFEROL) 1000 UNIT Tab Take 1,000 Units by mouth every day.     • levalbuterol (XOPENEX) 1.25 MG/3ML Nebu Soln 3 mL by Nebulization route every four hours as needed for Shortness of Breath. 24 mL 11   • acetaminophen (TYLENOL) 325 MG Tab Take 650 mg by mouth every four hours as needed.     • immune globulin (FLEBOGAMMA) 10 GM/100ML Solution 5 g by Intravenous route Once. Indications: Pt now on 5 grams       No current facility-administered medications for this visit.         Patient is taking medications as noted in medication list.  Current supplements as per medication list.     Allergies: Gadolinium; Influenza virus vaccine; Zyvox [linezolid]; Cefuroxime; Epinephrine; Other drug; Other misc; Prednisone; and Sulfa drugs    Current social contact/activities: spends time with caregiver       Is patient current with immunizations? No, due for PREVNAR (PCV13)  and TDAP. Patient is interested in receiving NONE today.    She  reports that she has been smoking Cigarettes.  She has a 13.75 pack-year smoking history. She has never used smokeless tobacco. She reports that she does not drink alcohol or use drugs.  Ready to quit: Not Answered  Counseling given: Not Answered        DPA/Advanced directive: Patient has Advanced Directive on file.     ROS:    Gait: Uses a wheelchair    Ostomy: no    Other tubes: no    Amputations: no    Chronic oxygen use yes    Last eye exam 2016    Wears hearing aids: no    : Denies any urinary leakage during the last 6 months        Screening:    COPD    1.  Is patient under the care of a pulmonologist? Yes, CareTeam was updated.  2.  Has patient ever completed a PFT or spirometry? Yes, on 02- 08 - 2017.  3.  Is patient on oxygen or CPAP? Yes, CareTeam was updated.  4.  Has patient ever had instruction on inhaler technique by health care professional? Yes  5.  Is patient interested in a referral to respiratory therapy for more information on COPD, inhaler technique, and/or information on establishing an action plan?  Yes, and patient is interested in more. Referral pended.    Depression Screening    Little interest or pleasure in doing things?  1 - several days  Feeling down, depressed, or hopeless? 3 - nearly every day  Trouble falling or staying asleep, or sleeping too much?  3 - nearly every day  Feeling tired or having little energy?  2 - more than half the days  Poor appetite or overeating?  2 - more than half the days  Feeling bad about yourself - or that you are a failure or have let yourself or your family down? 3 - nearly every day  Trouble concentrating on things, such as reading the newspaper or watching television? 3 - nearly every day  Moving or speaking so slowly that other people could have noticed.  Or the opposite - being so fidgety or restless that you have been moving around a lot more than usual?  2 - more than half the days  Thoughts that you would be better off dead, or of hurting yourself?  2 - more than half the days  Patient Health Questionnaire Score: 21      If depressive symptoms identified deferred to follow up visit unless specifically addressed in assessment and plan.    Interpretation of PHQ-9 Total Score   Score Severity   1-4 No Depression   5-9 Mild Depression   10-14 Moderate Depression   15-19 Moderately Severe Depression   20-27 Severe Depression      Screening for Cognitive Impairment    Three Minute Recall (leader, season, table)  2/3    Draw clock face with all 12 numbers and set the hands to show 10  past 11.  No    If cognitive concerns identified, deferred for follow up unless specifically addressed in assessment and plan.    Fall Risk Assessment    Has the patient had two or more falls in the last year or any fall with injury in the last year?  No  If fall risk identified, deferred for follow up unless specifically addressed in assessment and plan.      Safety Assessment    Throw rugs on floor.  No  Handrails on all stairs.  Yes  Good lighting in all hallways.  Yes  Difficulty hearing.  No  Patient counseled about all safety risks that were identified.    Functional Assessment ADLs    Are there any barriers preventing you from cooking for yourself or meeting nutritional needs?  No.    Are there any barriers preventing you from driving safely or obtaining transportation?  No.    Are there any barriers preventing you from using a telephone or calling for help?  No.    Are there any barriers preventing you from shopping?  Yes. Caregiver does the shopping   Are there any barriers preventing you from taking care of your own finances?  No.    Are there any barriers preventing you from managing your medications?    No.    Are there any barriers preventing you from showering, bathing or dressing yourself?  No.    Are you currently engaging in any exercise or physical activity?  No.     What is your perception of your health?  Poor.    Health Maintenance Summary                URINE DRUG SCREEN Overdue 1945     IMM DTaP/Tdap/Td Vaccine Overdue 10/19/1964     PAP SMEAR Overdue 10/19/1966     IMM PNEUMOCOCCAL 65+ (ADULT) LOW/MEDIUM RISK SERIES Overdue 10/19/2010     Annual Wellness Visit Overdue 10/19/2017      Postponed 10/18/2016      Patient has more history with this topic...    PFT SCREENING-FEV1 AND FEV/FVC RATIO / SPIROMETRY SHOULD BE PERFORMED ANNUALLY Overdue 2/8/2018      Done 2/8/2017 AMB PULMONARY FUNCTION TEST/LAB     Patient has more history with this topic...    IMM INFLUENZA Postponed 12/15/2018  "Originally 9/1/2018. Patient Refused    MAMMOGRAM Next Due 6/26/2018      Done 6/26/2017 [id number]     Patient has more history with this topic...    COLONOSCOPY Next Due 7/23/2018      Done 7/23/2008 [facility]!    BONE DENSITY Next Due 2/5/2020      Done 2/5/2015 DS-BONE DENSITY STUDY (DEXA)          Patient Care Team:  Andi Mccullough D.O. as PCP - General (Internal Medicine)  Ion Gutierrez M.D. (Internal Medicine)  Infusion Therapy  Mya Bennett M.D. (Ophthalmology)  Jabari Knox D.O. (Neurology)  [facility] as Home Health Provider (DME Supplier)      Social History   Substance Use Topics   • Smoking status: Current Some Day Smoker     Packs/day: 0.25     Years: 55.00     Types: Cigarettes   • Smokeless tobacco: Never Used      Comment: patient smokes 0 to 2 cigarettes a day    • Alcohol use No      Comment: patient states she has not had a drink in 2 years 10/09/2015     Family History   Problem Relation Age of Onset   • Alcohol/Drug Mother    • Cancer Father    • Stroke Father    • Heart Disease Father 42     She  has a past medical history of Allergy; Anxiety; ASTHMA; Back pain; Bladder infection; Bronchitis; Cellulitis; Chronic use of benzodiazepine for therapeutic purpose (9/4/2017); COPD; COPD (chronic obstructive pulmonary disease) (HCC) (3/1/2013); H/O blood transfusion reaction; History of measles; History of mumps; Hives; Hyperlipidemia; Immunologic deficiency syndrome (HCC) (3/1/2013); Lyme disease (3/1/2013); Mononucleosis; Pneumonia; Tachycardia (7/15/2015); and Tachycardia (7/15/2015).   Past Surgical History:   Procedure Laterality Date   • BREAST BIOPSY     • THYROID LOBECTOMY     • TONSILLECTOMY           Exam:     Blood pressure 110/76, pulse (!) 106, temperature 36.8 °C (98.2 °F), resp. rate 14, height 1.6 m (5' 3\"), weight 49 kg (108 lb), SpO2 96 %. Body mass index is 19.13 kg/m².    Hearing satisfactory.    Dentition good  Alert, oriented in no " acute distress.  Eye contact is good, speech goal directed, affect calm       Assessment and Plan. The following treatment and monitoring plan is recommended:     1. Smoking  Patient and I have discussed that she continues to smoke, we discussed this is a serious addiction her case where she has documented COPD and is on oxygen.    2. Hyperlipidemia with target LDL less than 100  Ongoing    3. Chronic use of benzodiazepine for therapeutic purpose  Urine drug screen controlled substance treatment agreement signed state drug task force reviewed  - Controlled Substance Treatment Agreement  - [facility] PAIN MANAGEMENT SCREEN; Future    4. Benzodiazepine dependence (HCC)  As above  - Controlled Substance Treatment Agreement  - [facility] PAIN MANAGEMENT SCREEN; Future    5. Chronic fatigue syndrome  Not actively treated but followed at [facility].     6. Lyme disease  Stable    7. Immunologic deficiency syndrome (HCC)  No change in medical therapy patients receiving immunoglobulin therapy    8. Anxiety  Treated with clonazepam  - clonazePAM (KLONOPIN) 0.5 MG Tab; Take 1 Tab by mouth every bedtime for 180 days.  Dispense: 90 Tab; Refill: 1  - ALPRAZolam (XANAX) 0.25 MG Tab; Take 1 Tab by mouth 2 times a day as needed for Anxiety for up to 180 days.  Dispense: 180 Tab; Refill: 1      Services suggested: No services needed at this time  Health Care Screening recommendations as per orders if indicated.  Referrals offered: PT/OT/Nutrition counseling/Behavioral Health/Smoking cessation as per orders if indicated.    Discussion today about general wellness and lifestyle habits:    · Prevent falls and reduce trip hazards; Cautioned about securing or removing rugs.  · Have a working fire alarm and carbon monoxide detector;   · Engage in regular physical activity and social activities       Follow-up: No Follow-up on file.

## 2018-05-31 ENCOUNTER — APPOINTMENT (OUTPATIENT)
Dept: ONCOLOGY | Facility: MEDICAL CENTER | Age: 73
End: 2018-05-31
Attending: INTERNAL MEDICINE
Payer: MEDICARE

## 2018-06-03 ENCOUNTER — OUTPATIENT INFUSION SERVICES (OUTPATIENT)
Dept: ONCOLOGY | Facility: MEDICAL CENTER | Age: 73
End: 2018-06-03
Attending: INTERNAL MEDICINE
Payer: MEDICARE

## 2018-06-03 VITALS
RESPIRATION RATE: 20 BRPM | SYSTOLIC BLOOD PRESSURE: 103 MMHG | HEIGHT: 63 IN | HEART RATE: 112 BPM | TEMPERATURE: 99 F | OXYGEN SATURATION: 94 % | WEIGHT: 107.58 LBS | BODY MASS INDEX: 19.06 KG/M2 | DIASTOLIC BLOOD PRESSURE: 63 MMHG

## 2018-06-03 PROCEDURE — 700105 HCHG RX REV CODE 258: Performed by: INTERNAL MEDICINE

## 2018-06-03 PROCEDURE — 96360 HYDRATION IV INFUSION INIT: CPT

## 2018-06-03 RX ORDER — SODIUM CHLORIDE 9 MG/ML
1000 INJECTION, SOLUTION INTRAVENOUS ONCE
Status: COMPLETED | OUTPATIENT
Start: 2018-06-03 | End: 2018-06-03

## 2018-06-03 RX ADMIN — SODIUM CHLORIDE 500 ML: 9 INJECTION, SOLUTION INTRAVENOUS at 14:32

## 2018-06-03 ASSESSMENT — PAIN SCALES - GENERAL: PAINLEVEL_OUTOF10: 5

## 2018-06-03 NOTE — PROGRESS NOTES
Patient in for hydration therapy, no new concerns voiced at this time. 500 mL infused, via peripheral IV, at a rate of 400 mL/hr per patient request, and well tolerated. Appointment confirmed for 6/10/18 at 1330; discharged ambulatory in no apparent distress.

## 2018-06-07 ENCOUNTER — APPOINTMENT (OUTPATIENT)
Dept: ONCOLOGY | Facility: MEDICAL CENTER | Age: 73
End: 2018-06-07
Attending: INTERNAL MEDICINE
Payer: MEDICARE

## 2018-06-10 ENCOUNTER — APPOINTMENT (OUTPATIENT)
Dept: ONCOLOGY | Facility: MEDICAL CENTER | Age: 73
End: 2018-06-10
Attending: INTERNAL MEDICINE
Payer: MEDICARE

## 2018-06-14 ENCOUNTER — OUTPATIENT INFUSION SERVICES (OUTPATIENT)
Dept: ONCOLOGY | Facility: MEDICAL CENTER | Age: 73
End: 2018-06-14
Attending: INTERNAL MEDICINE
Payer: MEDICARE

## 2018-06-14 VITALS
DIASTOLIC BLOOD PRESSURE: 61 MMHG | WEIGHT: 107.58 LBS | BODY MASS INDEX: 19.06 KG/M2 | HEART RATE: 100 BPM | OXYGEN SATURATION: 96 % | SYSTOLIC BLOOD PRESSURE: 102 MMHG | TEMPERATURE: 99.3 F | HEIGHT: 63 IN

## 2018-06-14 DIAGNOSIS — G90.3 NEUROGENIC ORTHOSTATIC HYPOTENSION (HCC): ICD-10-CM

## 2018-06-14 PROCEDURE — 96360 HYDRATION IV INFUSION INIT: CPT

## 2018-06-14 PROCEDURE — 700105 HCHG RX REV CODE 258: Performed by: INTERNAL MEDICINE

## 2018-06-14 RX ORDER — SODIUM CHLORIDE 9 MG/ML
500 INJECTION, SOLUTION INTRAVENOUS ONCE
Status: COMPLETED | OUTPATIENT
Start: 2018-06-14 | End: 2018-06-14

## 2018-06-14 RX ADMIN — SODIUM CHLORIDE 500 ML: 9 INJECTION, SOLUTION INTRAVENOUS at 14:12

## 2018-06-14 ASSESSMENT — PAIN SCALES - GENERAL: PAINLEVEL_OUTOF10: 3

## 2018-06-14 NOTE — PROGRESS NOTES
Pt ambulatory to [facility] for hydration.  Pt w/ no s/s of infection, pt has no complaints at this time.  PIV established w/ brisk blood return noted.  Pt requesting partial dose of 500 cc to run @ 400 mLs/hr.  Pt tolerated infusion w/ no adverse reactions.  PIV removed, gauze and coban dressing applied.  Pt left on foot in NAD, has next appt in place, confirmed w/ pt.

## 2018-06-17 ENCOUNTER — APPOINTMENT (OUTPATIENT)
Dept: ONCOLOGY | Facility: MEDICAL CENTER | Age: 73
End: 2018-06-17
Attending: INTERNAL MEDICINE
Payer: MEDICARE

## 2018-06-21 ENCOUNTER — OUTPATIENT INFUSION SERVICES (OUTPATIENT)
Dept: ONCOLOGY | Facility: MEDICAL CENTER | Age: 73
End: 2018-06-21
Attending: INTERNAL MEDICINE
Payer: MEDICARE

## 2018-06-21 VITALS
BODY MASS INDEX: 19.1 KG/M2 | OXYGEN SATURATION: 95 % | SYSTOLIC BLOOD PRESSURE: 88 MMHG | HEIGHT: 63 IN | WEIGHT: 107.81 LBS | DIASTOLIC BLOOD PRESSURE: 48 MMHG | RESPIRATION RATE: 20 BRPM | HEART RATE: 90 BPM | TEMPERATURE: 98.6 F

## 2018-06-21 PROCEDURE — 700105 HCHG RX REV CODE 258: Performed by: INTERNAL MEDICINE

## 2018-06-21 PROCEDURE — 96360 HYDRATION IV INFUSION INIT: CPT

## 2018-06-21 RX ORDER — SODIUM CHLORIDE 9 MG/ML
1000 INJECTION, SOLUTION INTRAVENOUS ONCE
Status: COMPLETED | OUTPATIENT
Start: 2018-06-21 | End: 2018-06-21

## 2018-06-21 RX ADMIN — SODIUM CHLORIDE 1000 ML: 9 INJECTION, SOLUTION INTRAVENOUS at 15:21

## 2018-06-21 ASSESSMENT — PAIN SCALES - GENERAL: PAINLEVEL_OUTOF10: 3

## 2018-06-22 NOTE — PROGRESS NOTES
Pt arrived ambulatory, here for hydration. IV access established, pt assessed. Pt requesting NS to be infused at 400 ml/hr for 500 ml. Hydration infused as requested. BP assessed post-hydration. Pt stated she feels better. IV flushed and removed, pressure dressing applied. Pt knows when to return. Discharged home under self care in no apparent distress.

## 2018-06-28 ENCOUNTER — OUTPATIENT INFUSION SERVICES (OUTPATIENT)
Dept: ONCOLOGY | Facility: MEDICAL CENTER | Age: 73
End: 2018-06-28
Attending: INTERNAL MEDICINE
Payer: MEDICARE

## 2018-06-28 VITALS
TEMPERATURE: 99.3 F | OXYGEN SATURATION: 93 % | BODY MASS INDEX: 19.06 KG/M2 | HEIGHT: 63 IN | HEART RATE: 100 BPM | WEIGHT: 107.58 LBS | RESPIRATION RATE: 20 BRPM | DIASTOLIC BLOOD PRESSURE: 66 MMHG | SYSTOLIC BLOOD PRESSURE: 103 MMHG

## 2018-06-28 PROCEDURE — 96360 HYDRATION IV INFUSION INIT: CPT

## 2018-06-28 PROCEDURE — 700105 HCHG RX REV CODE 258: Performed by: INTERNAL MEDICINE

## 2018-06-28 RX ORDER — SODIUM CHLORIDE 9 MG/ML
1000 INJECTION, SOLUTION INTRAVENOUS ONCE
Status: COMPLETED | OUTPATIENT
Start: 2018-06-28 | End: 2018-06-28

## 2018-06-28 RX ADMIN — SODIUM CHLORIDE 1000 ML: 9 INJECTION, SOLUTION INTRAVENOUS at 13:55

## 2018-06-28 ASSESSMENT — PAIN SCALES - GENERAL: PAINLEVEL_OUTOF10: 3

## 2018-06-28 NOTE — PROGRESS NOTES
Patient presents for hydration. IV started, flushes well with brisk blood return. Hydration infused at a 400 mL/hr for a volume of 500 mL per patient request. Patient declines post hydration vital signs. IV removed, catheter intact, compression dressing to site. Patient scheduled for next appointment and released in no acute distress.

## 2018-07-01 ENCOUNTER — APPOINTMENT (OUTPATIENT)
Dept: ONCOLOGY | Facility: MEDICAL CENTER | Age: 73
End: 2018-07-01
Attending: INTERNAL MEDICINE
Payer: MEDICARE

## 2018-07-05 ENCOUNTER — OUTPATIENT INFUSION SERVICES (OUTPATIENT)
Dept: ONCOLOGY | Facility: MEDICAL CENTER | Age: 73
End: 2018-07-05
Attending: INTERNAL MEDICINE
Payer: MEDICARE

## 2018-07-05 VITALS
HEIGHT: 63 IN | DIASTOLIC BLOOD PRESSURE: 58 MMHG | RESPIRATION RATE: 18 BRPM | TEMPERATURE: 98.6 F | HEART RATE: 100 BPM | SYSTOLIC BLOOD PRESSURE: 96 MMHG | BODY MASS INDEX: 19.18 KG/M2 | OXYGEN SATURATION: 95 % | WEIGHT: 108.25 LBS

## 2018-07-05 DIAGNOSIS — G90.3 NEUROGENIC ORTHOSTATIC HYPOTENSION (HCC): ICD-10-CM

## 2018-07-05 DIAGNOSIS — E78.5 HYPERLIPIDEMIA WITH TARGET LDL LESS THAN 100: Chronic | ICD-10-CM

## 2018-07-05 DIAGNOSIS — J43.9 PULMONARY EMPHYSEMA, UNSPECIFIED EMPHYSEMA TYPE (HCC): ICD-10-CM

## 2018-07-05 DIAGNOSIS — D84.9 IMMUNOLOGIC DEFICIENCY SYNDROME (HCC): ICD-10-CM

## 2018-07-05 PROCEDURE — 96360 HYDRATION IV INFUSION INIT: CPT

## 2018-07-05 PROCEDURE — 700105 HCHG RX REV CODE 258: Performed by: INTERNAL MEDICINE

## 2018-07-05 RX ORDER — SODIUM CHLORIDE 9 MG/ML
1000 INJECTION, SOLUTION INTRAVENOUS ONCE
Status: COMPLETED | OUTPATIENT
Start: 2018-07-05 | End: 2018-07-05

## 2018-07-05 RX ADMIN — SODIUM CHLORIDE 1000 ML: 9 INJECTION, SOLUTION INTRAVENOUS at 15:15

## 2018-07-05 ASSESSMENT — PAIN SCALES - GENERAL: PAINLEVEL_OUTOF10: 4

## 2018-07-06 NOTE — PROGRESS NOTES
Here for hydration. In good spirits. Voices no new complaints. On IV Ig's at home E O Week . Teaching and education reinforcement provided, also emotional support. Treatment well tolerated ( 500 cc given per patient's request). Discharge home to self care. Appointment confirmed for next treatment.

## 2018-07-12 ENCOUNTER — OUTPATIENT INFUSION SERVICES (OUTPATIENT)
Dept: ONCOLOGY | Facility: MEDICAL CENTER | Age: 73
End: 2018-07-12
Attending: INTERNAL MEDICINE
Payer: MEDICARE

## 2018-07-12 VITALS
HEIGHT: 63 IN | SYSTOLIC BLOOD PRESSURE: 96 MMHG | WEIGHT: 108.69 LBS | TEMPERATURE: 99 F | DIASTOLIC BLOOD PRESSURE: 48 MMHG | BODY MASS INDEX: 19.26 KG/M2 | RESPIRATION RATE: 18 BRPM | HEART RATE: 82 BPM | OXYGEN SATURATION: 100 %

## 2018-07-12 DIAGNOSIS — G93.32 CHRONIC FATIGUE SYNDROME: ICD-10-CM

## 2018-07-12 PROCEDURE — 700105 HCHG RX REV CODE 258: Performed by: INTERNAL MEDICINE

## 2018-07-12 PROCEDURE — 96360 HYDRATION IV INFUSION INIT: CPT

## 2018-07-12 RX ORDER — SODIUM CHLORIDE 9 MG/ML
1000 INJECTION, SOLUTION INTRAVENOUS ONCE
Status: COMPLETED | OUTPATIENT
Start: 2018-07-12 | End: 2018-07-12

## 2018-07-12 RX ADMIN — SODIUM CHLORIDE 500 ML: 9 INJECTION, SOLUTION INTRAVENOUS at 14:33

## 2018-07-12 ASSESSMENT — PAIN SCALES - GENERAL: PAINLEVEL_OUTOF10: 3

## 2018-07-12 NOTE — PROGRESS NOTES
Pt presents for weekly hydration appointment.  She reports usual fatigue.  PIV to RFA started, blood return verified.  500 mL infused at 400 ml/hr, per patient request.  Completed without incident.  PIV removed, site covered with gauze and coban.  Next appointment confirmed.  Pt discharged from IS in NAD under self care.

## 2018-07-15 ENCOUNTER — OUTPATIENT INFUSION SERVICES (OUTPATIENT)
Dept: ONCOLOGY | Facility: MEDICAL CENTER | Age: 73
End: 2018-07-15
Attending: INTERNAL MEDICINE
Payer: MEDICARE

## 2018-07-15 VITALS
OXYGEN SATURATION: 93 % | SYSTOLIC BLOOD PRESSURE: 109 MMHG | HEART RATE: 99 BPM | HEIGHT: 63 IN | TEMPERATURE: 99.4 F | DIASTOLIC BLOOD PRESSURE: 68 MMHG | BODY MASS INDEX: 19.34 KG/M2 | WEIGHT: 109.13 LBS | RESPIRATION RATE: 18 BRPM

## 2018-07-15 DIAGNOSIS — G93.32 CHRONIC FATIGUE SYNDROME: ICD-10-CM

## 2018-07-15 DIAGNOSIS — E78.5 HYPERLIPIDEMIA WITH TARGET LDL LESS THAN 100: Chronic | ICD-10-CM

## 2018-07-15 DIAGNOSIS — J43.9 PULMONARY EMPHYSEMA, UNSPECIFIED EMPHYSEMA TYPE (HCC): ICD-10-CM

## 2018-07-15 DIAGNOSIS — D84.9 IMMUNOLOGIC DEFICIENCY SYNDROME (HCC): ICD-10-CM

## 2018-07-15 DIAGNOSIS — G90.3 NEUROGENIC ORTHOSTATIC HYPOTENSION (HCC): ICD-10-CM

## 2018-07-15 PROCEDURE — 700105 HCHG RX REV CODE 258: Performed by: INTERNAL MEDICINE

## 2018-07-15 PROCEDURE — 96360 HYDRATION IV INFUSION INIT: CPT

## 2018-07-15 RX ORDER — SODIUM CHLORIDE 9 MG/ML
1000 INJECTION, SOLUTION INTRAVENOUS ONCE
Status: COMPLETED | OUTPATIENT
Start: 2018-07-15 | End: 2018-07-15

## 2018-07-15 RX ADMIN — SODIUM CHLORIDE 1000 ML: 9 INJECTION, SOLUTION INTRAVENOUS at 14:54

## 2018-07-15 ASSESSMENT — PAIN SCALES - GENERAL: PAINLEVEL_OUTOF10: 4

## 2018-07-15 NOTE — PROGRESS NOTES
Pt ambulated into department for her biweekly hydration. Pt connected to hospital oxygen upon arrival. Declined having any new symptoms since her Thursday appointment. PIV started, had brisk blood return. Pt requested that hydration be given at 400 ml/hr, and only 500 ml's infuse today. Stated she tolerates infusion best at this rate and amount. Hydration given as requested, Pt tolerated well and without incident. IV discontinued after, bleeding controlled with gauze and coban. Pt's next appointment on Thursday at 13:30 confirmed with Pt prior to leaving. Left department by self appearing in good spirits and NAD.

## 2018-07-19 ENCOUNTER — APPOINTMENT (OUTPATIENT)
Dept: ONCOLOGY | Facility: MEDICAL CENTER | Age: 73
End: 2018-07-19
Attending: INTERNAL MEDICINE
Payer: MEDICARE

## 2018-07-26 ENCOUNTER — OUTPATIENT INFUSION SERVICES (OUTPATIENT)
Dept: ONCOLOGY | Facility: MEDICAL CENTER | Age: 73
End: 2018-07-26
Attending: INTERNAL MEDICINE
Payer: MEDICARE

## 2018-07-26 VITALS
WEIGHT: 108.91 LBS | RESPIRATION RATE: 18 BRPM | BODY MASS INDEX: 19.3 KG/M2 | SYSTOLIC BLOOD PRESSURE: 100 MMHG | HEART RATE: 99 BPM | OXYGEN SATURATION: 99 % | DIASTOLIC BLOOD PRESSURE: 53 MMHG | HEIGHT: 63 IN | TEMPERATURE: 99 F

## 2018-07-26 DIAGNOSIS — G93.32 CHRONIC FATIGUE SYNDROME: ICD-10-CM

## 2018-07-26 PROCEDURE — 700105 HCHG RX REV CODE 258: Performed by: INTERNAL MEDICINE

## 2018-07-26 PROCEDURE — 96360 HYDRATION IV INFUSION INIT: CPT

## 2018-07-26 RX ORDER — SODIUM CHLORIDE 9 MG/ML
1000 INJECTION, SOLUTION INTRAVENOUS ONCE
Status: COMPLETED | OUTPATIENT
Start: 2018-07-26 | End: 2018-07-26

## 2018-07-26 RX ADMIN — SODIUM CHLORIDE 1000 ML: 9 INJECTION, SOLUTION INTRAVENOUS at 15:45

## 2018-07-26 ASSESSMENT — PAIN SCALES - GENERAL: PAINLEVEL_OUTOF10: 6

## 2018-07-29 ENCOUNTER — OUTPATIENT INFUSION SERVICES (OUTPATIENT)
Dept: ONCOLOGY | Facility: MEDICAL CENTER | Age: 73
End: 2018-07-29
Attending: INTERNAL MEDICINE
Payer: MEDICARE

## 2018-07-29 VITALS
WEIGHT: 108.25 LBS | SYSTOLIC BLOOD PRESSURE: 101 MMHG | HEART RATE: 104 BPM | OXYGEN SATURATION: 94 % | BODY MASS INDEX: 19.42 KG/M2 | RESPIRATION RATE: 20 BRPM | TEMPERATURE: 98.9 F | DIASTOLIC BLOOD PRESSURE: 54 MMHG

## 2018-07-29 DIAGNOSIS — G93.32 CHRONIC FATIGUE SYNDROME: ICD-10-CM

## 2018-07-29 PROCEDURE — 96360 HYDRATION IV INFUSION INIT: CPT

## 2018-07-29 PROCEDURE — 700105 HCHG RX REV CODE 258: Performed by: INTERNAL MEDICINE

## 2018-07-29 RX ORDER — SODIUM CHLORIDE 9 MG/ML
1000 INJECTION, SOLUTION INTRAVENOUS ONCE
Status: COMPLETED | OUTPATIENT
Start: 2018-07-29 | End: 2018-07-29

## 2018-07-29 RX ADMIN — SODIUM CHLORIDE 500 ML: 9 INJECTION, SOLUTION INTRAVENOUS at 14:50

## 2018-07-29 ASSESSMENT — PAIN SCALES - GENERAL: PAINLEVEL_OUTOF10: 6

## 2018-07-29 NOTE — PROGRESS NOTES
"Patient arrived ambulatory to the [facility] for IV hydration. Reviewed vital signs, labs, and physician order. Patient reports fatigue \"with the hot weather\". IV access established in left AC, visualized brisk blood return. Hydration administered volume of 500 at rate of 400ml/hr per pt request, no adverse reaction observed. IV flushed per protocol, catheter removed with tip intact, gauze and coban dressing placed. Confirmed upcoming appointment date and time with patient. Patient left the [facility] ambulatory in no sign of distress.   "

## 2018-08-02 ENCOUNTER — OUTPATIENT INFUSION SERVICES (OUTPATIENT)
Dept: ONCOLOGY | Facility: MEDICAL CENTER | Age: 73
End: 2018-08-02
Attending: INTERNAL MEDICINE
Payer: MEDICARE

## 2018-08-02 VITALS
WEIGHT: 108.91 LBS | HEART RATE: 97 BPM | OXYGEN SATURATION: 90 % | DIASTOLIC BLOOD PRESSURE: 77 MMHG | TEMPERATURE: 98.9 F | RESPIRATION RATE: 18 BRPM | SYSTOLIC BLOOD PRESSURE: 127 MMHG | HEIGHT: 63 IN | BODY MASS INDEX: 19.3 KG/M2

## 2018-08-02 DIAGNOSIS — G93.32 CHRONIC FATIGUE SYNDROME: ICD-10-CM

## 2018-08-02 PROCEDURE — 700105 HCHG RX REV CODE 258: Performed by: INTERNAL MEDICINE

## 2018-08-02 PROCEDURE — 96360 HYDRATION IV INFUSION INIT: CPT

## 2018-08-02 RX ORDER — SODIUM CHLORIDE 9 MG/ML
1000 INJECTION, SOLUTION INTRAVENOUS ONCE
Status: COMPLETED | OUTPATIENT
Start: 2018-08-02 | End: 2018-08-02

## 2018-08-02 RX ADMIN — SODIUM CHLORIDE 500 ML: 9 INJECTION, SOLUTION INTRAVENOUS at 15:05

## 2018-08-02 ASSESSMENT — PAIN SCALES - GENERAL: PAINLEVEL_OUTOF10: 6

## 2018-08-03 NOTE — PROGRESS NOTES
Patient arrived for hydration; reports no significant changes or concerns.  PIV started, hydration completed without incident.  PIV flushed, removed. Confirmed next appt; pt discharged home in no apparent distress.

## 2018-08-09 ENCOUNTER — APPOINTMENT (OUTPATIENT)
Dept: ONCOLOGY | Facility: MEDICAL CENTER | Age: 73
End: 2018-08-09
Attending: INTERNAL MEDICINE
Payer: MEDICARE

## 2018-08-10 ENCOUNTER — OFFICE VISIT (OUTPATIENT)
Dept: PULMONOLOGY | Facility: HOSPICE | Age: 73
End: 2018-08-10
Payer: MEDICARE

## 2018-08-10 VITALS
HEART RATE: 95 BPM | BODY MASS INDEX: 19.14 KG/M2 | TEMPERATURE: 98.8 F | SYSTOLIC BLOOD PRESSURE: 122 MMHG | DIASTOLIC BLOOD PRESSURE: 66 MMHG | HEIGHT: 63 IN | WEIGHT: 108 LBS | OXYGEN SATURATION: 92 % | RESPIRATION RATE: 16 BRPM

## 2018-08-10 DIAGNOSIS — J43.9 PULMONARY EMPHYSEMA, UNSPECIFIED EMPHYSEMA TYPE (HCC): ICD-10-CM

## 2018-08-10 PROCEDURE — 94761 N-INVAS EAR/PLS OXIMETRY MLT: CPT | Performed by: NURSE PRACTITIONER

## 2018-08-10 PROCEDURE — 99213 OFFICE O/P EST LOW 20 MIN: CPT | Performed by: NURSE PRACTITIONER

## 2018-08-10 RX ORDER — LEVALBUTEROL INHALATION SOLUTION 1.25 MG/3ML
1.25 SOLUTION RESPIRATORY (INHALATION) EVERY 4 HOURS PRN
Qty: 120 ML | Refills: 4 | Status: SHIPPED | OUTPATIENT
Start: 2018-08-10 | End: 2019-04-11 | Stop reason: SDUPTHER

## 2018-08-10 RX ORDER — LEVALBUTEROL INHALATION SOLUTION 1.25 MG/3ML
1.25 SOLUTION RESPIRATORY (INHALATION) EVERY 4 HOURS PRN
COMMUNITY
End: 2018-08-10 | Stop reason: SDUPTHER

## 2018-08-10 RX ORDER — METHYLPREDNISOLONE 4 MG/1
TABLET ORAL
Qty: 21 TAB | Refills: 0 | Status: SHIPPED | OUTPATIENT
Start: 2018-08-10 | End: 2020-06-12

## 2018-08-10 RX ORDER — AZITHROMYCIN 250 MG/1
TABLET, FILM COATED ORAL
Qty: 6 TAB | Refills: 0 | Status: SHIPPED | OUTPATIENT
Start: 2018-08-10 | End: 2018-11-28

## 2018-08-10 RX ORDER — METHYLPREDNISOLONE 4 MG/1
TABLET ORAL
Qty: 21 TAB | Refills: 1 | Status: SHIPPED | OUTPATIENT
Start: 2018-08-10 | End: 2018-10-11

## 2018-08-10 NOTE — PROCEDURES
Multi-Ox Readings  Multi Ox #1 Room air (2LPM pulsed 93-90-91)   O2 sat % at rest 91   O2 sat % on exertion 87   O2 sat average on exertion     Multi Ox #2 2 LPM (continuos )   O2 sat % at rest 98   O2 sat % on exertion 96   O2 sat average on exertion 97     Oxygen Use 2   Oxygen Frequency 24/7   Duration of need     Is the patient mobile within the home?     CPAP Use?     BIPAP Use?     Servo Titration

## 2018-08-10 NOTE — PROGRESS NOTES
CC:  Here for f/u pulmonary issues as listed below    HPI:   Deb Vega is a 71 y.o. year old female here today for follow-up on COPD.  Past medical history chronic fatigue syndrome being followed by Dr. Ion Gutierrez at [facility]. 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 Also reports normal saline infusions on a regular basis due to immunodeficiency.     Alpha 1 negative. PFTs from 2/2017 FEV1 0.90 L or 42% predicted, FEV1/FVC ratio 40, % predicted and a DLCO of 52% predicted.  Patient has a history of smoking for the last 55 years. She   continues to smoke 0-4 cigarettes a day. She is tried medications in the past with no benefit. She has been through [facility] smoking cessation program. She has difficulty completely stopping due to family member at home who continues to smoke daily.  She did not find therapist beneficial with smoking cessation.      CT scan 11/23/2016 indicated moderate diffuse centrilobular emphysematous changes. A 2.0 x 0.6 cm opacity in the medial right lower lobe. No adenopathy noted. PET scan was performed 11/23/2016 indicating no metabolic activity. Repeat CT 6/2/2017 indicated no change in 6.8 mm x 18.0 mm ill-defined opacity medial right lower lobe, markedly hyperexpanded emphysematous lungs and apical scarring. No pleural effusion. Updated Cat scan completed 11/17/2017: noting 18 x 6.8 mm focal opacification is again noted in the right lung base just above the right hemidiaphragm.  Focal area of scarring or discoid atelectasis is most likely. Neoplasm such as adenocarcinoma in situ is in the differential diagnosis. Updated cat scn from 5/22/2018 is stable.      She has a 2 week history of increased dyspnea, wheeze, cough with phlegm. She has utilized her Xopenex HFA 2x/day that last two weeks, prior to sickness it was rarely. She feels her symptoms are exacerbated by heat, altitude, and rudolph smoke. She is compliant Symbicort 160/4.5 µg 2 puffs twice  a day, Spiriva once daily. In the past she did not find nebulizer helpful, but with current symptoms, she did. She uses oxygen 2L 24/7. She started using Ponaris due to chronic nasal dryness which has been beneficial. She has postnasal drip.  She uses Ocean Spray nasal spray and then tries to blow her nose very hard and is unable to get any mucus up and then plugs her ears. Recommended Mucinex and flutter valve last OV, but did not obtain either.     She admits to being quite sedentary. She reports any type of exercise exacerbates her immune deficiency fatigue. When she does increase her activity she is unable to go to bed until 2 or 3 AM because she has much more energy.       She underwent sleep testing due to chronic fatigue which did not indicate sleep disordered breathing, but nocturnal hypoxemia only and PLMS index 45.0. She does complain of cramping in lower extremities that her primary is overseeing.        Patient Active Problem List    Diagnosis Date Noted   • Chronic use of benzodiazepine for therapeutic purpose 09/04/2017   • Benzodiazepine dependence ([facility]) 08/29/2017   • Nocturnal hypoxemia 06/06/2017   • Supplemental oxygen dependent 02/07/2017   • Abnormal CT scan of lung 02/07/2017   • Tachycardia 07/15/2015   • Hypotension 07/02/2015   • COPD (chronic obstructive pulmonary disease) ([facility]) 03/01/2013   • Lyme disease 03/01/2013   • Immunologic deficiency syndrome ([facility]) 03/01/2013   • Chronic fatigue syndrome 12/07/2012   • Hyperlipidemia with target LDL less than 100 06/06/2012   • Smoking 06/06/2012   • Anxiety 06/06/2012       Past Medical History:   Diagnosis Date   • Allergy    • Anxiety    • ASTHMA    • Back pain    • Bladder infection    • Bronchitis    • Cellulitis     (L) elbow    • Chronic use of benzodiazepine for therapeutic purpose 9/4/2017   • COPD    • COPD (chronic obstructive pulmonary disease) ([facility]) 3/1/2013   • H/O blood transfusion reaction    • History of measles    • History of  mumps    • Hives    • Hyperlipidemia    • Immunologic deficiency syndrome (HCC) 3/1/2013   • Lyme disease 3/1/2013   • Mononucleosis    • Pneumonia    • Tachycardia 7/15/2015   • Tachycardia 7/15/2015       Past Surgical History:   Procedure Laterality Date   • BREAST BIOPSY     • THYROID LOBECTOMY     • TONSILLECTOMY         Family History   Problem Relation Age of Onset   • Alcohol/Drug Mother    • Cancer Father    • Stroke Father    • Heart Disease Father 42       Social History   Substance Use Topics   • Smoking status: Current Some Day Smoker     Packs/day: 0.25     Years: 55.00     Types: Cigarettes   • Smokeless tobacco: Never Used      Comment: patient smokes 0 to 5 cigarettes a day    • Alcohol use No      Comment: patient states she has not had a drink in 2 years 10/09/2015       Current Outpatient Prescriptions   Medication Sig Dispense Refill   • levalbuterol (XOPENEX) 1.25 MG/3ML Nebu Soln 3 mL by Nebulization route every four hours as needed for Shortness of Breath. 120 mL 4   • azithromycin (ZITHROMAX) 250 MG Tab Take 2 tablets on day 1, then take 1 tablet a day for 4 days. 6 Tab 0   • MethylPREDNISolone (MEDROL DOSEPAK) 4 MG Tablet Therapy Pack Take as directed. 21 Tab 0   • MethylPREDNISolone (MEDROL DOSEPAK) 4 MG Tablet Therapy Pack Take as directed. 21 Tab 1   • clonazePAM (KLONOPIN) 0.5 MG Tab Take 1 Tab by mouth every bedtime for 180 days. 90 Tab 1   • ALPRAZolam (XANAX) 0.25 MG Tab Take 1 Tab by mouth 2 times a day as needed for Anxiety for up to 180 days. 180 Tab 1   • budesonide-formoterol (SYMBICORT) 160-4.5 MCG/ACT Aerosol Inhale 2 Puffs by mouth 2 Times a Day. Use spacer. Rinse mouth after each use. 1 Inhaler 5   • desloratadine (CLARINEX) 5 MG tablet Take 1 Tab by mouth every day. 90 Tab 3   • montelukast (SINGULAIR) 10 MG Tab TAKE 1 TABLET BY MOUTH EVERY DAY 90 Tab 3   • pravastatin (PRAVACHOL) 40 MG tablet TAKE 1 TABLET BY MOUTH EVERY DAY 90 Tab 3   • SPIRIVA HANDIHALER 18 MCG Cap  "INHALE 1 CAPSULE BY MOUTH AT THE SAME TIME EVERY DAY 90 Cap 3   • vitamin D (CHOLECALCIFEROL) 1000 UNIT Tab Take 1,000 Units by mouth every day.     • Oral Electrolytes (EMERGEN-C ELECTRO MIX PO) Take  by mouth every day.     • docosahexanoic acid (OMEGA 3 FA) 1000 MG CAPS Take 1,000 mg by mouth 2 Times a Day.     • aspirin (ASA) 81 MG CHEW Take 162 mg by mouth every day.     • MAGNESIUM GLYCINATE Take 480 mg by mouth.     • gabapentin (NEURONTIN) 100 MG Cap Take 1 Cap by mouth 3 times a day. Increase to 2 tab tid in 2 weeks as directed (Patient not taking: Reported on 8/10/2018) 120 Cap 2   • NON SPECIFIED (hydration orders)  please infuse 2 L NS per week. Infuse over one hour QT: 2 L REFILL: 56 fax [phone] 2 Each 56   • XOPENEX HFA 45 MCG/ACT inhaler INHALE ONE TO TWO PUFFS BY MOUTH EVERY 4 HOURS AS NEEDED FOR SHORTNESS OF BREATH 15 g 11   • acetaminophen (TYLENOL) 325 MG Tab Take 650 mg by mouth every four hours as needed.     • immune globulin (FLEBOGAMMA) 10 GM/100ML Solution 5 g by Intravenous route Once. Indications: Pt now on 5 grams       No current facility-administered medications for this visit.           Allergies: Gadolinium; Influenza virus vaccine; Zyvox [linezolid]; Cefuroxime; Epinephrine; Other drug; Other misc; Prednisone; and Sulfa drugs          ROS  Gen: Denies fever, chills, unintentional weight loss, fatigue, night sweats  E/N/T: Denies nasal congestion, ear pain  Resp:Denies hemoptysis  CV: Denies chest pain, chest tightness, palpitations  Sleep:Denies morning headache  Neuro: Denies frequent headaches, weakness, dizziness  GI: Denies acid reflux, N/V  See HPI.  All other systems reviewed and negative          Vital signs for this encounter:  Vitals:    08/10/18 1429   Height: 1.6 m (5' 3\")   Weight: 49 kg (108 lb)   Weight % change since last entry.: 0 %   BP: 122/66   Pulse: 95   BMI (Calculated): 19.13   Resp: 16   Temp: 37.1 °C (98.8 °F)   O2 sat % on O2: 92 %   O2 Flow Rate " (L/min): 2       Multi-Ox Readings  Multi Ox #1 Room air (2LPM pulsed 93-90-91)   O2 sat % at rest 91   O2 sat % on exertion 87   O2 sat average on exertion     Multi Ox #2 2 LPM (continuos )   O2 sat % at rest 98   O2 sat % on exertion 96   O2 sat average on exertion 97     Oxygen Use 2   Oxygen Frequency 24/7   Duration of need     Is the patient mobile within the home?     CPAP Use?     BIPAP Use?     Servo Titration                   Physical Exam:   Appearance: well developed, well nourished, no acute distress.   Eyes: PERRL, EOM intact, sclere white, conjunctiva moist.  Ears: no lesions or deformities.  Hearing: grossly intact.  Nose: no lesions or deformities.  Dentition: good dentition.   Oropharynx: tongue normal, posterior pharynx without erythema or exudate.  Neck: supple, trachea midline, no masses.  Respiratory effort: no intercostal retractions or use of accessory muscles.  Lung auscultation: Bilateral diminished   Heart auscultation: no murmur, rub, or gallop   Extremities: no cyanosis or edema.  Abdomen: soft, non-tender, no masses.  Gait and station: without difficulty   Digits and Nails: no clubbing, cyanosis, petechiae, or nodes.  Cranial nerves: grossly normal.  Motor: no focal deficits observed.   Skin: no rashes, lesions, or ulcers noted.  Orientation: oriented to time, place, and person.  Mood and affect: mood and affect appropriate, normal interaction with examiner.      Assessment   1. Pulmonary emphysema, unspecified emphysema type (HCC)  AMB MULTIPLE OXIMETRY    AMB MULTIPLE OXIMETRY       Patient was seen for 25 minutes, more than 50% of time spent in face to face review, counseling, and arranging future evaluation and follow up of medical conditions and care relating to COPD, sick symptoms, answered all questions to the best of my knowledge patient had regarding her disease, medications, and aerobika she plans to purchase herself.  Patient is clinically stable and will have the following  changes per plan.     PLAN:   Patient Instructions   1) Continue oxygen at 24/7 at 2L  2) Continue Symbicort at 160/4.5, given chamber, Spiriva, rescue inhaler.  Encourage increase use of nebulizer during rudolph smoke exacerbation.   3) Encourage light conditioning   4) Add Mucinex found OTC and aerobika to help with mucus clearance  5) Continue IgG infusion every other week  6) Vaccines: Allergic to vaccines  7) Smoking cessation discussed  8) Add sinus rinse - would greatly benefit post nasal drip  9) Update cat scan May 2019  10) Zpack on hand. Prednisone allergy.   11) Return in about 3 months (around 11/10/2018) for follow up with ROVERTO Broussard, review of symptoms, if not sooner.

## 2018-08-12 ENCOUNTER — OUTPATIENT INFUSION SERVICES (OUTPATIENT)
Dept: ONCOLOGY | Facility: MEDICAL CENTER | Age: 73
End: 2018-08-12
Attending: INTERNAL MEDICINE
Payer: MEDICARE

## 2018-08-12 VITALS
HEIGHT: 63 IN | RESPIRATION RATE: 20 BRPM | SYSTOLIC BLOOD PRESSURE: 106 MMHG | OXYGEN SATURATION: 96 % | DIASTOLIC BLOOD PRESSURE: 65 MMHG | TEMPERATURE: 98.9 F | HEART RATE: 107 BPM | BODY MASS INDEX: 19.34 KG/M2 | WEIGHT: 109.13 LBS

## 2018-08-12 DIAGNOSIS — G93.32 CHRONIC FATIGUE SYNDROME: ICD-10-CM

## 2018-08-12 PROCEDURE — 96360 HYDRATION IV INFUSION INIT: CPT

## 2018-08-12 PROCEDURE — 700105 HCHG RX REV CODE 258: Performed by: INTERNAL MEDICINE

## 2018-08-12 RX ORDER — SODIUM CHLORIDE 9 MG/ML
1000 INJECTION, SOLUTION INTRAVENOUS ONCE
Status: COMPLETED | OUTPATIENT
Start: 2018-08-12 | End: 2018-08-12

## 2018-08-12 RX ADMIN — SODIUM CHLORIDE 500 ML: 9 INJECTION, SOLUTION INTRAVENOUS at 15:16

## 2018-08-12 ASSESSMENT — PAIN SCALES - GENERAL: PAINLEVEL_OUTOF10: 4

## 2018-08-12 NOTE — PROGRESS NOTES
Patient arrived ambulatory to the [facility] for hydration. Reviewed vital signs, labs, and physician order. IV access established in right forearm, visualized brisk blood return. IV hydration administered 500ml's administered at rate of 400ml's/hr per pt request, no adverse reaction observed. IV flushed per protocol, catheter removed with tip intact, gauze and coban dressing placed. Confirmed upcoming appointment date and time with patient. Patient left the [facility] ambulatory in no sign of distress.

## 2018-08-13 ENCOUNTER — TELEPHONE (OUTPATIENT)
Dept: PULMONOLOGY | Facility: HOSPICE | Age: 73
End: 2018-08-13

## 2018-08-13 NOTE — TELEPHONE ENCOUNTER
Patient called and wanted to make sure that she should be taking the medication prescribed even thought she is allergic to some of it.

## 2018-08-13 NOTE — TELEPHONE ENCOUNTER
Pt wanted to know why her next appt on 11/16/18 would be nanda Schmid and was informed on vm that Fiordaliza Mccall would be out on Maternity leave.

## 2018-08-14 NOTE — TELEPHONE ENCOUNTER
I do not want her to take something she is allergic to. She is allergic to prednisone. I sent Medrol pack to her pharmacy, methylprednisone. She informed she has taken this before without trouble. Please clarify and confirm for patient before she buys it and takes it.

## 2018-08-15 ENCOUNTER — TELEPHONE (OUTPATIENT)
Dept: PULMONOLOGY | Facility: HOSPICE | Age: 73
End: 2018-08-15

## 2018-08-15 NOTE — TELEPHONE ENCOUNTER
MEDICATION PRIOR AUTHORIZATION NEEDED:    1. Name of Medication: Levalbuterol 1.25 mg/3 ml    2. Requested By (Name of Pharmacy): [facility]     3. Is insurance on file current? yes    4. What is the name & phone number of the 3rd party payor? [facility] [phone]

## 2018-08-15 NOTE — TELEPHONE ENCOUNTER
She would like to know if you wanted her to take the z pack now and if if so why? She also said that she is sensitive to MethylPREDNISolone and she said she did not sleep for 3 days after taking it.

## 2018-08-16 ENCOUNTER — APPOINTMENT (OUTPATIENT)
Dept: ONCOLOGY | Facility: MEDICAL CENTER | Age: 73
End: 2018-08-16
Attending: INTERNAL MEDICINE
Payer: MEDICARE

## 2018-08-16 DIAGNOSIS — J44.9 CHRONIC OBSTRUCTIVE PULMONARY DISEASE, UNSPECIFIED COPD TYPE (HCC): ICD-10-CM

## 2018-08-16 NOTE — TELEPHONE ENCOUNTER
NOELLEpadae was to have on hand for emergency if she is sick in future.     If she had severe insomnia with methylprednisone then do not take it.

## 2018-08-17 NOTE — TELEPHONE ENCOUNTER
DOCUMENTATION OF PRIOR AUTH STATUS    1. Medication name and dose: Levalbuterol 1.25 mg/3 ml    2. Name and Phone # of Prescription coverage company: [employer] [phone]    3. Date Prior Auth was submitted: 8/14/2018    4. What information was given to obtain insurance decision: Clinical notes    5. Prior Auth letter Approved or Denied: Approved    6. Pharmacy notified: Yes    7. Patient notified: Yes

## 2018-08-23 ENCOUNTER — APPOINTMENT (OUTPATIENT)
Dept: ONCOLOGY | Facility: MEDICAL CENTER | Age: 73
End: 2018-08-23
Attending: INTERNAL MEDICINE
Payer: MEDICARE

## 2018-08-26 ENCOUNTER — OUTPATIENT INFUSION SERVICES (OUTPATIENT)
Dept: ONCOLOGY | Facility: MEDICAL CENTER | Age: 73
End: 2018-08-26
Attending: INTERNAL MEDICINE
Payer: MEDICARE

## 2018-08-26 VITALS
RESPIRATION RATE: 18 BRPM | SYSTOLIC BLOOD PRESSURE: 101 MMHG | DIASTOLIC BLOOD PRESSURE: 64 MMHG | TEMPERATURE: 99 F | WEIGHT: 107.81 LBS | HEIGHT: 63 IN | HEART RATE: 107 BPM | OXYGEN SATURATION: 97 % | BODY MASS INDEX: 19.1 KG/M2

## 2018-08-26 DIAGNOSIS — G93.32 CHRONIC FATIGUE SYNDROME: ICD-10-CM

## 2018-08-26 PROCEDURE — 96360 HYDRATION IV INFUSION INIT: CPT

## 2018-08-26 PROCEDURE — 700105 HCHG RX REV CODE 258: Performed by: INTERNAL MEDICINE

## 2018-08-26 RX ORDER — SODIUM CHLORIDE 9 MG/ML
1000 INJECTION, SOLUTION INTRAVENOUS ONCE
Status: COMPLETED | OUTPATIENT
Start: 2018-08-26 | End: 2018-08-26

## 2018-08-26 RX ADMIN — SODIUM CHLORIDE 500 ML: 9 INJECTION, SOLUTION INTRAVENOUS at 14:58

## 2018-08-26 ASSESSMENT — PAIN SCALES - GENERAL: PAINLEVEL_OUTOF10: 4

## 2018-08-26 NOTE — PROGRESS NOTES
Patient arrived to clinic for hydration.  Denies any acute changes from previous visit.  PIV established with good blood return noted.  500ml NS running at 400ml/hr given per patient request.  Pt tolerated well.  PIV flushed, removed, and gauze/coban placed.  Confirmed next appointment and pt ambulated out of clinic in no apparent distress.

## 2018-08-28 RX ORDER — TIOTROPIUM BROMIDE 18 UG/1
CAPSULE ORAL; RESPIRATORY (INHALATION)
Qty: 90 CAP | Refills: 0 | Status: SHIPPED | OUTPATIENT
Start: 2018-08-28 | End: 2018-11-28 | Stop reason: SDUPTHER

## 2018-08-28 NOTE — TELEPHONE ENCOUNTER
Have we ever prescribed this med? No.  If yes, what date? N?A    Last OV: 08/10/2018--Stefany    Next OV: 11/16/2018-Brown    DX: COPD     Medications:   Requested Prescriptions     Pending Prescriptions Disp Refills   • SPIRIVA HANDIHALER 18 MCG Cap [Pharmacy Med Name: SPIRIVA 18MCG CAPS 30S &HANDIHALER] 90 Cap 0     Sig: INHALE THE CONTENTS OF 1 CAPSULE VIA INHALATION DEVICE AT THE SAME TIME EVERY DAY

## 2018-08-30 ENCOUNTER — APPOINTMENT (OUTPATIENT)
Dept: ONCOLOGY | Facility: MEDICAL CENTER | Age: 73
End: 2018-08-30
Attending: INTERNAL MEDICINE
Payer: MEDICARE

## 2018-09-02 ENCOUNTER — OUTPATIENT INFUSION SERVICES (OUTPATIENT)
Dept: ONCOLOGY | Facility: MEDICAL CENTER | Age: 73
End: 2018-09-02
Attending: INTERNAL MEDICINE
Payer: MEDICARE

## 2018-09-02 VITALS
HEART RATE: 97 BPM | DIASTOLIC BLOOD PRESSURE: 64 MMHG | HEIGHT: 63 IN | SYSTOLIC BLOOD PRESSURE: 107 MMHG | TEMPERATURE: 98.9 F | RESPIRATION RATE: 18 BRPM | OXYGEN SATURATION: 96 % | WEIGHT: 107.36 LBS | BODY MASS INDEX: 19.02 KG/M2

## 2018-09-02 DIAGNOSIS — G93.32 CHRONIC FATIGUE SYNDROME: ICD-10-CM

## 2018-09-02 PROCEDURE — 700105 HCHG RX REV CODE 258: Performed by: INTERNAL MEDICINE

## 2018-09-02 PROCEDURE — 96361 HYDRATE IV INFUSION ADD-ON: CPT

## 2018-09-02 PROCEDURE — 96360 HYDRATION IV INFUSION INIT: CPT

## 2018-09-02 RX ORDER — SODIUM CHLORIDE 9 MG/ML
1000 INJECTION, SOLUTION INTRAVENOUS ONCE
Status: COMPLETED | OUTPATIENT
Start: 2018-09-02 | End: 2018-09-02

## 2018-09-02 RX ADMIN — SODIUM CHLORIDE 1000 ML: 9 INJECTION, SOLUTION INTRAVENOUS at 16:35

## 2018-09-02 ASSESSMENT — PAIN SCALES - GENERAL: PAINLEVEL_OUTOF10: 3

## 2018-09-03 NOTE — PROGRESS NOTES
Returns for fluids.  Reports no new issues except dog has been ill.  Emotional support given.  Fluids infusing, but pump not delivering volume at rate.  One hour in and only approx 100 ml in.  Pump switched and pt completed 500 ml.  Denies need for full liter.  DC to self care with assistance to car.

## 2018-09-06 ENCOUNTER — APPOINTMENT (OUTPATIENT)
Dept: ONCOLOGY | Facility: MEDICAL CENTER | Age: 73
End: 2018-09-06
Attending: INTERNAL MEDICINE
Payer: MEDICARE

## 2018-09-13 ENCOUNTER — APPOINTMENT (OUTPATIENT)
Dept: ONCOLOGY | Facility: MEDICAL CENTER | Age: 73
End: 2018-09-13
Attending: INTERNAL MEDICINE
Payer: MEDICARE

## 2018-09-14 ENCOUNTER — OFFICE VISIT (OUTPATIENT)
Dept: MEDICAL GROUP | Facility: LAB | Age: 73
End: 2018-09-14
Payer: MEDICARE

## 2018-09-14 VITALS
HEIGHT: 64 IN | BODY MASS INDEX: 18.27 KG/M2 | OXYGEN SATURATION: 97 % | HEART RATE: 94 BPM | TEMPERATURE: 99.1 F | RESPIRATION RATE: 14 BRPM | SYSTOLIC BLOOD PRESSURE: 105 MMHG | WEIGHT: 107 LBS | DIASTOLIC BLOOD PRESSURE: 60 MMHG

## 2018-09-14 DIAGNOSIS — E78.5 HYPERLIPIDEMIA WITH TARGET LDL LESS THAN 100: Chronic | ICD-10-CM

## 2018-09-14 DIAGNOSIS — Z91.09 ENVIRONMENTAL ALLERGIES: ICD-10-CM

## 2018-09-14 DIAGNOSIS — G93.32 CHRONIC FATIGUE SYNDROME: ICD-10-CM

## 2018-09-14 DIAGNOSIS — J43.9 PULMONARY EMPHYSEMA, UNSPECIFIED EMPHYSEMA TYPE (HCC): ICD-10-CM

## 2018-09-14 DIAGNOSIS — N11.9 CHRONIC TUBULOINTERSTITIAL NEPHRITIS: ICD-10-CM

## 2018-09-14 DIAGNOSIS — D83.0 COMMON VARIABLE IMMUNODEFICIENCY WITH PREDOMINANT ABNORMALITIES OF B-CELL NUMBERS AND FUNCTION (HCC): ICD-10-CM

## 2018-09-14 PROCEDURE — 99214 OFFICE O/P EST MOD 30 MIN: CPT | Performed by: INTERNAL MEDICINE

## 2018-09-14 RX ORDER — MONTELUKAST SODIUM 10 MG/1
TABLET ORAL
Qty: 90 TAB | Refills: 3 | Status: SHIPPED | OUTPATIENT
Start: 2018-09-14 | End: 2020-04-07 | Stop reason: SDUPTHER

## 2018-09-15 NOTE — PROGRESS NOTES
CC: Deb Vega is a 72 y.o. female is suffering from   Chief Complaint   Patient presents with   • Follow-Up         SUBJECTIVE:  1. Chronic fatigue syndrome  Deb is here for follow-up continues to have problems of chronic fatigue syndrome, labs ordered    2. Pulmonary emphysema, unspecified emphysema type (HCC)  Patient with emphysema on oxygen, no change in medical therapy    3. 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 Gutierrez    4. Chronic tubulointerstitial nephritis  Per Dr. Gutierrez    5. Hyperlipidemia with target LDL less than 100  Labs ordered        Past social, family, history: Single  Social History   Substance Use Topics   • Smoking status: Current Some Day Smoker     Packs/day: 0.25     Years: 55.00     Types: Cigarettes   • Smokeless tobacco: Never Used      Comment: patient smokes 0 to 5 cigarettes a day    • Alcohol use No      Comment: patient states she has not had a drink in 2 years 10/09/2015         MEDICATIONS:    Current Outpatient Prescriptions:   •  montelukast (SINGULAIR) 10 MG Tab, TAKE 1 TABLET BY MOUTH EVERY DAY, Disp: 90 Tab, Rfl: 3  •  SPIRIVA HANDIHALER 18 MCG Cap, INHALE THE CONTENTS OF 1 CAPSULE VIA INHALATION DEVICE AT THE SAME TIME EVERY DAY, Disp: 90 Cap, Rfl: 0  •  levalbuterol (XOPENEX) 1.25 MG/3ML Nebu Soln, 3 mL by Nebulization route every four hours as needed for Shortness of Breath., Disp: 120 mL, Rfl: 4  •  clonazePAM (KLONOPIN) 0.5 MG Tab, Take 1 Tab by mouth every bedtime for 180 days., Disp: 90 Tab, Rfl: 1  •  ALPRAZolam (XANAX) 0.25 MG Tab, Take 1 Tab by mouth 2 times a day as needed for Anxiety for up to 180 days., Disp: 180 Tab, Rfl: 1  •  budesonide-formoterol (SYMBICORT) 160-4.5 MCG/ACT Aerosol, Inhale 2 Puffs by mouth 2 Times a Day. Use spacer. Rinse mouth after each use., Disp: 1 Inhaler, Rfl: 5  •  desloratadine (CLARINEX) 5 MG tablet, Take 1 Tab by mouth every day., Disp: 90 Tab, Rfl:  3  •  NON SPECIFIED, (hydration orders)  please infuse 2 L NS per week. Infuse over one hour QT: 2 L REFILL: 56 fax [phone], Disp: 2 Each, Rfl: 56  •  XOPENEX HFA 45 MCG/ACT inhaler, INHALE ONE TO TWO PUFFS BY MOUTH EVERY 4 HOURS AS NEEDED FOR SHORTNESS OF BREATH, Disp: 15 g, Rfl: 11  •  pravastatin (PRAVACHOL) 40 MG tablet, TAKE 1 TABLET BY MOUTH EVERY DAY, Disp: 90 Tab, Rfl: 3  •  vitamin D (CHOLECALCIFEROL) 1000 UNIT Tab, Take 1,000 Units by mouth every day., Disp: , Rfl:   •  immune globulin (FLEBOGAMMA) 10 GM/100ML Solution, 5 g by Intravenous route Once. Indications: Pt now on 5 grams, Disp: , Rfl:   •  Oral Electrolytes (EMERGEN-C ELECTRO MIX PO), Take  by mouth every day., Disp: , Rfl:   •  docosahexanoic acid (OMEGA 3 FA) 1000 MG CAPS, Take 1,000 mg by mouth 2 Times a Day., Disp: , Rfl:   •  aspirin (ASA) 81 MG CHEW, Take 162 mg by mouth every day., Disp: , Rfl:   •  MAGNESIUM GLYCINATE, Take 480 mg by mouth., Disp: , Rfl:   •  azithromycin (ZITHROMAX) 250 MG Tab, Take 2 tablets on day 1, then take 1 tablet a day for 4 days., Disp: 6 Tab, Rfl: 0  •  MethylPREDNISolone (MEDROL DOSEPAK) 4 MG Tablet Therapy Pack, Take as directed., Disp: 21 Tab, Rfl: 0  •  MethylPREDNISolone (MEDROL DOSEPAK) 4 MG Tablet Therapy Pack, Take as directed., Disp: 21 Tab, Rfl: 1  •  gabapentin (NEURONTIN) 100 MG Cap, Take 1 Cap by mouth 3 times a day. 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) 09/14/2018   • 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   • Chronic use of benzodiazepine for therapeutic purpose 09/04/2017   • Benzodiazepine dependence (HCC) 08/29/2017   • Nocturnal  "hypoxemia 06/06/2017   • Supplemental oxygen dependent 02/07/2017   • Abnormal CT scan of lung 02/07/2017   • Tachycardia 07/15/2015   • Hypotension 07/02/2015   • COPD (chronic obstructive pulmonary disease) ([facility]) 03/01/2013   • Lyme disease 03/01/2013   • Immunologic deficiency syndrome ([facility]) 03/01/2013   • Chronic fatigue syndrome 12/07/2012   • Hyperlipidemia with target LDL less than 100 06/06/2012   • Smoking 06/06/2012   • Anxiety 06/06/2012       REVIEW OF SYSTEMS:  Gen.:  No Nausea, Vomiting, fever, Chills.  Heart: No chest pain.  Lungs:  No shortness of Breath.  Psychological: Girish unusual Anxiety depression     PHYSICAL EXAM   Constitutional: Alert, cooperative, not in acute distress.  Cardiovascular:  Rate Rhythm is regular without murmurs rubs clicks.     Thorax & Lungs: Very poor inspiratory expiratory excursion, no wheezing, rhonchi, or rales  HENT: Normocephalic, Atraumatic.  Eyes: PERRLA, EOMI, Conjunctiva normal.   Neck: Trachia is midline no swelling of the thyroid.   Lymphatic: No lymphadenopathy noted.   Neurologic: Alert & oriented x 3, cranial nerves II through XII are intact, Normal motor function, Normal sensory function, No focal deficits noted.   Psychiatric: Affect normal, Judgment normal, Mood normal.     VITAL SIGNS:/60   Pulse 94   Temp 37.3 °C (99.1 °F)   Resp 14   Ht 1.613 m (5' 3.5\")   Wt 48.5 kg (107 lb)   SpO2 97% Comment: pt is on 2 liters of oxygen  BMI 18.66 kg/m²     Labs: Reviewed    Assessment:                                                     Plan:    1. Chronic fatigue syndrome  No change in medical therapy    2. Pulmonary emphysema, unspecified emphysema type ([facility])  Recheck CBC CMP  - COMP METABOLIC PANEL; Future  - CBC WITH DIFFERENTIAL; Future    3. 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 Chronic " tubulointerstitial nephritis  Labs ordered  - URINALYSIS,CULTURE IF INDICATED; Future    5. Hyperlipidemia with target LDL less than 100  Recheck lipid profile  - LIPID PROFILE; Future

## 2018-09-16 ENCOUNTER — OUTPATIENT INFUSION SERVICES (OUTPATIENT)
Dept: ONCOLOGY | Facility: MEDICAL CENTER | Age: 73
End: 2018-09-16
Attending: INTERNAL MEDICINE
Payer: MEDICARE

## 2018-09-16 VITALS
WEIGHT: 107.58 LBS | HEART RATE: 95 BPM | SYSTOLIC BLOOD PRESSURE: 106 MMHG | DIASTOLIC BLOOD PRESSURE: 65 MMHG | HEIGHT: 63 IN | OXYGEN SATURATION: 98 % | BODY MASS INDEX: 19.06 KG/M2 | RESPIRATION RATE: 20 BRPM | TEMPERATURE: 98.7 F

## 2018-09-16 DIAGNOSIS — J43.9 PULMONARY EMPHYSEMA, UNSPECIFIED EMPHYSEMA TYPE (HCC): ICD-10-CM

## 2018-09-16 DIAGNOSIS — G93.32 CHRONIC FATIGUE SYNDROME: ICD-10-CM

## 2018-09-16 DIAGNOSIS — N11.9 CHRONIC TUBULOINTERSTITIAL NEPHRITIS: ICD-10-CM

## 2018-09-16 DIAGNOSIS — D83.0 COMMON VARIABLE IMMUNODEFICIENCY WITH PREDOMINANT ABNORMALITIES OF B-CELL NUMBERS AND FUNCTION (HCC): ICD-10-CM

## 2018-09-16 LAB
ALBUMIN SERPL BCP-MCNC: 4.2 G/DL (ref 3.2–4.9)
ALBUMIN/GLOB SERPL: 1.4 G/DL
ALP SERPL-CCNC: 55 U/L (ref 30–99)
ALT SERPL-CCNC: 19 U/L (ref 2–50)
ANION GAP SERPL CALC-SCNC: 9 MMOL/L (ref 0–11.9)
AST SERPL-CCNC: 21 U/L (ref 12–45)
BASOPHILS # BLD AUTO: 1 % (ref 0–1.8)
BASOPHILS # BLD: 0.06 K/UL (ref 0–0.12)
BILIRUB SERPL-MCNC: 0.4 MG/DL (ref 0.1–1.5)
BUN SERPL-MCNC: 9 MG/DL (ref 8–22)
CALCIUM SERPL-MCNC: 9.2 MG/DL (ref 8.5–10.5)
CHLORIDE SERPL-SCNC: 102 MMOL/L (ref 96–112)
CO2 SERPL-SCNC: 29 MMOL/L (ref 20–33)
CREAT SERPL-MCNC: 0.5 MG/DL (ref 0.5–1.4)
EOSINOPHIL # BLD AUTO: 0.05 K/UL (ref 0–0.51)
EOSINOPHIL NFR BLD: 0.8 % (ref 0–6.9)
ERYTHROCYTE [DISTWIDTH] IN BLOOD BY AUTOMATED COUNT: 46.3 FL (ref 35.9–50)
GLOBULIN SER CALC-MCNC: 2.9 G/DL (ref 1.9–3.5)
GLUCOSE SERPL-MCNC: 110 MG/DL (ref 65–99)
HCT VFR BLD AUTO: 44.1 % (ref 37–47)
HGB BLD-MCNC: 14.2 G/DL (ref 12–16)
IMM GRANULOCYTES # BLD AUTO: 0.02 K/UL (ref 0–0.11)
IMM GRANULOCYTES NFR BLD AUTO: 0.3 % (ref 0–0.9)
LYMPHOCYTES # BLD AUTO: 1.24 K/UL (ref 1–4.8)
LYMPHOCYTES NFR BLD: 21 % (ref 22–41)
MCH RBC QN AUTO: 30.5 PG (ref 27–33)
MCHC RBC AUTO-ENTMCNC: 32.2 G/DL (ref 33.6–35)
MCV RBC AUTO: 94.6 FL (ref 81.4–97.8)
MONOCYTES # BLD AUTO: 0.45 K/UL (ref 0–0.85)
MONOCYTES NFR BLD AUTO: 7.6 % (ref 0–13.4)
NEUTROPHILS # BLD AUTO: 4.08 K/UL (ref 2–7.15)
NEUTROPHILS NFR BLD: 69.3 % (ref 44–72)
NRBC # BLD AUTO: 0 K/UL
NRBC BLD-RTO: 0 /100 WBC
PLATELET # BLD AUTO: 266 K/UL (ref 164–446)
PMV BLD AUTO: 9.5 FL (ref 9–12.9)
POTASSIUM SERPL-SCNC: 3.7 MMOL/L (ref 3.6–5.5)
PROT SERPL-MCNC: 7.1 G/DL (ref 6–8.2)
RBC # BLD AUTO: 4.66 M/UL (ref 4.2–5.4)
SODIUM SERPL-SCNC: 140 MMOL/L (ref 135–145)
WBC # BLD AUTO: 5.9 K/UL (ref 4.8–10.8)

## 2018-09-16 PROCEDURE — 96360 HYDRATION IV INFUSION INIT: CPT

## 2018-09-16 PROCEDURE — 83883 ASSAY NEPHELOMETRY NOT SPEC: CPT | Mod: 91

## 2018-09-16 PROCEDURE — 84156 ASSAY OF PROTEIN URINE: CPT | Mod: XU

## 2018-09-16 PROCEDURE — 85025 COMPLETE CBC W/AUTO DIFF WBC: CPT

## 2018-09-16 PROCEDURE — 36415 COLL VENOUS BLD VENIPUNCTURE: CPT

## 2018-09-16 PROCEDURE — 84165 PROTEIN E-PHORESIS SERUM: CPT

## 2018-09-16 PROCEDURE — 80053 COMPREHEN METABOLIC PANEL: CPT

## 2018-09-16 PROCEDURE — 84160 ASSAY OF PROTEIN ANY SOURCE: CPT

## 2018-09-16 PROCEDURE — 81003 URINALYSIS AUTO W/O SCOPE: CPT

## 2018-09-16 PROCEDURE — 700105 HCHG RX REV CODE 258: Performed by: INTERNAL MEDICINE

## 2018-09-16 RX ORDER — SODIUM CHLORIDE 9 MG/ML
1000 INJECTION, SOLUTION INTRAVENOUS ONCE
Status: COMPLETED | OUTPATIENT
Start: 2018-09-16 | End: 2018-09-16

## 2018-09-16 RX ADMIN — SODIUM CHLORIDE 500 ML: 9 INJECTION, SOLUTION INTRAVENOUS at 15:10

## 2018-09-16 ASSESSMENT — PAIN SCALES - GENERAL: PAINLEVEL_OUTOF10: 3

## 2018-09-16 NOTE — PROGRESS NOTES
Patient arrived ambulatory to the \[facility]\"" for hydration. Reviewed vital signs, and physician order. Patient arrives with lab slip for Dr Mccullough, reports drinking coffee and water today, pt requests labs to be drawn with the exception of the lipid profile, UA collected. IV access established in left AC, visualized brisk blood return, labs collected. IV hydration administered per pt request volume of 500cc's, at rate of 400ml's per hour, no adverse reaction observed. IV flushed per protocol, catheter removed with tip intact, gauze and coban dressing placed. Confirmed upcoming appointment date and time with patient. Patient left the \[facility]\"" ambulatory in no sign of distress.

## 2018-09-17 LAB
APPEARANCE UR: CLEAR
BILIRUB UR QL STRIP.AUTO: NEGATIVE
COLOR UR: YELLOW
GLUCOSE UR STRIP.AUTO-MCNC: NEGATIVE MG/DL
KETONES UR STRIP.AUTO-MCNC: NEGATIVE MG/DL
LEUKOCYTE ESTERASE UR QL STRIP.AUTO: NEGATIVE
MICRO URNS: NORMAL
NITRITE UR QL STRIP.AUTO: NEGATIVE
PH UR STRIP.AUTO: 7.5 [PH]
PROT UR QL STRIP: NEGATIVE MG/DL
RBC UR QL AUTO: NEGATIVE
SP GR UR STRIP.AUTO: 1.01
UROBILINOGEN UR STRIP.AUTO-MCNC: 0.2 MG/DL

## 2018-09-19 LAB
ALBUMIN 24H UR QL ELPH: DETECTED
ALPHA1 GLOB 24H UR QL ELPH: DETECTED
ALPHA2 GLOB 24H UR QL ELPH: DETECTED
B-GLOBULIN UR QL ELPH: DETECTED
COLLECT DURATION TIME SPEC: ABNORMAL H
GAMMA GLOB UR QL ELPH: DETECTED
INTERPRETATION UR IFE-IMP: ABNORMAL
KAPPA LC FREE UR-MCNC: 0.6 MG/DL (ref 0.14–2.42)
KAPPA LC FREE/LAMBDA FREE UR: 20 RATIO (ref 2.04–10.37)
LAMBDA LC FREE UR-MCNC: 0.03 MG/DL (ref 0.02–0.67)
PROT 24H UR-MRATE: ABNORMAL MG/D (ref 10–140)
TOTAL VOLUME 1105: ABNORMAL ML

## 2018-09-20 ENCOUNTER — OUTPATIENT INFUSION SERVICES (OUTPATIENT)
Dept: ONCOLOGY | Facility: MEDICAL CENTER | Age: 73
End: 2018-09-20
Attending: INTERNAL MEDICINE
Payer: MEDICARE

## 2018-09-20 VITALS
BODY MASS INDEX: 19.22 KG/M2 | HEIGHT: 63 IN | DIASTOLIC BLOOD PRESSURE: 64 MMHG | TEMPERATURE: 99.2 F | WEIGHT: 108.47 LBS | HEART RATE: 101 BPM | OXYGEN SATURATION: 91 % | SYSTOLIC BLOOD PRESSURE: 101 MMHG | RESPIRATION RATE: 18 BRPM

## 2018-09-20 DIAGNOSIS — D83.0 COMMON VARIABLE IMMUNODEFICIENCY WITH PREDOMINANT ABNORMALITIES OF B-CELL NUMBERS AND FUNCTION (HCC): ICD-10-CM

## 2018-09-20 LAB
ALBUMIN SERPL-MCNC: 4.56 G/DL (ref 3.75–5.01)
ALPHA1 GLOB SERPL ELPH-MCNC: 0.37 G/DL (ref 0.19–0.46)
ALPHA2 GLOB SERPL ELPH-MCNC: 0.87 G/DL (ref 0.48–1.05)
B-GLOBULIN SERPL ELPH-MCNC: 0.88 G/DL (ref 0.48–1.1)
EER PROT ELECT SER Q1092: NORMAL
GAMMA GLOB SERPL ELPH-MCNC: 1.12 G/DL (ref 0.62–1.51)
INTERPRETATION SERPL IFE-IMP: NORMAL
PROT SERPL-MCNC: 7.8 G/DL (ref 6–8.3)

## 2018-09-20 PROCEDURE — 700105 HCHG RX REV CODE 258: Performed by: INTERNAL MEDICINE

## 2018-09-20 PROCEDURE — 96360 HYDRATION IV INFUSION INIT: CPT

## 2018-09-20 RX ORDER — SODIUM CHLORIDE 9 MG/ML
500 INJECTION, SOLUTION INTRAVENOUS ONCE
Status: CANCELLED | OUTPATIENT
Start: 2018-09-20 | End: 2018-09-20

## 2018-09-20 RX ORDER — SODIUM CHLORIDE 9 MG/ML
500 INJECTION, SOLUTION INTRAVENOUS ONCE
Status: COMPLETED | OUTPATIENT
Start: 2018-09-20 | End: 2018-09-20

## 2018-09-20 RX ADMIN — SODIUM CHLORIDE 500 ML: 9 INJECTION, SOLUTION INTRAVENOUS at 14:55

## 2018-09-20 ASSESSMENT — PAIN SCALES - GENERAL: PAINLEVEL_OUTOF10: 3

## 2018-09-21 NOTE — PROGRESS NOTES
Pt arrived ambulatory on oxygen to [facility] for scheduled hydration. Pt states she only wants to do 500mL and she would like it to be infused at the rate of 400mL/hr. PIV established in L forearm, brisk blood return noted, flushed appropriately. 500mL NS administered over 75 minutes. No issues noted. Pt confirmed follow up appointment. PIV flushed and removed after treatment, gauze dressing placed and coban wrap. Pt left ambulatory with oxygen.

## 2018-09-27 ENCOUNTER — APPOINTMENT (OUTPATIENT)
Dept: ONCOLOGY | Facility: MEDICAL CENTER | Age: 73
End: 2018-09-27
Attending: INTERNAL MEDICINE
Payer: MEDICARE

## 2018-09-30 ENCOUNTER — OUTPATIENT INFUSION SERVICES (OUTPATIENT)
Dept: ONCOLOGY | Facility: MEDICAL CENTER | Age: 73
End: 2018-09-30
Attending: INTERNAL MEDICINE
Payer: MEDICARE

## 2018-09-30 VITALS
OXYGEN SATURATION: 94 % | DIASTOLIC BLOOD PRESSURE: 47 MMHG | WEIGHT: 108.03 LBS | HEIGHT: 63 IN | RESPIRATION RATE: 18 BRPM | BODY MASS INDEX: 19.14 KG/M2 | HEART RATE: 105 BPM | TEMPERATURE: 98.1 F | SYSTOLIC BLOOD PRESSURE: 110 MMHG

## 2018-09-30 DIAGNOSIS — G93.32 CHRONIC FATIGUE SYNDROME: ICD-10-CM

## 2018-09-30 PROCEDURE — 96360 HYDRATION IV INFUSION INIT: CPT

## 2018-09-30 PROCEDURE — 700105 HCHG RX REV CODE 258: Performed by: INTERNAL MEDICINE

## 2018-09-30 RX ORDER — SODIUM CHLORIDE 9 MG/ML
1000 INJECTION, SOLUTION INTRAVENOUS ONCE
Status: COMPLETED | OUTPATIENT
Start: 2018-09-30 | End: 2018-09-30

## 2018-09-30 RX ADMIN — SODIUM CHLORIDE 500 ML: 9 INJECTION, SOLUTION INTRAVENOUS at 15:34

## 2018-09-30 ASSESSMENT — PAIN SCALES - GENERAL: PAINLEVEL_OUTOF10: 4

## 2018-09-30 NOTE — PROGRESS NOTES
Patient arrived ambulatory to the [facility] for Hydration. Reviewed vital signs, labs, and physician order. Patient denies S&S of infection, or bleeding. IV access established in left forearm by Jessica BENITO, visualized brisk blood return. Hydration administered at rate of 400ml/hr for volume of 500cc's per pt request, no adverse reaction observed. IV flushed per protocol, catheter removed with tip intact, gauze and coban dressing placed. Confirmed upcoming appointment date and time with patient. Patient left the [facility] ambulatory in no sign of distress.

## 2018-10-04 ENCOUNTER — NON-PROVIDER VISIT (OUTPATIENT)
Dept: NEUROLOGY | Facility: MEDICAL CENTER | Age: 73
End: 2018-10-04
Payer: MEDICARE

## 2018-10-04 DIAGNOSIS — R20.2 PARESTHESIA: ICD-10-CM

## 2018-10-04 PROCEDURE — 95886 MUSC TEST DONE W/N TEST COMP: CPT | Performed by: PSYCHIATRY & NEUROLOGY

## 2018-10-04 PROCEDURE — 95911 NRV CNDJ TEST 9-10 STUDIES: CPT | Performed by: PSYCHIATRY & NEUROLOGY

## 2018-10-04 NOTE — PROCEDURES
NERVE CONDUCTION STUDIES AND ELECTROMYOGRAPHY REPORT        10/04/18      Referring provider: Dr Anaya      SUMMARY OF PATIENT'S CLINICAL HISTORY,PHYSICAL EXAM, AND RATIONALE FOR TESTING:    Ms. Deb Vega 72 y.o. presenting with paresthesia in the feet    Past Medical History is significant for :   Past Medical History:   Diagnosis Date   • Allergy    • Anxiety    • ASTHMA    • Back pain    • Bladder infection    • Bronchitis    • Cellulitis     (L) elbow    • Chronic use of benzodiazepine for therapeutic purpose 9/4/2017   • COPD    • COPD (chronic obstructive pulmonary disease) ([facility]) 3/1/2013   • H/O blood transfusion reaction    • History of measles    • History of mumps    • Hives    • Hyperlipidemia    • Immunologic deficiency syndrome ([facility]) 3/1/2013   • Lyme disease 3/1/2013   • Mononucleosis    • Pneumonia    • Tachycardia 7/15/2015   • Tachycardia 7/15/2015         The electrodiagnostic studies were performed to evaluate for possible neuropathy        ELECTRODIAGNOSTIC EXAMINATION:  Nerve conduction studies (NCS) and electromyography (EMG) are utilized to evaluate direct or indirect damage to the peripheral nervous system. NCS are performed to measure the nerve(s) response(s) to electrostimulation across a given nerve segment. EMG evaluates the passive and active electrical activity of the muscle(s) in question.  Muscles are innervated by specific peripheral nerves and roots. Often times, several nerves the muscle to be examined in order to determine the presence or absence of the disease process. Furthermore, nerves and muscles may need to be tested in a [id number] comparison, as well as in additional extremities, as this may be crucial in characterizing the extent of the disease process, which may be diffuse or isolated and of varying degree of severity. The extent of the neurodiagnostic exam is justified as it may help arrive to a proper diagnosis, which ultimately may contribute to better  management of the patient. Therefore, the nerves to muscles examined during the study were medically necessary.    Unless otherwise noted, temperature of the extremity(s) study was monitored before and during the examination and remained between 32 and 36 degrees C for the upper extremities, and between 30 and 36 degrees C for the lower extremities.      Patient: Deb Vega : 1945 Physician: Dr. Castorena   Sex: Male Height:  cm Ref Phys: Dr. Anaya   ID#: [id number] Weight:  lbs. Technician: Niesha Bass   CSN#: [id number]           Nerve Conduction Studies     Stim Site NR Peak (ms) Norm Peak (ms) O-P Amp (µV) Norm O-P Amp Site1 Site2 Delta-P (ms) Dist (cm) Maximilian (m/s) Norm Maximilian (m/s)   Left Median Anti Sensory (2nd Digit)   Wrist    3.8 <3.8 55.3 >10 Wrist 2nd Digit 3.8 14.0 *37 >50   Left Sural Anti Sensory (Lat Mall)   Calf *NR    >3 Calf Lat Mall  14.0  >40   Right Sural Anti Sensory (Lat Mall)   Calf *NR    >3 Calf Lat Mall  14.0  >40   Left Ulnar Anti Sensory (5th Digit)   Wrist    *4.3 <3.2 43.4 >5 Wrist 5th Digit 4.3 14.0 *33 >50        Stim Site NR Onset (ms) Norm Onset (ms) O-P Amp (mV) Norm O-P Amp Site1 Site2 Delta-0 (ms) Dist (cm) Maximilian (m/s) Norm Maximilian (m/s)   Left Median Motor (Abd Poll Brev)   Wrist    3.4 <4 10.6 >5 Elbow Wrist 3.8 22.0 58 >50   Elbow    7.2  9.1          Left Peroneal EDB Motor (Ext Dig Brev)   Ankle    4.3 <6 *0.6 >2.5 B Fib Ankle 6.9 31.0 45 >40   B Fib    11.2  0.5  Poplt B Fib  0.0     Poplt *NR             Right Peroneal EDB Motor (Ext Dig Brev)   Ankle *NR  <6  >2.5 B Fib Ankle  0.0  >40   B Fib *NR             Left Tibial Motor (Abd Holm Brev)   Ankle    4.3 <6 11.9 >4 Knee Ankle 7.8 38.0 49 >40   Knee    12.1  6.7          Right Tibial Motor (Abd Holm Brev)   Ankle    4.5 <6 11.6 >4 Knee Ankle 6.4 34.0 53 >40   Knee    10.9  9.6          Left Ulnar Motor (Abd Dig Min)   Wrist    *3.3 <3.1 9.7 >7 B Elbow Wrist 2.8 17.0 61 >50   B Elbow    6.1  9.1  A Elbow B Elbow 2.0  10.0 50    A Elbow    8.1  8.7            F Wave Studies     NR F-Lat (ms) Lat Norm (ms)   Left Median (Abd Poll Brev)      24.38 <31   Left Peroneal EDB (Ext Dig Brev)      *62.30 <57   Left Tibial (Abd Hallucis)      50.47 <57   Right Tibial (Abd Hallucis)      52.03 <57   Left Ulnar (Abd Dig Min)      26.17 <32                                            Electromyography     Side Muscle Nerve Root Ins Act Fibs Psw Amp Dur Poly Recrt Int Pat Comment   Left VastusLat Femoral L2-4 Nml Nml Nml Nml Nml 0 Nml Nml    Left AntTibialis Dp Br Fibular L4-5 Nml Nml Nml Nml Nml 0 Nml Nml    Left Gastroc Tibial S1-2 Nml Nml Nml Nml Nml 0 Nml Nml    Left Ext Dig Brev Dp Br Fibular L5, S1 - - - - - - - -    Left VastusMed Femoral L2-4 Nml Nml Nml Nml Nml 0 Nml Nml                 NERVE CONDUCTION STUDIES:  Sensory nerves:   - Leftl Median sensory nerves were examined.The responses were within normal limits.  - Left Ulnar sensory nerves were examined. The responses were within normal limits.  - Bilateral Sural nerves were tested. The responses were absent.    Motor nerves:   - left Median motor nerves were examined. Recording electrodes placed at the Abductor Pollicis Brevis muscles. The responses were within normal limits.  - left Ulnar motor nerves were examined. Recording electrodes placed at the Abductor Digiti Minimi muscles. The responses were within normal limits.  - Bilateral Tibial nerves were examined. Recording electrodes placed at the Abductor     Hallucis muscles. The responses were within normal limits.  - Bilateral Deep Peroneal motor nerves were examined. Absent right peroneal and reduced CMAP in left peroneal  No temporal dispersion or conduction block observed in any of the motor nerves examined.    LATE RESPONSES:  F waves were assessed in the following nerves: left peroneal F is prolonged        ELECTROMYOGRAPHY:  The study was performed the concentric needle electrode. Fibrillation and fasciculation activity  is graded by convention from none (0) to continuous (4+).  Needle electrode examination was performed in the following muscles in the table-- nearly absent MUP in the EDB  The muscles tested demonstrated normal inserctional activity, normal motor unit morphology and recruitment. There were no elements suggestive of active denervation.    The patient tolerated testing well, without any complications.           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 Instant pain behind the knees in the night  2. Reflexes mild increased  3. Spasm and Pain up to the back    Superimposed upper motor neuron signs are possible too-- such as spine and brain  Nutritional deficiency remains possible    Hx of chronic nausea  Raynaud phenomenon  Weight loss    ________________________________________________________________________    Nerve conduction studies and EMG studies are useful supportive diagnostic tool but not confirmatory tests in majority of cases. Clinical Correlation is advised  ________________________________________________________________________          Laurel Castorena M.D.  NEUROLOGIST  [facility]  10:01 PM10/04/18

## 2018-10-04 NOTE — PROGRESS NOTES
NERVE CONDUCTION STUDIES AND ELECTROMYOGRAPHY REPORT        10/04/18      Referring provider: Dr Anaya      SUMMARY OF PATIENT'S CLINICAL HISTORY,PHYSICAL EXAM, AND RATIONALE FOR TESTING:    Ms. Deb Vega 72 y.o. presenting with paresthesia in the feet    Past Medical History is significant for :   Past Medical History:   Diagnosis Date   • Allergy    • Anxiety    • ASTHMA    • Back pain    • Bladder infection    • Bronchitis    • Cellulitis     (L) elbow    • Chronic use of benzodiazepine for therapeutic purpose 9/4/2017   • COPD    • COPD (chronic obstructive pulmonary disease) ([facility]) 3/1/2013   • H/O blood transfusion reaction    • History of measles    • History of mumps    • Hives    • Hyperlipidemia    • Immunologic deficiency syndrome ([facility]) 3/1/2013   • Lyme disease 3/1/2013   • Mononucleosis    • Pneumonia    • Tachycardia 7/15/2015   • Tachycardia 7/15/2015         The electrodiagnostic studies were performed to evaluate for possible neuropathy        ELECTRODIAGNOSTIC EXAMINATION:  Nerve conduction studies (NCS) and electromyography (EMG) are utilized to evaluate direct or indirect damage to the peripheral nervous system. NCS are performed to measure the nerve(s) response(s) to electrostimulation across a given nerve segment. EMG evaluates the passive and active electrical activity of the muscle(s) in question.  Muscles are innervated by specific peripheral nerves and roots. Often times, several nerves the muscle to be examined in order to determine the presence or absence of the disease process. Furthermore, nerves and muscles may need to be tested in a [id number] comparison, as well as in additional extremities, as this may be crucial in characterizing the extent of the disease process, which may be diffuse or isolated and of varying degree of severity. The extent of the neurodiagnostic exam is justified as it may help arrive to a proper diagnosis, which ultimately may contribute to better  management of the patient. Therefore, the nerves to muscles examined during the study were medically necessary.    Unless otherwise noted, temperature of the extremity(s) study was monitored before and during the examination and remained between 32 and 36 degrees C for the upper extremities, and between 30 and 36 degrees C for the lower extremities.      Patient: Deb Vega : 1945 Physician: Dr. Castorena   Sex: Male Height:  cm Ref Phys: Dr. Anaya   ID#: [id number] Weight:  lbs. Technician: Niesha Bass   CSN#: [id number]           Nerve Conduction Studies     Stim Site NR Peak (ms) Norm Peak (ms) O-P Amp (µV) Norm O-P Amp Site1 Site2 Delta-P (ms) Dist (cm) Maximilian (m/s) Norm Maximilian (m/s)   Left Median Anti Sensory (2nd Digit)   Wrist    3.8 <3.8 55.3 >10 Wrist 2nd Digit 3.8 14.0 *37 >50   Left Sural Anti Sensory (Lat Mall)   Calf *NR    >3 Calf Lat Mall  14.0  >40   Right Sural Anti Sensory (Lat Mall)   Calf *NR    >3 Calf Lat Mall  14.0  >40   Left Ulnar Anti Sensory (5th Digit)   Wrist    *4.3 <3.2 43.4 >5 Wrist 5th Digit 4.3 14.0 *33 >50        Stim Site NR Onset (ms) Norm Onset (ms) O-P Amp (mV) Norm O-P Amp Site1 Site2 Delta-0 (ms) Dist (cm) Maximilian (m/s) Norm Maximilian (m/s)   Left Median Motor (Abd Poll Brev)   Wrist    3.4 <4 10.6 >5 Elbow Wrist 3.8 22.0 58 >50   Elbow    7.2  9.1          Left Peroneal EDB Motor (Ext Dig Brev)   Ankle    4.3 <6 *0.6 >2.5 B Fib Ankle 6.9 31.0 45 >40   B Fib    11.2  0.5  Poplt B Fib  0.0     Poplt *NR             Right Peroneal EDB Motor (Ext Dig Brev)   Ankle *NR  <6  >2.5 B Fib Ankle  0.0  >40   B Fib *NR             Left Tibial Motor (Abd Holm Brev)   Ankle    4.3 <6 11.9 >4 Knee Ankle 7.8 38.0 49 >40   Knee    12.1  6.7          Right Tibial Motor (Abd Holm Brev)   Ankle    4.5 <6 11.6 >4 Knee Ankle 6.4 34.0 53 >40   Knee    10.9  9.6          Left Ulnar Motor (Abd Dig Min)   Wrist    *3.3 <3.1 9.7 >7 B Elbow Wrist 2.8 17.0 61 >50   B Elbow    6.1  9.1  A Elbow B Elbow 2.0  10.0 50    A Elbow    8.1  8.7            F Wave Studies     NR F-Lat (ms) Lat Norm (ms)   Left Median (Abd Poll Brev)      24.38 <31   Left Peroneal EDB (Ext Dig Brev)      *62.30 <57   Left Tibial (Abd Hallucis)      50.47 <57   Right Tibial (Abd Hallucis)      52.03 <57   Left Ulnar (Abd Dig Min)      26.17 <32                                            Electromyography     Side Muscle Nerve Root Ins Act Fibs Psw Amp Dur Poly Recrt Int Pat Comment   Left VastusLat Femoral L2-4 Nml Nml Nml Nml Nml 0 Nml Nml    Left AntTibialis Dp Br Fibular L4-5 Nml Nml Nml Nml Nml 0 Nml Nml    Left Gastroc Tibial S1-2 Nml Nml Nml Nml Nml 0 Nml Nml    Left Ext Dig Brev Dp Br Fibular L5, S1 - - - - - - - -    Left VastusMed Femoral L2-4 Nml Nml Nml Nml Nml 0 Nml Nml                 NERVE CONDUCTION STUDIES:  Sensory nerves:   - Leftl Median sensory nerves were examined.The responses were within normal limits.  - Left Ulnar sensory nerves were examined. The responses were within normal limits.  - Bilateral Sural nerves were tested. The responses were absent.    Motor nerves:   - left Median motor nerves were examined. Recording electrodes placed at the Abductor Pollicis Brevis muscles. The responses were within normal limits.  - left Ulnar motor nerves were examined. Recording electrodes placed at the Abductor Digiti Minimi muscles. The responses were within normal limits.  - Bilateral Tibial nerves were examined. Recording electrodes placed at the Abductor     Hallucis muscles. The responses were within normal limits.  - Bilateral Deep Peroneal motor nerves were examined. Absent right peroneal and reduced CMAP in left peroneal  No temporal dispersion or conduction block observed in any of the motor nerves examined.    LATE RESPONSES:  F waves were assessed in the following nerves: left peroneal F is prolonged        ELECTROMYOGRAPHY:  The study was performed the concentric needle electrode. Fibrillation and fasciculation activity  is graded by convention from none (0) to continuous (4+).  Needle electrode examination was performed in the following muscles in the table-- nearly absent MUP in the EDB  The muscles tested demonstrated normal inserctional activity, normal motor unit morphology and recruitment. There were no elements suggestive of active denervation.    The patient tolerated testing well, without any complications.           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 Instant pain behind the knees in the night  2. Reflexes mild increased  3. Spasm and Pain up to the back    Superimposed upper motor neuron signs are possible too-- such as spine and brain  Nutritional deficiency remains possible    Hx of chronic nausea  Raynaud phenomenon  Weight loss    ________________________________________________________________________    Nerve conduction studies and EMG studies are useful supportive diagnostic tool but not confirmatory tests in majority of cases. Clinical Correlation is advised  ________________________________________________________________________          Laurel Castorena M.D.  NEUROLOGIST  [facility]  10:01 PM10/04/18

## 2018-10-11 ENCOUNTER — OFFICE VISIT (OUTPATIENT)
Dept: NEUROLOGY | Facility: MEDICAL CENTER | Age: 73
End: 2018-10-11
Payer: MEDICARE

## 2018-10-11 VITALS
HEART RATE: 90 BPM | SYSTOLIC BLOOD PRESSURE: 100 MMHG | TEMPERATURE: 98.5 F | WEIGHT: 107.5 LBS | DIASTOLIC BLOOD PRESSURE: 60 MMHG | OXYGEN SATURATION: 89 % | BODY MASS INDEX: 19.78 KG/M2 | HEIGHT: 62 IN

## 2018-10-11 DIAGNOSIS — G60.8 PERIPHERAL SENSORY-MOTOR AXONAL POLYNEUROPATHY: Primary | ICD-10-CM

## 2018-10-11 PROCEDURE — 99213 OFFICE O/P EST LOW 20 MIN: CPT | Performed by: PSYCHIATRY & NEUROLOGY

## 2018-10-11 ASSESSMENT — ENCOUNTER SYMPTOMS
SENSORY CHANGE: 1
MEMORY LOSS: 0
TINGLING: 1
FOCAL WEAKNESS: 0
FALLS: 0
TREMORS: 0

## 2018-10-11 NOTE — PROGRESS NOTES
"Subjective:      Deb eVga is a 72 y.o. female who presents for follow-up with a history of painful paresthesias and dysesthesias.    HPI    Since seen in May of this year, Deb states that the painful symptoms in the feet have remained essentially unchanged. It limits her ability to stand or even walk for short periods of time before she has to sit down. She also gets the cold sensations that seemed to start in the feet and then ascended quickly, this too can be quite distracting and disabling. The cold symptoms seem to occur on a more random, episodic basis. The painful dysesthesias are constant. When at their worst, she feels as if the legs give out on her, she has to sit down because of this \"weakness\". She can walk otherwise with little deficit other than that related to the pain. She denies any involvement of the hands in similar fashion.    Symptomatically, I had wanted to try very low-dose Neurontin 100 mg, 3 times a day, keeping in mind her incredible sensitivity to medication side effects. She still felt uncomfortable, so never tried the drug though she picked the prescription up.    She did undergo EMG/NCV studies one week ago, revealing a mixed sensory motor, axonal polyneuropathy. So far blood work has included B-12 and folate levels, SPEP, immune markers, all of which from this standpoint were unremarkable.    Medical, surgical and family histories are reviewed in electronic health record, there are no new drug allergies. There are no medical diagnoses documented. She remains on Pravachol, Klonopin, Singulair, baby aspirin daily, vitamin D and calcium supplement daily, her several inhalers as well as oxygen therapy, and the rest as per the electronic health record, noncontributory from my standpoint.    Review of Systems   Musculoskeletal: Negative for falls.   Neurological: Positive for tingling and sensory change. Negative for tremors and focal weakness.   Psychiatric/Behavioral: Negative for " "memory loss.   All other systems reviewed and are negative.       Objective:     /60 (BP Location: Left arm, Patient Position: Sitting)   Pulse 90   Temp 36.9 °C (98.5 °F) (Temporal)   Ht 1.575 m (5' 2\")   Wt 48.8 kg (107 lb 8 oz)   SpO2 89% Comment: 94%  BMI 19.66 kg/m²      Physical Exam    She appears in no acute distress. Vital signs are stable. There is no malar rash. The neck is supple, range of motion is full. Cardiac evaluation is unremarkable. Distal pulses are intact, capillary refill is intact.    Neurologic examination was unchanged from that of 4 months ago. Strength is intact throughout, tone is normal, reflexes are very brisk, there are no asymmetries, both toes are downgoing. She walks with minimal antalgic quality, station is normal. Sensory exam remains intact.     Assessment/Plan:     1. 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 Additional blood tests should be ordered looking for autoimmune and paraneoplastic markers, though I'm doubtful we will find something significant. We are still left with symptomatic relief, regardless, the we will call her if there is abnormality that suggests a treatable disease state. I reassured her about the use of gabapentin as a relatively safe drug, probably one of the safest available when he comes to neuropathic pain symptoms. She was reassured she can always stop the drug quickly if the side effect were to happen, she does not need to worry about withdrawal.    Again, Neurontin 100 mg, 3 times a day, for 2 weeks, then increase to 200 mg, 3 times a day. Side effects were reviewed. Phone contact in the interim.    As for the Raynaud's phenomena, this certainly would explain some of the coldness that she experiences, I don't think the neuropathy itself is a vascular issue, I think rather the Raynaud's is related to an independent " vascular process that can make neuropathic symptoms worse. Calcium channel blockers can be used, but I would like to avoid starting two medications at once. The rationale for this was reviewed. We will follow-up otherwise in 6 months, phone contact in the interim.    - ASIALOGM1 ANTIBODY GABBY; Future  - MAG ANTIBODY SERUM; Future  - CRYOGLOBULIN QL SERUM RFLX; Future  - HEAVY METALS BLOOD; Future  - VITAMIN E; Future  - VITAMIN A; Future    Time: 20 minutes spent face-to-face for exam, review, discussion, and education, of this over 70% of the time spent counseling and coordination care surrounding all of the above issues.

## 2018-10-14 ENCOUNTER — OUTPATIENT INFUSION SERVICES (OUTPATIENT)
Dept: ONCOLOGY | Facility: MEDICAL CENTER | Age: 73
End: 2018-10-14
Attending: INTERNAL MEDICINE
Payer: MEDICARE

## 2018-10-14 VITALS
HEART RATE: 98 BPM | DIASTOLIC BLOOD PRESSURE: 54 MMHG | BODY MASS INDEX: 19.1 KG/M2 | OXYGEN SATURATION: 92 % | RESPIRATION RATE: 18 BRPM | SYSTOLIC BLOOD PRESSURE: 103 MMHG | WEIGHT: 107.81 LBS | HEIGHT: 63 IN | TEMPERATURE: 98.7 F

## 2018-10-14 DIAGNOSIS — E78.5 HYPERLIPIDEMIA WITH TARGET LDL LESS THAN 100: Chronic | ICD-10-CM

## 2018-10-14 DIAGNOSIS — G60.8 PERIPHERAL SENSORY-MOTOR AXONAL POLYNEUROPATHY: ICD-10-CM

## 2018-10-14 LAB
CHOLEST SERPL-MCNC: 204 MG/DL (ref 100–199)
HDLC SERPL-MCNC: 92 MG/DL
LDLC SERPL CALC-MCNC: 100 MG/DL
TRIGL SERPL-MCNC: 60 MG/DL (ref 0–149)

## 2018-10-14 PROCEDURE — 82595 ASSAY OF CRYOGLOBULIN: CPT

## 2018-10-14 PROCEDURE — 83516 IMMUNOASSAY NONANTIBODY: CPT | Mod: 91

## 2018-10-14 PROCEDURE — 700105 HCHG RX REV CODE 258: Performed by: INTERNAL MEDICINE

## 2018-10-14 PROCEDURE — 96360 HYDRATION IV INFUSION INIT: CPT

## 2018-10-14 PROCEDURE — 80061 LIPID PANEL: CPT

## 2018-10-14 PROCEDURE — 82300 ASSAY OF CADMIUM: CPT

## 2018-10-14 PROCEDURE — 83825 ASSAY OF MERCURY: CPT

## 2018-10-14 PROCEDURE — 82175 ASSAY OF ARSENIC: CPT

## 2018-10-14 PROCEDURE — 84446 ASSAY OF VITAMIN E: CPT

## 2018-10-14 PROCEDURE — 83655 ASSAY OF LEAD: CPT

## 2018-10-14 PROCEDURE — 96361 HYDRATE IV INFUSION ADD-ON: CPT

## 2018-10-14 PROCEDURE — 84590 ASSAY OF VITAMIN A: CPT

## 2018-10-14 RX ORDER — SODIUM CHLORIDE 9 MG/ML
500 INJECTION, SOLUTION INTRAVENOUS ONCE
Status: COMPLETED | OUTPATIENT
Start: 2018-10-14 | End: 2018-10-14

## 2018-10-14 RX ADMIN — SODIUM CHLORIDE 500 ML: 9 INJECTION, SOLUTION INTRAVENOUS at 15:41

## 2018-10-14 ASSESSMENT — PAIN SCALES - GENERAL: PAINLEVEL_OUTOF10: 4

## 2018-10-14 NOTE — PROGRESS NOTES
Pt presents for hydration with no new complaints. Pt arrives with lab slip from Dr Anaya and has been fasting.  PIV inserted after multiple attempts with brisk blood return. Labs drawn as ordered.  Hydration given at 400ml/hr for volume of 500cc per patient preference and tolerated well. PIV flushed and removed; gauze and coban to site.  No sign of adverse effect.  Pt stable and ambulatory at discharge.  Plan to return in two weeks.

## 2018-10-17 LAB
ARSENIC BLD-MCNC: <10 UG/L (ref 0–13)
CADMIUM BLD-MCNC: <1 UG/L (ref 0–5)
GD1A GANGL AB SER IA-ACNC: 3 IV (ref 0–50)
GD1B GANGL AB SER IA-ACNC: 8 IV (ref 0–50)
GM1 ASIALO AB SER IA-ACNC: NORMAL (ref 0–50)
GM1 GANGL IGG+IGM SER QL IA: 3 IV (ref 0–50)
GM2 GANGL IGG+IGM SER QL IA: 5 IV (ref 0–50)
GQ1B GANGL AB SER-ACNC: 1 IV (ref 0–50)
LEAD BLDV-MCNC: <2 UG/DL (ref 0–4.9)
MERCURY BLD-MCNC: <3 UG/L (ref 0–10)

## 2018-10-18 ENCOUNTER — APPOINTMENT (OUTPATIENT)
Dept: ONCOLOGY | Facility: MEDICAL CENTER | Age: 73
End: 2018-10-18
Attending: INTERNAL MEDICINE
Payer: MEDICARE

## 2018-10-18 LAB
A-TOCOPHEROL VIT E SERPL-MCNC: 13.8 MG/L (ref 5.5–18)
ANNOTATION COMMENT IMP: NORMAL
BETA+GAMMA TOCOPHEROL SERPL-MCNC: 1.8 MG/L (ref 0–6)
MAG IGM SER IA-ACNC: 0 TU (ref 0–999)
RETINYL PALMITATE SERPL-MCNC: 0.02 MG/L (ref 0–0.1)
VIT A SERPL-MCNC: 0.47 MG/L (ref 0.3–1.2)

## 2018-10-20 LAB — CRYOGLOB SER QL 3D COLD INC: NORMAL

## 2018-10-25 ENCOUNTER — APPOINTMENT (OUTPATIENT)
Dept: ONCOLOGY | Facility: MEDICAL CENTER | Age: 73
End: 2018-10-25
Attending: INTERNAL MEDICINE
Payer: MEDICARE

## 2018-10-28 ENCOUNTER — OUTPATIENT INFUSION SERVICES (OUTPATIENT)
Dept: ONCOLOGY | Facility: MEDICAL CENTER | Age: 73
End: 2018-10-28
Attending: INTERNAL MEDICINE
Payer: MEDICARE

## 2018-10-28 VITALS
DIASTOLIC BLOOD PRESSURE: 59 MMHG | WEIGHT: 106.26 LBS | HEART RATE: 122 BPM | TEMPERATURE: 98.9 F | OXYGEN SATURATION: 90 % | HEIGHT: 63 IN | BODY MASS INDEX: 18.83 KG/M2 | RESPIRATION RATE: 18 BRPM | SYSTOLIC BLOOD PRESSURE: 103 MMHG

## 2018-10-28 DIAGNOSIS — G93.32 CHRONIC FATIGUE SYNDROME: ICD-10-CM

## 2018-10-28 PROCEDURE — 96360 HYDRATION IV INFUSION INIT: CPT

## 2018-10-28 PROCEDURE — 700105 HCHG RX REV CODE 258: Performed by: INTERNAL MEDICINE

## 2018-10-28 RX ORDER — SODIUM CHLORIDE 9 MG/ML
1000 INJECTION, SOLUTION INTRAVENOUS ONCE
Status: COMPLETED | OUTPATIENT
Start: 2018-10-28 | End: 2018-10-28

## 2018-10-28 RX ADMIN — SODIUM CHLORIDE 500 ML: 9 INJECTION, SOLUTION INTRAVENOUS at 15:18

## 2018-10-28 ASSESSMENT — PAIN SCALES - GENERAL: PAINLEVEL_OUTOF10: 4

## 2018-10-28 NOTE — PROGRESS NOTES
Pt presents today for hydration, offers no new complaints, mainly fatigue and general body aches.   PIV started in left AC 24G positive blood return noted. 500 cc NS infused at rate of 400 ml/hr per pt request. PIV flushed and d/c'd. Pt will return to clinic on 11/11 for hydration.

## 2018-11-11 ENCOUNTER — OUTPATIENT INFUSION SERVICES (OUTPATIENT)
Dept: ONCOLOGY | Facility: MEDICAL CENTER | Age: 73
End: 2018-11-11
Attending: INTERNAL MEDICINE
Payer: MEDICARE

## 2018-11-11 VITALS
WEIGHT: 106.7 LBS | HEART RATE: 98 BPM | RESPIRATION RATE: 19 BRPM | DIASTOLIC BLOOD PRESSURE: 64 MMHG | TEMPERATURE: 99.4 F | SYSTOLIC BLOOD PRESSURE: 107 MMHG | BODY MASS INDEX: 18.91 KG/M2 | HEIGHT: 63 IN | OXYGEN SATURATION: 100 %

## 2018-11-11 DIAGNOSIS — G93.32 CHRONIC FATIGUE SYNDROME: ICD-10-CM

## 2018-11-11 PROCEDURE — 700105 HCHG RX REV CODE 258: Performed by: INTERNAL MEDICINE

## 2018-11-11 PROCEDURE — 96360 HYDRATION IV INFUSION INIT: CPT

## 2018-11-11 RX ORDER — SODIUM CHLORIDE 9 MG/ML
500 INJECTION, SOLUTION INTRAVENOUS CONTINUOUS
Status: DISCONTINUED | OUTPATIENT
Start: 2018-11-11 | End: 2018-11-11 | Stop reason: HOSPADM

## 2018-11-11 RX ADMIN — SODIUM CHLORIDE 500 ML: 9 INJECTION, SOLUTION INTRAVENOUS at 15:23

## 2018-11-11 ASSESSMENT — PAIN SCALES - GENERAL: PAINLEVEL_OUTOF10: 4

## 2018-11-13 ENCOUNTER — OFFICE VISIT (OUTPATIENT)
Dept: MEDICAL GROUP | Facility: LAB | Age: 73
End: 2018-11-13
Payer: MEDICARE

## 2018-11-13 VITALS
WEIGHT: 108 LBS | SYSTOLIC BLOOD PRESSURE: 118 MMHG | TEMPERATURE: 98.9 F | BODY MASS INDEX: 18.44 KG/M2 | RESPIRATION RATE: 14 BRPM | DIASTOLIC BLOOD PRESSURE: 62 MMHG | HEIGHT: 64 IN | OXYGEN SATURATION: 93 % | HEART RATE: 106 BPM

## 2018-11-13 DIAGNOSIS — F17.200 SMOKING: Chronic | ICD-10-CM

## 2018-11-13 DIAGNOSIS — E78.5 DYSLIPIDEMIA: ICD-10-CM

## 2018-11-13 PROCEDURE — 99213 OFFICE O/P EST LOW 20 MIN: CPT | Performed by: INTERNAL MEDICINE

## 2018-11-14 NOTE — PROGRESS NOTES
CC: Deb Vega is a 73 y.o. female is suffering from   Chief Complaint   Patient presents with   • Follow-Up     6 months         SUBJECTIVE:  1. Smoking  Deb is here for follow-up, continues to smoke discussed the importance of stopping smoking.    2. Dyslipidemia  Patient with a history of dyslipidemia of recommended CT cardiac scoring after reviewing the patient's previous lung CT patient has extensive cardiac calcification.        Past social, family, history:   Social History   Substance Use Topics   • Smoking status: Current Some Day Smoker     Packs/day: 0.25     Years: 55.00     Types: Cigarettes   • Smokeless tobacco: Never Used      Comment: patient smokes 0 to 5 cigarettes a day    • Alcohol use No      Comment: patient states she has not had a drink in 2 years 10/09/2015         MEDICATIONS:    Current Outpatient Prescriptions:   •  montelukast (SINGULAIR) 10 MG Tab, TAKE 1 TABLET BY MOUTH EVERY DAY, Disp: 90 Tab, Rfl: 3  •  SPIRIVA HANDIHALER 18 MCG Cap, INHALE THE CONTENTS OF 1 CAPSULE VIA INHALATION DEVICE AT THE SAME TIME EVERY DAY, Disp: 90 Cap, Rfl: 0  •  levalbuterol (XOPENEX) 1.25 MG/3ML Nebu Soln, 3 mL by Nebulization route every four hours as needed for Shortness of Breath., Disp: 120 mL, Rfl: 4  •  clonazePAM (KLONOPIN) 0.5 MG Tab, Take 1 Tab by mouth every bedtime for 180 days., Disp: 90 Tab, Rfl: 1  •  ALPRAZolam (XANAX) 0.25 MG Tab, Take 1 Tab by mouth 2 times a day as needed for Anxiety for up to 180 days., Disp: 180 Tab, Rfl: 1  •  budesonide-formoterol (SYMBICORT) 160-4.5 MCG/ACT Aerosol, Inhale 2 Puffs by mouth 2 Times a Day. Use spacer. Rinse mouth after each use., Disp: 1 Inhaler, Rfl: 5  •  desloratadine (CLARINEX) 5 MG tablet, Take 1 Tab by mouth every day., Disp: 90 Tab, Rfl: 3  •  NON SPECIFIED, (hydration orders)  please infuse 2 L NS per week. Infuse over one hour QT: 2 L REFILL: 56 fax [phone], Disp: 2 Each, Rfl: 56  •  XOPENEX HFA 45 MCG/ACT inhaler, INHALE  ONE TO TWO PUFFS BY MOUTH EVERY 4 HOURS AS NEEDED FOR SHORTNESS OF BREATH, Disp: 15 g, Rfl: 11  •  vitamin D (CHOLECALCIFEROL) 1000 UNIT Tab, Take 1,000 Units by mouth every day., Disp: , Rfl:   •  immune globulin (FLEBOGAMMA) 10 GM/100ML Solution, 5 g by Intravenous route Once. Indications: Pt now on 5 grams, Disp: , Rfl:   •  Oral Electrolytes (EMERGEN-C ELECTRO MIX PO), Take  by mouth every day., Disp: , Rfl:   •  docosahexanoic acid (OMEGA 3 FA) 1000 MG CAPS, Take 1,000 mg by mouth 2 Times a Day., Disp: , Rfl:   •  aspirin (ASA) 81 MG CHEW, Take 162 mg by mouth every day., Disp: , Rfl:   •  MAGNESIUM GLYCINATE, Take 480 mg by mouth., Disp: , Rfl:   •  pravastatin (PRAVACHOL) 40 MG tablet, TAKE 1 TABLET BY MOUTH EVERY DAY, Disp: 90 Tab, Rfl: 3  •  azithromycin (ZITHROMAX) 250 MG Tab, Take 2 tablets on day 1, then take 1 tablet a day for 4 days., Disp: 6 Tab, Rfl: 0  •  MethylPREDNISolone (MEDROL DOSEPAK) 4 MG Tablet Therapy Pack, Take as directed., Disp: 21 Tab, Rfl: 0  •  gabapentin (NEURONTIN) 100 MG Cap, Take 1 Cap by mouth 3 times a day. 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 ([facility]) 09/14/2018   • 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   • Chronic use of benzodiazepine for therapeutic purpose 09/04/2017   • Benzodiazepine dependence ([facility]) 08/29/2017   • Nocturnal hypoxemia 06/06/2017   • Supplemental oxygen dependent 02/07/2017   • Abnormal CT scan of lung 02/07/2017   • Tachycardia 07/15/2015   • Hypotension 07/02/2015   • COPD (chronic obstructive pulmonary disease) ([facility]) 03/01/2013   • Lyme disease 03/01/2013   • Immunologic deficiency syndrome  "([facility]) 03/01/2013   • Chronic fatigue syndrome 12/07/2012   • Hyperlipidemia with target LDL less than 100 06/06/2012   • Smoking 06/06/2012   • Anxiety 06/06/2012       REVIEW OF SYSTEMS:  Gen.:  No Nausea, Vomiting, fever, Chills.  Heart: No chest pain.  Lungs:  No shortness of Breath.  Psychological: Girish unusual Anxiety depression     PHYSICAL EXAM   Constitutional: Alert, cooperative, not in acute distress.  Cardiovascular:  Rate Rhythm is regular without murmurs rubs clicks.     Thorax & Lungs: Clear to auscultation, no wheezing, rhonchi, or rales  HENT: Normocephalic, Atraumatic.  Eyes: PERRLA, EOMI, Conjunctiva normal.   Neck: Trachia is midline no swelling of the thyroid.   Lymphatic: No lymphadenopathy noted.   Neurologic: Alert & oriented x 3, cranial nerves II through XII are intact, Normal motor function, Normal sensory function, No focal deficits noted.   Psychiatric: Affect normal, Judgment normal, Mood normal.     VITAL SIGNS:/62   Pulse (!) 106   Temp 37.2 °C (98.9 °F) (Temporal)   Resp 14   Ht 1.613 m (5' 3.5\")   Wt 49 kg (108 lb)   SpO2 93% Comment: pt is on 2 liters of oxygen  BMI 18.83 kg/m²     Labs: Reviewed    Assessment:                                                     Plan:    1. Smoking  Strongly encouraged patient to stop smoking    2. Dyslipidemia  CT cardiac scoring ordered  - CT-CARDIAC SCORING; Future        "

## 2018-11-16 ENCOUNTER — OFFICE VISIT (OUTPATIENT)
Dept: PULMONOLOGY | Facility: HOSPICE | Age: 73
End: 2018-11-16
Payer: MEDICARE

## 2018-11-16 VITALS
TEMPERATURE: 97.7 F | HEIGHT: 64 IN | WEIGHT: 107.4 LBS | SYSTOLIC BLOOD PRESSURE: 104 MMHG | HEART RATE: 101 BPM | DIASTOLIC BLOOD PRESSURE: 62 MMHG | OXYGEN SATURATION: 95 % | BODY MASS INDEX: 18.34 KG/M2 | RESPIRATION RATE: 16 BRPM

## 2018-11-16 DIAGNOSIS — J34.89 NASAL DRYNESS: ICD-10-CM

## 2018-11-16 DIAGNOSIS — J96.11 CHRONIC RESPIRATORY FAILURE WITH HYPOXIA (HCC): ICD-10-CM

## 2018-11-16 DIAGNOSIS — R06.5 MOUTH BREATHING: ICD-10-CM

## 2018-11-16 DIAGNOSIS — J32.9 CHRONIC CONGESTION OF PARANASAL SINUS: ICD-10-CM

## 2018-11-16 DIAGNOSIS — J43.9 PULMONARY EMPHYSEMA, UNSPECIFIED EMPHYSEMA TYPE (HCC): Chronic | ICD-10-CM

## 2018-11-16 DIAGNOSIS — F17.200 CURRENT SMOKER: ICD-10-CM

## 2018-11-16 DIAGNOSIS — D80.3 IGG DEFICIENCY (HCC): ICD-10-CM

## 2018-11-16 DIAGNOSIS — R93.89 ABNORMAL CT OF THE CHEST: ICD-10-CM

## 2018-11-16 DIAGNOSIS — R00.0 TACHYCARDIA: ICD-10-CM

## 2018-11-16 PROCEDURE — 99214 OFFICE O/P EST MOD 30 MIN: CPT | Performed by: NURSE PRACTITIONER

## 2018-11-16 RX ORDER — DESLORATADINE 5 MG/1
5 TABLET ORAL
COMMUNITY
Start: 2011-02-15 | End: 2019-04-09 | Stop reason: SDUPTHER

## 2018-11-16 RX ORDER — BUTALB/ACETAMINOPHEN/CAFFEINE 50-325-40
30 TABLET ORAL
COMMUNITY
End: 2021-06-15

## 2018-11-16 RX ORDER — TIOTROPIUM BROMIDE 18 UG/1
1 CAPSULE ORAL; RESPIRATORY (INHALATION)
COMMUNITY
Start: 2011-02-17 | End: 2019-05-24

## 2018-11-16 RX ORDER — MONTELUKAST SODIUM 10 MG/1
10 TABLET ORAL
COMMUNITY
Start: 2011-02-17 | End: 2019-10-07 | Stop reason: SDUPTHER

## 2018-11-16 RX ORDER — DESLORATADINE 5 MG/1
5 TABLET ORAL
COMMUNITY
Start: 2011-02-17 | End: 2021-04-19

## 2018-11-16 RX ORDER — PRAVASTATIN SODIUM 40 MG
40 TABLET ORAL
COMMUNITY
Start: 2011-02-16 | End: 2019-06-24

## 2018-11-16 RX ORDER — CLONAZEPAM 1 MG/1
1 TABLET ORAL
COMMUNITY
Start: 2010-07-08 | End: 2018-12-03

## 2018-11-16 RX ORDER — TIOTROPIUM BROMIDE 18 UG/1
CAPSULE ORAL; RESPIRATORY (INHALATION)
COMMUNITY
Start: 2010-11-16 | End: 2019-05-24

## 2018-11-17 NOTE — PROGRESS NOTES
Chief Complaint   Patient presents with   • Follow-Up     follow up       HPI:  Deb Vega is a 73 y.o. year old female here today for follow-up on Stage 3 COPD with chronic respiratory failure.  History of abnormal CT scan of chest.  History of chronic fatigue syndrome followed by Dr. Ion Gutierrez of [facility].  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  She declines influenza or Prevnar 13 vaccinations due to Dr. Gutierrez's recommendations.    PFTs 2017 indicated FEV1 0.90 L or 42%, FEV1/FVC ratio 40%, % predicted and a DLCO 52% predicted.  She is currently compliant with Symbicort 160/4.5 mcg 2 puffs twice daily, Spiriva HandiHaler once daily, nebulizer as needed and Xopenex HFA inhaler a couple times daily.  Previous alpha-1 testing was negative.  She notes steroids to make her heart race.  She has not required an antibiotic >6mos.    In regards to abnormal CT scan of chest, most recent CT May 22, 2018 was stable with no significant changes in a right lung base opacification measuring 18 x 6.8 mm.  This is been followed since 2016 and CT at that time indicated 2.0x0.6cm opacity in medial RLL.  PET scan was performed 2016 indicating no metabolic activity. She is due for f/u scan May 2019.     She previously underwent sleep testing due to chronic fatigue which did not indicate sleep disordered breathing but nocturnal hypoxemia.  She remains on 2 L oxygen 24/7.    Today she notes increased shortness of breath and chest heaviness since her dog  last week.  She she notes anxiety and grieving at this time.  She has chronic sinus issues with dryness and congestion.  She notes postnasal drip causing cough.  She produces clear/yellow phlegm.  She notes an occasional wheeze. She feels the nebulizer is causing her to have thrush but she has levalbuterol, I reviewed this with patient.  She has never been evaluated by an ENT.  She also notes leg cramps which  her PCP and urologist are evaluating.  She has not been evaluated for venous insufficiency.  BMI 18.    ROS: As per HPI and otherwise negative if not stated.    Past Medical History:   Diagnosis Date   • Allergy    • Anxiety    • ASTHMA    • Back pain    • Bladder infection    • Bronchitis    • Cellulitis     (L) elbow    • Chronic use of benzodiazepine for therapeutic purpose 9/4/2017   • COPD    • COPD (chronic obstructive pulmonary disease) ([facility]) 3/1/2013   • H/O blood transfusion reaction    • History of measles    • History of mumps    • Hives    • Hyperlipidemia    • Immunologic deficiency syndrome (HCC) 3/1/2013   • Lyme disease 3/1/2013   • Mononucleosis    • Pneumonia    • Tachycardia 7/15/2015   • Tachycardia 7/15/2015       Past Surgical History:   Procedure Laterality Date   • BREAST BIOPSY     • THYROID LOBECTOMY     • TONSILLECTOMY         Family History   Problem Relation Age of Onset   • Alcohol/Drug Mother    • Cancer Father    • Stroke Father    • Heart Disease Father 42       Social History     Social History   • Marital status:      Spouse name: N/A   • Number of children: N/A   • Years of education: N/A     Occupational History   • Not on file.     Social History Main Topics   • Smoking status: Current Some Day Smoker     Packs/day: 0.25     Years: 55.00     Types: Cigarettes   • Smokeless tobacco: Never Used      Comment: patient smokes 0 to 5 cigarettes a day    • Alcohol use No      Comment: patient states she has not had a drink in 2 years 10/09/2015   • Drug use: No   • Sexual activity: No     Other Topics Concern   • Not on file     Social History Narrative   • No narrative on file       Allergies as of 11/16/2018 - Reviewed 11/16/2018   Allergen Reaction Noted   • Gadolinium Anaphylaxis and Nausea 04/19/2012   • Influenza virus vaccine  10/03/2013   • Zyvox [linezolid] Rash 07/06/2015   • Cefuroxime Nausea 02/23/2015   • Epinephrine  09/08/2016   • Other drug  06/06/2012   • Other  "misc  11/06/2011   • Prednisone  09/08/2016   • Sulfa drugs  11/06/2011        @Vital signs for this encounter:  Vitals:    11/16/18 1456 11/16/18 1500   Height: 1.613 m (5' 3.5\")    Weight: 48.7 kg (107 lb 6.4 oz)    Weight % change since last entry.: 0 %    BP: 104/62    Pulse: (!) 101    BMI (Calculated): 18.73    Resp: 16    Temp: 36.5 °C (97.7 °F)    TempSrc: Temporal    O2 sat % on O2:  95 %   O2 Flow Rate (L/min):  2       Current medications as of today   Current Outpatient Prescriptions   Medication Sig Dispense Refill   • clonazePAM (KLONOPIN) 1 MG Tab Take 1 mg by mouth.     • desloratadine (CLARINEX) 5 MG tablet Take 5 mg by mouth.     • desloratadine (CLARINEX) 5 MG tablet Take 5 mg by mouth.     • montelukast (SINGULAIR) 10 MG Tab Take 10 mg by mouth.     • pravastatin (PRAVACHOL) 40 MG tablet Take 40 mg by mouth.     • tiotropium (SPIRIVA HANDIHALER) 18 MCG Cap by Nebulization route.     • tiotropium (SPIRIVA HANDIHALER) 18 MCG Cap 1 Capsule by Nebulization route.     • Coenzyme Q10 (CO Q-10) 30 MG Cap Take 30 mg by mouth.     • vitamin E 100 UNIT capsule Take 400 Units by mouth.     • Bioflavonoid Products (MUNIRA-C) 500-200-60 MG Tab Take  by mouth.     • Omega-3 Fatty Acids (FISH OIL PO) Take  by mouth.     • MAGNESIUM PO Take 480 mg by mouth.     • pravastatin (PRAVACHOL) 40 MG tablet TAKE 1 TABLET BY MOUTH EVERY DAY 90 Tab 3   • montelukast (SINGULAIR) 10 MG Tab TAKE 1 TABLET BY MOUTH EVERY DAY 90 Tab 3   • SPIRIVA HANDIHALER 18 MCG Cap INHALE THE CONTENTS OF 1 CAPSULE VIA INHALATION DEVICE AT THE SAME TIME EVERY DAY 90 Cap 0   • levalbuterol (XOPENEX) 1.25 MG/3ML Nebu Soln 3 mL by Nebulization route every four hours as needed for Shortness of Breath. 120 mL 4   • azithromycin (ZITHROMAX) 250 MG Tab Take 2 tablets on day 1, then take 1 tablet a day for 4 days. 6 Tab 0   • MethylPREDNISolone (MEDROL DOSEPAK) 4 MG Tablet Therapy Pack Take as directed. 21 Tab 0   • clonazePAM (KLONOPIN) 0.5 MG Tab " Take 1 Tab by mouth every bedtime for 180 days. 90 Tab 1   • ALPRAZolam (XANAX) 0.25 MG Tab Take 1 Tab by mouth 2 times a day as needed for Anxiety for up to 180 days. 180 Tab 1   • budesonide-formoterol (SYMBICORT) 160-4.5 MCG/ACT Aerosol Inhale 2 Puffs by mouth 2 Times a Day. Use spacer. Rinse mouth after each use. 1 Inhaler 5   • gabapentin (NEURONTIN) 100 MG Cap Take 1 Cap by mouth 3 times a day. Increase to 2 tab tid in 2 weeks as directed 120 Cap 2   • desloratadine (CLARINEX) 5 MG tablet Take 1 Tab by mouth every day. 90 Tab 3   • NON SPECIFIED (hydration orders)  please infuse 2 L NS per week. Infuse over one hour QT: 2 L REFILL: 56 fax [phone] 2 Each 56   • XOPENEX HFA 45 MCG/ACT inhaler INHALE ONE TO TWO PUFFS BY MOUTH EVERY 4 HOURS AS NEEDED FOR SHORTNESS OF BREATH 15 g 11   • vitamin D (CHOLECALCIFEROL) 1000 UNIT Tab Take 1,000 Units by mouth every day.     • acetaminophen (TYLENOL) 325 MG Tab Take 650 mg by mouth every four hours as needed.     • immune globulin (FLEBOGAMMA) 10 GM/100ML Solution 5 g by Intravenous route Once. Indications: Pt now on 5 grams     • Oral Electrolytes (EMERGEN-C ELECTRO MIX PO) Take  by mouth every day.     • docosahexanoic acid (OMEGA 3 FA) 1000 MG CAPS Take 1,000 mg by mouth 2 Times a Day.     • aspirin (ASA) 81 MG CHEW Take 162 mg by mouth every day.     • MAGNESIUM GLYCINATE Take 480 mg by mouth.       No current facility-administered medications for this visit.          Physical Exam:   Gen:           Alert and oriented, No apparent distress. Mood and affect appropriate, normal interaction with examiner. Presents with mask and gloves.  Eyes:          PERRL, EOM intact, sclere white, conjunctive moist.  Ears:          Not examined.   Hearing:     Grossly intact.  Nose:          Normal, no lesions or deformities.  Dentition:    Good dentition.  Oropharynx:   Tongue normal, posterior pharynx without erythema or exudate.  Mallampati Classification: 2  Neck:         Supple, trachea midline, no masses.  Respiratory Effort: No intercostal retractions or use of accessory muscles.   Lung Auscultation:      Diminished t/o but no significant rales or wheezing.   CV:            Regular rate and rhythm. No murmurs, rubs or gallops.  Abd:           Not examined.   Lymphadenopathy: Not examined.  Gait and Station: Normal.  Digits and Nails: No clubbing, cyanosis, petechiae, or nodes.   Cranial Nerves: II-XII grossly intact.  Skin:        No rashes, lesions or ulcers noted.               Ext:           No cyanosis or edema.      Assessment:  1. Pulmonary emphysema, unspecified emphysema type (HCC)     2. Chronic respiratory failure with hypoxia (HCC)     3. IgG deficiency (HCC)     4. Abnormal CT of the chest     5. Tachycardia     6. Current smoker     7. BMI less than 19,adult     8. Chronic congestion of paranasal sinus  REFERRAL TO ENT   9. Nasal dryness  REFERRAL TO ENT   10. Mouth breathing  REFERRAL TO ENT       Immunizations:    Flu:declines  Pneumovax 23:per patient in 2017  Prevnar 13:declines    Plan: Patient was seen for 40 minutes, more than 50% of time spent in face to face review, counseling, and arranging future evaluation and follow up of medical conditions and care related to COPD disease process, medication use, mucous clearance, and review of records. Patient is clinically stable and will proceed with following plan. Answered all patient questions to their satisfaction.    1.  Patient has a complex history with multiple complaints.  Her COPD is clinically stable at this time and she will remain on her bronchodilator therapy.  2.  Continue oxygen 2 L 24/7.  She will continue to benefit from this.  3.  Instruction given on using Aerobika for mucus clearance.  Patient also has Mucinex on hand at home.  4.  Referral to ENT for chronic sinus dryness/congestion postnasal drip.  She notes inability to breathe with her nose and having to mouth breathe.  5.  CT scan of chest  due May 2019.  6.  Follow-up in 4 months to check symptoms, sooner if needed.    Please note that this dictation was created using voice recognition software. I have made every reasonable attempt to correct obvious errors, but it is possible there are errors of grammar and possibly content that I did not discover before finalizing the note.

## 2018-11-21 ENCOUNTER — OFFICE VISIT (OUTPATIENT)
Dept: URGENT CARE | Facility: CLINIC | Age: 73
End: 2018-11-21
Payer: MEDICARE

## 2018-11-21 VITALS
WEIGHT: 105 LBS | TEMPERATURE: 98.8 F | OXYGEN SATURATION: 95 % | RESPIRATION RATE: 18 BRPM | DIASTOLIC BLOOD PRESSURE: 80 MMHG | BODY MASS INDEX: 18.61 KG/M2 | SYSTOLIC BLOOD PRESSURE: 100 MMHG | HEART RATE: 109 BPM | HEIGHT: 63 IN

## 2018-11-21 DIAGNOSIS — R09.81 SINUS CONGESTION: ICD-10-CM

## 2018-11-21 DIAGNOSIS — R68.89 FLU-LIKE SYMPTOMS: ICD-10-CM

## 2018-11-21 DIAGNOSIS — R05.9 COUGH: ICD-10-CM

## 2018-11-21 LAB
FLUAV+FLUBV AG SPEC QL IA: NEGATIVE
INT CON NEG: NORMAL
INT CON POS: NORMAL

## 2018-11-21 PROCEDURE — 87804 INFLUENZA ASSAY W/OPTIC: CPT | Performed by: PHYSICIAN ASSISTANT

## 2018-11-21 PROCEDURE — 99214 OFFICE O/P EST MOD 30 MIN: CPT | Performed by: PHYSICIAN ASSISTANT

## 2018-11-21 RX ORDER — OSELTAMIVIR PHOSPHATE 75 MG/1
75 CAPSULE ORAL 2 TIMES DAILY
Qty: 10 CAP | Refills: 1 | Status: SHIPPED | OUTPATIENT
Start: 2018-11-21 | End: 2018-11-26

## 2018-11-21 RX ORDER — LEVOFLOXACIN 500 MG/1
500 TABLET, FILM COATED ORAL DAILY
Qty: 10 TAB | Refills: 0 | Status: SHIPPED | OUTPATIENT
Start: 2018-11-21 | End: 2018-11-28

## 2018-11-21 ASSESSMENT — ENCOUNTER SYMPTOMS
COUGH: 1
SORE THROAT: 0
CARDIOVASCULAR NEGATIVE: 1
FEVER: 1
WHEEZING: 0
ABDOMINAL PAIN: 0
SPUTUM PRODUCTION: 1
SINUS PAIN: 1
EYES NEGATIVE: 1
SHORTNESS OF BREATH: 0

## 2018-11-22 NOTE — PROGRESS NOTES
"Subjective:      Deb Vega is a 73 y.o. female who presents with Fever (x3 days); Runny Nose (on and off x1 month); and Cough            Fever    This is a new (cough, sinus madhu x 1mo; no fever) problem. The current episode started 1 to 4 weeks ago. The problem occurs constantly. The problem has been unchanged. Her temperature was unmeasured prior to arrival. Associated symptoms include congestion and coughing. Pertinent negatives include no abdominal pain, muscle aches, sore throat or wheezing. She has tried nothing for the symptoms. The treatment provided no relief.   Cough   This is a new problem. The current episode started 1 to 4 weeks ago. The problem has been unchanged. The problem occurs every few minutes. The cough is productive of sputum. Associated symptoms include a fever. Pertinent negatives include no sore throat, shortness of breath or wheezing. Nothing aggravates the symptoms. She has tried nothing for the symptoms. The treatment provided no relief. There is no history of asthma.       Review of Systems   Constitutional: Positive for fever.   HENT: Positive for congestion and sinus pain. Negative for sore throat.    Eyes: Negative.    Respiratory: Positive for cough and sputum production. Negative for shortness of breath and wheezing.    Cardiovascular: Negative.    Gastrointestinal: Negative for abdominal pain.   Skin: Negative.           Objective:     /80 (BP Location: Left arm, Patient Position: Sitting, BP Cuff Size: Adult)   Pulse (!) 109   Temp 37.1 °C (98.8 °F)   Resp 18   Ht 1.6 m (5' 3\")   Wt 47.6 kg (105 lb)   SpO2 95%   BMI 18.60 kg/m²      Physical Exam   Constitutional: She is oriented to person, place, and time. She appears well-developed and well-nourished. No distress.   HENT:   Head: Normocephalic and atraumatic.   Eyes: Pupils are equal, round, and reactive to light. EOM are normal.   Neck: Normal range of motion. Neck supple.   Cardiovascular: Normal rate.  "   Pulmonary/Chest: Effort normal and breath sounds normal. No respiratory distress. She has no wheezes. She has no rales.   Lymphadenopathy:     She has no cervical adenopathy.   Neurological: She is alert and oriented to person, place, and time.   Skin: Skin is warm and dry. Capillary refill takes less than 2 seconds.   Psychiatric: She has a normal mood and affect. Her behavior is normal.   Nursing note and vitals reviewed.    Active Ambulatory Problems     Diagnosis Date Noted   • Hyperlipidemia with target LDL less than 100 06/06/2012   • Current smoker 06/06/2012   • Anxiety 06/06/2012   • Chronic fatigue syndrome 12/07/2012   • COPD (chronic obstructive pulmonary disease) ([facility]) 03/01/2013   • Lyme disease 03/01/2013   • Immunologic deficiency syndrome ([facility]) 03/01/2013   • Tachycardia 07/15/2015   • Supplemental oxygen dependent 02/07/2017   • Abnormal CT of the chest 02/07/2017   • Nocturnal hypoxemia 06/06/2017   • Benzodiazepine dependence ([facility]) 08/29/2017   • Chronic use of benzodiazepine for therapeutic purpose 09/04/2017   • 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 ([facility]) 09/14/2018   • 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   • 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   • Chronic respiratory failure with hypoxia ([facility]) 11/16/2018   • IgG deficiency ([facility]) 11/16/2018   • BMI less than 19,adult 11/16/2018     Resolved Ambulatory Problems     Diagnosis Date Noted   • Cellulitis and abscess of upper arm and forearm 07/01/2015   • Hypotension 07/02/2015   • Olecranon bursitis of left elbow 07/02/2015     Past Medical History:   Diagnosis Date   • Allergy    • Anxiety    • ASTHMA    • Back pain    • Bladder infection    • Bronchitis    • Cellulitis    • Chronic use of benzodiazepine for therapeutic purpose 9/4/2017   • COPD    • COPD (chronic obstructive pulmonary disease) ([facility]) 3/1/2013   • H/O blood transfusion reaction    • History  of measles    • History of mumps    • Hives    • Hyperlipidemia    • Immunologic deficiency syndrome (HCC) 3/1/2013   • Lyme disease 3/1/2013   • Mononucleosis    • Pneumonia    • Tachycardia 7/15/2015   • Tachycardia 7/15/2015     Current Outpatient Prescriptions on File Prior to Visit   Medication Sig Dispense Refill   • Coenzyme Q10 (CO Q-10) 30 MG Cap Take 30 mg by mouth.     • vitamin E 100 UNIT capsule Take 400 Units by mouth.     • Bioflavonoid Products (MUNIRA-C) 500-200-60 MG Tab Take  by mouth.     • Omega-3 Fatty Acids (FISH OIL PO) Take  by mouth.     • MAGNESIUM PO Take 480 mg by mouth.     • clonazePAM (KLONOPIN) 1 MG Tab Take 1 mg by mouth.     • desloratadine (CLARINEX) 5 MG tablet Take 5 mg by mouth.     • desloratadine (CLARINEX) 5 MG tablet Take 5 mg by mouth.     • montelukast (SINGULAIR) 10 MG Tab Take 10 mg by mouth.     • pravastatin (PRAVACHOL) 40 MG tablet Take 40 mg by mouth.     • tiotropium (SPIRIVA HANDIHALER) 18 MCG Cap by Nebulization route.     • tiotropium (SPIRIVA HANDIHALER) 18 MCG Cap 1 Capsule by Nebulization route.     • pravastatin (PRAVACHOL) 40 MG tablet TAKE 1 TABLET BY MOUTH EVERY DAY 90 Tab 3   • montelukast (SINGULAIR) 10 MG Tab TAKE 1 TABLET BY MOUTH EVERY DAY 90 Tab 3   • SPIRIVA HANDIHALER 18 MCG Cap INHALE THE CONTENTS OF 1 CAPSULE VIA INHALATION DEVICE AT THE SAME TIME EVERY DAY 90 Cap 0   • levalbuterol (XOPENEX) 1.25 MG/3ML Nebu Soln 3 mL by Nebulization route every four hours as needed for Shortness of Breath. 120 mL 4   • azithromycin (ZITHROMAX) 250 MG Tab Take 2 tablets on day 1, then take 1 tablet a day for 4 days. 6 Tab 0   • MethylPREDNISolone (MEDROL DOSEPAK) 4 MG Tablet Therapy Pack Take as directed. 21 Tab 0   • clonazePAM (KLONOPIN) 0.5 MG Tab Take 1 Tab by mouth every bedtime for 180 days. 90 Tab 1   • ALPRAZolam (XANAX) 0.25 MG Tab Take 1 Tab by mouth 2 times a day as needed for Anxiety for up to 180 days. 180 Tab 1   • budesonide-formoterol (SYMBICORT)  160-4.5 MCG/ACT Aerosol Inhale 2 Puffs by mouth 2 Times a Day. Use spacer. Rinse mouth after each use. 1 Inhaler 5   • gabapentin (NEURONTIN) 100 MG Cap Take 1 Cap by mouth 3 times a day. Increase to 2 tab tid in 2 weeks as directed 120 Cap 2   • desloratadine (CLARINEX) 5 MG tablet Take 1 Tab by mouth every day. 90 Tab 3   • NON SPECIFIED (hydration orders)  please infuse 2 L NS per week. Infuse over one hour QT: 2 L REFILL: 56 fax [phone] 2 Each 56   • XOPENEX HFA 45 MCG/ACT inhaler INHALE ONE TO TWO PUFFS BY MOUTH EVERY 4 HOURS AS NEEDED FOR SHORTNESS OF BREATH 15 g 11   • vitamin D (CHOLECALCIFEROL) 1000 UNIT Tab Take 1,000 Units by mouth every day.     • acetaminophen (TYLENOL) 325 MG Tab Take 650 mg by mouth every four hours as needed.     • immune globulin (FLEBOGAMMA) 10 GM/100ML Solution 5 g by Intravenous route Once. Indications: Pt now on 5 grams     • Oral Electrolytes (EMERGEN-C ELECTRO MIX PO) Take  by mouth every day.     • docosahexanoic acid (OMEGA 3 FA) 1000 MG CAPS Take 1,000 mg by mouth 2 Times a Day.     • aspirin (ASA) 81 MG CHEW Take 162 mg by mouth every day.     • MAGNESIUM GLYCINATE Take 480 mg by mouth.       No current facility-administered medications on file prior to visit.      Social History     Social History   • Marital status:      Spouse name: N/A   • Number of children: N/A   • Years of education: N/A     Occupational History   • Not on file.     Social History Main Topics   • Smoking status: Current Some Day Smoker     Packs/day: 0.25     Years: 55.00     Types: Cigarettes   • Smokeless tobacco: Never Used      Comment: patient smokes 0 to 5 cigarettes a day    • Alcohol use No      Comment: patient states she has not had a drink in 2 years 10/09/2015   • Drug use: No   • Sexual activity: No     Other Topics Concern   • Not on file     Social History Narrative   • No narrative on file     Family History   Problem Relation Age of Onset   • Alcohol/Drug Mother    •  Cancer Father    • Stroke Father    • Heart Disease Father 42     Gadolinium; Influenza virus vaccine; Zyvox [linezolid]; Cefuroxime; Epinephrine; Other drug; Other misc; Prednisone; and Sulfa drugs              Assessment/Plan:     ·  bronchitis, sinus madhu x 1mo      · rx meds; otc prn

## 2018-11-25 ENCOUNTER — APPOINTMENT (OUTPATIENT)
Dept: ONCOLOGY | Facility: MEDICAL CENTER | Age: 73
End: 2018-11-25
Attending: INTERNAL MEDICINE
Payer: MEDICARE

## 2018-11-26 ENCOUNTER — TELEPHONE (OUTPATIENT)
Dept: URGENT CARE | Facility: CLINIC | Age: 73
End: 2018-11-26

## 2018-11-26 DIAGNOSIS — F41.9 ANXIETY: ICD-10-CM

## 2018-11-27 RX ORDER — ALPRAZOLAM 0.25 MG/1
TABLET ORAL
Qty: 180 TAB | Refills: 0 | Status: SHIPPED | OUTPATIENT
Start: 2018-11-27 | End: 2019-04-19 | Stop reason: SDUPTHER

## 2018-11-27 NOTE — TELEPHONE ENCOUNTER
Patient called stating that when she started taking levaquin she started getting panic attacks. I advised her to stop taking the antibiotic. She would like to know what to do now. [phone]. Ok to leave a voicemail.

## 2018-11-28 ENCOUNTER — TELEPHONE (OUTPATIENT)
Dept: URGENT CARE | Facility: CLINIC | Age: 73
End: 2018-11-28

## 2018-11-28 ENCOUNTER — TELEPHONE (OUTPATIENT)
Dept: PULMONOLOGY | Facility: HOSPICE | Age: 73
End: 2018-11-28

## 2018-11-28 DIAGNOSIS — J32.9 CHRONIC SINUSITIS, UNSPECIFIED LOCATION: ICD-10-CM

## 2018-11-28 DIAGNOSIS — J44.9 CHRONIC OBSTRUCTIVE PULMONARY DISEASE, UNSPECIFIED COPD TYPE (HCC): ICD-10-CM

## 2018-11-28 RX ORDER — TIOTROPIUM BROMIDE 18 UG/1
CAPSULE ORAL; RESPIRATORY (INHALATION)
Qty: 90 CAP | Refills: 3 | Status: SHIPPED | OUTPATIENT
Start: 2018-11-28 | End: 2019-05-24

## 2018-11-28 RX ORDER — AZITHROMYCIN 250 MG/1
TABLET, FILM COATED ORAL
Qty: 6 TAB | Refills: 0 | Status: SHIPPED | OUTPATIENT
Start: 2018-11-28 | End: 2020-11-23

## 2018-11-28 NOTE — TELEPHONE ENCOUNTER
Caller Name:  Deb Vega  Call Back Number: [phone]    Patient approves a detailed voicemail message: no    Patient's symptoms    Consititutional: SOB, Post Nasal Drip, Cough    Fever: Low grade if any     Cough: productive cough    Is this a new symptom: Yes    Onset of symptom: several days ago, went to  and ENT     Frequency of symptom: intermittent    Has the patient tried anything to resolve symptoms: Yes    Has the patient taken their nebulizer/rescue inhaler: no    Additional details: She was referred to ENT and he said she was too swollen to help but wasn't given anything (note requested-but not finished by ).   gave her Levaquin which she says she had a reaction.     Action taken per Active Symptom guide: Appt. offered for Friday, patient refused says she is too sick to get here.     Patient does not agree to recommended action per Active Symptom Escalation Protocol. Patient was advised again of recommendation and verbalized understanding.       Please advise

## 2018-11-28 NOTE — TELEPHONE ENCOUNTER
Caller Name: Deb Vega                 Call Back Number: [phone] (home)         Patient approves a detailed voicemail message: N\A    Have we ever prescribed this med? Yes.  If yes, what date? 2/17/11    Last OV: 11/16/18 ROVERTO Amezquita     Next OV: 3/15/19 ROVERTO Ho     DX: COPD     Medications:  Current Outpatient Prescriptions   Medication Sig Dispense Refill   • ALPRAZolam (XANAX) 0.25 MG Tab TAKE 1 TABLET BY MOUTH TWICE DAILY AS NEEDED FOR ANXIETY 180 Tab 0   • levoFLOXacin (LEVAQUIN) 500 MG tablet Take 1 Tab by mouth every day for 10 days. 10 Tab 0   • Coenzyme Q10 (CO Q-10) 30 MG Cap Take 30 mg by mouth.     • vitamin E 100 UNIT capsule Take 400 Units by mouth.     • Bioflavonoid Products (MUNIRA-C) 500-200-60 MG Tab Take  by mouth.     • Omega-3 Fatty Acids (FISH OIL PO) Take  by mouth.     • MAGNESIUM PO Take 480 mg by mouth.     • clonazePAM (KLONOPIN) 1 MG Tab Take 1 mg by mouth.     • desloratadine (CLARINEX) 5 MG tablet Take 5 mg by mouth.     • desloratadine (CLARINEX) 5 MG tablet Take 5 mg by mouth.     • montelukast (SINGULAIR) 10 MG Tab Take 10 mg by mouth.     • pravastatin (PRAVACHOL) 40 MG tablet Take 40 mg by mouth.     • tiotropium (SPIRIVA HANDIHALER) 18 MCG Cap by Nebulization route.     • tiotropium (SPIRIVA HANDIHALER) 18 MCG Cap 1 Capsule by Nebulization route.     • pravastatin (PRAVACHOL) 40 MG tablet TAKE 1 TABLET BY MOUTH EVERY DAY 90 Tab 3   • montelukast (SINGULAIR) 10 MG Tab TAKE 1 TABLET BY MOUTH EVERY DAY 90 Tab 3   • SPIRIVA HANDIHALER 18 MCG Cap INHALE THE CONTENTS OF 1 CAPSULE VIA INHALATION DEVICE AT THE SAME TIME EVERY DAY 90 Cap 0   • levalbuterol (XOPENEX) 1.25 MG/3ML Nebu Soln 3 mL by Nebulization route every four hours as needed for Shortness of Breath. 120 mL 4   • azithromycin (ZITHROMAX) 250 MG Tab Take 2 tablets on day 1, then take 1 tablet a day for 4 days. 6 Tab 0   • MethylPREDNISolone (MEDROL DOSEPAK) 4 MG Tablet Therapy Pack Take as directed. 21  Tab 0   • budesonide-formoterol (SYMBICORT) 160-4.5 MCG/ACT Aerosol Inhale 2 Puffs by mouth 2 Times a Day. Use spacer. Rinse mouth after each use. 1 Inhaler 5   • gabapentin (NEURONTIN) 100 MG Cap Take 1 Cap by mouth 3 times a day. Increase to 2 tab tid in 2 weeks as directed 120 Cap 2   • desloratadine (CLARINEX) 5 MG tablet Take 1 Tab by mouth every day. 90 Tab 3   • NON SPECIFIED (hydration orders)  please infuse 2 L NS per week. Infuse over one hour QT: 2 L REFILL: 56 fax [phone] 2 Each 56   • XOPENEX HFA 45 MCG/ACT inhaler INHALE ONE TO TWO PUFFS BY MOUTH EVERY 4 HOURS AS NEEDED FOR SHORTNESS OF BREATH 15 g 11   • vitamin D (CHOLECALCIFEROL) 1000 UNIT Tab Take 1,000 Units by mouth every day.     • acetaminophen (TYLENOL) 325 MG Tab Take 650 mg by mouth every four hours as needed.     • immune globulin (FLEBOGAMMA) 10 GM/100ML Solution 5 g by Intravenous route Once. Indications: Pt now on 5 grams     • Oral Electrolytes (EMERGEN-C ELECTRO MIX PO) Take  by mouth every day.     • docosahexanoic acid (OMEGA 3 FA) 1000 MG CAPS Take 1,000 mg by mouth 2 Times a Day.     • aspirin (ASA) 81 MG CHEW Take 162 mg by mouth every day.     • MAGNESIUM GLYCINATE Take 480 mg by mouth.       No current facility-administered medications for this visit.

## 2018-11-29 NOTE — TELEPHONE ENCOUNTER
Please verify reaction she had to levaquin and document in allergies if appropriate.  RX for zpak for sinus issues.   If this does not help, she needs to be seen by ENT again or come in for OV.

## 2018-11-29 NOTE — TELEPHONE ENCOUNTER
Deb called to return Angi's call. Patient mentioned that her reaction to Levaquin was a panic attack and feeling SOB that she took Xanax for her anxiety, she did state that she is using her azithromycin which she started last night.

## 2018-12-02 ENCOUNTER — OFFICE VISIT (OUTPATIENT)
Dept: URGENT CARE | Facility: CLINIC | Age: 73
End: 2018-12-02
Payer: MEDICARE

## 2018-12-02 ENCOUNTER — APPOINTMENT (OUTPATIENT)
Dept: RADIOLOGY | Facility: IMAGING CENTER | Age: 73
End: 2018-12-02
Attending: NURSE PRACTITIONER
Payer: MEDICARE

## 2018-12-02 VITALS
RESPIRATION RATE: 16 BRPM | TEMPERATURE: 97.9 F | DIASTOLIC BLOOD PRESSURE: 70 MMHG | HEART RATE: 99 BPM | OXYGEN SATURATION: 92 % | SYSTOLIC BLOOD PRESSURE: 140 MMHG

## 2018-12-02 DIAGNOSIS — J42 CHRONIC BRONCHITIS, UNSPECIFIED CHRONIC BRONCHITIS TYPE (HCC): ICD-10-CM

## 2018-12-02 DIAGNOSIS — J01.90 ACUTE BACTERIAL SINUSITIS: ICD-10-CM

## 2018-12-02 DIAGNOSIS — B96.89 ACUTE BACTERIAL SINUSITIS: ICD-10-CM

## 2018-12-02 PROCEDURE — 71046 X-RAY EXAM CHEST 2 VIEWS: CPT | Mod: 26 | Performed by: NURSE PRACTITIONER

## 2018-12-02 PROCEDURE — 99214 OFFICE O/P EST MOD 30 MIN: CPT | Performed by: NURSE PRACTITIONER

## 2018-12-02 RX ORDER — AMOXICILLIN AND CLAVULANATE POTASSIUM 875; 125 MG/1; MG/1
1 TABLET, FILM COATED ORAL 2 TIMES DAILY
Qty: 14 TAB | Refills: 0 | Status: SHIPPED | OUTPATIENT
Start: 2018-12-02 | End: 2018-12-09

## 2018-12-02 RX ORDER — ONDANSETRON HYDROCHLORIDE 8 MG/1
8 TABLET, FILM COATED ORAL EVERY 8 HOURS PRN
Qty: 15 TAB | Refills: 0 | Status: SHIPPED | OUTPATIENT
Start: 2018-12-02 | End: 2021-06-15

## 2018-12-02 ASSESSMENT — ENCOUNTER SYMPTOMS
ORTHOPNEA: 0
SHORTNESS OF BREATH: 1
FEVER: 0
MYALGIAS: 0
SPUTUM PRODUCTION: 1
COUGH: 1
NAUSEA: 0
EYE DISCHARGE: 0
DIARRHEA: 0
HEADACHES: 0
CHILLS: 0
WHEEZING: 1
SINUS PAIN: 1
SORE THROAT: 0

## 2018-12-03 DIAGNOSIS — J44.9 CHRONIC OBSTRUCTIVE PULMONARY DISEASE, UNSPECIFIED COPD TYPE (HCC): ICD-10-CM

## 2018-12-03 RX ORDER — BUDESONIDE AND FORMOTEROL FUMARATE DIHYDRATE 160; 4.5 UG/1; UG/1
AEROSOL RESPIRATORY (INHALATION)
Qty: 1 INHALER | Refills: 5 | Status: SHIPPED | OUTPATIENT
Start: 2018-12-03 | End: 2019-03-26 | Stop reason: SDUPTHER

## 2018-12-03 NOTE — TELEPHONE ENCOUNTER
Caller Name: Deb Vega                 Call Back Number: [phone] (home)         Patient approves a detailed voicemail message: N\A    Have we ever prescribed this med? Yes.  If yes, what date? 5/25/18    Last OV: 11/16/18 ROVERTO Victoria     Next OV: 3/15/19 ROVERTO Ho     DX: COPD     Medications:  Current Outpatient Prescriptions   Medication Sig Dispense Refill   • KLONOPIN 0.5 MG Tab TAKE 1 TABLET BY MOUTH EVERY NIGHT AT BEDTIME FOR UP  DAYS 90 Tab 0   • amoxicillin-clavulanate (AUGMENTIN) 875-125 MG Tab Take 1 Tab by mouth 2 times a day for 7 days. 14 Tab 0   • ondansetron (ZOFRAN) 8 MG Tab Take 1 Tab by mouth every 8 hours as needed for Nausea/Vomiting. 15 Tab 0   • SPIRIVA HANDIHALER 18 MCG Cap INHALE THE CONTENTS OF 1 CAPSULE VIA INHALATION DEVICE AT THE SAME TIME EVERY DAY 90 Cap 3   • azithromycin (ZITHROMAX Z-BENEDICTO) 250 MG Tab Take 2 tablets on day 1. Then take 1 tablet a day for 4 days. 6 Tab 0   • ALPRAZolam (XANAX) 0.25 MG Tab TAKE 1 TABLET BY MOUTH TWICE DAILY AS NEEDED FOR ANXIETY 180 Tab 0   • Coenzyme Q10 (CO Q-10) 30 MG Cap Take 30 mg by mouth.     • vitamin E 100 UNIT capsule Take 400 Units by mouth.     • Bioflavonoid Products (MUNIRA-C) 500-200-60 MG Tab Take  by mouth.     • Omega-3 Fatty Acids (FISH OIL PO) Take  by mouth.     • MAGNESIUM PO Take 480 mg by mouth.     • desloratadine (CLARINEX) 5 MG tablet Take 5 mg by mouth.     • desloratadine (CLARINEX) 5 MG tablet Take 5 mg by mouth.     • montelukast (SINGULAIR) 10 MG Tab Take 10 mg by mouth.     • pravastatin (PRAVACHOL) 40 MG tablet Take 40 mg by mouth.     • tiotropium (SPIRIVA HANDIHALER) 18 MCG Cap by Nebulization route.     • tiotropium (SPIRIVA HANDIHALER) 18 MCG Cap 1 Capsule by Nebulization route.     • pravastatin (PRAVACHOL) 40 MG tablet TAKE 1 TABLET BY MOUTH EVERY DAY 90 Tab 3   • montelukast (SINGULAIR) 10 MG Tab TAKE 1 TABLET BY MOUTH EVERY DAY 90 Tab 3   • levalbuterol (XOPENEX) 1.25 MG/3ML Nebu Soln 3  mL by Nebulization route every four hours as needed for Shortness of Breath. 120 mL 4   • MethylPREDNISolone (MEDROL DOSEPAK) 4 MG Tablet Therapy Pack Take as directed. 21 Tab 0   • budesonide-formoterol (SYMBICORT) 160-4.5 MCG/ACT Aerosol Inhale 2 Puffs by mouth 2 Times a Day. Use spacer. Rinse mouth after each use. 1 Inhaler 5   • gabapentin (NEURONTIN) 100 MG Cap Take 1 Cap by mouth 3 times a day. Increase to 2 tab tid in 2 weeks as directed 120 Cap 2   • desloratadine (CLARINEX) 5 MG tablet Take 1 Tab by mouth every day. 90 Tab 3   • NON SPECIFIED (hydration orders)  please infuse 2 L NS per week. Infuse over one hour QT: 2 L REFILL: 56 fax [phone] 2 Each 56   • XOPENEX HFA 45 MCG/ACT inhaler INHALE ONE TO TWO PUFFS BY MOUTH EVERY 4 HOURS AS NEEDED FOR SHORTNESS OF BREATH 15 g 11   • vitamin D (CHOLECALCIFEROL) 1000 UNIT Tab Take 1,000 Units by mouth every day.     • acetaminophen (TYLENOL) 325 MG Tab Take 650 mg by mouth every four hours as needed.     • immune globulin (FLEBOGAMMA) 10 GM/100ML Solution 5 g by Intravenous route Once. Indications: Pt now on 5 grams     • Oral Electrolytes (EMERGEN-C ELECTRO MIX PO) Take  by mouth every day.     • docosahexanoic acid (OMEGA 3 FA) 1000 MG CAPS Take 1,000 mg by mouth 2 Times a Day.     • aspirin (ASA) 81 MG CHEW Take 162 mg by mouth every day.     • MAGNESIUM GLYCINATE Take 480 mg by mouth.       No current facility-administered medications for this visit.

## 2018-12-03 NOTE — PROGRESS NOTES
Subjective:      Deb Vega is a 73 y.o. female who presents with Cough (not feeling better, coug, chest congestion, sinus pain)            HPI New problem. 73 year old female with cough and congestion for 3 weeks. She has been evaluated here and by ENT as well as pulmonary. She has two days left of z-pack which she states is not helping. She reports sinus pain and pressure as well as burning sensation in her nose. Her sputum is light yellow without blood. She states +upper airway wheeze. She is taking all of her COPD medications as well as z-pack  Gadolinium; Influenza virus vaccine; Zyvox [linezolid]; Cefuroxime; Epinephrine; Other drug; Other misc; Prednisone; and Sulfa drugs  Current Outpatient Prescriptions on File Prior to Visit   Medication Sig Dispense Refill   • azithromycin (ZITHROMAX Z-BENEDICTO) 250 MG Tab Take 2 tablets on day 1. Then take 1 tablet a day for 4 days. 6 Tab 0   • clonazePAM (KLONOPIN) 0.5 MG Tab TAKE 1 TABLET BY MOUTH EVERY NIGHT AT BEDTIME FOR UP  DAYS 90 Tab 1   • SPIRIVA HANDIHALER 18 MCG Cap INHALE THE CONTENTS OF 1 CAPSULE VIA INHALATION DEVICE AT THE SAME TIME EVERY DAY 90 Cap 3   • ALPRAZolam (XANAX) 0.25 MG Tab TAKE 1 TABLET BY MOUTH TWICE DAILY AS NEEDED FOR ANXIETY 180 Tab 0   • Coenzyme Q10 (CO Q-10) 30 MG Cap Take 30 mg by mouth.     • vitamin E 100 UNIT capsule Take 400 Units by mouth.     • Bioflavonoid Products (MUNIRA-C) 500-200-60 MG Tab Take  by mouth.     • Omega-3 Fatty Acids (FISH OIL PO) Take  by mouth.     • MAGNESIUM PO Take 480 mg by mouth.     • clonazePAM (KLONOPIN) 1 MG Tab Take 1 mg by mouth.     • desloratadine (CLARINEX) 5 MG tablet Take 5 mg by mouth.     • desloratadine (CLARINEX) 5 MG tablet Take 5 mg by mouth.     • montelukast (SINGULAIR) 10 MG Tab Take 10 mg by mouth.     • pravastatin (PRAVACHOL) 40 MG tablet Take 40 mg by mouth.     • tiotropium (SPIRIVA HANDIHALER) 18 MCG Cap by Nebulization route.     • tiotropium (SPIRIVA HANDIHALER) 18 MCG Cap  1 Capsule by Nebulization route.     • pravastatin (PRAVACHOL) 40 MG tablet TAKE 1 TABLET BY MOUTH EVERY DAY 90 Tab 3   • montelukast (SINGULAIR) 10 MG Tab TAKE 1 TABLET BY MOUTH EVERY DAY 90 Tab 3   • levalbuterol (XOPENEX) 1.25 MG/3ML Nebu Soln 3 mL by Nebulization route every four hours as needed for Shortness of Breath. 120 mL 4   • MethylPREDNISolone (MEDROL DOSEPAK) 4 MG Tablet Therapy Pack Take as directed. 21 Tab 0   • budesonide-formoterol (SYMBICORT) 160-4.5 MCG/ACT Aerosol Inhale 2 Puffs by mouth 2 Times a Day. Use spacer. Rinse mouth after each use. 1 Inhaler 5   • gabapentin (NEURONTIN) 100 MG Cap Take 1 Cap by mouth 3 times a day. Increase to 2 tab tid in 2 weeks as directed 120 Cap 2   • desloratadine (CLARINEX) 5 MG tablet Take 1 Tab by mouth every day. 90 Tab 3   • NON SPECIFIED (hydration orders)  please infuse 2 L NS per week. Infuse over one hour QT: 2 L REFILL: 56 fax [phone] 2 Each 56   • XOPENEX HFA 45 MCG/ACT inhaler INHALE ONE TO TWO PUFFS BY MOUTH EVERY 4 HOURS AS NEEDED FOR SHORTNESS OF BREATH 15 g 11   • vitamin D (CHOLECALCIFEROL) 1000 UNIT Tab Take 1,000 Units by mouth every day.     • acetaminophen (TYLENOL) 325 MG Tab Take 650 mg by mouth every four hours as needed.     • immune globulin (FLEBOGAMMA) 10 GM/100ML Solution 5 g by Intravenous route Once. Indications: Pt now on 5 grams     • Oral Electrolytes (EMERGEN-C ELECTRO MIX PO) Take  by mouth every day.     • docosahexanoic acid (OMEGA 3 FA) 1000 MG CAPS Take 1,000 mg by mouth 2 Times a Day.     • aspirin (ASA) 81 MG CHEW Take 162 mg by mouth every day.     • MAGNESIUM GLYCINATE Take 480 mg by mouth.       No current facility-administered medications on file prior to visit.      Social History     Social History   • Marital status:      Spouse name: N/A   • Number of children: N/A   • Years of education: N/A     Occupational History   • Not on file.     Social History Main Topics   • Smoking status: Current Some Day  Smoker     Packs/day: 0.25     Years: 55.00     Types: Cigarettes   • Smokeless tobacco: Never Used      Comment: patient smokes 0 to 5 cigarettes a day    • Alcohol use No      Comment: patient states she has not had a drink in 2 years 10/09/2015   • Drug use: No   • Sexual activity: No     Other Topics Concern   • Not on file     Social History Narrative   • No narrative on file     family history includes Alcohol/Drug in her mother; Cancer in her father; Heart Disease (age of onset: 42) in her father; Stroke in her father.      Review of Systems   Constitutional: Positive for malaise/fatigue. Negative for chills and fever.   HENT: Positive for congestion and sinus pain. Negative for sore throat.    Eyes: Negative for discharge.   Respiratory: Positive for cough, sputum production, shortness of breath and wheezing.    Cardiovascular: Negative for chest pain and orthopnea.   Gastrointestinal: Negative for diarrhea and nausea.   Musculoskeletal: Negative for myalgias.   Neurological: Negative for headaches.   Endo/Heme/Allergies: Negative for environmental allergies.          Objective:     /70 (BP Location: Left arm, Patient Position: Sitting, BP Cuff Size: Adult)   Pulse 99   Temp 36.6 °C (97.9 °F) (Temporal)   Resp 16   SpO2 92%      Physical Exam   Constitutional: She is oriented to person, place, and time. She appears well-developed and well-nourished. No distress.   HENT:   Head: Normocephalic and atraumatic.   Right Ear: External ear and ear canal normal. Tympanic membrane is not injected and not perforated. No middle ear effusion.   Left Ear: External ear and ear canal normal. Tympanic membrane is not injected and not perforated.  No middle ear effusion.   Nose: Mucosal edema present.   Mouth/Throat: No oropharyngeal exudate or posterior oropharyngeal erythema.   Eyes: Conjunctivae are normal. Right eye exhibits no discharge. Left eye exhibits no discharge.   Neck: Normal range of motion. Neck  supple.   Cardiovascular: Normal rate, regular rhythm and normal heart sounds.    No murmur heard.  Pulmonary/Chest: Effort normal and breath sounds normal. No respiratory distress.   diminished   Musculoskeletal: Normal range of motion.   Normal movement of all 4 extremities.   Lymphadenopathy:     She has no cervical adenopathy.        Right: No supraclavicular adenopathy present.        Left: No supraclavicular adenopathy present.   Neurological: She is alert and oriented to person, place, and time. Gait normal.   Skin: Skin is warm and dry.   Psychiatric: She has a normal mood and affect. Her behavior is normal. Thought content normal.   Nursing note and vitals reviewed.              Assessment/Plan:     1. Chronic bronchitis, unspecified chronic bronchitis type (HCC)  DX-CHEST-2 VIEWS   2. Acute bacterial sinusitis  amoxicillin-clavulanate (AUGMENTIN) 875-125 MG Tab    ondansetron (ZOFRAN) 8 MG Tab     X-ray negative for pneumonia  augmentin (zofran for any nausea)  Differential diagnosis, natural history, supportive care, and indications for immediate follow-up discussed at length.

## 2018-12-05 ENCOUNTER — TELEPHONE (OUTPATIENT)
Dept: MEDICAL GROUP | Facility: LAB | Age: 73
End: 2018-12-05

## 2018-12-05 ENCOUNTER — TELEPHONE (OUTPATIENT)
Dept: PULMONOLOGY | Facility: HOSPICE | Age: 73
End: 2018-12-05

## 2018-12-05 NOTE — TELEPHONE ENCOUNTER
DOCUMENTATION OF PRIOR AUTH STATUS    1. Medication name and dose: Spiriva Handihaler 18 mcg    2. Name and Phone # of Prescription coverage company: [employer] [phone]    3. Date Prior Auth was submitted: 12/4/2018    4. What information was given to obtain insurance decision: Clinical notes    5. Prior Auth letter Approved or Denied: Approved through 12/31/2019    6. Pharmacy notified: Yes    7. Patient notified: Yes

## 2018-12-06 DIAGNOSIS — B37.2 YEAST INFECTION OF THE SKIN: ICD-10-CM

## 2018-12-06 RX ORDER — FLUCONAZOLE 150 MG/1
TABLET ORAL
Qty: 3 TAB | Refills: 0 | Status: SHIPPED | OUTPATIENT
Start: 2018-12-06 | End: 2021-06-15

## 2018-12-06 RX ORDER — NYSTATIN 100000 U/G
1 CREAM TOPICAL 2 TIMES DAILY
Qty: 1 TUBE | Refills: 2 | Status: SHIPPED | OUTPATIENT
Start: 2018-12-06 | End: 2021-06-15

## 2018-12-09 ENCOUNTER — OUTPATIENT INFUSION SERVICES (OUTPATIENT)
Dept: ONCOLOGY | Facility: MEDICAL CENTER | Age: 73
End: 2018-12-09
Attending: INTERNAL MEDICINE
Payer: MEDICARE

## 2018-12-23 ENCOUNTER — APPOINTMENT (OUTPATIENT)
Dept: ONCOLOGY | Facility: MEDICAL CENTER | Age: 73
End: 2018-12-23
Attending: INTERNAL MEDICINE
Payer: MEDICARE

## 2019-01-06 ENCOUNTER — APPOINTMENT (OUTPATIENT)
Dept: ONCOLOGY | Facility: MEDICAL CENTER | Age: 74
End: 2019-01-06
Attending: INTERNAL MEDICINE
Payer: MEDICARE

## 2019-01-20 ENCOUNTER — APPOINTMENT (OUTPATIENT)
Dept: ONCOLOGY | Facility: MEDICAL CENTER | Age: 74
End: 2019-01-20
Attending: INTERNAL MEDICINE
Payer: MEDICARE

## 2019-02-03 ENCOUNTER — APPOINTMENT (OUTPATIENT)
Dept: ONCOLOGY | Facility: MEDICAL CENTER | Age: 74
End: 2019-02-03
Attending: INTERNAL MEDICINE
Payer: MEDICARE

## 2019-02-12 ENCOUNTER — TELEPHONE (OUTPATIENT)
Dept: MEDICAL GROUP | Facility: LAB | Age: 74
End: 2019-02-12

## 2019-02-12 NOTE — TELEPHONE ENCOUNTER
1. Caller Name: Deb                                          Call Back Number: [phone]      Patient approves a detailed voicemail message: yes    Pt contracted a sickness when she was here in November.  She is still a little bit sick from that.  Her follow up appt is 2/17 and she is not wanting to take the chance of getting sick all over again.  She is asking if you can consult with her over the phone.

## 2019-02-13 NOTE — TELEPHONE ENCOUNTER
Telephone call returned, patient is still having problems with vaginal yeast infection offered to do Diflucan 150 mg 3 days patient declined.  Patient also declined referral to obstetrics gynecology.  Patient is concerned about getting sick in the office once again encouraged her to make an appointment in May.

## 2019-02-17 ENCOUNTER — APPOINTMENT (OUTPATIENT)
Dept: ONCOLOGY | Facility: MEDICAL CENTER | Age: 74
End: 2019-02-17
Attending: INTERNAL MEDICINE
Payer: MEDICARE

## 2019-02-27 ENCOUNTER — TELEPHONE (OUTPATIENT)
Dept: MEDICAL GROUP | Facility: LAB | Age: 74
End: 2019-02-27

## 2019-02-27 NOTE — TELEPHONE ENCOUNTER
"· Home Health paperwork received from [facility]  requiring provider signature.     · All appropriate fields completed by Medical Assistant: No    · Paperwork placed in \"MA to Provider\" folder/basket. Awaiting provider completion/signature.  "

## 2019-03-01 DIAGNOSIS — J43.8 OTHER EMPHYSEMA (HCC): ICD-10-CM

## 2019-03-01 DIAGNOSIS — R91.8 ABNORMAL CT SCAN OF LUNG: ICD-10-CM

## 2019-03-03 ENCOUNTER — APPOINTMENT (OUTPATIENT)
Dept: ONCOLOGY | Facility: MEDICAL CENTER | Age: 74
End: 2019-03-03
Attending: INTERNAL MEDICINE
Payer: MEDICARE

## 2019-03-17 ENCOUNTER — APPOINTMENT (OUTPATIENT)
Dept: ONCOLOGY | Facility: MEDICAL CENTER | Age: 74
End: 2019-03-17
Attending: INTERNAL MEDICINE
Payer: MEDICARE

## 2019-03-26 ENCOUNTER — TELEPHONE (OUTPATIENT)
Dept: MEDICAL GROUP | Facility: LAB | Age: 74
End: 2019-03-26

## 2019-03-26 DIAGNOSIS — J44.9 CHRONIC OBSTRUCTIVE PULMONARY DISEASE, UNSPECIFIED COPD TYPE (HCC): ICD-10-CM

## 2019-03-26 RX ORDER — BUDESONIDE AND FORMOTEROL FUMARATE DIHYDRATE 160; 4.5 UG/1; UG/1
AEROSOL RESPIRATORY (INHALATION)
Qty: 3 INHALER | Refills: 3 | Status: SHIPPED | OUTPATIENT
Start: 2019-03-26 | End: 2020-04-21

## 2019-03-26 NOTE — TELEPHONE ENCOUNTER
"· Plan of care paperwork received from [facility] requiring provider signature.     · All appropriate fields completed by Medical Assistant: No    · Paperwork placed in \"MA to Provider\" folder/basket. Awaiting provider completion/signature.   · 6 pages fill out and fax back  "

## 2019-03-26 NOTE — TELEPHONE ENCOUNTER
Caller Name: Deb Vega                 Call Back Number: [phone] (home)         Patient approves a detailed voicemail message: N\A    Have we ever prescribed this med? Yes.  If yes, what date? 12/3/18    Last OV: 11/16/18 IVAN Schmid, APRn     Next OV: 5/24/19 UMANG Mccall, APRN     DX: COPD     Request received via fax requesting 90 day supply

## 2019-04-09 RX ORDER — DESLORATADINE 5 MG
5 TABLET ORAL DAILY
Qty: 30 TAB | Refills: 5 | Status: SHIPPED | OUTPATIENT
Start: 2019-04-09 | End: 2019-04-11 | Stop reason: SDUPTHER

## 2019-04-09 NOTE — TELEPHONE ENCOUNTER
"· order paperwork received from Home Health care requiring provider signature.     · All appropriate fields completed by Medical Assistant: No    · Paperwork placed in \"MA to Provider\" folder/basket. Awaiting provider completion/signature.  "

## 2019-04-11 DIAGNOSIS — J44.9 CHRONIC OBSTRUCTIVE PULMONARY DISEASE, UNSPECIFIED COPD TYPE (HCC): ICD-10-CM

## 2019-04-11 RX ORDER — LEVALBUTEROL INHALATION SOLUTION 1.25 MG/3ML
1.25 SOLUTION RESPIRATORY (INHALATION) EVERY 4 HOURS PRN
Qty: 120 ML | Refills: 4 | Status: SHIPPED | OUTPATIENT
Start: 2019-04-11 | End: 2019-10-07 | Stop reason: SDUPTHER

## 2019-04-11 RX ORDER — DESLORATADINE 5 MG
5 TABLET ORAL DAILY
Qty: 90 TAB | Refills: 3 | Status: SHIPPED | OUTPATIENT
Start: 2019-04-11 | End: 2021-04-19

## 2019-04-11 RX ORDER — LEVALBUTEROL INHALATION SOLUTION 1.25 MG/3ML
1.25 SOLUTION RESPIRATORY (INHALATION) EVERY 4 HOURS PRN
Qty: 120 ML | Refills: 4 | Status: SHIPPED | OUTPATIENT
Start: 2019-04-11 | End: 2021-12-23

## 2019-04-11 NOTE — TELEPHONE ENCOUNTER
This request was sent by [facility], [facility]

## 2019-04-11 NOTE — TELEPHONE ENCOUNTER
Was the patient seen in the last year in this department? Yes lov 11/13/18    Does patient have an active prescription for medications requested? Yes    Received Request Via: Pharmacy     Per Pharmacy requesting a 90 supply vs. 30 day supply with 5 refills.

## 2019-04-16 ENCOUNTER — TELEPHONE (OUTPATIENT)
Dept: PULMONOLOGY | Facility: HOSPICE | Age: 74
End: 2019-04-16

## 2019-04-16 NOTE — TELEPHONE ENCOUNTER
MEDICATION PRIOR AUTHORIZATION NEEDED:    1. Name of Medication: Levalbuterol 1.25 mg/3 ml    2. Requested By ([facility]     3. Is insurance on file current? yes    4. What is the name & phone number of the 3rd party payor? Mariaelena/Jacek [phone]

## 2019-04-17 NOTE — TELEPHONE ENCOUNTER
DOCUMENTATION OF PRIOR AUTH STATUS    1. Medication name and dose: Levalbuterol 1.25 mg/3 ml    2. Name and Phone # of Prescription coverage company: [employer] [phone]    3. Date Prior Auth was submitted: 4/16/2019    4. What information was given to obtain insurance decision: Clinical notes    5. Prior Auth letter Approved or Denied: Denied, must be billed through Medicare Part B    6. Pharmacy notified: No    7. Patient notified: No

## 2019-04-19 DIAGNOSIS — F41.9 ANXIETY: ICD-10-CM

## 2019-04-19 RX ORDER — ALPRAZOLAM 0.25 MG/1
0.25 TABLET ORAL 2 TIMES DAILY PRN
Qty: 180 TAB | Refills: 0 | Status: SHIPPED
Start: 2019-04-19 | End: 2019-07-18

## 2019-05-20 ENCOUNTER — HOSPITAL ENCOUNTER (OUTPATIENT)
Dept: RADIOLOGY | Facility: MEDICAL CENTER | Age: 74
End: 2019-05-20
Attending: NURSE PRACTITIONER
Payer: MEDICARE

## 2019-05-20 DIAGNOSIS — R91.8 ABNORMAL CT SCAN OF LUNG: ICD-10-CM

## 2019-05-20 DIAGNOSIS — J43.8 OTHER EMPHYSEMA (HCC): ICD-10-CM

## 2019-05-20 PROCEDURE — 71250 CT THORAX DX C-: CPT

## 2019-05-22 DIAGNOSIS — J44.9 CHRONIC OBSTRUCTIVE PULMONARY DISEASE, UNSPECIFIED COPD TYPE (HCC): ICD-10-CM

## 2019-05-22 DIAGNOSIS — F41.9 ANXIETY: ICD-10-CM

## 2019-05-22 RX ORDER — CLONAZEPAM 0.5 MG
TABLET ORAL
Qty: 90 TAB | Refills: 0 | Status: SHIPPED
Start: 2019-05-22 | End: 2019-08-20

## 2019-05-22 RX ORDER — LEVALBUTEROL TARTRATE 45 UG/1
2 AEROSOL, METERED ORAL EVERY 4 HOURS PRN
Qty: 1 INHALER | Refills: 11 | Status: SHIPPED | OUTPATIENT
Start: 2019-05-22 | End: 2020-09-28

## 2019-05-22 NOTE — TELEPHONE ENCOUNTER
Faxed request for Xopenex HFA, looks like patient that received refill by Ruddy Mccullough D.O on 11/20/17    Patient has also received Xopenex Neb Solution on 04/11/19 Arpit [id number]     Have we ever prescribed this med? No.  If yes, what date? N/A    Last OV: 11/16/18 Myrna APRN     Next OV: 05/26/19 Arpit APRN      DX: Chronic obstructive pulmonary disease, unspecified COPD type (HCC)     Medications: XOPENEX HFA      Pending Med

## 2019-05-22 NOTE — TELEPHONE ENCOUNTER
Pt is stating how important it is for her to get this Rx.  She can't go without, and she can only do afternoon appt's so she is not able to see you any time soon.  She has about a week.  I told her that she's due for her UDS and contract.  Please advise.

## 2019-05-24 ENCOUNTER — OFFICE VISIT (OUTPATIENT)
Dept: PULMONOLOGY | Facility: HOSPICE | Age: 74
End: 2019-05-24
Payer: MEDICARE

## 2019-05-24 VITALS
RESPIRATION RATE: 16 BRPM | BODY MASS INDEX: 18.85 KG/M2 | SYSTOLIC BLOOD PRESSURE: 122 MMHG | DIASTOLIC BLOOD PRESSURE: 60 MMHG | OXYGEN SATURATION: 91 % | HEIGHT: 63 IN | WEIGHT: 106.4 LBS | HEART RATE: 107 BPM

## 2019-05-24 DIAGNOSIS — Z99.81 SUPPLEMENTAL OXYGEN DEPENDENT: ICD-10-CM

## 2019-05-24 DIAGNOSIS — R93.89 ABNORMAL CT OF THE CHEST: Chronic | ICD-10-CM

## 2019-05-24 DIAGNOSIS — F17.200 CURRENT SMOKER: Chronic | ICD-10-CM

## 2019-05-24 DIAGNOSIS — J96.11 CHRONIC RESPIRATORY FAILURE WITH HYPOXIA (HCC): Chronic | ICD-10-CM

## 2019-05-24 DIAGNOSIS — J43.9 PULMONARY EMPHYSEMA, UNSPECIFIED EMPHYSEMA TYPE (HCC): Chronic | ICD-10-CM

## 2019-05-24 DIAGNOSIS — D83.0 COMMON VARIABLE IMMUNODEFICIENCY WITH PREDOMINANT ABNORMALITIES OF B-CELL NUMBERS AND FUNCTION (HCC): ICD-10-CM

## 2019-05-24 DIAGNOSIS — D80.3 IGG DEFICIENCY (HCC): Chronic | ICD-10-CM

## 2019-05-24 DIAGNOSIS — G93.32 CHRONIC FATIGUE SYNDROME: ICD-10-CM

## 2019-05-24 DIAGNOSIS — G47.34 NOCTURNAL HYPOXEMIA: ICD-10-CM

## 2019-05-24 PROCEDURE — 99214 OFFICE O/P EST MOD 30 MIN: CPT | Performed by: NURSE PRACTITIONER

## 2019-05-24 NOTE — PATIENT INSTRUCTIONS
1) Continue oxygen at 24/7 at 2L  2) Will try Trelelgy in exchange for Symbicort at 160/4.5 and Spiriva Respimat. Continue rescue inhaler.  Encourage increase use of nebulizer during rudolph smoke exacerbation.   3) Encourage light conditioning   4) Add Mucinex found OTC and aerobika to help with mucus clearance  5) Continue IgG infusion every other week  6) Vaccines: Allergic to vaccines  7) Complete smoking cessation discussed  8) Add sinus rinse - would greatly benefit post nasal drip  9) Zpack on hand. Prednisone allergy.   10) Return in about 3 months (around 8/24/2019), or or Binu, for if not sooner, review of symptoms, follow up with ROVERTO Broussard, pulmonary function results.

## 2019-05-24 NOTE — PROGRESS NOTES
CC:  Here for f/u pulmonary issues as listed below    HPI:   Deb Vega is a 71 y.o. year old female here today for follow-up on COPD with sick visit.     Past medical history chronic fatigue syndrome, Lyme disease, being followed by Dr. Ion Gutierrez at [facility]. 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      Alpha 1 negative. PFTs from 2/2017 FEV1 0.90 L or 42% predicted, FEV1/FVC ratio 40, % predicted and a DLCO of 52% predicted.  Patient has a history of smoking for the last 55 years. She continues to take puffs of cigarettes. She has been through [facility] smoking cessation program. She has difficulty completely stopping due to family member at home who continues to smoke daily.  We reviewed the benefits of complete smoking cessation.       CT scan 11/23/2016 indicated moderate diffuse centrilobular emphysematous changes. A 2.0 x 0.6 cm opacity in the medial right lower lobe. No adenopathy noted. PET scan was performed 11/23/2016 indicating no metabolic activity. Repeat CT 6/2/2017 indicated no change in 6.8 mm x 18.0 mm ill-defined opacity medial right lower lobe, markedly hyperexpanded emphysematous lungs and apical scarring. No pleural effusion. Updated Cat scan completed 11/17/2017: noting 18 x 6.8 mm focal opacification is again noted in the right lung base just above the right hemidiaphragm.  Focal area of scarring or discoid atelectasis is most likely. Neoplasm such as adenocarcinoma in situ is in the differential diagnosis. Updated cat scn from 5/22/2018 is stable. Updated cat scan from 5/20/2019 stable. Extensive emphysematous changes. Will continues yearly scans given smoking history.      Last illness in which he required antibiotics Nov 2018, required different antibiotics b/c of intolerance. She reports yeast vaginally and rectally since that time. She declines ID referral, because sister is an ID MD and plans to discuss with her.     She finds allergy season  increases her shortness of breath, and over the last 6-8 months has increased dyspnea, wheeze, cough with phlegm.  She is compliant Symbicort 160/4.5 µg 2 puffs twice a day, Spiriva once daily.  She has utilized her Xopenex HFA  rarely. She feels her symptoms are exacerbated by heat, altitude, and rudolph smoke.     She uses oxygen 2L 24/7. She started using Ponaris due to chronic nasal dryness which has been beneficial. She has postnasal drip. She uses Ocean Spray nasal spray and then tries to blow her nose very hard and is unable to get any mucus up and then plugs her ears. Recommended Mucinex and flutter valve last OV, but did not obtain either.       She admits to being quite sedentary. She reports any type of exercise exacerbates her immune deficiency fatigue. When she does increase her activity she is unable to go to bed until 2 or 3 AM because she has much more energy.       She underwent sleep testing due to chronic fatigue which did not indicate sleep disordered breathing, but nocturnal hypoxemia only and PLMS index 45.0. She does complain of cramping in lower extremities that her primary is overseeing.           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   • Chronic use of benzodiazepine for therapeutic purpose 09/04/2017   • Benzodiazepine dependence ([facility]) 08/29/2017   • Nocturnal hypoxemia 06/06/2017   • Supplemental oxygen dependent 02/07/2017   • Abnormal CT of the chest 02/07/2017   • Tachycardia 07/15/2015   • COPD (chronic obstructive pulmonary disease) ([facility]) 03/01/2013   • Lyme disease  03/01/2013   • Immunologic deficiency syndrome ([facility]) 03/01/2013   • Chronic fatigue syndrome 12/07/2012   • Hyperlipidemia with target LDL less than 100 06/06/2012   • Current smoker 06/06/2012   • Anxiety 06/06/2012       Past Medical History:   Diagnosis Date   • Allergy    • Anxiety    • ASTHMA    • Back pain    • Bladder infection    • Bronchitis    • Cellulitis     (L) elbow    • Chronic use of benzodiazepine for therapeutic purpose 9/4/2017   • COPD    • COPD (chronic obstructive pulmonary disease) ([facility]) 3/1/2013   • H/O blood transfusion reaction    • History of measles    • History of mumps    • Hives    • Hyperlipidemia    • Immunologic deficiency syndrome ([facility]) 3/1/2013   • Lyme disease 3/1/2013   • Mononucleosis    • Pneumonia    • Tachycardia 7/15/2015   • Tachycardia 7/15/2015       Past Surgical History:   Procedure Laterality Date   • BREAST BIOPSY     • THYROID LOBECTOMY     • TONSILLECTOMY         Family History   Problem Relation Age of Onset   • Alcohol/Drug Mother    • Cancer Father    • Stroke Father    • Heart Disease Father 42       Social History   Substance Use Topics   • Smoking status: Current Some Day Smoker     Packs/day: 0.25     Years: 55.00     Types: Cigarettes     Last attempt to quit: 11/20/2018   • Smokeless tobacco: Never Used      Comment: patient smokes 0 to 5 cigarettes a day    • Alcohol use Yes      Comment: patient states she has not had a drink in 2 years 10/09/2015       Current Outpatient Prescriptions   Medication Sig Dispense Refill   • Tiotropium Bromide Monohydrate (SPIRIVA RESPIMAT) 2.5 MCG/ACT Aero Soln Inhale 2 Inhalation by mouth every day. Assemble and prime. 1 Inhaler 6   • Fluticasone-Umeclidin-Vilant (TRELEGY ELLIPTA) 100-62.5-25 MCG/INH AEROSOL POWDER, BREATH ACTIVATED Inhale 1 Puff by mouth every day. 1 Each 11   • levalbuterol (XOPENEX HFA) 45 MCG/ACT inhaler Inhale 2 Puffs by mouth every four hours as needed for Shortness of Breath (As needed for  shortness of breath, cough, wheezing.). 1 Inhaler 11   • KLONOPIN 0.5 MG Tab TAKE ONE TAB BY MOUTH EVERY NIGHT AT BEDTIME ICD10 F41.9 90 Tab 0   • ALPRAZolam (XANAX) 0.25 MG Tab Take 1 Tab by mouth 2 times a day as needed for up to 90 days. FOR ANXIETY 180 Tab 0   • levalbuterol (XOPENEX) 1.25 MG/3ML Nebu Soln 3 mL by Nebulization route every four hours as needed for Shortness of Breath. 120 mL 4   • CLARINEX 5 MG tablet Take 1 Tab by mouth every day. 90 Tab 3   • budesonide-formoterol (SYMBICORT) 160-4.5 MCG/ACT Aerosol INHALE TWO PUFFS BY MOUTH TWICE DAILY. USE SPACER. RINSE MOUTH AFTER USE. 3 Inhaler 3   • fluconazole (DIFLUCAN) 150 MG tablet Take one tab by mouth daily for 3 days. Stop pravastatin while taking and restart 72 hours after last dose of diflucan. 3 Tab 0   • ondansetron (ZOFRAN) 8 MG Tab Take 1 Tab by mouth every 8 hours as needed for Nausea/Vomiting. 15 Tab 0   • Coenzyme Q10 (CO Q-10) 30 MG Cap Take 30 mg by mouth.     • vitamin E 100 UNIT capsule Take 400 Units by mouth.     • Bioflavonoid Products (MUNIRA-C) 500-200-60 MG Tab Take  by mouth.     • Omega-3 Fatty Acids (FISH OIL PO) Take  by mouth.     • MAGNESIUM PO Take 480 mg by mouth.     • desloratadine (CLARINEX) 5 MG tablet Take 5 mg by mouth.     • montelukast (SINGULAIR) 10 MG Tab Take 10 mg by mouth.     • pravastatin (PRAVACHOL) 40 MG tablet Take 40 mg by mouth.     • pravastatin (PRAVACHOL) 40 MG tablet TAKE 1 TABLET BY MOUTH EVERY DAY 90 Tab 3   • montelukast (SINGULAIR) 10 MG Tab TAKE 1 TABLET BY MOUTH EVERY DAY 90 Tab 3   • desloratadine (CLARINEX) 5 MG tablet Take 1 Tab by mouth every day. 90 Tab 3   • vitamin D (CHOLECALCIFEROL) 1000 UNIT Tab Take 1,000 Units by mouth every day.     • acetaminophen (TYLENOL) 325 MG Tab Take 650 mg by mouth every four hours as needed.     • immune globulin (FLEBOGAMMA) 10 GM/100ML Solution 5 g by Intravenous route Once. Indications: Pt now on 5 grams     • Oral Electrolytes (EMERGEN-C ELECTRO MIX  PO) Take  by mouth every day.     • docosahexanoic acid (OMEGA 3 FA) 1000 MG CAPS Take 1,000 mg by mouth 2 Times a Day.     • aspirin (ASA) 81 MG CHEW Take 162 mg by mouth every day.     • MAGNESIUM GLYCINATE Take 480 mg by mouth.     • levalbuterol (XOPENEX) 1.25 MG/3ML Nebu Soln 3 mL by Nebulization route every four hours as needed for Shortness of Breath. 120 mL 4   • nystatin (MYCOSTATIN) 022300 UNIT/GM Cream topical cream Apply 1 g to affected area(s) 2 times a day. (Patient not taking: Reported on 5/24/2019) 1 Tube 2   • azithromycin (ZITHROMAX Z-BENEDICTO) 250 MG Tab Take 2 tablets on day 1. Then take 1 tablet a day for 4 days. (Patient not taking: Reported on 5/24/2019) 6 Tab 0   • MethylPREDNISolone (MEDROL DOSEPAK) 4 MG Tablet Therapy Pack Take as directed. (Patient not taking: Reported on 5/24/2019) 21 Tab 0   • gabapentin (NEURONTIN) 100 MG Cap Take 1 Cap by mouth 3 times a day. Increase to 2 tab tid in 2 weeks as directed (Patient not taking: Reported on 5/24/2019) 120 Cap 2   • NON SPECIFIED (hydration orders)  please infuse 2 L NS per week. Infuse over one hour QT: 2 L REFILL: 56 fax [phone] (Patient not taking: Reported on 5/24/2019) 2 Each 56   • XOPENEX HFA 45 MCG/ACT inhaler INHALE ONE TO TWO PUFFS BY MOUTH EVERY 4 HOURS AS NEEDED FOR SHORTNESS OF BREATH 15 g 11     No current facility-administered medications for this visit.           Allergies: Gadolinium; Influenza virus vaccine; Zyvox [linezolid]; Cefuroxime; Epinephrine; Nsaids; Other drug; Other misc; Prednisone; and Sulfa drugs          ROS   Gen: Denies fever, chills, unintentional weight loss, fatigue, night sweats  E/N/T: Denies nasal congestion, ear pain  Resp:Denies  hemoptysis  CV: Denies chest pain, chest tightness, palpitations  Sleep:Denies morning headache  Neuro: Denies frequent headaches, weakness, dizziness  GI: Denies acid reflux, N/V  See HPI.  All other systems reviewed and negative          Vital signs for this  "encounter:  Vitals:    05/24/19 1520 05/24/19 1525   Height: 1.6 m (5' 3\")    Weight: 48.3 kg (106 lb 6.4 oz)    Weight % change since last entry.: 0 %    BP: 122/60    Pulse: (!) 107    BMI (Calculated): 18.85    Resp: 16    O2 sat % on O2:  (!) 2 %                 Physical Exam:   Appearance: well developed, well nourished, no acute distress.   Eyes: PERRL, EOM intact, sclere white, conjunctiva moist.  Ears: no lesions or deformities.  Hearing: grossly intact.  Nose: no lesions or deformities.  Dentition: good dentition.   Oropharynx: tongue normal, posterior pharynx without erythema or exudate.  Neck: supple, trachea midline, no masses.  Respiratory effort: no intercostal retractions or use of accessory muscles.  Lung auscultation: poor air movement  Heart auscultation: no murmur, rub, or gallop   Extremities: no cyanosis or edema.  Abdomen: soft, non-tender, no masses.  Gait and station: without difficulty   Digits and Nails: no clubbing, cyanosis, petechiae, or nodes.  Cranial nerves: grossly normal.  Motor: no focal deficits observed.   Skin: no rashes, lesions, or ulcers noted.  Orientation: oriented to time, place, and person.  Mood and affect: mood and affect appropriate, normal interaction with examiner.      Assessment   1. Supplemental oxygen dependent     2. Chronic respiratory failure with hypoxia (HCC)  PULMONARY FUNCTION TESTS -Test requested: Complete Pulmonary Function Test   3. Nocturnal hypoxemia     4. Abnormal CT of the chest     5. Pulmonary emphysema, unspecified emphysema type (HCC)  PULMONARY FUNCTION TESTS -Test requested: Complete Pulmonary Function Test   6. Current smoker  PULMONARY FUNCTION TESTS -Test requested: Complete Pulmonary Function Test   7. Chronic fatigue syndrome     8. 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 IgG deficiency (HCC)     10. BMI less than 19,adult         Patient was seen for 30 minutes, more " than 50% of time spent in face to face review, counseling, and arranging future evaluation and follow up of medical conditions and care relating to patients many questions and concerns relating to her health, in particular her breathing.  I have discussed again in detail the extensive emphysema her lungs have relating to her growing dyspnea.  She discussed her last infection in Nov and continues to have yeast vaginally and rectally since then.  Recommend referral to ID, but has sister who does ID and will discuss with her.  She brought in OTC nebulizer to discuss, but I am not familiar with it. Wished to discuss the SALT method, again I have no knowledge of.  Patient is clinically stable and will have the following changes per plan.     PLAN:   Patient Instructions   1) Continue oxygen at 24/7 at 2L  2) Will try Trelelgy in exchange for Symbicort at 160/4.5 and Spiriva Respimat. Continue rescue inhaler.  Encourage increase use of nebulizer during rudolph smoke exacerbation.   3) Encourage light conditioning   4) Add Mucinex found OTC and aerobika to help with mucus clearance  5) Continue IgG infusion every other week  6) Vaccines: Allergic to vaccines  7) Complete smoking cessation discussed  8) Add sinus rinse - would greatly benefit post nasal drip  9) Zpack on hand. Prednisone allergy.   10) Return in about 3 months (around 8/24/2019), or or Binu, for if not sooner, review of symptoms, follow up with ROVERTO Broussard, pulmonary function results.

## 2019-06-24 ENCOUNTER — PATIENT MESSAGE (OUTPATIENT)
Dept: MEDICAL GROUP | Facility: LAB | Age: 74
End: 2019-06-24

## 2019-06-24 ENCOUNTER — OFFICE VISIT (OUTPATIENT)
Dept: MEDICAL GROUP | Facility: LAB | Age: 74
End: 2019-06-24
Payer: MEDICARE

## 2019-06-24 VITALS
RESPIRATION RATE: 12 BRPM | WEIGHT: 108 LBS | HEIGHT: 64 IN | BODY MASS INDEX: 18.44 KG/M2 | SYSTOLIC BLOOD PRESSURE: 110 MMHG | HEART RATE: 100 BPM | TEMPERATURE: 98.8 F | OXYGEN SATURATION: 96 % | DIASTOLIC BLOOD PRESSURE: 50 MMHG

## 2019-06-24 DIAGNOSIS — R20.9 BILATERAL COLD FEET: ICD-10-CM

## 2019-06-24 DIAGNOSIS — J43.9 PULMONARY EMPHYSEMA, UNSPECIFIED EMPHYSEMA TYPE (HCC): Chronic | ICD-10-CM

## 2019-06-24 DIAGNOSIS — E78.5 DYSLIPIDEMIA: ICD-10-CM

## 2019-06-24 DIAGNOSIS — E55.9 VITAMIN D DEFICIENCY: ICD-10-CM

## 2019-06-24 PROCEDURE — 99214 OFFICE O/P EST MOD 30 MIN: CPT | Performed by: INTERNAL MEDICINE

## 2019-06-24 RX ORDER — IMMUNE GLOBULIN INFUSION (HUMAN) 100 MG/ML
5 INJECTION, SOLUTION INTRAVENOUS; SUBCUTANEOUS ONCE
COMMUNITY
End: 2021-06-15

## 2019-06-24 RX ORDER — PRAVASTATIN SODIUM 80 MG/1
80 TABLET ORAL DAILY
Qty: 90 TAB | Refills: 3 | Status: SHIPPED | OUTPATIENT
Start: 2019-06-24 | End: 2021-06-15

## 2019-06-25 NOTE — PROGRESS NOTES
CC: Deb Vega is a 73 y.o. female is suffering from   Chief Complaint   Patient presents with   • Other     follow up         SUBJECTIVE:  1. Vitamin D deficiency  Deb is here for follow-up is suffering from a history of low vitamin D.    2. Bilateral cold feet  Patient and I have discussed that she is suffering also from bilateral cold feet etiology behind this is uncertain suspect claudication    3. Pulmonary emphysema, unspecified emphysema type (HCC)  Patient with severe end-stage emphysema.  Recommend patient moved to a lower altitude closer to family    4. Dyslipidemia  History of dyslipidemia we will double patient's Pravachol from 40 to 80 mg        Past social, family, history: Single  Social History   Substance Use Topics   • Smoking status: Current Some Day Smoker     Packs/day: 0.25     Years: 55.00     Types: Cigarettes     Last attempt to quit: 11/20/2018   • Smokeless tobacco: Never Used      Comment: patient smokes 0 to 5 cigarettes a day    • Alcohol use Yes      Comment: patient states she has not had a drink in 2 years 10/09/2015         MEDICATIONS:    Current Outpatient Prescriptions:   •  immune globulin (GAMMAGARD) 5 GM/50ML Solution infusion, 5 g by Intravenous route Once., Disp: , Rfl:   •  pravastatin (PRAVACHOL) 80 MG tablet, Take 1 Tab by mouth every day., Disp: 90 Tab, Rfl: 3  •  Tiotropium Bromide Monohydrate (SPIRIVA RESPIMAT) 2.5 MCG/ACT Aero Soln, Inhale 2 Inhalation by mouth every day. Assemble and prime., Disp: 1 Inhaler, Rfl: 6  •  levalbuterol (XOPENEX HFA) 45 MCG/ACT inhaler, Inhale 2 Puffs by mouth every four hours as needed for Shortness of Breath (As needed for shortness of breath, cough, wheezing.)., Disp: 1 Inhaler, Rfl: 11  •  KLONOPIN 0.5 MG Tab, TAKE ONE TAB BY MOUTH EVERY NIGHT AT BEDTIME ICD10 F41.9, Disp: 90 Tab, Rfl: 0  •  ALPRAZolam (XANAX) 0.25 MG Tab, Take 1 Tab by mouth 2 times a day as needed for up to 90 days. FOR ANXIETY, Disp: 180 Tab, Rfl: 0  •   CLARINEX 5 MG tablet, Take 1 Tab by mouth every day., Disp: 90 Tab, Rfl: 3  •  budesonide-formoterol (SYMBICORT) 160-4.5 MCG/ACT Aerosol, INHALE TWO PUFFS BY MOUTH TWICE DAILY. USE SPACER. RINSE MOUTH AFTER USE., Disp: 3 Inhaler, Rfl: 3  •  montelukast (SINGULAIR) 10 MG Tab, TAKE 1 TABLET BY MOUTH EVERY DAY, Disp: 90 Tab, Rfl: 3  •  XOPENEX HFA 45 MCG/ACT inhaler, INHALE ONE TO TWO PUFFS BY MOUTH EVERY 4 HOURS AS NEEDED FOR SHORTNESS OF BREATH, Disp: 15 g, Rfl: 11  •  Fluticasone-Umeclidin-Vilant (TRELEGY ELLIPTA) 100-62.5-25 MCG/INH AEROSOL POWDER, BREATH ACTIVATED, Inhale 1 Puff by mouth every day., Disp: 1 Each, Rfl: 11  •  levalbuterol (XOPENEX) 1.25 MG/3ML Nebu Soln, 3 mL by Nebulization route every four hours as needed for Shortness of Breath., Disp: 120 mL, Rfl: 4  •  levalbuterol (XOPENEX) 1.25 MG/3ML Nebu Soln, 3 mL by Nebulization route every four hours as needed for Shortness of Breath., Disp: 120 mL, Rfl: 4  •  fluconazole (DIFLUCAN) 150 MG tablet, Take one tab by mouth daily for 3 days. Stop pravastatin while taking and restart 72 hours after last dose of diflucan., Disp: 3 Tab, Rfl: 0  •  nystatin (MYCOSTATIN) 479662 UNIT/GM Cream topical cream, Apply 1 g to affected area(s) 2 times a day. (Patient not taking: Reported on 5/24/2019), Disp: 1 Tube, Rfl: 2  •  ondansetron (ZOFRAN) 8 MG Tab, Take 1 Tab by mouth every 8 hours as needed for Nausea/Vomiting., Disp: 15 Tab, Rfl: 0  •  azithromycin (ZITHROMAX Z-BENEDICTO) 250 MG Tab, Take 2 tablets on day 1. Then take 1 tablet a day for 4 days. (Patient not taking: Reported on 5/24/2019), Disp: 6 Tab, Rfl: 0  •  Coenzyme Q10 (CO Q-10) 30 MG Cap, Take 30 mg by mouth., Disp: , Rfl:   •  vitamin E 100 UNIT capsule, Take 400 Units by mouth., Disp: , Rfl:   •  Bioflavonoid Products (MUNIRA-C) 500-200-60 MG Tab, Take  by mouth., Disp: , Rfl:   •  Omega-3 Fatty Acids (FISH OIL PO), Take  by mouth., Disp: , Rfl:   •  MAGNESIUM PO, Take 480 mg by mouth., Disp: , Rfl:   •   desloratadine (CLARINEX) 5 MG tablet, Take 5 mg by mouth., Disp: , Rfl:   •  montelukast (SINGULAIR) 10 MG Tab, Take 10 mg by mouth., Disp: , Rfl:   •  MethylPREDNISolone (MEDROL DOSEPAK) 4 MG Tablet Therapy Pack, Take as directed. (Patient not taking: Reported on 5/24/2019), Disp: 21 Tab, Rfl: 0  •  gabapentin (NEURONTIN) 100 MG Cap, Take 1 Cap by mouth 3 times a day. Increase to 2 tab tid in 2 weeks as directed (Patient not taking: Reported on 5/24/2019), Disp: 120 Cap, Rfl: 2  •  desloratadine (CLARINEX) 5 MG tablet, Take 1 Tab by mouth every day., Disp: 90 Tab, Rfl: 3  •  NON SPECIFIED, (hydration orders)  please infuse 2 L NS per week. Infuse over one hour QT: 2 L REFILL: 56 fax [phone] (Patient not taking: Reported on 5/24/2019), Disp: 2 Each, Rfl: 56  •  vitamin D (CHOLECALCIFEROL) 1000 UNIT Tab, Take 1,000 Units by mouth every day., Disp: , Rfl:   •  acetaminophen (TYLENOL) 325 MG Tab, Take 650 mg by mouth every four hours as needed., Disp: , Rfl:   •  immune globulin (FLEBOGAMMA) 10 GM/100ML Solution, 5 g by Intravenous route Once. Indications: Pt now on 5 grams, Disp: , Rfl:   •  Oral Electrolytes (EMERGEN-C ELECTRO MIX PO), Take  by mouth every day., Disp: , Rfl:   •  docosahexanoic acid (OMEGA 3 FA) 1000 MG CAPS, Take 1,000 mg by mouth 2 Times a Day., Disp: , Rfl:   •  aspirin (ASA) 81 MG CHEW, Take 162 mg by mouth every day., Disp: , Rfl:   •  MAGNESIUM GLYCINATE, Take 480 mg by mouth., Disp: , Rfl:     PROBLEMS:  Patient Active Problem List    Diagnosis Date Noted   • Yeast infection of the skin 12/06/2018   • Chronic respiratory failure with hypoxia (HCC) 11/16/2018   • IgG deficiency (HCC) 11/16/2018   • BMI less than 19,adult 11/16/2018   • 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   • 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 (HCC) 09/14/2018   • 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   • Chronic use of  "benzodiazepine for therapeutic purpose 09/04/2017   • Benzodiazepine dependence ([facility]) 08/29/2017   • Nocturnal hypoxemia 06/06/2017   • Supplemental oxygen dependent 02/07/2017   • Abnormal CT of the chest 02/07/2017   • Tachycardia 07/15/2015   • COPD (chronic obstructive pulmonary disease) ([facility]) 03/01/2013   • Lyme disease 03/01/2013   • Immunologic deficiency syndrome ([facility]) 03/01/2013   • Chronic fatigue syndrome 12/07/2012   • Hyperlipidemia with target LDL less than 100 06/06/2012   • Current smoker 06/06/2012   • Anxiety 06/06/2012       REVIEW OF SYSTEMS:  Gen.:  No Nausea, Vomiting, fever, Chills.  Heart: No chest pain.  Lungs:  No shortness of Breath.  Psychological: Girish unusual Anxiety depression     PHYSICAL EXAM   Constitutional: Alert, cooperative, not in acute distress.  Cardiovascular:  Rate Rhythm is regular without murmurs rubs clicks.     Thorax & Lungs: Very poor inspiratory expiratory excursion  HENT: Normocephalic, Atraumatic.  Eyes: PERRLA, EOMI, Conjunctiva normal.   Neck: Trachia is midline no swelling of the thyroid.   Lymphatic: No lymphadenopathy noted.   Neurologic: Alert & oriented x 3, cranial nerves II through XII are intact, Normal motor function, Normal sensory function, No focal deficits noted.   Psychiatric: Affect normal, Judgment normal, Mood normal.     VITAL SIGNS:/50   Pulse 100   Temp 37.1 °C (98.8 °F) (Temporal)   Resp 12   Ht 1.613 m (5' 3.5\")   Wt 49 kg (108 lb)   SpO2 96%   BMI 18.83 kg/m²     Labs: Reviewed    Assessment:                                                     Plan:    1. Vitamin D deficiency  Recheck vitamin D PTH  - VITAMIN D,25 HYDROXY; Future  - PTH INTACT; Future  - US-EXTREMITY ARTERY LOWER BILAT W/RUDY (COMBO); Future    2. Bilateral cold feet  Ultrasound ordered lower extremities  - US-EXTREMITY ARTERY LOWER BILAT W/RUDY (COMBO); Future    3. Pulmonary emphysema, unspecified emphysema type ([facility])  Comprehensive metabolic panel CBC " ordered, recommended patient moved closer to family  - Comp Metabolic Panel; Future  - CBC WITH DIFFERENTIAL; Future    4. Dyslipidemia  Increase Pravachol from 40 to 80 mg  - pravastatin (PRAVACHOL) 80 MG tablet; Take 1 Tab by mouth every day.  Dispense: 90 Tab; Refill: 3

## 2019-06-26 ENCOUNTER — HOSPITAL ENCOUNTER (OUTPATIENT)
Dept: LAB | Facility: MEDICAL CENTER | Age: 74
End: 2019-06-26
Attending: INTERNAL MEDICINE
Payer: MEDICARE

## 2019-06-26 DIAGNOSIS — J43.9 PULMONARY EMPHYSEMA, UNSPECIFIED EMPHYSEMA TYPE (HCC): Chronic | ICD-10-CM

## 2019-06-26 DIAGNOSIS — E55.9 VITAMIN D DEFICIENCY: ICD-10-CM

## 2019-06-26 LAB
25(OH)D3 SERPL-MCNC: 12 NG/ML (ref 30–100)
ALBUMIN SERPL BCP-MCNC: 4 G/DL (ref 3.2–4.9)
ALBUMIN/GLOB SERPL: 1.5 G/DL
ALP SERPL-CCNC: 43 U/L (ref 30–99)
ALT SERPL-CCNC: 21 U/L (ref 2–50)
ANION GAP SERPL CALC-SCNC: 7 MMOL/L (ref 0–11.9)
AST SERPL-CCNC: 23 U/L (ref 12–45)
BASOPHILS # BLD AUTO: 1.9 % (ref 0–1.8)
BASOPHILS # BLD: 0.07 K/UL (ref 0–0.12)
BILIRUB SERPL-MCNC: 0.6 MG/DL (ref 0.1–1.5)
BUN SERPL-MCNC: 5 MG/DL (ref 8–22)
CALCIUM SERPL-MCNC: 9 MG/DL (ref 8.5–10.5)
CHLORIDE SERPL-SCNC: 101 MMOL/L (ref 96–112)
CO2 SERPL-SCNC: 29 MMOL/L (ref 20–33)
CREAT SERPL-MCNC: 0.55 MG/DL (ref 0.5–1.4)
EOSINOPHIL # BLD AUTO: 0.09 K/UL (ref 0–0.51)
EOSINOPHIL NFR BLD: 2.4 % (ref 0–6.9)
ERYTHROCYTE [DISTWIDTH] IN BLOOD BY AUTOMATED COUNT: 47.8 FL (ref 35.9–50)
GLOBULIN SER CALC-MCNC: 2.7 G/DL (ref 1.9–3.5)
GLUCOSE SERPL-MCNC: 87 MG/DL (ref 65–99)
HCT VFR BLD AUTO: 43 % (ref 37–47)
HGB BLD-MCNC: 13.6 G/DL (ref 12–16)
IMM GRANULOCYTES # BLD AUTO: 0.01 K/UL (ref 0–0.11)
IMM GRANULOCYTES NFR BLD AUTO: 0.3 % (ref 0–0.9)
LYMPHOCYTES # BLD AUTO: 1.27 K/UL (ref 1–4.8)
LYMPHOCYTES NFR BLD: 33.8 % (ref 22–41)
MCH RBC QN AUTO: 30.8 PG (ref 27–33)
MCHC RBC AUTO-ENTMCNC: 31.6 G/DL (ref 33.6–35)
MCV RBC AUTO: 97.3 FL (ref 81.4–97.8)
MONOCYTES # BLD AUTO: 0.34 K/UL (ref 0–0.85)
MONOCYTES NFR BLD AUTO: 9 % (ref 0–13.4)
NEUTROPHILS # BLD AUTO: 1.98 K/UL (ref 2–7.15)
NEUTROPHILS NFR BLD: 52.6 % (ref 44–72)
NRBC # BLD AUTO: 0 K/UL
NRBC BLD-RTO: 0 /100 WBC
PLATELET # BLD AUTO: 256 K/UL (ref 164–446)
PMV BLD AUTO: 9.9 FL (ref 9–12.9)
POTASSIUM SERPL-SCNC: 3.9 MMOL/L (ref 3.6–5.5)
PROT SERPL-MCNC: 6.7 G/DL (ref 6–8.2)
PTH-INTACT SERPL-MCNC: 31 PG/ML (ref 14–72)
RBC # BLD AUTO: 4.42 M/UL (ref 4.2–5.4)
SODIUM SERPL-SCNC: 137 MMOL/L (ref 135–145)
WBC # BLD AUTO: 3.8 K/UL (ref 4.8–10.8)

## 2019-06-26 PROCEDURE — 83970 ASSAY OF PARATHORMONE: CPT

## 2019-06-26 PROCEDURE — 80053 COMPREHEN METABOLIC PANEL: CPT

## 2019-06-26 PROCEDURE — 85025 COMPLETE CBC W/AUTO DIFF WBC: CPT

## 2019-06-26 PROCEDURE — 82306 VITAMIN D 25 HYDROXY: CPT

## 2019-06-26 PROCEDURE — 36415 COLL VENOUS BLD VENIPUNCTURE: CPT

## 2019-06-27 DIAGNOSIS — E55.9 VITAMIN D DEFICIENCY: ICD-10-CM

## 2019-06-27 RX ORDER — ERGOCALCIFEROL 1.25 MG/1
50000 CAPSULE ORAL
Qty: 5 CAP | Refills: 1 | Status: SHIPPED | OUTPATIENT
Start: 2019-06-27 | End: 2021-06-15

## 2019-07-03 ENCOUNTER — HOSPITAL ENCOUNTER (OUTPATIENT)
Dept: RADIOLOGY | Facility: MEDICAL CENTER | Age: 74
End: 2019-07-03
Attending: INTERNAL MEDICINE
Payer: MEDICARE

## 2019-07-03 DIAGNOSIS — R20.9 BILATERAL COLD FEET: ICD-10-CM

## 2019-07-03 DIAGNOSIS — E55.9 VITAMIN D DEFICIENCY: ICD-10-CM

## 2019-07-03 PROCEDURE — 93922 UPR/L XTREMITY ART 2 LEVELS: CPT

## 2019-07-15 ENCOUNTER — PATIENT MESSAGE (OUTPATIENT)
Dept: MEDICAL GROUP | Facility: LAB | Age: 74
End: 2019-07-15

## 2019-07-15 NOTE — PATIENT COMMUNICATION
Sister Selena Webster calling back stated she would like to speak to you, [phone].  This person is on pt's emergency contact and has authorization to receive medical and billing information on patient. You can find this in the Demographic

## 2019-07-16 ENCOUNTER — TELEPHONE (OUTPATIENT)
Dept: PULMONOLOGY | Facility: HOSPICE | Age: 74
End: 2019-07-16

## 2019-07-16 DIAGNOSIS — J44.9 CHRONIC OBSTRUCTIVE PULMONARY DISEASE, UNSPECIFIED COPD TYPE (HCC): ICD-10-CM

## 2019-07-17 NOTE — TELEPHONE ENCOUNTER
From: Deb Vega  To: Andi Mccullough D.O.  Sent: 7/15/2019 12:28 AM PDT  Subject: Non-Urgent Medical Question    My sister, Selena [place], ([place]) would like to speak with you and will be placing a call to you I hereby authorize you to discuss my care with her - she is an APRN and works at [employer] along with her ,  Luke Darin. FYI, I will NOT be moving to [place] as we discussed - not possible and not a healthy place for me to be. Heat and humidity, mold, their living arrangements won't work. I am from [facility] and have friends and family over there and planning to pursue that - probably [place] or [place], or [facility] communities there. Appropriate to do a lipid panel prior to me taking double dose of statin?  Thanks for all of your help and kindness. You are very much appreciated.   Deb Vega

## 2019-07-22 ENCOUNTER — PATIENT MESSAGE (OUTPATIENT)
Dept: PULMONOLOGY | Facility: HOSPICE | Age: 74
End: 2019-07-22

## 2019-07-22 NOTE — PATIENT COMMUNICATION
Patient called back and just wanted to make sure her sister was her emergency contact and that we can speak with her if she calls. No changes were made to her chart as her sister is already on as her emergency contact.

## 2019-07-22 NOTE — PATIENT COMMUNICATION
Deb's sister is in the chart as someone you are allowed to speak to.        Sister Selena Webster calling back stated she would like to speak to you, [phone].  This person is on pt's emergency contact and has authorization to receive medical and billing information on patient. You can find this in the Demographic.

## 2019-07-24 ENCOUNTER — PATIENT MESSAGE (OUTPATIENT)
Dept: MEDICAL GROUP | Facility: LAB | Age: 74
End: 2019-07-24

## 2019-07-24 NOTE — TELEPHONE ENCOUNTER
From: Deb Vega  To: Andi Mccullough D.O.  Sent: 7/24/2019 11:35 AM PDT  Subject: Non-Urgent Medical Question    Thank you so very much for speaking with Emmanuel!  ----- Message -----  From: Andi Mccullough D.O.  Sent: 7/24/2019 8:09 AM PDT  To: Deb Vega  Subject: RE: Non-Urgent Medical Question  Deb:  Wonderful discussion with your sister, Selena, obviously very knowledgeable and concerned about you. At this point in time we are in agreement that you are medically stable that there is no immediate rush for you to be moving.   Regards, Andi Mccullough, DO      ----- Message -----   From: Deb Vega   Sent: 7/15/2019 12:28 AM PDT   To: Andi Mccullough D.O.  Subject: Non-Urgent Medical Question    My sister, Selena [place], ([place]) would like to speak with you and will be placing a call to you I hereby authorize you to discuss my care with her - she is an APRN and works at [employer] along with her , Dr. Luke [place]. FYI, I will NOT be moving to [place] as we discussed - not possible and not a healthy place for me to be. Heat and humidity, mold, their living arrangements won't work. I am from [place] [facility] and have friends and family over there and planning to pursue that - probably [place] or [place], or [facility] communities there. Appropriate to do a lipid panel prior to me taking double dose of statin?  Thanks for all of your help and kindness. You are very much appreciated.   Deb Vega

## 2019-07-25 ENCOUNTER — TELEPHONE (OUTPATIENT)
Dept: MEDICAL GROUP | Facility: LAB | Age: 74
End: 2019-07-25

## 2019-07-25 NOTE — TELEPHONE ENCOUNTER
"· Home Health paperwork received from [facility] requiring provider signature.     · All appropriate fields completed by Medical Assistant: No    · Paperwork placed in \"MA to Provider\" folder/basket. Awaiting provider completion/signature.   · 3 SEPARATE ORDERS THAT NEED TO BE SIGNED AND FAXED BACK  "

## 2019-08-04 ENCOUNTER — PATIENT MESSAGE (OUTPATIENT)
Dept: MEDICAL GROUP | Facility: LAB | Age: 74
End: 2019-08-04

## 2019-08-07 DIAGNOSIS — E78.5 DYSLIPIDEMIA: ICD-10-CM

## 2019-08-07 DIAGNOSIS — E55.9 VITAMIN D DEFICIENCY: ICD-10-CM

## 2019-08-17 ENCOUNTER — TELEPHONE (OUTPATIENT)
Dept: MEDICAL GROUP | Facility: LAB | Age: 74
End: 2019-08-17

## 2019-08-17 NOTE — TELEPHONE ENCOUNTER
· Home Health paperwork received from [facility] dos 7/23/2019 requiring provider signature.     · All appropriate fields completed by Medical Assistant: Yes    · Paperwork handed to provider to sign then faxed to [phone]

## 2019-08-21 ENCOUNTER — HOSPITAL ENCOUNTER (OUTPATIENT)
Dept: LAB | Facility: MEDICAL CENTER | Age: 74
End: 2019-08-21
Attending: INTERNAL MEDICINE
Payer: MEDICARE

## 2019-08-21 DIAGNOSIS — E55.9 VITAMIN D DEFICIENCY: ICD-10-CM

## 2019-08-21 DIAGNOSIS — F41.9 ANXIETY: ICD-10-CM

## 2019-08-21 DIAGNOSIS — E78.5 DYSLIPIDEMIA: ICD-10-CM

## 2019-08-21 LAB
25(OH)D3 SERPL-MCNC: 32 NG/ML (ref 30–100)
CHOLEST SERPL-MCNC: 201 MG/DL (ref 100–199)
FASTING STATUS PATIENT QL REPORTED: NORMAL
HDLC SERPL-MCNC: 86 MG/DL
LDLC SERPL CALC-MCNC: 104 MG/DL
TRIGL SERPL-MCNC: 54 MG/DL (ref 0–149)

## 2019-08-21 PROCEDURE — 36415 COLL VENOUS BLD VENIPUNCTURE: CPT

## 2019-08-21 PROCEDURE — 80307 DRUG TEST PRSMV CHEM ANLYZR: CPT | Mod: GA

## 2019-08-21 PROCEDURE — 80061 LIPID PANEL: CPT

## 2019-08-21 PROCEDURE — 82306 VITAMIN D 25 HYDROXY: CPT

## 2019-08-23 DIAGNOSIS — F41.9 ANXIETY: ICD-10-CM

## 2019-08-23 RX ORDER — ALPRAZOLAM 0.25 MG/1
TABLET ORAL
Qty: 180 TAB | Refills: 0 | Status: SHIPPED
Start: 2019-08-23 | End: 2019-12-17 | Stop reason: SDUPTHER

## 2019-08-23 NOTE — TELEPHONE ENCOUNTER
Was the patient seen in the last year in this department? Yes  6/24/19  4/19/19  Does patient have an active prescription for medications requested? No     Received Request Via: Pharmacy/ please fax

## 2019-08-25 LAB
6MAM UR QL: NOT DETECTED
7AMINOCLONAZEPAM UR QL: NOT DETECTED
A-OH ALPRAZ UR QL: PRESENT
ALPRAZ UR QL: NOT DETECTED
AMPHET UR QL SCN: NOT DETECTED
ANNOTATION COMMENT IMP: NORMAL
ANNOTATION COMMENT IMP: NORMAL
BARBITURATES UR QL: NOT DETECTED
BUPRENORPHINE UR QL: NOT DETECTED
BZE UR QL: NOT DETECTED
CARBOXYTHC UR QL: NOT DETECTED
CARISOPRODOL UR QL: NOT DETECTED
CLONAZEPAM UR QL: NOT DETECTED
CODEINE UR QL: NOT DETECTED
DIAZEPAM UR QL: NOT DETECTED
ETHYL GLUCURONIDE UR QL: NOT DETECTED
FENTANYL UR QL: NOT DETECTED
HYDROCODONE UR QL: NOT DETECTED
HYDROMORPHONE UR QL: NOT DETECTED
LORAZEPAM UR QL: NOT DETECTED
MDA UR QL: NOT DETECTED
MDEA UR QL: NOT DETECTED
MDMA UR QL: NOT DETECTED
MEPERIDINE UR QL: NOT DETECTED
METHADONE UR QL: NOT DETECTED
METHAMPHET UR QL: NOT DETECTED
MIDAZOLAM UR QL SCN: NOT DETECTED
MORPHINE UR QL: NOT DETECTED
NORBUPRENORPHINE UR QL CFM: NOT DETECTED
NORDIAZEPAM UR QL: NOT DETECTED
NORFENTANYL UR QL: NOT DETECTED
NORHYDROCODONE UR QL CFM: NOT DETECTED
NOROXYCODONE UR QL CFM: NOT DETECTED
NOROXYMORPH CO100 Q0458: NOT DETECTED
OXAZEPAM UR QL: NOT DETECTED
OXYCODONE UR QL: NOT DETECTED
OXYMORPHONE UR QL: NOT DETECTED
PATHOLOGY STUDY: NORMAL
PCP UR QL: NOT DETECTED
PHENTERMINE UR QL: NOT DETECTED
PPAA UR QL: NOT DETECTED
PROPOXYPH UR QL: NOT DETECTED
SERVICE CMNT-IMP: NORMAL
TAPENADOL OSULF CO200 Q0473: NOT DETECTED
TAPENTADOL UR QL SCN: NOT DETECTED
TEMAZEPAM UR QL: NOT DETECTED
TRAMADOL UR QL: NOT DETECTED
ZOLPIDEM UR QL: NOT DETECTED

## 2019-08-30 ENCOUNTER — NON-PROVIDER VISIT (OUTPATIENT)
Dept: PULMONOLOGY | Facility: HOSPICE | Age: 74
End: 2019-08-30
Attending: NURSE PRACTITIONER
Payer: MEDICARE

## 2019-08-30 ENCOUNTER — OFFICE VISIT (OUTPATIENT)
Dept: PULMONOLOGY | Facility: HOSPICE | Age: 74
End: 2019-08-30
Payer: MEDICARE

## 2019-08-30 VITALS
OXYGEN SATURATION: 96 % | HEART RATE: 87 BPM | WEIGHT: 105 LBS | RESPIRATION RATE: 16 BRPM | HEIGHT: 63 IN | BODY MASS INDEX: 18.61 KG/M2 | SYSTOLIC BLOOD PRESSURE: 130 MMHG | DIASTOLIC BLOOD PRESSURE: 70 MMHG

## 2019-08-30 VITALS — WEIGHT: 105 LBS | BODY MASS INDEX: 18.31 KG/M2

## 2019-08-30 DIAGNOSIS — R69 CHRONIC ILLNESS: ICD-10-CM

## 2019-08-30 DIAGNOSIS — D83.0 COMMON VARIABLE IMMUNODEFICIENCY WITH PREDOMINANT ABNORMALITIES OF B-CELL NUMBERS AND FUNCTION (HCC): ICD-10-CM

## 2019-08-30 DIAGNOSIS — D80.3 IGG DEFICIENCY (HCC): Chronic | ICD-10-CM

## 2019-08-30 DIAGNOSIS — G93.32 CHRONIC FATIGUE SYNDROME: ICD-10-CM

## 2019-08-30 DIAGNOSIS — D84.9 IMMUNOLOGIC DEFICIENCY SYNDROME (HCC): ICD-10-CM

## 2019-08-30 DIAGNOSIS — J43.9 PULMONARY EMPHYSEMA, UNSPECIFIED EMPHYSEMA TYPE (HCC): Chronic | ICD-10-CM

## 2019-08-30 DIAGNOSIS — J96.11 CHRONIC RESPIRATORY FAILURE WITH HYPOXIA (HCC): Chronic | ICD-10-CM

## 2019-08-30 DIAGNOSIS — F17.200 CURRENT SMOKER: Chronic | ICD-10-CM

## 2019-08-30 DIAGNOSIS — G47.34 NOCTURNAL HYPOXEMIA: ICD-10-CM

## 2019-08-30 DIAGNOSIS — J96.11 CHRONIC RESPIRATORY FAILURE WITH HYPOXIA (HCC): ICD-10-CM

## 2019-08-30 DIAGNOSIS — Z99.81 SUPPLEMENTAL OXYGEN DEPENDENT: ICD-10-CM

## 2019-08-30 DIAGNOSIS — R93.89 ABNORMAL CT OF THE CHEST: Chronic | ICD-10-CM

## 2019-08-30 PROCEDURE — 94060 EVALUATION OF WHEEZING: CPT | Performed by: INTERNAL MEDICINE

## 2019-08-30 PROCEDURE — 99214 OFFICE O/P EST MOD 30 MIN: CPT | Performed by: NURSE PRACTITIONER

## 2019-08-30 PROCEDURE — 94726 PLETHYSMOGRAPHY LUNG VOLUMES: CPT | Performed by: INTERNAL MEDICINE

## 2019-08-30 PROCEDURE — 94729 DIFFUSING CAPACITY: CPT | Performed by: INTERNAL MEDICINE

## 2019-08-30 ASSESSMENT — PULMONARY FUNCTION TESTS
FEV1_PERCENT_PREDICTED: 28
FEV1/FVC_PERCENT_PREDICTED: 47
FEV1/FVC_PREDICTED: 75
FEV1/FVC: 33.7
FEV1/FVC: 35
FEV1_PERCENT_CHANGE: 5
FEV1_LLN: 1.70
FEV1_PREDICTED: 2.03
FVC_LLN: 2.25
FEV1: .61
FEV1/FVC: 34
FEV1/FVC_PERCENT_PREDICTED: 46
FEV1_PERCENT_CHANGE: 10
FEV1/FVC_PERCENT_PREDICTED: 45
FEV1/FVC_PERCENT_PREDICTED: 44
FEV1/FVC_PERCENT_CHANGE: 50
FVC_PERCENT_PREDICTED: 67
FEV1/FVC_PERCENT_PREDICTED: 75
FVC_PERCENT_PREDICTED: 60
FEV1: .58
FVC: 1.81
FEV1_PERCENT_PREDICTED: 30
FEV1/FVC: 35
FEV1/FVC_PERCENT_CHANGE: -4
FVC_PREDICTED: 2.7
FEV1/FVC_PERCENT_LLN: 63
FVC: 1.64

## 2019-08-30 NOTE — PROCEDURES
Technician: ZEFERINO Pereira    Technician Comment:  Good patient effort & cooperation.  The results of this test meet the ATS/ERS standards for acceptability & reproducibility.  Test was performed on the TensorComm Body Plethysmograph-Elite DX system.  Predicted values were NHanes-3 for spirometry, [facility] for DLCO, ITS for Lung Volumes.  The DLCO was uncorrected for Hgb.  A bronchodilator of Ventolin HFA -2puffs via spacer administered.  DLCO performed during dilation period.    Interpretation:

## 2019-08-30 NOTE — PATIENT INSTRUCTIONS
1) Continue oxygen at 24/7 at 2L  2) Continue Symbicort at 160/4.5 and Spiriva. Continue rescue inhaler.  Encourage increase use of nebulizer during rudolph smoke exacerbation.   3) Encourage light conditioning   4) Add Mucinex found OTC and aerobika to help with mucus clearance  5) Continue IgG infusion every other week  6) Vaccines: Allergic to vaccines. Encourage hand washing  7) Complete smoking cessation discussed  8) Zpack on hand. Prednisone allergy.   9) Referral to Behavioral Health  10) Return in about 6 months (around 2/29/2020), or with Binu or Stefany. F/U after flu season per pt request, for if not sooner, review of symptoms.

## 2019-08-30 NOTE — PROGRESS NOTES
CC:  Here for f/u pulmonary issues as listed below    HPI:   Deb Vega is a 71 y.o. year old female here today for follow-up on Emphysema with PFT results.      Past medical history chronic fatigue syndrome, Lyme disease, being followed by Dr. Ion Gutierrez at [facility]. 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      Alpha 1 negative. PFTs from 8/2019 have declined since 2017 and indicate a Fev1 of 0.58L or 28% predicted with a moderate bronchodilator response, Fev1/FVC ratio of 35, DLCO 40%. Patient has a history of smoking for the last 55 years. She continues to take puffs of cigarettes. She has been through [employer]'s smoking cessation program. She has difficulty completely stopping due to family member at home who continues to smoke daily.  We reviewed the benefits of complete smoking cessation.       CT scan 11/23/2016 indicated moderate diffuse centrilobular emphysematous changes. A 2.0 x 0.6 cm opacity in the medial right lower lobe. No adenopathy noted. PET scan was performed 11/23/2016 indicating no metabolic activity. Repeat CT 6/2/2017 indicated no change in 6.8 mm x 18.0 mm ill-defined opacity medial right lower lobe, markedly hyperexpanded emphysematous lungs and apical scarring. No pleural effusion. Updated Cat scan completed 11/17/2017: noting 18 x 6.8 mm focal opacification is again noted in the right lung base just above the right hemidiaphragm.  Focal area of scarring or discoid atelectasis is most likely. Neoplasm such as adenocarcinoma in situ is in the differential diagnosis. Updated cat scn from 5/22/2018 is stable. Updated cat scan from 5/20/2019 stable. Extensive emphysematous changes. Will continues yearly scans given smoking history.      Last illness in which he required antibiotics Nov 2018, required different antibiotics b/c of intolerance. She reported yeast vaginally and rectally since then and declined ID referral, because sister is an ID MD and plans to  discuss with her.      She finds worsening shortness of breath, since she was sick Nov 2018, rare wheeze, cough with phlegm.  She is compliant Symbicort 160/4.5 µg 2 puffs twice a day, Spiriva once daily. She did not try Trelegy for fear of a new med. She has utilized her Xopenex HFA  rarely. Using nebulizer 2x/day. She feels her symptoms are exacerbated by heat, cold, altitude, and rudolph smoke.     She uses oxygen 2L 24/7. She started using Ponaris due to chronic nasal dryness which has been beneficial. She has postnasal drip. She uses Ocean Spray nasal spray and then tries to blow her nose very hard and is unable to get any mucus up and then plugs her ears. Recommended Mucinex and flutter valve last OV.        She admits to being quite sedentary. She reports any type of exercise exacerbates her immune deficiency fatigue. When she does increase her activity she is unable to go to bed until 2 or 3 AM because she has much more energy.       She underwent sleep testing due to chronic fatigue which did not indicate sleep disordered breathing, but nocturnal hypoxemia and PLMS index 45.0. She does complain of cramping in lower extremities that her primary is overseeing. She c/o difficulty waking/sleeping consistency. She naps 1 hour a day. She is sleeping 5 hours at night.     She has chronic illness and requesting to see a therapist.       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   • Chronic use of benzodiazepine for therapeutic purpose 09/04/2017   • Benzodiazepine  dependence ([facility]) 2017   • Nocturnal hypoxemia 2017   • Supplemental oxygen dependent 2017   • Abnormal CT of the chest 2017   • Tachycardia 07/15/2015   • COPD (chronic obstructive pulmonary disease) ([facility]) 2013   • Lyme disease 2013   • Immunologic deficiency syndrome ([facility]) 2013   • Chronic fatigue syndrome 2012   • Hyperlipidemia with target LDL less than 100 2012   • Current smoker 2012   • Anxiety 2012       Past Medical History:   Diagnosis Date   • Allergy    • Anxiety    • ASTHMA    • Back pain    • Bladder infection    • Bronchitis    • Cellulitis     (L) elbow    • Chronic use of benzodiazepine for therapeutic purpose 2017   • COPD    • COPD (chronic obstructive pulmonary disease) ([facility]) 3/1/2013   • H/O blood transfusion reaction    • History of measles    • History of mumps    • Hives    • Hyperlipidemia    • Immunologic deficiency syndrome ([facility]) 3/1/2013   • Lyme disease 3/1/2013   • Mononucleosis    • Pneumonia    • Tachycardia 7/15/2015   • Tachycardia 7/15/2015   • Vitamin D deficiency 2019       Past Surgical History:   Procedure Laterality Date   • BREAST BIOPSY     • THYROID LOBECTOMY     • TONSILLECTOMY         Family History   Problem Relation Age of Onset   • Alcohol/Drug Mother    • Cancer Father    • Stroke Father    • Heart Disease Father 42       Social History     Tobacco Use   • Smoking status: Current Some Day Smoker     Packs/day: 0.25     Years: 55.00     Pack years: 13.75     Types: Cigarettes     Last attempt to quit: 2018     Years since quittin.7   • Smokeless tobacco: Never Used   • Tobacco comment: patient smokes 0 to 5 cigarettes a day    Substance Use Topics   • Alcohol use: Yes     Comment: patient states she has not had a drink in 2 years 10/09/2015   • Drug use: No       Current Outpatient Medications   Medication Sig Dispense Refill   • ALPRAZolam (XANAX) 0.25 MG Tab TAKE 1 TABLET BY MOUTH  TWICE DAILY AS NEEDED FOR UP TO 90 DAYS, F41.9 180 Tab 0   • immune globulin (GAMMAGARD) 5 GM/50ML Solution infusion 5 g by Intravenous route Once.     • pravastatin (PRAVACHOL) 80 MG tablet Take 1 Tab by mouth every day. 90 Tab 3   • Tiotropium Bromide Monohydrate (SPIRIVA RESPIMAT) 2.5 MCG/ACT Aero Soln Inhale 2 Inhalation by mouth every day. Assemble and prime. 1 Inhaler 6   • Fluticasone-Umeclidin-Vilant (TRELEGY ELLIPTA) 100-62.5-25 MCG/INH AEROSOL POWDER, BREATH ACTIVATED Inhale 1 Puff by mouth every day. 1 Each 11   • levalbuterol (XOPENEX HFA) 45 MCG/ACT inhaler Inhale 2 Puffs by mouth every four hours as needed for Shortness of Breath (As needed for shortness of breath, cough, wheezing.). 1 Inhaler 11   • levalbuterol (XOPENEX) 1.25 MG/3ML Nebu Soln 3 mL by Nebulization route every four hours as needed for Shortness of Breath. 120 mL 4   • CLARINEX 5 MG tablet Take 1 Tab by mouth every day. 90 Tab 3   • budesonide-formoterol (SYMBICORT) 160-4.5 MCG/ACT Aerosol INHALE TWO PUFFS BY MOUTH TWICE DAILY. USE SPACER. RINSE MOUTH AFTER USE. 3 Inhaler 3   • azithromycin (ZITHROMAX Z-BENEDICTO) 250 MG Tab Take 2 tablets on day 1. Then take 1 tablet a day for 4 days. 6 Tab 0   • Bioflavonoid Products (MUNIRA-C) 500-200-60 MG Tab Take  by mouth.     • Omega-3 Fatty Acids (FISH OIL PO) Take  by mouth.     • MAGNESIUM PO Take 480 mg by mouth.     • montelukast (SINGULAIR) 10 MG Tab TAKE 1 TABLET BY MOUTH EVERY DAY 90 Tab 3   • XOPENEX HFA 45 MCG/ACT inhaler INHALE ONE TO TWO PUFFS BY MOUTH EVERY 4 HOURS AS NEEDED FOR SHORTNESS OF BREATH 15 g 11   • vitamin D (CHOLECALCIFEROL) 1000 UNIT Tab Take 1,000 Units by mouth every day.     • immune globulin (FLEBOGAMMA) 10 GM/100ML Solution 5 g by Intravenous route Once. Indications: Pt now on 5 grams     • Oral Electrolytes (EMERGEN-C ELECTRO MIX PO) Take  by mouth every day.     • aspirin (ASA) 81 MG CHEW Take 162 mg by mouth every day.     • MAGNESIUM GLYCINATE Take 480 mg by mouth.      • vitamin D, Ergocalciferol, (DRISDOL) 65811 units Cap capsule Take 1 Cap by mouth every 7 days. 5 Cap 1   • levalbuterol (XOPENEX) 1.25 MG/3ML Nebu Soln 3 mL by Nebulization route every four hours as needed for Shortness of Breath. 120 mL 4   • fluconazole (DIFLUCAN) 150 MG tablet Take one tab by mouth daily for 3 days. Stop pravastatin while taking and restart 72 hours after last dose of diflucan. 3 Tab 0   • nystatin (MYCOSTATIN) 742982 UNIT/GM Cream topical cream Apply 1 g to affected area(s) 2 times a day. (Patient not taking: Reported on 5/24/2019) 1 Tube 2   • ondansetron (ZOFRAN) 8 MG Tab Take 1 Tab by mouth every 8 hours as needed for Nausea/Vomiting. 15 Tab 0   • Coenzyme Q10 (CO Q-10) 30 MG Cap Take 30 mg by mouth.     • vitamin E 100 UNIT capsule Take 400 Units by mouth.     • desloratadine (CLARINEX) 5 MG tablet Take 5 mg by mouth.     • montelukast (SINGULAIR) 10 MG Tab Take 10 mg by mouth.     • MethylPREDNISolone (MEDROL DOSEPAK) 4 MG Tablet Therapy Pack Take as directed. (Patient not taking: Reported on 5/24/2019) 21 Tab 0   • gabapentin (NEURONTIN) 100 MG Cap Take 1 Cap by mouth 3 times a day. Increase to 2 tab tid in 2 weeks as directed (Patient not taking: Reported on 5/24/2019) 120 Cap 2   • desloratadine (CLARINEX) 5 MG tablet Take 1 Tab by mouth every day. 90 Tab 3   • NON SPECIFIED (hydration orders)  please infuse 2 L NS per week. Infuse over one hour QT: 2 L REFILL: 56 fax [phone] (Patient not taking: Reported on 5/24/2019) 2 Each 56   • acetaminophen (TYLENOL) 325 MG Tab Take 650 mg by mouth every four hours as needed.     • docosahexanoic acid (OMEGA 3 FA) 1000 MG CAPS Take 1,000 mg by mouth 2 Times a Day.       No current facility-administered medications for this visit.           Allergies: Gadolinium; Influenza virus vaccine; Zyvox [linezolid]; Cefuroxime; Epinephrine; Nsaids; Other drug; Other misc; Prednisone; and Sulfa drugs          ROS   Gen: Denies fever, chills,  "unintentional weight loss, fatigue, night sweats  E/N/T: Denies nasal congestion, ear pain  Resp:Denies  hemoptysis  CV: Denies chest pain, chest tightness, palpitations  Sleep:Denies morning headache  Neuro: Denies frequent headaches, weakness, dizziness  GI: Denies acid reflux, N/V  See HPI.  All other systems reviewed and negative          Vital signs for this encounter:  Vitals:    08/30/19 1612   Height: 1.594 m (5' 2.75\")   Weight: 47.6 kg (105 lb)   Weight % change since last entry.: 0 %   BP: 130/70   Pulse: 87   BMI (Calculated): 18.75   Resp: 16                 Physical Exam:   Appearance: well developed, well nourished, no acute distress.   Eyes: PERRL, EOM intact, sclere white, conjunctiva moist.  Ears: no lesions or deformities.  Hearing: grossly intact.  Nose: no lesions or deformities.  Dentition: good dentition.   Oropharynx: tongue normal, posterior pharynx without erythema or exudate.  Neck: supple, trachea midline, no masses.  Respiratory effort: no intercostal retractions or use of accessory muscles.  Lung auscultation: Poor air movement  Heart auscultation: no murmur, rub, or gallop   Extremities: no cyanosis or edema.  Abdomen: soft, non-tender, no masses.  Gait and station: without difficulty   Digits and Nails: no clubbing, cyanosis, petechiae, or nodes.  Cranial nerves: grossly normal.  Motor: no focal deficits observed.   Skin: no rashes, lesions, or ulcers noted.  Orientation: oriented to time, place, and person.  Mood and affect: mood and affect appropriate, normal interaction with examiner.      Assessment   1. Immunologic deficiency syndrome (HCC)  REFERRAL TO BEHAVIORAL HEALTH   2. Chronic respiratory failure with hypoxia (HCC)  REFERRAL TO BEHAVIORAL HEALTH   3. 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 Chronic illness  REFERRAL TO BEHAVIORAL HEALTH       Patient was seen for 35 minutes, " more than 50% of time spent in face to face review, counseling, and arranging future evaluation and follow up of medical conditions and care relating to chronic illness, progression of lung disease with confirmation of PFTs.  Most recent CAT scan in May did not show abnormality and will continue with yearly PFTs at this time.  She is requesting therapist to discuss her chronic illness and living with chronic illness etc.  Patient is clinically stable and will have the following changes per plan.     PLAN:   Patient Instructions   1) Continue oxygen at 24/7 at 2L  2) Continue Symbicort at 160/4.5 and Spiriva. Continue rescue inhaler.  Encourage increase use of nebulizer during rudolph smoke exacerbation.   3) Encourage light conditioning   4) Add Mucinex found OTC and aerobika to help with mucus clearance  5) Continue IgG infusion every other week  6) Vaccines: Allergic to vaccines. Encourage hand washing  7) Complete smoking cessation discussed  8) Zpack on hand. Prednisone allergy.   9) Referral to Behavioral Health  10) Return in about 6 months (around 2/29/2020), or with Binu or Stefany. F/U after flu season per pt request, for if not sooner, review of symptoms.

## 2019-08-31 DIAGNOSIS — F41.9 ANXIETY: ICD-10-CM

## 2019-09-03 RX ORDER — CLONAZEPAM 0.5 MG
TABLET ORAL
Qty: 90 TAB | Refills: 0 | Status: SHIPPED
Start: 2019-09-03 | End: 2019-12-12

## 2019-09-03 NOTE — TELEPHONE ENCOUNTER
Was the patient seen in the last year in this department? Yes  6/24/19  5/24/19  Does patient have an active prescription for medications requested? No     Received Request Via: Pharmacy

## 2019-09-17 ENCOUNTER — OFFICE VISIT (OUTPATIENT)
Dept: MEDICAL GROUP | Facility: LAB | Age: 74
End: 2019-09-17
Payer: MEDICARE

## 2019-09-17 VITALS
WEIGHT: 104.2 LBS | OXYGEN SATURATION: 97 % | SYSTOLIC BLOOD PRESSURE: 100 MMHG | HEIGHT: 64 IN | HEART RATE: 96 BPM | RESPIRATION RATE: 12 BRPM | TEMPERATURE: 98.6 F | BODY MASS INDEX: 17.79 KG/M2 | DIASTOLIC BLOOD PRESSURE: 56 MMHG

## 2019-09-17 DIAGNOSIS — J43.9 PULMONARY EMPHYSEMA, UNSPECIFIED EMPHYSEMA TYPE (HCC): Chronic | ICD-10-CM

## 2019-09-17 DIAGNOSIS — F33.9 RECURRENT MAJOR DEPRESSIVE DISORDER, REMISSION STATUS UNSPECIFIED (HCC): ICD-10-CM

## 2019-09-17 DIAGNOSIS — D47.2 MGUS (MONOCLONAL GAMMOPATHY OF UNKNOWN SIGNIFICANCE): ICD-10-CM

## 2019-09-17 DIAGNOSIS — F41.9 ANXIETY: ICD-10-CM

## 2019-09-17 DIAGNOSIS — E55.9 VITAMIN D DEFICIENCY: ICD-10-CM

## 2019-09-17 PROCEDURE — 99214 OFFICE O/P EST MOD 30 MIN: CPT | Performed by: INTERNAL MEDICINE

## 2019-09-17 RX ORDER — PRAVASTATIN SODIUM 40 MG
40 TABLET ORAL NIGHTLY
COMMUNITY
End: 2019-10-13 | Stop reason: SDUPTHER

## 2019-09-17 ASSESSMENT — PATIENT HEALTH QUESTIONNAIRE - PHQ9: CLINICAL INTERPRETATION OF PHQ2 SCORE: 0

## 2019-09-18 NOTE — PROGRESS NOTES
CC: Deb Vega is a 73 y.o. female is suffering from   Chief Complaint   Patient presents with   • Other     3 months follow up         SUBJECTIVE:  1. Pulmonary emphysema, unspecified emphysema type (HCC)  Deb is here for follow-up, continues to have severe emphysema is on oxygen    2. Anxiety  Patient with severe anxiety associated with hypoxemia.  Would like to moved to [facility] but cannot afford it    3. Recurrent major depressive disorder, remission status unspecified (HCC)  Patient with severe depression and is thought about possible suicide    4. Vitamin D deficiency  Recheck vitamin D    5. MGUS (monoclonal gammopathy of unknown significance)  Monoclonal gammopathy of uncertain significance we will recheck labs  - ARLETTE+PE Random Urine  - ARLETTE+PE Random Urine        Past social, family, history:   Social History     Tobacco Use   • Smoking status: Current Some Day Smoker     Packs/day: 0.25     Years: 55.00     Pack years: 13.75     Types: Cigarettes     Last attempt to quit: 2018     Years since quittin.8   • Smokeless tobacco: Never Used   • Tobacco comment: patient smokes 0 to 5 cigarettes a day    Substance Use Topics   • Alcohol use: Yes     Comment: patient states she has not had a drink in 2 years 10/09/2015   • Drug use: No         MEDICATIONS:    Current Outpatient Medications:   •  pravastatin (PRAVACHOL) 40 MG tablet, Take 40 mg by mouth every evening., Disp: , Rfl:   •  KLONOPIN 0.5 MG Tab, TAKE 1 TAB BY MOUTH EVERY NIGHT AT BEDTIME. F41.9, Disp: 90 Tab, Rfl: 0  •  ALPRAZolam (XANAX) 0.25 MG Tab, TAKE 1 TABLET BY MOUTH TWICE DAILY AS NEEDED FOR UP TO 90 DAYS, F41.9, Disp: 180 Tab, Rfl: 0  •  immune globulin (GAMMAGARD) 5 GM/50ML Solution infusion, 5 g by Intravenous route Once., Disp: , Rfl:   •  Tiotropium Bromide Monohydrate (SPIRIVA RESPIMAT) 2.5 MCG/ACT Aero Soln, Inhale 2 Inhalation by mouth every day. Assemble and prime., Disp: 1 Inhaler, Rfl: 6  •   Fluticasone-Umeclidin-Vilant (TRELEGY ELLIPTA) 100-62.5-25 MCG/INH AEROSOL POWDER, BREATH ACTIVATED, Inhale 1 Puff by mouth every day., Disp: 1 Each, Rfl: 11  •  levalbuterol (XOPENEX HFA) 45 MCG/ACT inhaler, Inhale 2 Puffs by mouth every four hours as needed for Shortness of Breath (As needed for shortness of breath, cough, wheezing.)., Disp: 1 Inhaler, Rfl: 11  •  levalbuterol (XOPENEX) 1.25 MG/3ML Nebu Soln, 3 mL by Nebulization route every four hours as needed for Shortness of Breath., Disp: 120 mL, Rfl: 4  •  CLARINEX 5 MG tablet, Take 1 Tab by mouth every day., Disp: 90 Tab, Rfl: 3  •  budesonide-formoterol (SYMBICORT) 160-4.5 MCG/ACT Aerosol, INHALE TWO PUFFS BY MOUTH TWICE DAILY. USE SPACER. RINSE MOUTH AFTER USE., Disp: 3 Inhaler, Rfl: 3  •  Bioflavonoid Products (MUNIRA-C) 500-200-60 MG Tab, Take  by mouth., Disp: , Rfl:   •  Omega-3 Fatty Acids (FISH OIL PO), Take  by mouth., Disp: , Rfl:   •  MAGNESIUM PO, Take 480 mg by mouth., Disp: , Rfl:   •  montelukast (SINGULAIR) 10 MG Tab, TAKE 1 TABLET BY MOUTH EVERY DAY, Disp: 90 Tab, Rfl: 3  •  XOPENEX HFA 45 MCG/ACT inhaler, INHALE ONE TO TWO PUFFS BY MOUTH EVERY 4 HOURS AS NEEDED FOR SHORTNESS OF BREATH, Disp: 15 g, Rfl: 11  •  vitamin D (CHOLECALCIFEROL) 1000 UNIT Tab, Take 1,000 Units by mouth every day., Disp: , Rfl:   •  immune globulin (FLEBOGAMMA) 10 GM/100ML Solution, 5 g by Intravenous route Once. Indications: Pt now on 5 grams, Disp: , Rfl:   •  Oral Electrolytes (EMERGEN-C ELECTRO MIX PO), Take  by mouth every day., Disp: , Rfl:   •  aspirin (ASA) 81 MG CHEW, Take 162 mg by mouth every day., Disp: , Rfl:   •  MAGNESIUM GLYCINATE, Take 480 mg by mouth., Disp: , Rfl:   •  vitamin D, Ergocalciferol, (DRISDOL) 06465 units Cap capsule, Take 1 Cap by mouth every 7 days., Disp: 5 Cap, Rfl: 1  •  pravastatin (PRAVACHOL) 80 MG tablet, Take 1 Tab by mouth every day. (Patient not taking: Reported on 9/17/2019), Disp: 90 Tab, Rfl: 3  •  levalbuterol (XOPENEX)  1.25 MG/3ML Nebu Soln, 3 mL by Nebulization route every four hours as needed for Shortness of Breath., Disp: 120 mL, Rfl: 4  •  fluconazole (DIFLUCAN) 150 MG tablet, Take one tab by mouth daily for 3 days. Stop pravastatin while taking and restart 72 hours after last dose of diflucan., Disp: 3 Tab, Rfl: 0  •  nystatin (MYCOSTATIN) 107378 UNIT/GM Cream topical cream, Apply 1 g to affected area(s) 2 times a day. (Patient not taking: Reported on 5/24/2019), Disp: 1 Tube, Rfl: 2  •  ondansetron (ZOFRAN) 8 MG Tab, Take 1 Tab by mouth every 8 hours as needed for Nausea/Vomiting., Disp: 15 Tab, Rfl: 0  •  azithromycin (ZITHROMAX Z-BENEDICTO) 250 MG Tab, Take 2 tablets on day 1. Then take 1 tablet a day for 4 days., Disp: 6 Tab, Rfl: 0  •  Coenzyme Q10 (CO Q-10) 30 MG Cap, Take 30 mg by mouth., Disp: , Rfl:   •  vitamin E 100 UNIT capsule, Take 400 Units by mouth., Disp: , Rfl:   •  desloratadine (CLARINEX) 5 MG tablet, Take 5 mg by mouth., Disp: , Rfl:   •  montelukast (SINGULAIR) 10 MG Tab, Take 10 mg by mouth., Disp: , Rfl:   •  MethylPREDNISolone (MEDROL DOSEPAK) 4 MG Tablet Therapy Pack, Take as directed. (Patient not taking: Reported on 5/24/2019), Disp: 21 Tab, Rfl: 0  •  gabapentin (NEURONTIN) 100 MG Cap, Take 1 Cap by mouth 3 times a day. Increase to 2 tab tid in 2 weeks as directed (Patient not taking: Reported on 5/24/2019), Disp: 120 Cap, Rfl: 2  •  desloratadine (CLARINEX) 5 MG tablet, Take 1 Tab by mouth every day., Disp: 90 Tab, Rfl: 3  •  NON SPECIFIED, (hydration orders)  please infuse 2 L NS per week. 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   • BMI less than 19,adult 11/16/2018   • 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   • 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 ([facility]) 09/14/2018   • 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   • Chronic use of benzodiazepine for therapeutic purpose 09/04/2017   • Benzodiazepine dependence ([facility]) 08/29/2017   • Nocturnal hypoxemia 06/06/2017   • Supplemental oxygen dependent 02/07/2017   • Abnormal CT of the chest 02/07/2017   • Tachycardia 07/15/2015   • COPD (chronic obstructive pulmonary disease) ([facility]) 03/01/2013   • Lyme disease 03/01/2013   • Immunologic deficiency syndrome ([facility]) 03/01/2013   • Chronic fatigue syndrome 12/07/2012   • Hyperlipidemia with target LDL less than 100 06/06/2012   • Current smoker 06/06/2012   • Anxiety 06/06/2012       REVIEW OF SYSTEMS:  Gen.:  No Nausea, Vomiting, fever, Chills.  Heart: No chest pain.  Lungs:  No shortness of Breath.  Psychological: Girish unusual Anxiety depression     PHYSICAL EXAM   Constitutional: Alert, cooperative, not in acute distress.  Cardiovascular:  Rate Rhythm is regular without murmurs rubs clicks.     Thorax & Lungs: Severe end-stage COPD with prolonged expiratory phase no wheezing, rhonchi, or rales  HENT: Normocephalic, Atraumatic.  Eyes: PERRLA, EOMI, Conjunctiva normal.   Neck: Trachia is midline no swelling of the thyroid.   Neurologic: Alert & oriented x 3, cranial nerves II through XII are intact, Normal motor function, Normal sensory function, No focal deficits noted.   Psychiatric: Affect normal, Judgment normal, Mood normal.     VITAL SIGNS:/56   Pulse 96   Temp 37 °C (98.6 °F) (Temporal)   Resp 12   Ht 1.613 m (5' 3.5\")   Wt 47.3 kg (104 lb 3.2 oz)   SpO2 97%   BMI 18.17 kg/m²     Labs: " Reviewed    Assessment:                                                     Plan:    1. Pulmonary emphysema, unspecified emphysema type (HCC)  Severe emphysema    2. Anxiety  Ongoing continue Klonopin also Xanax referral written to psychiatry    3. Recurrent major depressive disorder, remission status unspecified (HCC)  Patient with extreme difficulty coping with life  - REFERRAL TO PSYCHIATRY    4. Vitamin D deficiency  Vitamin D ordered  - VITAMIN D,25 HYDROXY; Future    5. MGUS (monoclonal gammopathy of unknown significance)  Recheck labs  - IMMUNOELECTROPHORESIS, SERUM; Future  - SPEP W/REFLEX TO ARLETTE, A, G, M; Future  - ARLETTE+PE Random Urine; Standing  - ARLETTE+PE Random Urine    Please note because of concerns regarding recurrent upper respiratory tract infections patient is requested a 6-month follow-up.  Her medical condition warrants more earlier follow-up but I have agreed to six-month follow-up.  S

## 2019-09-19 ENCOUNTER — TELEPHONE (OUTPATIENT)
Dept: PULMONOLOGY | Facility: HOSPICE | Age: 74
End: 2019-09-19

## 2019-09-19 DIAGNOSIS — J44.9 CHRONIC OBSTRUCTIVE PULMONARY DISEASE, UNSPECIFIED COPD TYPE (HCC): ICD-10-CM

## 2019-09-19 NOTE — TELEPHONE ENCOUNTER
Pt states she needed order for both neb and o2 supplies    Order for o2 supplies is not needed so [facility] is needed in regards to her o2    Please sign for neb supplies

## 2019-10-07 DIAGNOSIS — J44.9 CHRONIC OBSTRUCTIVE PULMONARY DISEASE, UNSPECIFIED COPD TYPE (HCC): ICD-10-CM

## 2019-10-07 RX ORDER — LEVALBUTEROL INHALATION SOLUTION 1.25 MG/3ML
SOLUTION RESPIRATORY (INHALATION)
Qty: 120 ML | Refills: 4 | Status: SHIPPED | OUTPATIENT
Start: 2019-10-07 | End: 2020-03-13

## 2019-10-07 RX ORDER — DESLORATADINE 5 MG
TABLET ORAL
Qty: 90 TAB | Refills: 1 | Status: SHIPPED | OUTPATIENT
Start: 2019-10-07 | End: 2020-04-07 | Stop reason: SDUPTHER

## 2019-10-07 NOTE — TELEPHONE ENCOUNTER
Was the patient seen in the last year in this department? Yes lov 9/17/19    Does patient have an active prescription for medications requested? No     Received Request Via: Pharmacy

## 2019-10-07 NOTE — TELEPHONE ENCOUNTER
Have we ever prescribed this med? Yes.  If yes, what date? 4/11/19    Last OV: 8/30/19  Carolyn     Next OV: No pending apt scheduled , Return in about 6 months (around 2/29/2020), or with Binu or Stefany.    DX: Chronic obstructive pulmonary disease, unspecified COPD type (HCC) (J44.9)    Medications: levalbuterol (XOPENEX) 1.25 MG/3ML Nebu Soln

## 2019-10-09 ENCOUNTER — HOSPITAL ENCOUNTER (OUTPATIENT)
Dept: LAB | Facility: MEDICAL CENTER | Age: 74
End: 2019-10-09
Attending: INTERNAL MEDICINE
Payer: MEDICARE

## 2019-10-09 DIAGNOSIS — E55.9 VITAMIN D DEFICIENCY: ICD-10-CM

## 2019-10-09 DIAGNOSIS — D47.2 MGUS (MONOCLONAL GAMMOPATHY OF UNKNOWN SIGNIFICANCE): ICD-10-CM

## 2019-10-09 PROCEDURE — 82784 ASSAY IGA/IGD/IGG/IGM EACH: CPT

## 2019-10-09 PROCEDURE — 84155 ASSAY OF PROTEIN SERUM: CPT | Mod: 91

## 2019-10-09 PROCEDURE — 86334 IMMUNOFIX E-PHORESIS SERUM: CPT

## 2019-10-09 PROCEDURE — 84165 PROTEIN E-PHORESIS SERUM: CPT

## 2019-10-09 PROCEDURE — 36415 COLL VENOUS BLD VENIPUNCTURE: CPT

## 2019-10-09 PROCEDURE — 82306 VITAMIN D 25 HYDROXY: CPT

## 2019-10-10 LAB — 25(OH)D3 SERPL-MCNC: 20 NG/ML (ref 30–100)

## 2019-10-13 LAB
ALBUMIN SERPL ELPH-MCNC: 4.07 G/DL (ref 3.75–5.01)
ALBUMIN SERPL ELPH-MCNC: 4.13 G/DL (ref 3.75–5.01)
ALPHA1 GLOB SERPL ELPH-MCNC: 0.29 G/DL (ref 0.19–0.46)
ALPHA1 GLOB SERPL ELPH-MCNC: 0.29 G/DL (ref 0.19–0.46)
ALPHA2 GLOB SERPL ELPH-MCNC: 0.63 G/DL (ref 0.48–1.05)
ALPHA2 GLOB SERPL ELPH-MCNC: 0.66 G/DL (ref 0.48–1.05)
B-GLOBULIN SERPL ELPH-MCNC: 0.72 G/DL (ref 0.48–1.1)
B-GLOBULIN SERPL ELPH-MCNC: 0.75 G/DL (ref 0.48–1.1)
GAMMA GLOB SERPL ELPH-MCNC: 0.89 G/DL (ref 0.62–1.51)
GAMMA GLOB SERPL ELPH-MCNC: 0.96 G/DL (ref 0.62–1.51)
IGA SERPL-MCNC: 240 MG/DL (ref 68–408)
IGG SERPL-MCNC: 981 MG/DL (ref 768–1632)
IGM SERPL-MCNC: 60 MG/DL (ref 35–263)
INTERPRETATION SERPL IFE-IMP: NORMAL
MONOCLON BAND OBS SERPL: NORMAL
PATHOLOGY STUDY: NORMAL
PATHOLOGY STUDY: NORMAL
PROT SERPL-MCNC: 6.6 G/DL (ref 6.3–8.2)
PROT SERPL-MCNC: 6.8 G/DL (ref 6.3–8.2)

## 2019-10-14 RX ORDER — PRAVASTATIN SODIUM 40 MG
TABLET ORAL
Qty: 90 TAB | Refills: 1 | Status: SHIPPED | OUTPATIENT
Start: 2019-10-14 | End: 2020-04-13 | Stop reason: SDUPTHER

## 2019-10-15 ENCOUNTER — TELEPHONE (OUTPATIENT)
Dept: MEDICAL GROUP | Facility: LAB | Age: 74
End: 2019-10-15

## 2019-10-15 NOTE — TELEPHONE ENCOUNTER
"· Home Health paperwork received from [facility]. requiring provider signature.     · All appropriate fields completed by Medical Assistant: No    · Paperwork placed in \"MA to Provider\" folder/basket. Awaiting provider completion/signature.  "

## 2019-11-04 ENCOUNTER — TELEPHONE (OUTPATIENT)
Dept: PULMONOLOGY | Facility: HOSPICE | Age: 74
End: 2019-11-04

## 2019-11-04 NOTE — TELEPHONE ENCOUNTER
Pt called states roommate is currently sick and has a horrible cough, was tested for flu came back negative. Pt wants to confirm if there is an outbreak of some short of disease she should be aware of. I informed the patient unaware. I highly suggested she wash her hands constantly and disinfect things that have come in contact with room mate.

## 2019-11-10 ENCOUNTER — HOSPITAL ENCOUNTER (INPATIENT)
Dept: HOSPITAL 8 - ED | Age: 74
LOS: 11 days | Discharge: HOSPICE | DRG: 193 | End: 2019-11-21
Attending: HOSPITALIST | Admitting: HOSPITALIST
Payer: MEDICARE

## 2019-11-10 VITALS — SYSTOLIC BLOOD PRESSURE: 120 MMHG | DIASTOLIC BLOOD PRESSURE: 69 MMHG

## 2019-11-10 VITALS — BODY MASS INDEX: 22.15 KG/M2 | HEIGHT: 63 IN | WEIGHT: 125 LBS

## 2019-11-10 VITALS — DIASTOLIC BLOOD PRESSURE: 69 MMHG | SYSTOLIC BLOOD PRESSURE: 120 MMHG

## 2019-11-10 DIAGNOSIS — D89.9: ICD-10-CM

## 2019-11-10 DIAGNOSIS — E87.3: ICD-10-CM

## 2019-11-10 DIAGNOSIS — F17.200: ICD-10-CM

## 2019-11-10 DIAGNOSIS — F41.9: ICD-10-CM

## 2019-11-10 DIAGNOSIS — J43.9: ICD-10-CM

## 2019-11-10 DIAGNOSIS — Z66: ICD-10-CM

## 2019-11-10 DIAGNOSIS — Z82.49: ICD-10-CM

## 2019-11-10 DIAGNOSIS — Z71.6: ICD-10-CM

## 2019-11-10 DIAGNOSIS — F10.10: ICD-10-CM

## 2019-11-10 DIAGNOSIS — E78.5: ICD-10-CM

## 2019-11-10 DIAGNOSIS — Z51.5: ICD-10-CM

## 2019-11-10 DIAGNOSIS — I47.2: ICD-10-CM

## 2019-11-10 DIAGNOSIS — G61.81: ICD-10-CM

## 2019-11-10 DIAGNOSIS — J96.21: ICD-10-CM

## 2019-11-10 DIAGNOSIS — I36.1: ICD-10-CM

## 2019-11-10 DIAGNOSIS — Z99.81: ICD-10-CM

## 2019-11-10 DIAGNOSIS — J10.1: Primary | ICD-10-CM

## 2019-11-10 DIAGNOSIS — Z86.19: ICD-10-CM

## 2019-11-10 DIAGNOSIS — F13.90: ICD-10-CM

## 2019-11-10 DIAGNOSIS — E86.0: ICD-10-CM

## 2019-11-10 DIAGNOSIS — F32.9: ICD-10-CM

## 2019-11-10 DIAGNOSIS — Z82.3: ICD-10-CM

## 2019-11-10 LAB
ALBUMIN SERPL-MCNC: 3.7 G/DL (ref 3.4–5)
ANION GAP SERPL CALC-SCNC: 5 MMOL/L (ref 5–15)
BASOPHILS # BLD AUTO: 0.02 X10^3/UL (ref 0–0.1)
BASOPHILS NFR BLD AUTO: 1 % (ref 0–1)
CALCIUM SERPL-MCNC: 8.3 MG/DL (ref 8.5–10.1)
CHLORIDE SERPL-SCNC: 100 MMOL/L (ref 98–107)
CREAT SERPL-MCNC: 0.61 MG/DL (ref 0.55–1.02)
EOSINOPHIL # BLD AUTO: 0.01 X10^3/UL (ref 0–0.4)
EOSINOPHIL NFR BLD AUTO: 0 % (ref 1–7)
ERYTHROCYTE [DISTWIDTH] IN BLOOD BY AUTOMATED COUNT: 13.9 % (ref 9.6–15.2)
FLUBV AG SPEC QL IA: POSITIVE
LYMPHOCYTES # BLD AUTO: 0.68 X10^3/UL (ref 1–3.4)
LYMPHOCYTES NFR BLD AUTO: 20 % (ref 22–44)
MCH RBC QN AUTO: 31.5 PG (ref 27–34.8)
MCHC RBC AUTO-ENTMCNC: 33.3 G/DL (ref 32.4–35.8)
MCV RBC AUTO: 94.9 FL (ref 80–100)
MD: NO
MONOCYTES # BLD AUTO: 0.47 X10^3/UL (ref 0.2–0.8)
MONOCYTES NFR BLD AUTO: 14 % (ref 2–9)
NEUTROPHILS # BLD AUTO: 2.2 X10^3/UL (ref 1.8–6.8)
NEUTROPHILS NFR BLD AUTO: 65 % (ref 42–75)
PLATELET # BLD AUTO: 201 X10^3/UL (ref 130–400)
PMV BLD AUTO: 8.3 FL (ref 7.4–10.4)
RBC # BLD AUTO: 4.68 X10^6/UL (ref 3.82–5.3)

## 2019-11-10 PROCEDURE — 87324 CLOSTRIDIUM AG IA: CPT

## 2019-11-10 PROCEDURE — 85025 COMPLETE CBC W/AUTO DIFF WBC: CPT

## 2019-11-10 PROCEDURE — 87400 INFLUENZA A/B EACH AG IA: CPT

## 2019-11-10 PROCEDURE — 82040 ASSAY OF SERUM ALBUMIN: CPT

## 2019-11-10 PROCEDURE — 93306 TTE W/DOPPLER COMPLETE: CPT

## 2019-11-10 PROCEDURE — 36600 WITHDRAWAL OF ARTERIAL BLOOD: CPT

## 2019-11-10 PROCEDURE — 93005 ELECTROCARDIOGRAM TRACING: CPT

## 2019-11-10 PROCEDURE — 36415 COLL VENOUS BLD VENIPUNCTURE: CPT

## 2019-11-10 PROCEDURE — 96360 HYDRATION IV INFUSION INIT: CPT

## 2019-11-10 PROCEDURE — 83735 ASSAY OF MAGNESIUM: CPT

## 2019-11-10 PROCEDURE — 84132 ASSAY OF SERUM POTASSIUM: CPT

## 2019-11-10 PROCEDURE — 82803 BLOOD GASES ANY COMBINATION: CPT

## 2019-11-10 PROCEDURE — 80053 COMPREHEN METABOLIC PANEL: CPT

## 2019-11-10 PROCEDURE — 71045 X-RAY EXAM CHEST 1 VIEW: CPT

## 2019-11-10 PROCEDURE — 85379 FIBRIN DEGRADATION QUANT: CPT

## 2019-11-10 PROCEDURE — 71260 CT THORAX DX C+: CPT

## 2019-11-10 PROCEDURE — 94640 AIRWAY INHALATION TREATMENT: CPT

## 2019-11-10 PROCEDURE — 80048 BASIC METABOLIC PNL TOTAL CA: CPT

## 2019-11-10 RX ADMIN — PRAVASTATIN SODIUM SCH MG: 40 TABLET ORAL at 23:14

## 2019-11-10 RX ADMIN — OSELTAMIVIR PHOSPHATE SCH MG: 75 CAPSULE ORAL at 23:14

## 2019-11-10 RX ADMIN — IPRATROPIUM BROMIDE AND ALBUTEROL SULFATE SCH ML: 2.5; .5 SOLUTION RESPIRATORY (INHALATION) at 23:50

## 2019-11-10 RX ADMIN — ASPIRIN SCH MG: 81 TABLET, COATED ORAL at 23:13

## 2019-11-10 RX ADMIN — MONTELUKAST SODIUM SCH MG: 10 TABLET ORAL at 23:14

## 2019-11-10 RX ADMIN — Medication SCH MG: at 23:17

## 2019-11-10 NOTE — NUR
PT STATES SHE IS TOO SOB TO AMBULATED TO RESTROOM, SO SHE WAS WHEELED TO 
RESTROOM AND BACK TO BED. OXYGEN LEVELS AT 95%.

## 2019-11-10 NOTE — NUR
C/O INCREASED SOB X4 DAYS. HAS BEEN USING HER NEBULIZER AND INHALERS W/O 
RELIEF.  HX COPD. CONNECTED TO MONITORING.  AT BEDSIDE. CALL LIGHT IN 
REACH.

## 2019-11-11 VITALS — SYSTOLIC BLOOD PRESSURE: 109 MMHG | DIASTOLIC BLOOD PRESSURE: 72 MMHG

## 2019-11-11 VITALS — SYSTOLIC BLOOD PRESSURE: 120 MMHG | DIASTOLIC BLOOD PRESSURE: 77 MMHG

## 2019-11-11 VITALS — SYSTOLIC BLOOD PRESSURE: 124 MMHG | DIASTOLIC BLOOD PRESSURE: 76 MMHG

## 2019-11-11 VITALS — DIASTOLIC BLOOD PRESSURE: 67 MMHG | SYSTOLIC BLOOD PRESSURE: 105 MMHG

## 2019-11-11 LAB
ANION GAP SERPL CALC-SCNC: 4 MMOL/L (ref 5–15)
BASOPHILS # BLD AUTO: 0.01 X10^3/UL (ref 0–0.1)
BASOPHILS NFR BLD AUTO: 0 % (ref 0–1)
CALCIUM SERPL-MCNC: 8.2 MG/DL (ref 8.5–10.1)
CHLORIDE SERPL-SCNC: 105 MMOL/L (ref 98–107)
CLOSTRIDIUM DIFFICILE ANTIGEN: NEGATIVE
CLOSTRIDIUM DIFFICILE TOXIN: NEGATIVE
CREAT SERPL-MCNC: 0.54 MG/DL (ref 0.55–1.02)
EOSINOPHIL # BLD AUTO: 0 X10^3/UL (ref 0–0.4)
EOSINOPHIL NFR BLD AUTO: 0 % (ref 1–7)
ERYTHROCYTE [DISTWIDTH] IN BLOOD BY AUTOMATED COUNT: 13.2 % (ref 9.6–15.2)
LYMPHOCYTES # BLD AUTO: 0.6 X10^3/UL (ref 1–3.4)
LYMPHOCYTES NFR BLD AUTO: 30 % (ref 22–44)
MCH RBC QN AUTO: 30.9 PG (ref 27–34.8)
MCHC RBC AUTO-ENTMCNC: 33.1 G/DL (ref 32.4–35.8)
MCV RBC AUTO: 93.4 FL (ref 80–100)
MD: (no result)
MONOCYTES # BLD AUTO: 0.28 X10^3/UL (ref 0.2–0.8)
MONOCYTES NFR BLD AUTO: 14 % (ref 2–9)
NEUTROPHILS # BLD AUTO: 1.1 X10^3/UL (ref 1.8–6.8)
NEUTROPHILS NFR BLD AUTO: 56 % (ref 42–75)
PLATELET # BLD AUTO: 183 X10^3/UL (ref 130–400)
PMV BLD AUTO: 8.1 FL (ref 7.4–10.4)
RBC # BLD AUTO: 4.48 X10^6/UL (ref 3.82–5.3)

## 2019-11-11 RX ADMIN — PRAVASTATIN SODIUM SCH MG: 40 TABLET ORAL at 21:16

## 2019-11-11 RX ADMIN — GUAIFENESIN SCH MG: 200 TABLET ORAL at 16:00

## 2019-11-11 RX ADMIN — Medication SCH MG: at 21:17

## 2019-11-11 RX ADMIN — ASPIRIN SCH MG: 81 TABLET, COATED ORAL at 21:17

## 2019-11-11 RX ADMIN — MONTELUKAST SODIUM SCH MG: 10 TABLET ORAL at 21:17

## 2019-11-11 RX ADMIN — OSELTAMIVIR PHOSPHATE SCH MG: 75 CAPSULE ORAL at 09:25

## 2019-11-11 RX ADMIN — GUAIFENESIN SCH MG: 200 TABLET ORAL at 12:24

## 2019-11-11 RX ADMIN — HEPARIN SODIUM SCH UNITS: 5000 INJECTION, SOLUTION INTRAVENOUS; SUBCUTANEOUS at 21:17

## 2019-11-11 RX ADMIN — HEPARIN SODIUM SCH UNITS: 5000 INJECTION, SOLUTION INTRAVENOUS; SUBCUTANEOUS at 09:28

## 2019-11-11 RX ADMIN — BUDESONIDE SCH MG: 0.5 INHALANT RESPIRATORY (INHALATION) at 09:00

## 2019-11-11 RX ADMIN — NICOTINE SCH PATCH: 7 PATCH, EXTENDED RELEASE TRANSDERMAL at 18:03

## 2019-11-11 RX ADMIN — IPRATROPIUM BROMIDE AND ALBUTEROL SULFATE SCH ML: 2.5; .5 SOLUTION RESPIRATORY (INHALATION) at 14:35

## 2019-11-11 RX ADMIN — ASPIRIN SCH MG: 81 TABLET, COATED ORAL at 09:25

## 2019-11-11 RX ADMIN — SODIUM CHLORIDE SCH MLS/HR: 0.9 INJECTION, SOLUTION INTRAVENOUS at 09:25

## 2019-11-11 RX ADMIN — OSELTAMIVIR PHOSPHATE SCH MG: 75 CAPSULE ORAL at 21:17

## 2019-11-11 RX ADMIN — IPRATROPIUM BROMIDE AND ALBUTEROL SULFATE SCH ML: 2.5; .5 SOLUTION RESPIRATORY (INHALATION) at 08:53

## 2019-11-11 RX ADMIN — GUAIFENESIN SCH MG: 200 TABLET ORAL at 21:17

## 2019-11-11 RX ADMIN — ACETAMINOPHEN PRN MG: 325 TABLET, FILM COATED ORAL at 23:36

## 2019-11-12 VITALS — SYSTOLIC BLOOD PRESSURE: 154 MMHG | DIASTOLIC BLOOD PRESSURE: 83 MMHG

## 2019-11-12 VITALS — SYSTOLIC BLOOD PRESSURE: 135 MMHG | DIASTOLIC BLOOD PRESSURE: 82 MMHG

## 2019-11-12 VITALS — SYSTOLIC BLOOD PRESSURE: 133 MMHG | DIASTOLIC BLOOD PRESSURE: 84 MMHG

## 2019-11-12 VITALS — DIASTOLIC BLOOD PRESSURE: 99 MMHG | SYSTOLIC BLOOD PRESSURE: 184 MMHG

## 2019-11-12 LAB — O2/TOTAL GAS SETTING VFR VENT: 92 %

## 2019-11-12 RX ADMIN — OSELTAMIVIR PHOSPHATE SCH MG: 75 CAPSULE ORAL at 21:26

## 2019-11-12 RX ADMIN — ACETAMINOPHEN PRN MG: 325 TABLET, FILM COATED ORAL at 22:00

## 2019-11-12 RX ADMIN — Medication SCH MG: at 21:26

## 2019-11-12 RX ADMIN — IPRATROPIUM BROMIDE SCH MG: 0.5 SOLUTION RESPIRATORY (INHALATION) at 07:48

## 2019-11-12 RX ADMIN — BUDESONIDE SCH MG: 0.5 INHALANT RESPIRATORY (INHALATION) at 07:48

## 2019-11-12 RX ADMIN — ASPIRIN SCH MG: 81 TABLET, COATED ORAL at 09:37

## 2019-11-12 RX ADMIN — HEPARIN SODIUM SCH UNITS: 5000 INJECTION, SOLUTION INTRAVENOUS; SUBCUTANEOUS at 21:25

## 2019-11-12 RX ADMIN — GUAIFENESIN SCH MG: 200 TABLET ORAL at 21:26

## 2019-11-12 RX ADMIN — GUAIFENESIN SCH MG: 200 TABLET ORAL at 09:36

## 2019-11-12 RX ADMIN — PRAVASTATIN SODIUM SCH MG: 40 TABLET ORAL at 21:26

## 2019-11-12 RX ADMIN — MONTELUKAST SODIUM SCH MG: 10 TABLET ORAL at 21:26

## 2019-11-12 RX ADMIN — IPRATROPIUM BROMIDE SCH MG: 0.5 SOLUTION RESPIRATORY (INHALATION) at 03:00

## 2019-11-12 RX ADMIN — OSELTAMIVIR PHOSPHATE SCH MG: 75 CAPSULE ORAL at 09:37

## 2019-11-12 RX ADMIN — BUDESONIDE SCH MG: 0.5 INHALANT RESPIRATORY (INHALATION) at 19:44

## 2019-11-12 RX ADMIN — NICOTINE SCH PATCH: 7 PATCH, EXTENDED RELEASE TRANSDERMAL at 17:57

## 2019-11-12 RX ADMIN — BUDESONIDE SCH MG: 0.5 INHALANT RESPIRATORY (INHALATION) at 09:00

## 2019-11-12 RX ADMIN — ACETAMINOPHEN PRN MG: 325 TABLET, FILM COATED ORAL at 10:53

## 2019-11-12 RX ADMIN — GUAIFENESIN SCH MG: 200 TABLET ORAL at 15:32

## 2019-11-12 RX ADMIN — IPRATROPIUM BROMIDE SCH MG: 0.5 SOLUTION RESPIRATORY (INHALATION) at 19:43

## 2019-11-12 RX ADMIN — CEFTRIAXONE SCH MLS/HR: 2 INJECTION, SOLUTION INTRAVENOUS at 14:03

## 2019-11-12 RX ADMIN — HEPARIN SODIUM SCH UNITS: 5000 INJECTION, SOLUTION INTRAVENOUS; SUBCUTANEOUS at 09:37

## 2019-11-12 RX ADMIN — CARVEDILOL SCH MG: 3.12 TABLET, FILM COATED ORAL at 17:56

## 2019-11-12 RX ADMIN — ASPIRIN SCH MG: 81 TABLET, COATED ORAL at 21:27

## 2019-11-12 RX ADMIN — GUAIFENESIN SCH MG: 200 TABLET ORAL at 06:00

## 2019-11-12 RX ADMIN — CARVEDILOL SCH MG: 3.12 TABLET, FILM COATED ORAL at 09:36

## 2019-11-12 RX ADMIN — SODIUM CHLORIDE SCH MLS/HR: 0.9 INJECTION, SOLUTION INTRAVENOUS at 02:56

## 2019-11-12 RX ADMIN — IPRATROPIUM BROMIDE SCH MG: 0.5 SOLUTION RESPIRATORY (INHALATION) at 13:34

## 2019-11-13 VITALS — SYSTOLIC BLOOD PRESSURE: 119 MMHG | DIASTOLIC BLOOD PRESSURE: 75 MMHG

## 2019-11-13 VITALS — SYSTOLIC BLOOD PRESSURE: 146 MMHG | DIASTOLIC BLOOD PRESSURE: 78 MMHG

## 2019-11-13 VITALS — DIASTOLIC BLOOD PRESSURE: 68 MMHG | SYSTOLIC BLOOD PRESSURE: 105 MMHG

## 2019-11-13 VITALS — SYSTOLIC BLOOD PRESSURE: 151 MMHG | DIASTOLIC BLOOD PRESSURE: 78 MMHG

## 2019-11-13 VITALS — DIASTOLIC BLOOD PRESSURE: 80 MMHG | SYSTOLIC BLOOD PRESSURE: 149 MMHG

## 2019-11-13 LAB
ANION GAP SERPL CALC-SCNC: 7 MMOL/L (ref 5–15)
CALCIUM SERPL-MCNC: 7.5 MG/DL (ref 8.5–10.1)
CHLORIDE SERPL-SCNC: 97 MMOL/L (ref 98–107)
CREAT SERPL-MCNC: 0.39 MG/DL (ref 0.55–1.02)

## 2019-11-13 RX ADMIN — SODIUM CHLORIDE SCH MLS/HR: 0.9 INJECTION, SOLUTION INTRAVENOUS at 00:04

## 2019-11-13 RX ADMIN — IPRATROPIUM BROMIDE SCH MG: 0.5 SOLUTION RESPIRATORY (INHALATION) at 10:56

## 2019-11-13 RX ADMIN — GUAIFENESIN SCH MG: 200 TABLET ORAL at 20:33

## 2019-11-13 RX ADMIN — NICOTINE SCH PATCH: 7 PATCH, EXTENDED RELEASE TRANSDERMAL at 18:33

## 2019-11-13 RX ADMIN — GUAIFENESIN SCH MG: 200 TABLET ORAL at 05:50

## 2019-11-13 RX ADMIN — BUDESONIDE SCH MG: 0.5 INHALANT RESPIRATORY (INHALATION) at 06:16

## 2019-11-13 RX ADMIN — DOXYCYCLINE HYCLATE SCH MG: 100 TABLET, FILM COATED ORAL at 20:32

## 2019-11-13 RX ADMIN — IPRATROPIUM BROMIDE SCH MG: 0.5 SOLUTION RESPIRATORY (INHALATION) at 21:52

## 2019-11-13 RX ADMIN — CARVEDILOL SCH MG: 6.25 TABLET, FILM COATED ORAL at 18:33

## 2019-11-13 RX ADMIN — ASPIRIN SCH MG: 81 TABLET, COATED ORAL at 20:29

## 2019-11-13 RX ADMIN — OSELTAMIVIR PHOSPHATE SCH MG: 75 CAPSULE ORAL at 20:30

## 2019-11-13 RX ADMIN — PRAVASTATIN SODIUM SCH MG: 40 TABLET ORAL at 20:30

## 2019-11-13 RX ADMIN — BUDESONIDE SCH MG: 0.5 INHALANT RESPIRATORY (INHALATION) at 21:00

## 2019-11-13 RX ADMIN — HEPARIN SODIUM SCH UNITS: 5000 INJECTION, SOLUTION INTRAVENOUS; SUBCUTANEOUS at 08:55

## 2019-11-13 RX ADMIN — CEFTRIAXONE SCH MLS/HR: 2 INJECTION, SOLUTION INTRAVENOUS at 14:06

## 2019-11-13 RX ADMIN — ASPIRIN SCH MG: 81 TABLET, COATED ORAL at 08:55

## 2019-11-13 RX ADMIN — ACETAMINOPHEN PRN MG: 325 TABLET, FILM COATED ORAL at 06:13

## 2019-11-13 RX ADMIN — GUAIFENESIN SCH MG: 200 TABLET ORAL at 20:28

## 2019-11-13 RX ADMIN — GUAIFENESIN SCH MG: 200 TABLET ORAL at 16:00

## 2019-11-13 RX ADMIN — ACETAMINOPHEN PRN MG: 325 TABLET, FILM COATED ORAL at 20:32

## 2019-11-13 RX ADMIN — DOXYCYCLINE HYCLATE SCH MG: 100 TABLET, FILM COATED ORAL at 09:30

## 2019-11-13 RX ADMIN — HEPARIN SODIUM SCH UNITS: 5000 INJECTION, SOLUTION INTRAVENOUS; SUBCUTANEOUS at 20:32

## 2019-11-13 RX ADMIN — MONTELUKAST SODIUM SCH MG: 10 TABLET ORAL at 20:30

## 2019-11-13 RX ADMIN — CARVEDILOL SCH MG: 3.12 TABLET, FILM COATED ORAL at 05:50

## 2019-11-13 RX ADMIN — BUDESONIDE AND FORMOTEROL FUMARATE DIHYDRATE PRN MG: 160; 4.5 AEROSOL RESPIRATORY (INHALATION) at 14:58

## 2019-11-13 RX ADMIN — IPRATROPIUM BROMIDE SCH MG: 0.5 SOLUTION RESPIRATORY (INHALATION) at 06:16

## 2019-11-13 RX ADMIN — GUAIFENESIN SCH MG: 200 TABLET ORAL at 18:33

## 2019-11-13 RX ADMIN — IPRATROPIUM BROMIDE AND ALBUTEROL SULFATE SCH ML: 2.5; .5 SOLUTION RESPIRATORY (INHALATION) at 16:30

## 2019-11-13 RX ADMIN — OSELTAMIVIR PHOSPHATE SCH MG: 75 CAPSULE ORAL at 08:55

## 2019-11-13 RX ADMIN — IPRATROPIUM BROMIDE AND ALBUTEROL SULFATE SCH ML: 2.5; .5 SOLUTION RESPIRATORY (INHALATION) at 22:30

## 2019-11-13 RX ADMIN — IPRATROPIUM BROMIDE SCH MG: 0.5 SOLUTION RESPIRATORY (INHALATION) at 02:22

## 2019-11-13 RX ADMIN — Medication SCH MG: at 20:30

## 2019-11-13 RX ADMIN — IPRATROPIUM BROMIDE SCH MG: 0.5 SOLUTION RESPIRATORY (INHALATION) at 14:58

## 2019-11-13 RX ADMIN — GUAIFENESIN SCH MG: 200 TABLET ORAL at 11:36

## 2019-11-14 VITALS — DIASTOLIC BLOOD PRESSURE: 81 MMHG | SYSTOLIC BLOOD PRESSURE: 124 MMHG

## 2019-11-14 VITALS — DIASTOLIC BLOOD PRESSURE: 70 MMHG | SYSTOLIC BLOOD PRESSURE: 119 MMHG

## 2019-11-14 VITALS — DIASTOLIC BLOOD PRESSURE: 64 MMHG | SYSTOLIC BLOOD PRESSURE: 115 MMHG

## 2019-11-14 VITALS — DIASTOLIC BLOOD PRESSURE: 83 MMHG | SYSTOLIC BLOOD PRESSURE: 133 MMHG

## 2019-11-14 LAB
BASOPHILS # BLD AUTO: 0.01 X10^3/UL (ref 0–0.1)
BASOPHILS NFR BLD AUTO: 0 % (ref 0–1)
EOSINOPHIL # BLD AUTO: 0.04 X10^3/UL (ref 0–0.4)
EOSINOPHIL NFR BLD AUTO: 1 % (ref 1–7)
ERYTHROCYTE [DISTWIDTH] IN BLOOD BY AUTOMATED COUNT: 13.4 % (ref 9.6–15.2)
LYMPHOCYTES # BLD AUTO: 0.85 X10^3/UL (ref 1–3.4)
LYMPHOCYTES NFR BLD AUTO: 15 % (ref 22–44)
MCH RBC QN AUTO: 31.3 PG (ref 27–34.8)
MCHC RBC AUTO-ENTMCNC: 33.1 G/DL (ref 32.4–35.8)
MCV RBC AUTO: 94.6 FL (ref 80–100)
MD: NO
MONOCYTES # BLD AUTO: 0.62 X10^3/UL (ref 0.2–0.8)
MONOCYTES NFR BLD AUTO: 11 % (ref 2–9)
NEUTROPHILS # BLD AUTO: 4.3 X10^3/UL (ref 1.8–6.8)
NEUTROPHILS NFR BLD AUTO: 74 % (ref 42–75)
PLATELET # BLD AUTO: 160 X10^3/UL (ref 130–400)
PMV BLD AUTO: 8.1 FL (ref 7.4–10.4)
RBC # BLD AUTO: 4 X10^6/UL (ref 3.82–5.3)

## 2019-11-14 RX ADMIN — CEFTRIAXONE SCH MLS/HR: 2 INJECTION, SOLUTION INTRAVENOUS at 12:32

## 2019-11-14 RX ADMIN — GUAIFENESIN SCH MG: 200 TABLET ORAL at 21:00

## 2019-11-14 RX ADMIN — GUAIFENESIN SCH MG: 200 TABLET ORAL at 11:00

## 2019-11-14 RX ADMIN — PRAVASTATIN SODIUM SCH MG: 40 TABLET ORAL at 21:54

## 2019-11-14 RX ADMIN — IPRATROPIUM BROMIDE SCH MG: 0.5 SOLUTION RESPIRATORY (INHALATION) at 20:46

## 2019-11-14 RX ADMIN — ACETAMINOPHEN PRN MG: 325 TABLET, FILM COATED ORAL at 22:12

## 2019-11-14 RX ADMIN — Medication SCH MG: at 21:54

## 2019-11-14 RX ADMIN — GUAIFENESIN SCH MG: 200 TABLET ORAL at 10:53

## 2019-11-14 RX ADMIN — NICOTINE SCH PATCH: 7 PATCH, EXTENDED RELEASE TRANSDERMAL at 17:48

## 2019-11-14 RX ADMIN — IPRATROPIUM BROMIDE AND ALBUTEROL SULFATE SCH ML: 2.5; .5 SOLUTION RESPIRATORY (INHALATION) at 04:24

## 2019-11-14 RX ADMIN — ASPIRIN SCH MG: 81 TABLET, COATED ORAL at 21:53

## 2019-11-14 RX ADMIN — NYSTATIN SCH UNITS: 100000 SUSPENSION ORAL at 12:31

## 2019-11-14 RX ADMIN — GUAIFENESIN SCH MG: 200 TABLET ORAL at 17:47

## 2019-11-14 RX ADMIN — NYSTATIN SCH UNITS: 100000 SUSPENSION ORAL at 21:00

## 2019-11-14 RX ADMIN — DOXYCYCLINE HYCLATE SCH MG: 100 TABLET, FILM COATED ORAL at 21:00

## 2019-11-14 RX ADMIN — BUDESONIDE AND FORMOTEROL FUMARATE DIHYDRATE PRN MG: 160; 4.5 AEROSOL RESPIRATORY (INHALATION) at 15:58

## 2019-11-14 RX ADMIN — NYSTATIN SCH UNITS: 100000 SUSPENSION ORAL at 17:48

## 2019-11-14 RX ADMIN — HEPARIN SODIUM SCH UNITS: 5000 INJECTION, SOLUTION INTRAVENOUS; SUBCUTANEOUS at 21:53

## 2019-11-14 RX ADMIN — BUDESONIDE SCH MG: 0.5 INHALANT RESPIRATORY (INHALATION) at 09:30

## 2019-11-14 RX ADMIN — IPRATROPIUM BROMIDE AND ALBUTEROL SULFATE SCH ML: 2.5; .5 SOLUTION RESPIRATORY (INHALATION) at 10:30

## 2019-11-14 RX ADMIN — DOXYCYCLINE HYCLATE SCH MG: 100 TABLET, FILM COATED ORAL at 10:53

## 2019-11-14 RX ADMIN — BUDESONIDE AND FORMOTEROL FUMARATE DIHYDRATE PRN MG: 160; 4.5 AEROSOL RESPIRATORY (INHALATION) at 11:25

## 2019-11-14 RX ADMIN — ASPIRIN SCH MG: 81 TABLET, COATED ORAL at 10:53

## 2019-11-14 RX ADMIN — OSELTAMIVIR PHOSPHATE SCH MG: 75 CAPSULE ORAL at 09:58

## 2019-11-14 RX ADMIN — MONTELUKAST SODIUM SCH MG: 10 TABLET ORAL at 21:54

## 2019-11-14 RX ADMIN — CARVEDILOL SCH MG: 6.25 TABLET, FILM COATED ORAL at 10:53

## 2019-11-14 RX ADMIN — IPRATROPIUM BROMIDE SCH MG: 0.5 SOLUTION RESPIRATORY (INHALATION) at 15:00

## 2019-11-14 RX ADMIN — IPRATROPIUM BROMIDE SCH MG: 0.5 SOLUTION RESPIRATORY (INHALATION) at 09:30

## 2019-11-14 RX ADMIN — BUDESONIDE SCH MG: 0.5 INHALANT RESPIRATORY (INHALATION) at 20:46

## 2019-11-14 RX ADMIN — HEPARIN SODIUM SCH UNITS: 5000 INJECTION, SOLUTION INTRAVENOUS; SUBCUTANEOUS at 10:54

## 2019-11-14 RX ADMIN — OSELTAMIVIR PHOSPHATE SCH MG: 75 CAPSULE ORAL at 21:54

## 2019-11-14 RX ADMIN — CARVEDILOL SCH MG: 6.25 TABLET, FILM COATED ORAL at 17:50

## 2019-11-14 RX ADMIN — IPRATROPIUM BROMIDE AND ALBUTEROL SULFATE SCH ML: 2.5; .5 SOLUTION RESPIRATORY (INHALATION) at 16:30

## 2019-11-14 RX ADMIN — IPRATROPIUM BROMIDE AND ALBUTEROL SULFATE SCH ML: 2.5; .5 SOLUTION RESPIRATORY (INHALATION) at 22:30

## 2019-11-14 RX ADMIN — GUAIFENESIN SCH MG: 200 TABLET ORAL at 21:54

## 2019-11-15 VITALS — SYSTOLIC BLOOD PRESSURE: 119 MMHG | DIASTOLIC BLOOD PRESSURE: 76 MMHG

## 2019-11-15 VITALS — DIASTOLIC BLOOD PRESSURE: 97 MMHG | SYSTOLIC BLOOD PRESSURE: 151 MMHG

## 2019-11-15 VITALS — DIASTOLIC BLOOD PRESSURE: 87 MMHG | SYSTOLIC BLOOD PRESSURE: 134 MMHG

## 2019-11-15 VITALS — SYSTOLIC BLOOD PRESSURE: 153 MMHG | DIASTOLIC BLOOD PRESSURE: 93 MMHG

## 2019-11-15 VITALS — SYSTOLIC BLOOD PRESSURE: 183 MMHG | DIASTOLIC BLOOD PRESSURE: 99 MMHG

## 2019-11-15 RX ADMIN — OSELTAMIVIR PHOSPHATE SCH MG: 75 CAPSULE ORAL at 11:34

## 2019-11-15 RX ADMIN — AMLODIPINE BESYLATE SCH MG: 5 TABLET ORAL at 11:34

## 2019-11-15 RX ADMIN — GUAIFENESIN SCH MG: 200 TABLET ORAL at 18:20

## 2019-11-15 RX ADMIN — GUAIFENESIN SCH MG: 200 TABLET ORAL at 21:00

## 2019-11-15 RX ADMIN — IPRATROPIUM BROMIDE AND ALBUTEROL SULFATE SCH ML: 2.5; .5 SOLUTION RESPIRATORY (INHALATION) at 02:59

## 2019-11-15 RX ADMIN — CEFTRIAXONE SCH MLS/HR: 2 INJECTION, SOLUTION INTRAVENOUS at 13:58

## 2019-11-15 RX ADMIN — ENOXAPARIN SODIUM SCH MG: 40 INJECTION SUBCUTANEOUS at 11:34

## 2019-11-15 RX ADMIN — NYSTATIN SCH UNITS: 100000 SUSPENSION ORAL at 18:20

## 2019-11-15 RX ADMIN — GUAIFENESIN SCH MG: 200 TABLET ORAL at 11:37

## 2019-11-15 RX ADMIN — BUDESONIDE SCH MG: 0.5 INHALANT RESPIRATORY (INHALATION) at 08:25

## 2019-11-15 RX ADMIN — AMLODIPINE BESYLATE SCH MG: 5 TABLET ORAL at 22:50

## 2019-11-15 RX ADMIN — IPRATROPIUM BROMIDE SCH MG: 0.5 SOLUTION RESPIRATORY (INHALATION) at 08:25

## 2019-11-15 RX ADMIN — DOXYCYCLINE HYCLATE SCH MG: 100 TABLET, FILM COATED ORAL at 04:16

## 2019-11-15 RX ADMIN — PRAVASTATIN SODIUM SCH MG: 40 TABLET ORAL at 22:50

## 2019-11-15 RX ADMIN — IPRATROPIUM BROMIDE SCH MG: 0.5 SOLUTION RESPIRATORY (INHALATION) at 21:00

## 2019-11-15 RX ADMIN — OSELTAMIVIR PHOSPHATE SCH MG: 75 CAPSULE ORAL at 21:00

## 2019-11-15 RX ADMIN — NYSTATIN SCH UNITS: 100000 SUSPENSION ORAL at 04:16

## 2019-11-15 RX ADMIN — DOXYCYCLINE HYCLATE SCH MG: 100 TABLET, FILM COATED ORAL at 21:00

## 2019-11-15 RX ADMIN — NYSTATIN SCH UNITS: 100000 SUSPENSION ORAL at 12:19

## 2019-11-15 RX ADMIN — IPRATROPIUM BROMIDE SCH MG: 0.5 SOLUTION RESPIRATORY (INHALATION) at 15:00

## 2019-11-15 RX ADMIN — ASPIRIN SCH MG: 81 TABLET, COATED ORAL at 12:18

## 2019-11-15 RX ADMIN — ASPIRIN SCH MG: 81 TABLET, COATED ORAL at 22:50

## 2019-11-15 RX ADMIN — MONTELUKAST SODIUM SCH MG: 10 TABLET ORAL at 21:00

## 2019-11-15 RX ADMIN — NICOTINE SCH PATCH: 7 PATCH, EXTENDED RELEASE TRANSDERMAL at 18:20

## 2019-11-15 RX ADMIN — Medication SCH MG: at 22:50

## 2019-11-15 RX ADMIN — NYSTATIN SCH UNITS: 100000 SUSPENSION ORAL at 21:00

## 2019-11-15 RX ADMIN — GUAIFENESIN SCH MG: 200 TABLET ORAL at 11:38

## 2019-11-16 VITALS — SYSTOLIC BLOOD PRESSURE: 94 MMHG | DIASTOLIC BLOOD PRESSURE: 59 MMHG

## 2019-11-16 VITALS — SYSTOLIC BLOOD PRESSURE: 146 MMHG | DIASTOLIC BLOOD PRESSURE: 83 MMHG

## 2019-11-16 LAB
ALBUMIN SERPL-MCNC: 2.9 G/DL (ref 3.4–5)
ALP SERPL-CCNC: 74 U/L (ref 45–117)
ALT SERPL-CCNC: 89 U/L (ref 12–78)
ANION GAP SERPL CALC-SCNC: 3 MMOL/L (ref 5–15)
BILIRUB SERPL-MCNC: 0.4 MG/DL (ref 0.2–1)
CALCIUM SERPL-MCNC: 8.6 MG/DL (ref 8.5–10.1)
CHLORIDE SERPL-SCNC: 99 MMOL/L (ref 98–107)
CREAT SERPL-MCNC: 0.32 MG/DL (ref 0.55–1.02)
PROT SERPL-MCNC: 7 G/DL (ref 6.4–8.2)

## 2019-11-16 RX ADMIN — PRAVASTATIN SODIUM SCH MG: 40 TABLET ORAL at 22:54

## 2019-11-16 RX ADMIN — IPRATROPIUM BROMIDE SCH MG: 0.5 SOLUTION RESPIRATORY (INHALATION) at 21:36

## 2019-11-16 RX ADMIN — NYSTATIN SCH UNITS: 100000 SUSPENSION ORAL at 21:00

## 2019-11-16 RX ADMIN — AMLODIPINE BESYLATE SCH MG: 5 TABLET ORAL at 10:24

## 2019-11-16 RX ADMIN — MIRTAZAPINE SCH MG: 15 TABLET, FILM COATED ORAL at 22:54

## 2019-11-16 RX ADMIN — NYSTATIN SCH UNITS: 100000 SUSPENSION ORAL at 17:42

## 2019-11-16 RX ADMIN — IPRATROPIUM BROMIDE SCH MG: 0.5 SOLUTION RESPIRATORY (INHALATION) at 07:00

## 2019-11-16 RX ADMIN — Medication SCH MG: at 22:54

## 2019-11-16 RX ADMIN — ENOXAPARIN SODIUM SCH MG: 40 INJECTION SUBCUTANEOUS at 10:24

## 2019-11-16 RX ADMIN — DOXYCYCLINE HYCLATE SCH MG: 100 TABLET, FILM COATED ORAL at 20:33

## 2019-11-16 RX ADMIN — CEFDINIR SCH MG: 300 CAPSULE ORAL at 22:54

## 2019-11-16 RX ADMIN — ASPIRIN SCH MG: 81 TABLET, COATED ORAL at 22:53

## 2019-11-16 RX ADMIN — GUAIFENESIN SCH MG: 200 TABLET ORAL at 11:06

## 2019-11-16 RX ADMIN — CEFDINIR SCH MG: 300 CAPSULE ORAL at 10:24

## 2019-11-16 RX ADMIN — NYSTATIN SCH UNITS: 100000 SUSPENSION ORAL at 03:03

## 2019-11-16 RX ADMIN — NYSTATIN SCH UNITS: 100000 SUSPENSION ORAL at 10:06

## 2019-11-16 RX ADMIN — ACETAMINOPHEN PRN MG: 325 TABLET, FILM COATED ORAL at 00:43

## 2019-11-16 RX ADMIN — GUAIFENESIN SCH MG: 200 TABLET ORAL at 21:00

## 2019-11-16 RX ADMIN — NICOTINE SCH PATCH: 7 PATCH, EXTENDED RELEASE TRANSDERMAL at 17:42

## 2019-11-16 RX ADMIN — CARVEDILOL SCH MG: 25 TABLET, FILM COATED ORAL at 17:42

## 2019-11-16 RX ADMIN — AMLODIPINE BESYLATE SCH MG: 5 TABLET ORAL at 22:53

## 2019-11-16 RX ADMIN — GUAIFENESIN SCH MG: 200 TABLET ORAL at 10:24

## 2019-11-16 RX ADMIN — IPRATROPIUM BROMIDE SCH MG: 0.5 SOLUTION RESPIRATORY (INHALATION) at 03:00

## 2019-11-16 RX ADMIN — ACETAMINOPHEN PRN MG: 325 TABLET, FILM COATED ORAL at 10:24

## 2019-11-16 RX ADMIN — GUAIFENESIN SCH MG: 200 TABLET ORAL at 17:42

## 2019-11-16 RX ADMIN — MONTELUKAST SODIUM SCH MG: 10 TABLET ORAL at 22:53

## 2019-11-16 RX ADMIN — DOXYCYCLINE HYCLATE SCH MG: 100 TABLET, FILM COATED ORAL at 10:03

## 2019-11-16 RX ADMIN — BUDESONIDE AND FORMOTEROL FUMARATE DIHYDRATE SCH ML: 160; 4.5 AEROSOL RESPIRATORY (INHALATION) at 17:00

## 2019-11-16 RX ADMIN — ASPIRIN SCH MG: 81 TABLET, COATED ORAL at 10:24

## 2019-11-16 RX ADMIN — IPRATROPIUM BROMIDE SCH MG: 0.5 SOLUTION RESPIRATORY (INHALATION) at 14:33

## 2019-11-17 VITALS — SYSTOLIC BLOOD PRESSURE: 128 MMHG | DIASTOLIC BLOOD PRESSURE: 81 MMHG

## 2019-11-17 VITALS — SYSTOLIC BLOOD PRESSURE: 115 MMHG | DIASTOLIC BLOOD PRESSURE: 70 MMHG

## 2019-11-17 VITALS — DIASTOLIC BLOOD PRESSURE: 69 MMHG | SYSTOLIC BLOOD PRESSURE: 108 MMHG

## 2019-11-17 VITALS — DIASTOLIC BLOOD PRESSURE: 54 MMHG | SYSTOLIC BLOOD PRESSURE: 78 MMHG

## 2019-11-17 VITALS — DIASTOLIC BLOOD PRESSURE: 61 MMHG | SYSTOLIC BLOOD PRESSURE: 95 MMHG

## 2019-11-17 LAB
ALBUMIN SERPL-MCNC: 2.9 G/DL (ref 3.4–5)
ALP SERPL-CCNC: 80 U/L (ref 45–117)
ALT SERPL-CCNC: 91 U/L (ref 12–78)
ANION GAP SERPL CALC-SCNC: 2 MMOL/L (ref 5–15)
BILIRUB SERPL-MCNC: 0.3 MG/DL (ref 0.2–1)
CALCIUM SERPL-MCNC: 8.7 MG/DL (ref 8.5–10.1)
CHLORIDE SERPL-SCNC: 99 MMOL/L (ref 98–107)
CREAT SERPL-MCNC: 0.52 MG/DL (ref 0.55–1.02)
O2/TOTAL GAS SETTING VFR VENT: 91 %
O2/TOTAL GAS SETTING VFR VENT: 92 %
PROT SERPL-MCNC: 6.8 G/DL (ref 6.4–8.2)

## 2019-11-17 RX ADMIN — NYSTATIN SCH UNITS: 100000 SUSPENSION ORAL at 11:00

## 2019-11-17 RX ADMIN — GUAIFENESIN SCH MG: 200 TABLET ORAL at 09:56

## 2019-11-17 RX ADMIN — MIRTAZAPINE SCH MG: 15 TABLET, FILM COATED ORAL at 21:53

## 2019-11-17 RX ADMIN — CEFDINIR SCH MG: 300 CAPSULE ORAL at 21:53

## 2019-11-17 RX ADMIN — IPRATROPIUM BROMIDE SCH MG: 0.5 SOLUTION RESPIRATORY (INHALATION) at 08:30

## 2019-11-17 RX ADMIN — GUAIFENESIN SCH MG: 200 TABLET ORAL at 11:00

## 2019-11-17 RX ADMIN — BUDESONIDE AND FORMOTEROL FUMARATE DIHYDRATE PRN CAP: 160; 4.5 AEROSOL RESPIRATORY (INHALATION) at 20:43

## 2019-11-17 RX ADMIN — ASPIRIN SCH MG: 81 TABLET, COATED ORAL at 21:54

## 2019-11-17 RX ADMIN — NICOTINE SCH PATCH: 7 PATCH, EXTENDED RELEASE TRANSDERMAL at 18:25

## 2019-11-17 RX ADMIN — NYSTATIN SCH UNITS: 100000 SUSPENSION ORAL at 21:28

## 2019-11-17 RX ADMIN — CEFDINIR SCH MG: 300 CAPSULE ORAL at 09:55

## 2019-11-17 RX ADMIN — CARVEDILOL SCH MG: 25 TABLET, FILM COATED ORAL at 09:56

## 2019-11-17 RX ADMIN — CARVEDILOL SCH MG: 12.5 TABLET, FILM COATED ORAL at 18:24

## 2019-11-17 RX ADMIN — AMLODIPINE BESYLATE SCH MG: 5 TABLET ORAL at 09:55

## 2019-11-17 RX ADMIN — IPRATROPIUM BROMIDE SCH MG: 0.5 SOLUTION RESPIRATORY (INHALATION) at 13:15

## 2019-11-17 RX ADMIN — MONTELUKAST SODIUM SCH MG: 10 TABLET ORAL at 21:53

## 2019-11-17 RX ADMIN — BUDESONIDE AND FORMOTEROL FUMARATE DIHYDRATE SCH ML: 160; 4.5 AEROSOL RESPIRATORY (INHALATION) at 18:30

## 2019-11-17 RX ADMIN — PRAVASTATIN SODIUM SCH MG: 40 TABLET ORAL at 21:54

## 2019-11-17 RX ADMIN — GUAIFENESIN SCH MG: 200 TABLET ORAL at 16:00

## 2019-11-17 RX ADMIN — ACETAMINOPHEN PRN MG: 325 TABLET, FILM COATED ORAL at 20:43

## 2019-11-17 RX ADMIN — DOXYCYCLINE HYCLATE SCH MG: 100 TABLET, FILM COATED ORAL at 20:45

## 2019-11-17 RX ADMIN — IPRATROPIUM BROMIDE SCH MG: 0.5 SOLUTION RESPIRATORY (INHALATION) at 21:00

## 2019-11-17 RX ADMIN — BUDESONIDE SCH MG: 0.5 INHALANT RESPIRATORY (INHALATION) at 21:00

## 2019-11-17 RX ADMIN — NYSTATIN SCH UNITS: 100000 SUSPENSION ORAL at 16:08

## 2019-11-17 RX ADMIN — Medication SCH MG: at 21:54

## 2019-11-17 RX ADMIN — IPRATROPIUM BROMIDE SCH MG: 0.5 SOLUTION RESPIRATORY (INHALATION) at 03:13

## 2019-11-17 RX ADMIN — ASPIRIN SCH MG: 81 TABLET, COATED ORAL at 09:56

## 2019-11-17 RX ADMIN — NYSTATIN SCH UNITS: 100000 SUSPENSION ORAL at 20:45

## 2019-11-17 RX ADMIN — DOXYCYCLINE HYCLATE SCH MG: 100 TABLET, FILM COATED ORAL at 09:52

## 2019-11-17 RX ADMIN — BUDESONIDE AND FORMOTEROL FUMARATE DIHYDRATE SCH ML: 160; 4.5 AEROSOL RESPIRATORY (INHALATION) at 03:13

## 2019-11-17 RX ADMIN — NYSTATIN SCH UNITS: 100000 SUSPENSION ORAL at 09:52

## 2019-11-17 RX ADMIN — BUDESONIDE AND FORMOTEROL FUMARATE DIHYDRATE SCH ML: 160; 4.5 AEROSOL RESPIRATORY (INHALATION) at 08:30

## 2019-11-17 RX ADMIN — ENOXAPARIN SODIUM SCH MG: 40 INJECTION SUBCUTANEOUS at 10:00

## 2019-11-17 RX ADMIN — GUAIFENESIN SCH MG: 200 TABLET ORAL at 20:43

## 2019-11-17 RX ADMIN — BUDESONIDE AND FORMOTEROL FUMARATE DIHYDRATE SCH ML: 160; 4.5 AEROSOL RESPIRATORY (INHALATION) at 13:15

## 2019-11-18 ENCOUNTER — TELEPHONE (OUTPATIENT)
Dept: MEDICAL GROUP | Facility: LAB | Age: 74
End: 2019-11-18

## 2019-11-18 VITALS — SYSTOLIC BLOOD PRESSURE: 137 MMHG | DIASTOLIC BLOOD PRESSURE: 81 MMHG

## 2019-11-18 VITALS — SYSTOLIC BLOOD PRESSURE: 119 MMHG | DIASTOLIC BLOOD PRESSURE: 75 MMHG

## 2019-11-18 VITALS — SYSTOLIC BLOOD PRESSURE: 138 MMHG | DIASTOLIC BLOOD PRESSURE: 87 MMHG

## 2019-11-18 LAB
ALBUMIN SERPL-MCNC: 3 G/DL (ref 3.4–5)
ALP SERPL-CCNC: 80 U/L (ref 45–117)
ALT SERPL-CCNC: 82 U/L (ref 12–78)
ANION GAP SERPL CALC-SCNC: 5 MMOL/L (ref 5–15)
BILIRUB SERPL-MCNC: 0.4 MG/DL (ref 0.2–1)
CALCIUM SERPL-MCNC: 8.7 MG/DL (ref 8.5–10.1)
CHLORIDE SERPL-SCNC: 98 MMOL/L (ref 98–107)
CREAT SERPL-MCNC: 0.5 MG/DL (ref 0.55–1.02)
O2 FLOW: 3 L/MIN
PROT SERPL-MCNC: 7 G/DL (ref 6.4–8.2)

## 2019-11-18 RX ADMIN — NYSTATIN SCH UNITS: 100000 SUSPENSION ORAL at 23:27

## 2019-11-18 RX ADMIN — IPRATROPIUM BROMIDE SCH MG: 0.5 SOLUTION RESPIRATORY (INHALATION) at 14:27

## 2019-11-18 RX ADMIN — CARVEDILOL SCH MG: 12.5 TABLET, FILM COATED ORAL at 17:18

## 2019-11-18 RX ADMIN — NYSTATIN SCH UNITS: 100000 SUSPENSION ORAL at 15:01

## 2019-11-18 RX ADMIN — NYSTATIN SCH UNITS: 100000 SUSPENSION ORAL at 09:27

## 2019-11-18 RX ADMIN — CEFDINIR SCH MG: 300 CAPSULE ORAL at 21:41

## 2019-11-18 RX ADMIN — BUDESONIDE AND FORMOTEROL FUMARATE DIHYDRATE SCH ML: 160; 4.5 AEROSOL RESPIRATORY (INHALATION) at 03:00

## 2019-11-18 RX ADMIN — DOXYCYCLINE HYCLATE SCH MG: 100 TABLET, FILM COATED ORAL at 07:23

## 2019-11-18 RX ADMIN — NICOTINE SCH PATCH: 7 PATCH, EXTENDED RELEASE TRANSDERMAL at 17:20

## 2019-11-18 RX ADMIN — IPRATROPIUM BROMIDE SCH MG: 0.5 SOLUTION RESPIRATORY (INHALATION) at 09:00

## 2019-11-18 RX ADMIN — ASPIRIN SCH MG: 81 TABLET, COATED ORAL at 09:54

## 2019-11-18 RX ADMIN — CARVEDILOL SCH MG: 12.5 TABLET, FILM COATED ORAL at 09:54

## 2019-11-18 RX ADMIN — IPRATROPIUM BROMIDE SCH MG: 0.5 SOLUTION RESPIRATORY (INHALATION) at 03:00

## 2019-11-18 RX ADMIN — CEFDINIR SCH MG: 300 CAPSULE ORAL at 09:54

## 2019-11-18 RX ADMIN — BUDESONIDE AND FORMOTEROL FUMARATE DIHYDRATE PRN CAP: 160; 4.5 AEROSOL RESPIRATORY (INHALATION) at 09:59

## 2019-11-18 RX ADMIN — GUAIFENESIN SCH MG: 200 TABLET ORAL at 09:27

## 2019-11-18 RX ADMIN — NYSTATIN SCH UNITS: 100000 SUSPENSION ORAL at 21:00

## 2019-11-18 RX ADMIN — BUDESONIDE SCH MG: 0.5 INHALANT RESPIRATORY (INHALATION) at 09:00

## 2019-11-18 RX ADMIN — PRAVASTATIN SODIUM SCH MG: 40 TABLET ORAL at 21:41

## 2019-11-18 RX ADMIN — BUDESONIDE AND FORMOTEROL FUMARATE DIHYDRATE SCH ML: 160; 4.5 AEROSOL RESPIRATORY (INHALATION) at 14:26

## 2019-11-18 RX ADMIN — Medication SCH MG: at 21:41

## 2019-11-18 RX ADMIN — ACETAMINOPHEN PRN MG: 325 TABLET, FILM COATED ORAL at 13:36

## 2019-11-18 RX ADMIN — DOXYCYCLINE HYCLATE SCH MG: 100 TABLET, FILM COATED ORAL at 21:02

## 2019-11-18 RX ADMIN — ACETAMINOPHEN PRN MG: 325 TABLET, FILM COATED ORAL at 21:50

## 2019-11-18 RX ADMIN — BUDESONIDE AND FORMOTEROL FUMARATE DIHYDRATE PRN CAP: 160; 4.5 AEROSOL RESPIRATORY (INHALATION) at 21:42

## 2019-11-18 RX ADMIN — GUAIFENESIN SCH MG: 200 TABLET ORAL at 15:01

## 2019-11-18 RX ADMIN — GUAIFENESIN SCH MG: 200 TABLET ORAL at 21:02

## 2019-11-18 RX ADMIN — MIRTAZAPINE SCH MG: 15 TABLET, FILM COATED ORAL at 21:41

## 2019-11-18 RX ADMIN — IPRATROPIUM BROMIDE SCH MG: 0.5 SOLUTION RESPIRATORY (INHALATION) at 20:34

## 2019-11-18 RX ADMIN — MONTELUKAST SODIUM SCH MG: 10 TABLET ORAL at 21:41

## 2019-11-18 RX ADMIN — BUDESONIDE AND FORMOTEROL FUMARATE DIHYDRATE SCH ML: 160; 4.5 AEROSOL RESPIRATORY (INHALATION) at 20:34

## 2019-11-18 RX ADMIN — BUDESONIDE SCH MG: 0.5 INHALANT RESPIRATORY (INHALATION) at 20:34

## 2019-11-18 RX ADMIN — BUDESONIDE AND FORMOTEROL FUMARATE DIHYDRATE SCH ML: 160; 4.5 AEROSOL RESPIRATORY (INHALATION) at 05:20

## 2019-11-18 RX ADMIN — ASPIRIN SCH MG: 81 TABLET, COATED ORAL at 21:40

## 2019-11-18 RX ADMIN — GUAIFENESIN SCH MG: 200 TABLET ORAL at 07:23

## 2019-11-18 RX ADMIN — ENOXAPARIN SODIUM SCH MG: 40 INJECTION SUBCUTANEOUS at 09:55

## 2019-11-19 VITALS — SYSTOLIC BLOOD PRESSURE: 145 MMHG | DIASTOLIC BLOOD PRESSURE: 84 MMHG

## 2019-11-19 VITALS — DIASTOLIC BLOOD PRESSURE: 62 MMHG | SYSTOLIC BLOOD PRESSURE: 91 MMHG

## 2019-11-19 VITALS — SYSTOLIC BLOOD PRESSURE: 124 MMHG | DIASTOLIC BLOOD PRESSURE: 70 MMHG

## 2019-11-19 VITALS — DIASTOLIC BLOOD PRESSURE: 77 MMHG | SYSTOLIC BLOOD PRESSURE: 131 MMHG

## 2019-11-19 LAB
ALBUMIN SERPL-MCNC: 3.1 G/DL (ref 3.4–5)
ALP SERPL-CCNC: 73 U/L (ref 45–117)
ALT SERPL-CCNC: 69 U/L (ref 12–78)
ANION GAP SERPL CALC-SCNC: 2 MMOL/L (ref 5–15)
BILIRUB SERPL-MCNC: 0.5 MG/DL (ref 0.2–1)
CALCIUM SERPL-MCNC: 8.8 MG/DL (ref 8.5–10.1)
CHLORIDE SERPL-SCNC: 98 MMOL/L (ref 98–107)
CREAT SERPL-MCNC: 0.5 MG/DL (ref 0.55–1.02)
PROT SERPL-MCNC: 7 G/DL (ref 6.4–8.2)

## 2019-11-19 RX ADMIN — BUDESONIDE SCH MG: 0.5 INHALANT RESPIRATORY (INHALATION) at 09:00

## 2019-11-19 RX ADMIN — POTASSIUM CHLORIDE SCH MEQ: 20 TABLET, EXTENDED RELEASE ORAL at 16:21

## 2019-11-19 RX ADMIN — PRAVASTATIN SODIUM SCH MG: 40 TABLET ORAL at 21:03

## 2019-11-19 RX ADMIN — DOXYCYCLINE HYCLATE SCH MG: 100 TABLET, FILM COATED ORAL at 19:30

## 2019-11-19 RX ADMIN — BUDESONIDE AND FORMOTEROL FUMARATE DIHYDRATE SCH ML: 160; 4.5 AEROSOL RESPIRATORY (INHALATION) at 02:29

## 2019-11-19 RX ADMIN — BUDESONIDE AND FORMOTEROL FUMARATE DIHYDRATE SCH ML: 160; 4.5 AEROSOL RESPIRATORY (INHALATION) at 06:47

## 2019-11-19 RX ADMIN — NYSTATIN SCH UNITS: 100000 SUSPENSION ORAL at 19:30

## 2019-11-19 RX ADMIN — ENOXAPARIN SODIUM SCH MG: 40 INJECTION SUBCUTANEOUS at 10:14

## 2019-11-19 RX ADMIN — MIRTAZAPINE SCH MG: 15 TABLET, FILM COATED ORAL at 21:04

## 2019-11-19 RX ADMIN — BUDESONIDE SCH MG: 0.5 INHALANT RESPIRATORY (INHALATION) at 22:01

## 2019-11-19 RX ADMIN — CEFDINIR SCH MG: 300 CAPSULE ORAL at 21:03

## 2019-11-19 RX ADMIN — IPRATROPIUM BROMIDE SCH MG: 0.5 SOLUTION RESPIRATORY (INHALATION) at 22:02

## 2019-11-19 RX ADMIN — DOXYCYCLINE HYCLATE SCH MG: 100 TABLET, FILM COATED ORAL at 10:12

## 2019-11-19 RX ADMIN — BUDESONIDE AND FORMOTEROL FUMARATE DIHYDRATE PRN CAP: 160; 4.5 AEROSOL RESPIRATORY (INHALATION) at 21:04

## 2019-11-19 RX ADMIN — ASPIRIN SCH MG: 81 TABLET, COATED ORAL at 10:14

## 2019-11-19 RX ADMIN — GUAIFENESIN SCH MG: 200 TABLET ORAL at 21:00

## 2019-11-19 RX ADMIN — MONTELUKAST SODIUM SCH MG: 10 TABLET ORAL at 21:03

## 2019-11-19 RX ADMIN — GUAIFENESIN SCH MG: 200 TABLET ORAL at 12:32

## 2019-11-19 RX ADMIN — CARVEDILOL SCH MG: 25 TABLET, FILM COATED ORAL at 17:45

## 2019-11-19 RX ADMIN — GUAIFENESIN SCH MG: 200 TABLET ORAL at 10:13

## 2019-11-19 RX ADMIN — IPRATROPIUM BROMIDE SCH MG: 0.5 SOLUTION RESPIRATORY (INHALATION) at 15:00

## 2019-11-19 RX ADMIN — NYSTATIN SCH UNITS: 100000 SUSPENSION ORAL at 22:44

## 2019-11-19 RX ADMIN — IPRATROPIUM BROMIDE SCH MG: 0.5 SOLUTION RESPIRATORY (INHALATION) at 02:30

## 2019-11-19 RX ADMIN — NYSTATIN SCH UNITS: 100000 SUSPENSION ORAL at 15:24

## 2019-11-19 RX ADMIN — BUDESONIDE AND FORMOTEROL FUMARATE DIHYDRATE SCH ML: 160; 4.5 AEROSOL RESPIRATORY (INHALATION) at 15:00

## 2019-11-19 RX ADMIN — ASPIRIN SCH MG: 81 TABLET, COATED ORAL at 21:03

## 2019-11-19 RX ADMIN — BUDESONIDE AND FORMOTEROL FUMARATE DIHYDRATE PRN CAP: 160; 4.5 AEROSOL RESPIRATORY (INHALATION) at 09:56

## 2019-11-19 RX ADMIN — NICOTINE SCH PATCH: 7 PATCH, EXTENDED RELEASE TRANSDERMAL at 17:45

## 2019-11-19 RX ADMIN — GUAIFENESIN SCH MG: 200 TABLET ORAL at 15:48

## 2019-11-19 RX ADMIN — CEFDINIR SCH MG: 300 CAPSULE ORAL at 10:12

## 2019-11-19 RX ADMIN — BUDESONIDE AND FORMOTEROL FUMARATE DIHYDRATE PRN CAP: 160; 4.5 AEROSOL RESPIRATORY (INHALATION) at 15:49

## 2019-11-19 RX ADMIN — NYSTATIN SCH UNITS: 100000 SUSPENSION ORAL at 10:12

## 2019-11-19 RX ADMIN — IPRATROPIUM BROMIDE SCH MG: 0.5 SOLUTION RESPIRATORY (INHALATION) at 09:00

## 2019-11-19 RX ADMIN — Medication SCH MG: at 21:03

## 2019-11-19 RX ADMIN — BUDESONIDE AND FORMOTEROL FUMARATE DIHYDRATE SCH ML: 160; 4.5 AEROSOL RESPIRATORY (INHALATION) at 22:01

## 2019-11-20 VITALS — DIASTOLIC BLOOD PRESSURE: 93 MMHG | SYSTOLIC BLOOD PRESSURE: 143 MMHG

## 2019-11-20 VITALS — DIASTOLIC BLOOD PRESSURE: 68 MMHG | SYSTOLIC BLOOD PRESSURE: 104 MMHG

## 2019-11-20 VITALS — DIASTOLIC BLOOD PRESSURE: 57 MMHG | SYSTOLIC BLOOD PRESSURE: 90 MMHG

## 2019-11-20 LAB
ALBUMIN SERPL-MCNC: 3.1 G/DL (ref 3.4–5)
ALP SERPL-CCNC: 70 U/L (ref 45–117)
ALT SERPL-CCNC: 55 U/L (ref 12–78)
ANION GAP SERPL CALC-SCNC: 3 MMOL/L (ref 5–15)
BILIRUB SERPL-MCNC: 0.4 MG/DL (ref 0.2–1)
CALCIUM SERPL-MCNC: 8.8 MG/DL (ref 8.5–10.1)
CHLORIDE SERPL-SCNC: 98 MMOL/L (ref 98–107)
CREAT SERPL-MCNC: 0.47 MG/DL (ref 0.55–1.02)
PROT SERPL-MCNC: 7.2 G/DL (ref 6.4–8.2)

## 2019-11-20 RX ADMIN — GUAIFENESIN SCH MG: 200 TABLET ORAL at 21:02

## 2019-11-20 RX ADMIN — CARVEDILOL SCH MG: 25 TABLET, FILM COATED ORAL at 09:07

## 2019-11-20 RX ADMIN — ENOXAPARIN SODIUM SCH MG: 40 INJECTION SUBCUTANEOUS at 11:09

## 2019-11-20 RX ADMIN — MONTELUKAST SODIUM SCH MG: 10 TABLET ORAL at 21:02

## 2019-11-20 RX ADMIN — GUAIFENESIN SCH MG: 200 TABLET ORAL at 09:07

## 2019-11-20 RX ADMIN — IPRATROPIUM BROMIDE SCH MG: 0.5 SOLUTION RESPIRATORY (INHALATION) at 23:15

## 2019-11-20 RX ADMIN — IPRATROPIUM BROMIDE SCH MG: 0.5 SOLUTION RESPIRATORY (INHALATION) at 20:03

## 2019-11-20 RX ADMIN — BUDESONIDE AND FORMOTEROL FUMARATE DIHYDRATE SCH ML: 160; 4.5 AEROSOL RESPIRATORY (INHALATION) at 23:15

## 2019-11-20 RX ADMIN — BUDESONIDE SCH MG: 0.5 INHALANT RESPIRATORY (INHALATION) at 20:03

## 2019-11-20 RX ADMIN — IPRATROPIUM BROMIDE SCH MG: 0.5 SOLUTION RESPIRATORY (INHALATION) at 07:40

## 2019-11-20 RX ADMIN — GUAIFENESIN SCH MG: 200 TABLET ORAL at 11:09

## 2019-11-20 RX ADMIN — NICOTINE SCH PATCH: 7 PATCH, EXTENDED RELEASE TRANSDERMAL at 18:37

## 2019-11-20 RX ADMIN — ASPIRIN SCH MG: 81 TABLET, COATED ORAL at 21:02

## 2019-11-20 RX ADMIN — NYSTATIN SCH UNITS: 100000 SUSPENSION ORAL at 11:10

## 2019-11-20 RX ADMIN — NYSTATIN SCH UNITS: 100000 SUSPENSION ORAL at 20:58

## 2019-11-20 RX ADMIN — GUAIFENESIN SCH MG: 200 TABLET ORAL at 16:14

## 2019-11-20 RX ADMIN — BUDESONIDE AND FORMOTEROL FUMARATE DIHYDRATE PRN CAP: 160; 4.5 AEROSOL RESPIRATORY (INHALATION) at 09:07

## 2019-11-20 RX ADMIN — ASPIRIN SCH MG: 81 TABLET, COATED ORAL at 09:06

## 2019-11-20 RX ADMIN — IPRATROPIUM BROMIDE SCH MG: 0.5 SOLUTION RESPIRATORY (INHALATION) at 14:10

## 2019-11-20 RX ADMIN — BUDESONIDE AND FORMOTEROL FUMARATE DIHYDRATE SCH ML: 160; 4.5 AEROSOL RESPIRATORY (INHALATION) at 14:10

## 2019-11-20 RX ADMIN — POTASSIUM CHLORIDE SCH MEQ: 20 TABLET, EXTENDED RELEASE ORAL at 07:15

## 2019-11-20 RX ADMIN — MIRTAZAPINE SCH MG: 15 TABLET, FILM COATED ORAL at 21:02

## 2019-11-20 RX ADMIN — BUDESONIDE SCH MG: 0.5 INHALANT RESPIRATORY (INHALATION) at 23:15

## 2019-11-20 RX ADMIN — BUDESONIDE SCH MG: 0.5 INHALANT RESPIRATORY (INHALATION) at 07:40

## 2019-11-20 RX ADMIN — Medication SCH MG: at 21:01

## 2019-11-20 RX ADMIN — BUDESONIDE AND FORMOTEROL FUMARATE DIHYDRATE SCH ML: 160; 4.5 AEROSOL RESPIRATORY (INHALATION) at 07:40

## 2019-11-20 RX ADMIN — IPRATROPIUM BROMIDE SCH MG: 0.5 SOLUTION RESPIRATORY (INHALATION) at 03:17

## 2019-11-20 RX ADMIN — BUDESONIDE AND FORMOTEROL FUMARATE DIHYDRATE PRN CAP: 160; 4.5 AEROSOL RESPIRATORY (INHALATION) at 11:09

## 2019-11-20 RX ADMIN — BUDESONIDE AND FORMOTEROL FUMARATE DIHYDRATE SCH ML: 160; 4.5 AEROSOL RESPIRATORY (INHALATION) at 21:00

## 2019-11-20 RX ADMIN — PRAVASTATIN SODIUM SCH MG: 40 TABLET ORAL at 21:02

## 2019-11-20 RX ADMIN — BUDESONIDE AND FORMOTEROL FUMARATE DIHYDRATE SCH ML: 160; 4.5 AEROSOL RESPIRATORY (INHALATION) at 03:17

## 2019-11-20 RX ADMIN — NYSTATIN SCH UNITS: 100000 SUSPENSION ORAL at 16:14

## 2019-11-20 RX ADMIN — POTASSIUM CHLORIDE SCH MEQ: 20 TABLET, EXTENDED RELEASE ORAL at 18:37

## 2019-11-20 RX ADMIN — CARVEDILOL SCH MG: 25 TABLET, FILM COATED ORAL at 18:37

## 2019-11-21 VITALS — SYSTOLIC BLOOD PRESSURE: 118 MMHG | DIASTOLIC BLOOD PRESSURE: 78 MMHG

## 2019-11-21 VITALS — DIASTOLIC BLOOD PRESSURE: 75 MMHG | SYSTOLIC BLOOD PRESSURE: 129 MMHG

## 2019-11-21 RX ADMIN — POTASSIUM CHLORIDE SCH MEQ: 20 TABLET, EXTENDED RELEASE ORAL at 08:57

## 2019-11-21 RX ADMIN — CARVEDILOL SCH MG: 25 TABLET, FILM COATED ORAL at 06:00

## 2019-11-21 RX ADMIN — GUAIFENESIN SCH MG: 200 TABLET ORAL at 06:00

## 2019-11-21 RX ADMIN — ENOXAPARIN SODIUM SCH MG: 40 INJECTION SUBCUTANEOUS at 11:51

## 2019-11-21 RX ADMIN — GUAIFENESIN SCH MG: 200 TABLET ORAL at 11:51

## 2019-11-21 RX ADMIN — BUDESONIDE SCH MG: 0.5 INHALANT RESPIRATORY (INHALATION) at 09:30

## 2019-11-21 RX ADMIN — IPRATROPIUM BROMIDE SCH MG: 0.5 SOLUTION RESPIRATORY (INHALATION) at 09:30

## 2019-11-21 RX ADMIN — BUDESONIDE AND FORMOTEROL FUMARATE DIHYDRATE SCH ML: 160; 4.5 AEROSOL RESPIRATORY (INHALATION) at 02:22

## 2019-11-21 RX ADMIN — IPRATROPIUM BROMIDE SCH MG: 0.5 SOLUTION RESPIRATORY (INHALATION) at 02:22

## 2019-11-21 RX ADMIN — BUDESONIDE AND FORMOTEROL FUMARATE DIHYDRATE SCH ML: 160; 4.5 AEROSOL RESPIRATORY (INHALATION) at 14:57

## 2019-11-21 RX ADMIN — ASPIRIN SCH MG: 81 TABLET, COATED ORAL at 08:57

## 2019-11-21 RX ADMIN — BUDESONIDE AND FORMOTEROL FUMARATE DIHYDRATE SCH ML: 160; 4.5 AEROSOL RESPIRATORY (INHALATION) at 09:30

## 2019-12-01 NOTE — TELEPHONE ENCOUNTER
Was the patient seen in the last year in this department? Yes9/17/19    Does patient have an active prescription for medications requested? No     Received Request Via: Pharmacy

## 2019-12-02 RX ORDER — TIOTROPIUM BROMIDE 18 UG/1
CAPSULE ORAL; RESPIRATORY (INHALATION)
Qty: 90 CAP | Refills: 2 | Status: SHIPPED | OUTPATIENT
Start: 2019-12-02 | End: 2020-04-20 | Stop reason: SDUPTHER

## 2019-12-05 ENCOUNTER — TELEPHONE (OUTPATIENT)
Dept: MEDICAL GROUP | Facility: LAB | Age: 74
End: 2019-12-05

## 2019-12-06 NOTE — TELEPHONE ENCOUNTER
Pt LVM stating she was in [facility] Nov 10-21 with influenza B. I sent for the notes to be faxed here.

## 2019-12-12 ENCOUNTER — PATIENT MESSAGE (OUTPATIENT)
Dept: MEDICAL GROUP | Facility: LAB | Age: 74
End: 2019-12-12

## 2019-12-12 DIAGNOSIS — F41.9 ANXIETY: ICD-10-CM

## 2019-12-17 RX ORDER — ALPRAZOLAM 0.25 MG/1
0.25 TABLET ORAL 2 TIMES DAILY PRN
Qty: 180 TAB | Refills: 0 | Status: SHIPPED
Start: 2019-12-17 | End: 2019-12-19

## 2019-12-17 RX ORDER — CLONAZEPAM 0.5 MG
0.5 TABLET ORAL DAILY
Qty: 90 TAB | Refills: 0 | Status: SHIPPED | OUTPATIENT
Start: 2019-12-17 | End: 2019-12-19

## 2020-03-13 DIAGNOSIS — J44.9 CHRONIC OBSTRUCTIVE PULMONARY DISEASE, UNSPECIFIED COPD TYPE (HCC): ICD-10-CM

## 2020-03-13 RX ORDER — LEVALBUTEROL INHALATION SOLUTION 1.25 MG/3ML
SOLUTION RESPIRATORY (INHALATION)
Qty: 216 ML | Refills: 0 | Status: SHIPPED | OUTPATIENT
Start: 2020-03-13 | End: 2020-04-20

## 2020-03-13 NOTE — TELEPHONE ENCOUNTER
Have we ever prescribed this med? Yes.  If yes, what date? 10/07/2019    Last OV: 08/30/2019 - Fiordaliza Mccall    Next OV: none scheduled; was to follow up in 6 months    DX: COPD    Medications: Xopenex

## 2020-04-07 DIAGNOSIS — Z91.09 ENVIRONMENTAL ALLERGIES: ICD-10-CM

## 2020-04-07 RX ORDER — DESLORATADINE 5 MG
5 TABLET ORAL
Qty: 90 TAB | Refills: 1 | Status: SHIPPED | OUTPATIENT
Start: 2020-04-07 | End: 2020-10-09

## 2020-04-07 RX ORDER — MONTELUKAST SODIUM 10 MG/1
10 TABLET ORAL
Qty: 90 TAB | Refills: 3 | Status: SHIPPED | OUTPATIENT
Start: 2020-04-07 | End: 2020-04-20 | Stop reason: SDUPTHER

## 2020-04-07 NOTE — TELEPHONE ENCOUNTER
Received request via: Pharmacy    Was the patient seen in the last year in this department? Yes  9/17/19  Does the patient have an active prescription (recently filled or refills available) for medication(s) requested? No

## 2020-04-13 RX ORDER — PRAVASTATIN SODIUM 40 MG
TABLET ORAL
Qty: 90 TAB | Refills: 1 | Status: SHIPPED | OUTPATIENT
Start: 2020-04-13 | End: 2020-04-20 | Stop reason: SDUPTHER

## 2020-04-13 NOTE — TELEPHONE ENCOUNTER
Received request via: Pharmacy    Was the patient seen in the last year in this department? Yes  9/17/20  Does the patient have an active prescription (recently filled or refills available) for medication(s) requested? No

## 2020-04-19 DIAGNOSIS — J44.9 CHRONIC OBSTRUCTIVE PULMONARY DISEASE, UNSPECIFIED COPD TYPE (HCC): ICD-10-CM

## 2020-04-20 ENCOUNTER — OFFICE VISIT (OUTPATIENT)
Dept: MEDICAL GROUP | Facility: LAB | Age: 75
End: 2020-04-20
Payer: MEDICARE

## 2020-04-20 VITALS — WEIGHT: 110 LBS | HEIGHT: 64 IN | BODY MASS INDEX: 18.78 KG/M2

## 2020-04-20 DIAGNOSIS — Z87.891 SMOKING HISTORY: ICD-10-CM

## 2020-04-20 DIAGNOSIS — F41.9 ANXIETY: ICD-10-CM

## 2020-04-20 DIAGNOSIS — Z91.09 ENVIRONMENTAL ALLERGIES: ICD-10-CM

## 2020-04-20 DIAGNOSIS — J43.9 PULMONARY EMPHYSEMA, UNSPECIFIED EMPHYSEMA TYPE (HCC): Chronic | ICD-10-CM

## 2020-04-20 PROCEDURE — 99443 PR PHYSICIAN TELEPHONE EVALUATION 21-30 MIN: CPT | Performed by: INTERNAL MEDICINE

## 2020-04-20 RX ORDER — MONTELUKAST SODIUM 10 MG/1
10 TABLET ORAL
Qty: 90 TAB | Refills: 3 | Status: SHIPPED | OUTPATIENT
Start: 2020-04-20 | End: 2021-07-19

## 2020-04-20 RX ORDER — CLONAZEPAM 0.5 MG
TABLET ORAL
Qty: 90 TAB | Refills: 0 | Status: SHIPPED | OUTPATIENT
Start: 2020-04-20 | End: 2020-12-04 | Stop reason: SDUPTHER

## 2020-04-20 RX ORDER — LEVALBUTEROL INHALATION SOLUTION 1.25 MG/3ML
SOLUTION RESPIRATORY (INHALATION)
Qty: 360 ML | Refills: 5 | Status: SHIPPED | OUTPATIENT
Start: 2020-04-20 | End: 2022-01-19

## 2020-04-20 RX ORDER — PRAVASTATIN SODIUM 40 MG
TABLET ORAL
Qty: 90 TAB | Refills: 1 | Status: SHIPPED | OUTPATIENT
Start: 2020-04-20 | End: 2021-01-15

## 2020-04-20 RX ORDER — ALPRAZOLAM 0.25 MG/1
TABLET ORAL
Qty: 180 TAB | Refills: 0 | Status: SHIPPED | OUTPATIENT
Start: 2020-04-20 | End: 2021-06-18 | Stop reason: SDUPTHER

## 2020-04-20 RX ORDER — TIOTROPIUM BROMIDE 18 UG/1
CAPSULE ORAL; RESPIRATORY (INHALATION)
Qty: 90 CAP | Refills: 2 | Status: SHIPPED | OUTPATIENT
Start: 2020-04-20 | End: 2020-05-18 | Stop reason: SDUPTHER

## 2020-04-20 ASSESSMENT — FIBROSIS 4 INDEX: FIB4 SCORE: 1.45

## 2020-04-20 NOTE — PROGRESS NOTES
Telephone Appointment Visit   As a means of avoiding spread of COVID-19, this visit is being conducted by telephone. This telephone visit was initiated by the patient and they verbally consented.    Time at start of call: 3:39    Reason for Call:  Symptom Follow-up    Patient Comments / History:   Patient and I have discussed that she has stopped smoking for approximately 6 months, we are concerned that she continues to have problems with shortness of breath, is to stay on Spiriva, black box warning reviewed from FDA.  Explained to the patient that because of severe emphysema she needs to be on maximal medical therapy.  Patient with ongoing issues associate with anxiety discussed her use of Xanax and Klonopin explained to her in detail that this is a very unusual combination patient has been on this combination since the 1990s.     Patient suffers from severe end-stage emphysema,     Labs / Images Reviewed       Assessment and Plan:     1. Smoking history  Risks benefits of Spiriva discussed with the patient given end-stage COPD recommend she continue it  - tiotropium (SPIRIVA HANDIHALER) 18 MCG Cap; INHALE 1 CAP ONCE A DAY AT THE SAME TIME EVERYDAY  Dispense: 90 Cap; Refill: 2    2. Pulmonary emphysema, unspecified emphysema type (HCC)  As detailed above end-stage COPD  - tiotropium (SPIRIVA HANDIHALER) 18 MCG Cap; INHALE 1 CAP ONCE A DAY AT THE SAME TIME EVERYDAY  Dispense: 90 Cap; Refill: 2    3. Anxiety  Explained to the patient unusual that she is on both Xanax and Klonopin will continue current medication suspect severe anxiety associated with her shortness of breath.  State drug task force reviewed patient's to complete urine drug screen in the next 90 days.  - ALPRAZolam (XANAX) 0.25 MG Tab; TAKE 1 TABLET BY MOUTH TWICE A DAY AS NEEDED FOR UP TO 90 DAYS F41.9  Dispense: 180 Tab; Refill: 0  - PAIN MANAGEMENT SCRN, UR; Future  - KLONOPIN 0.5 MG Tab; TAKE 1 TAB BY MOUTH EVERY NIGHT AT BEDTIME *F41*   Dispense: 90 Tab; Refill: 0    4. Environmental allergies  Discussed as above black box warning reviewed  - montelukast (SINGULAIR) 10 MG Tab; Take 1 Tab by mouth every day.  Dispense: 90 Tab; Refill: 3  - tiotropium (SPIRIVA HANDIHALER) 18 MCG Cap; INHALE 1 CAP ONCE A DAY AT THE SAME TIME EVERYDAY  Dispense: 90 Cap; Refill: 2    Follow-up: No follow-ups on file.    Time at end of call: 4:02  Total Time Spent: 21-30 minutes    Andi Mccullough D.O. ox sat sob.

## 2020-04-21 DIAGNOSIS — J44.9 CHRONIC OBSTRUCTIVE PULMONARY DISEASE, UNSPECIFIED COPD TYPE (HCC): ICD-10-CM

## 2020-04-21 RX ORDER — BUDESONIDE AND FORMOTEROL FUMARATE DIHYDRATE 160; 4.5 UG/1; UG/1
AEROSOL RESPIRATORY (INHALATION)
Qty: 30.6 INHALER | Refills: 3 | Status: SHIPPED | OUTPATIENT
Start: 2020-04-21 | End: 2021-04-19

## 2020-04-21 NOTE — TELEPHONE ENCOUNTER
Have we ever prescribed this med? Yes.  If yes, what date? 3/26/2019    Last OV: 08/30/2019    Next OV: 05/26/2020    DX: COPD    Medications: Symbicort 160    [facility] [address].

## 2020-04-28 DIAGNOSIS — F41.9 ANXIETY: ICD-10-CM

## 2020-05-18 ENCOUNTER — OFFICE VISIT (OUTPATIENT)
Dept: MEDICAL GROUP | Facility: LAB | Age: 75
End: 2020-05-18
Payer: MEDICARE

## 2020-05-18 ENCOUNTER — TELEPHONE (OUTPATIENT)
Dept: MEDICAL GROUP | Facility: LAB | Age: 75
End: 2020-05-18

## 2020-05-18 DIAGNOSIS — R63.4 WEIGHT LOSS: ICD-10-CM

## 2020-05-18 DIAGNOSIS — Z87.891 SMOKING HISTORY: ICD-10-CM

## 2020-05-18 DIAGNOSIS — Z91.09 ENVIRONMENTAL ALLERGIES: ICD-10-CM

## 2020-05-18 DIAGNOSIS — J43.9 PULMONARY EMPHYSEMA, UNSPECIFIED EMPHYSEMA TYPE (HCC): Chronic | ICD-10-CM

## 2020-05-18 DIAGNOSIS — F41.9 ANXIETY: ICD-10-CM

## 2020-05-18 PROCEDURE — 99442 PR PHYSICIAN TELEPHONE EVALUATION 11-20 MIN: CPT | Performed by: INTERNAL MEDICINE

## 2020-05-18 RX ORDER — TIOTROPIUM BROMIDE 18 UG/1
CAPSULE ORAL; RESPIRATORY (INHALATION)
Qty: 90 CAP | Refills: 2 | Status: SHIPPED | OUTPATIENT
Start: 2020-05-18 | End: 2020-12-22 | Stop reason: SDUPTHER

## 2020-05-18 NOTE — PROGRESS NOTES
Telephone Appointment Visit   As a means of avoiding spread of COVID-19, this visit is being conducted by telephone. This telephone visit was initiated by the patient and they verbally consented.    Time at start of call: 2:23    Reason for Call:  Symptom Follow-up    Patient Comments / History:   Patient and I have discussed that she continues to have problems with anxiety discussed that I would rather she used clonazepam (Klonopin) at 0.5 mg then splitting the tablet in half and using alprazolam (Xanax).  Discussed with her that Xanax is more habit-forming that Klonopin is a better long-term drug.    Patient continues to have significant problems with her lungs and is on oxygen.  Is not going outside is self isolating.    Patient additionally suffers from chronic fatigue syndrome is doing okay with this regarding her current symptomatology.  Patient also is on oxygen at 2 L/min.  Patient relates that her weight is gone up from approximately 96 pounds up to 110.    Labs / Images Reviewed   Weight is up to 110 to 112 lbs.      Assessment and Plan:     1. Smoking history  Continue on Spiriva consider possibly using theophylline if denied by insurance  - tiotropium (SPIRIVA HANDIHALER) 18 MCG Cap; INHALE 1 CAP ONCE A DAY AT THE SAME TIME EVERYDAY  Dispense: 90 Cap; Refill: 2    2. Pulmonary emphysema, unspecified emphysema type (HCC)  As detailed above  - tiotropium (SPIRIVA HANDIHALER) 18 MCG Cap; INHALE 1 CAP ONCE A DAY AT THE SAME TIME EVERYDAY  Dispense: 90 Cap; Refill: 2    3. Environmental allergies  Patient was significant improvement in her breathing but still on oxygen  - tiotropium (SPIRIVA HANDIHALER) 18 MCG Cap; INHALE 1 CAP ONCE A DAY AT THE SAME TIME EVERYDAY  Dispense: 90 Cap; Refill: 2    4. Anxiety  Anxiety, would prefer patient be on clonazepam versus alprazolam.    5. Weight loss  Improved recently.    Follow-up: No follow-ups on file.    Time at end of call: 2:40  Total Time Spent: 11-20  brown Mccullough D.O.

## 2020-05-20 ENCOUNTER — TELEPHONE (OUTPATIENT)
Dept: MEDICAL GROUP | Facility: LAB | Age: 75
End: 2020-05-20

## 2020-05-20 NOTE — TELEPHONE ENCOUNTER
Pt is wanting to appeal the denial for Spiriva.  She is asking for you to write an appeal letter to send to [facility].  I will fax the letter when complete.

## 2020-06-12 ENCOUNTER — TELEPHONE (OUTPATIENT)
Dept: MEDICAL GROUP | Facility: LAB | Age: 75
End: 2020-06-12

## 2020-06-12 DIAGNOSIS — J06.9 UPPER RESPIRATORY TRACT INFECTION, UNSPECIFIED TYPE: ICD-10-CM

## 2020-06-12 RX ORDER — METHYLPREDNISOLONE 4 MG/1
TABLET ORAL
Qty: 21 TAB | Refills: 0 | Status: SHIPPED | OUTPATIENT
Start: 2020-06-12 | End: 2020-12-04

## 2020-06-12 RX ORDER — AMOXICILLIN AND CLAVULANATE POTASSIUM 875; 125 MG/1; MG/1
1 TABLET, FILM COATED ORAL 2 TIMES DAILY
Qty: 14 TAB | Refills: 0 | Status: SHIPPED | OUTPATIENT
Start: 2020-06-12 | End: 2020-07-02 | Stop reason: SDUPTHER

## 2020-06-12 NOTE — TELEPHONE ENCOUNTER
Pt. Notified.  She has a xopenex inhaler and nebulizer soln.  Is there something better that she can use for SOB.

## 2020-06-12 NOTE — TELEPHONE ENCOUNTER
Janie:  Please notify Brayan Duran Dosepak sent to patient's pharmacy!  Regards, Andi Mccullough, DO

## 2020-06-12 NOTE — TELEPHONE ENCOUNTER
Janie:  Please call Deb, let her know that a prescription is been sent for Augmentin to her pharmacy.  If she wants steroids Medrol Dosepak sent I can write a prescription also but it may aggravate her tachycardia.  Regards, Andi Mccullough, DO

## 2020-06-12 NOTE — TELEPHONE ENCOUNTER
1. Caller Name: Deb                         Call Back Number: [phone]    How would the patient prefer to be contacted with a response: Phone call OK to leave a detailed message    Pt is having cold symptoms.  Sore throat, low grade temperature (for her), she feels terrible, headache.  She is very afraid.  I gave her the phone number to the [facility] ([phone]) for assessment to see if she needs to be tested for COVID19.  I also told her that I will send you this message to see if you want to call something in for her before this starts to affect her lungs.

## 2020-06-12 NOTE — TELEPHONE ENCOUNTER
Pt. Notified.  She was tested for COVID19 today at [facility].  I told her that ifher symptoms worsen over the weekend she will need to go to ER.  Can you please call in a new Medrol dose pack just in case.

## 2020-06-18 ENCOUNTER — TELEMEDICINE (OUTPATIENT)
Dept: PULMONOLOGY | Facility: HOSPICE | Age: 75
End: 2020-06-18
Payer: MEDICARE

## 2020-06-18 VITALS — BODY MASS INDEX: 18.78 KG/M2 | HEIGHT: 64 IN | WEIGHT: 110 LBS

## 2020-06-18 DIAGNOSIS — J43.9 PULMONARY EMPHYSEMA, UNSPECIFIED EMPHYSEMA TYPE (HCC): Chronic | ICD-10-CM

## 2020-06-18 DIAGNOSIS — G93.32 CHRONIC FATIGUE SYNDROME: ICD-10-CM

## 2020-06-18 DIAGNOSIS — Z99.81 DEPENDENCE ON SUPPLEMENTAL OXYGEN: ICD-10-CM

## 2020-06-18 DIAGNOSIS — J96.11 CHRONIC RESPIRATORY FAILURE WITH HYPOXIA (HCC): Chronic | ICD-10-CM

## 2020-06-18 DIAGNOSIS — D84.9 IMMUNOLOGIC DEFICIENCY SYNDROME (HCC): ICD-10-CM

## 2020-06-18 DIAGNOSIS — J20.9 ACUTE BRONCHITIS, UNSPECIFIED ORGANISM: ICD-10-CM

## 2020-06-18 PROCEDURE — 99214 OFFICE O/P EST MOD 30 MIN: CPT | Mod: 95,CR | Performed by: INTERNAL MEDICINE

## 2020-06-18 ASSESSMENT — FIBROSIS 4 INDEX: FIB4 SCORE: 1.45

## 2020-06-18 NOTE — PROGRESS NOTES
Deb Vega is a 74 y.o. 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 Patient was referred by primary care.    History of Present Illness:      This encounter was conducted via Zoom .   Verbal consent was obtained. Patient's identity was verified.  This lady was evaluated by telemedicine, has an acute respiratory infection and antibiotics were just initiated.  There was concerned about COVID, she underwent testing and I have a report that indicates that she is negative.  She had a low-grade fever not presently.  She was coughing up purulent sputum and is still on antibiotics.  I explained to her that if she worsen she would need to go to the emergency room.    She has a very complicated prior history that includes emphysema, she did stop smoking and agrees to never smoke again.  In addition she has severe COPD, is on Singulair, Xopenex, Spiriva, nebulizer that she can utilize up to every 4 hours for acute symptoms, and then Symbicort maintenance, needs to rinse and gargle after use.    Presently she is on nasal prong oxygen, appears to have good inspiratory and expiratory flow without stridor or tachypnea.  No signs of oral thrush on exam.    We agree that she would finish the antibiotics, be seen in several months assuming that she fully recovers, if there is still lingering symptoms or question then she would contact us for possible in-house direct visit, may need to do a chest x-ray at that time if she does not fully resolve her symptoms given her underlying IgG deficiency but it appears that she is on course for recovery.    Constitutional ROS:No unexplained fevers; low-grade fever associated with respiratory illness, on antibiotics  Eyes: No conjunctival injection, inspected  Mouth/Throat ROS: No sore throat, No recent change in voice or hoarseness  Pulmonary ROS: See present history for pertinent  positives  Cardiovascular ROS: No chest pain to suggest acute coronary syndrome  Gastrointestinal ROS: No described complaint  Musculoskeletal/Extremities ROS: no acute artritis or unusual swelling  Hematologic/Lymphatic ROS: No easy bleeding or unusual lymph node swelling  Neurologic ROS: No new or unusual weakness  Allergic/Immunologic: No  urticaria or allergic rash      Current Outpatient Medications   Medication Sig Dispense Refill   • tiotropium (SPIRIVA HANDIHALER) 18 MCG Cap INHALE 1 CAP ONCE A DAY AT THE SAME TIME EVERYDAY 90 Cap 2   • montelukast (SINGULAIR) 10 MG Tab Take 1 Tab by mouth every day. 90 Tab 3   • pravastatin (PRAVACHOL) 40 MG tablet TAKE 1 TABLET BY MOUTH DAILY 90 Tab 1   • levalbuterol (XOPENEX) 1.25 MG/3ML Nebu Soln 3 mL by Nebulization route every four hours as needed for Shortness of Breath. 120 mL 4   • CLARINEX 5 MG tablet Take 1 Tab by mouth every day. 90 Tab 3   • Omega-3 Fatty Acids (FISH OIL PO) Take  by mouth.     • XOPENEX HFA 45 MCG/ACT inhaler INHALE ONE TO TWO PUFFS BY MOUTH EVERY 4 HOURS AS NEEDED FOR SHORTNESS OF BREATH 15 g 11   • acetaminophen (TYLENOL) 325 MG Tab Take 650 mg by mouth every four hours as needed.     • vitamin E 100 UNIT capsule Take 400 Units by mouth every day.     • amoxicillin-clavulanate (AUGMENTIN) 875-125 MG Tab Take 1 Tab by mouth 2 times a day. 14 Tab 0   • methylPREDNISolone (MEDROL DOSEPAK) 4 MG Tablet Therapy Pack As directed on the packaging label. 21 Tab 0   • budesonide-formoterol (SYMBICORT) 160-4.5 MCG/ACT Aerosol INHALE TWO PUFFS BY MOUTH TWICE DAILY. USE SPACER. RINSE MOUTH AFTER USE. 30.6 Inhaler 3   • levalbuterol (XOPENEX) 1.25 MG/3ML Nebu Soln USE 3ML VIA NEBULIZER ROUTE EVERY 4 HOURS AS NEEDED FOR SHORTNESS OF BREATH *J44* 360 mL 5   • ALPRAZolam (XANAX) 0.25 MG Tab TAKE 1 TABLET BY MOUTH TWICE A DAY AS NEEDED FOR UP TO 90 DAYS F41.9 180 Tab 0   • KLONOPIN 0.5 MG Tab TAKE 1 TAB BY MOUTH EVERY NIGHT AT BEDTIME *F41* 90 Tab 0   •  CLARINEX 5 MG tablet Take 1 Tab by mouth every day. 90 Tab 1   • vitamin D, Ergocalciferol, (DRISDOL) 22173 units Cap capsule Take 1 Cap by mouth every 7 days. 5 Cap 1   • immune globulin (GAMMAGARD) 5 GM/50ML Solution infusion 5 g by Intravenous route Once.     • pravastatin (PRAVACHOL) 80 MG tablet Take 1 Tab by mouth every day. (Patient not taking: Reported on 9/17/2019) 90 Tab 3   • Tiotropium Bromide Monohydrate (SPIRIVA RESPIMAT) 2.5 MCG/ACT Aero Soln Inhale 2 Inhalation by mouth every day. Assemble and prime. 1 Inhaler 6   • Fluticasone-Umeclidin-Vilant (TRELEGY ELLIPTA) 100-62.5-25 MCG/INH AEROSOL POWDER, BREATH ACTIVATED Inhale 1 Puff by mouth every day. 1 Each 11   • levalbuterol (XOPENEX HFA) 45 MCG/ACT inhaler Inhale 2 Puffs by mouth every four hours as needed for Shortness of Breath (As needed for shortness of breath, cough, wheezing.). 1 Inhaler 11   • fluconazole (DIFLUCAN) 150 MG tablet Take one tab by mouth daily for 3 days. Stop pravastatin while taking and restart 72 hours after last dose of diflucan. 3 Tab 0   • nystatin (MYCOSTATIN) 970325 UNIT/GM Cream topical cream Apply 1 g to affected area(s) 2 times a day. (Patient not taking: Reported on 5/24/2019) 1 Tube 2   • ondansetron (ZOFRAN) 8 MG Tab Take 1 Tab by mouth every 8 hours as needed for Nausea/Vomiting. 15 Tab 0   • azithromycin (ZITHROMAX Z-BENEDICTO) 250 MG Tab Take 2 tablets on day 1. Then take 1 tablet a day for 4 days. 6 Tab 0   • Coenzyme Q10 (CO Q-10) 30 MG Cap Take 30 mg by mouth.     • vitamin E 100 UNIT capsule Take 400 Units by mouth.     • Bioflavonoid Products (MUNIRA-C) 500-200-60 MG Tab Take  by mouth.     • MAGNESIUM PO Take 480 mg by mouth.     • desloratadine (CLARINEX) 5 MG tablet Take 5 mg by mouth.     • gabapentin (NEURONTIN) 100 MG Cap Take 1 Cap by mouth 3 times a day. Increase to 2 tab tid in 2 weeks as directed (Patient not taking: Reported on 5/24/2019) 120 Cap 2   • desloratadine (CLARINEX) 5 MG tablet Take 1 Tab by  mouth every day. 90 Tab 3   • NON SPECIFIED (hydration orders)  please infuse 2 L NS per week. Infuse over one hour QT: 2 L REFILL: 56 fax [phone] (Patient not taking: Reported on 2019) 2 Each 56   • vitamin D (CHOLECALCIFEROL) 1000 UNIT Tab Take 1,000 Units by mouth every day.     • immune globulin (FLEBOGAMMA) 10 GM/100ML Solution 5 g by Intravenous route Once. Indications: Pt now on 5 grams     • Oral Electrolytes (EMERGEN-C ELECTRO MIX PO) Take  by mouth every day.     • docosahexanoic acid (OMEGA 3 FA) 1000 MG CAPS Take 1,000 mg by mouth 2 Times a Day.     • aspirin (ASA) 81 MG CHEW Take 162 mg by mouth every day.     • MAGNESIUM GLYCINATE Take 480 mg by mouth.       No current facility-administered medications for this visit.        Social History     Tobacco Use   • Smoking status: Former Smoker     Packs/day: 0.25     Years: 55.00     Pack years: 13.75     Types: Cigarettes     Last attempt to quit: 2018     Years since quittin.5   • Smokeless tobacco: Never Used   • Tobacco comment: patient smokes 0 to 5 cigarettes a day    Substance Use Topics   • Alcohol use: Yes     Comment: patient states she has not had a drink in 2 years 10/09/2015   • Drug use: No        Past Medical History:   Diagnosis Date   • Allergy    • Anxiety    • ASTHMA    • Back pain    • Bladder infection    • Bronchitis    • Cellulitis     (L) elbow    • Chronic use of benzodiazepine for therapeutic purpose 2017   • COPD    • COPD (chronic obstructive pulmonary disease) ([facility]) 3/1/2013   • H/O blood transfusion reaction    • History of measles    • History of mumps    • Hives    • Hyperlipidemia    • Immunologic deficiency syndrome (HCC) 3/1/2013   • Lyme disease 3/1/2013   • Mononucleosis    • Pneumonia    • Tachycardia 7/15/2015   • Tachycardia 7/15/2015   • Vitamin D deficiency 2019       Past Surgical History:   Procedure Laterality Date   • BREAST BIOPSY     • THYROID LOBECTOMY     • TONSILLECTOMY    "      Allergies: Gadolinium; Influenza virus vaccine; Epinephrine; Other drug; Other misc; Prednisone; Sulfa drugs; Zyvox [linezolid]; Cefuroxime; and Nsaids    Family History   Problem Relation Age of Onset   • Alcohol/Drug Mother    • Cancer Father    • Stroke Father    • Heart Disease Father 42       Physical Examination    Vitals:    06/18/20 1527   Height: 1.613 m (5' 3.5\")   Weight: 49.9 kg (110 lb)   Weight % change since last entry.: 0 %   BMI (Calculated): 19.18       General Appearance: alert, no distress  Skin: Skin color, texture, turgor normal. No rashes or lesions.  Eyes: negative  Oropharynx: Lips, mucosa, and tongue normal. Teeth and gums normal. Oropharynx moist and without lesion  Lungs: positive findings: Normal respiratory pattern on oxygen without stridor or tachypnea, good inspiratory and expiratory flow observed by telemedicine  Heart: No neck vein distention  Abdomen: Not visible on telemedicine exam  Extremities: No clubbing or cyanosis  Joints: No acute arthritis in visible joints  Neurologic: intact grossly seems coherent      II (soft palate, uvula, fauces visible)    Imaging: May need chest x-ray if the current respiratory illness does not resolve    PFTS: None today      Assessment and Plan  1. Pulmonary emphysema, unspecified emphysema type (HCC)  Noted on previous CT scan    2. Chronic fatigue syndrome  Followed by [place] specialist in this disease process    3. Chronic respiratory failure with hypoxia (HCC)  On oxygen    4. Supplemental oxygen dependent  On oxygen    5. Immunologic deficiency syndrome (HCC)  Periodic infusions, immunoglobulin deficient      Acute respiratory illness, antibiotics initiated, fever improved, COVID testing negative, patient will contact us in the next week if we need to see her in the office and obtain a chest x-ray, also advised to go to urgent care or emergency room if symptoms worsen  Followup Return in about 3 months (around 9/18/2020) for " With MD or APRN.

## 2020-06-18 NOTE — PATIENT INSTRUCTIONS
This encounter was conducted via Zoom .   Verbal consent was obtained. Patient's identity was verified.  This lady was evaluated by telemedicine, has an acute respiratory infection and antibiotics were just initiated.  There was concerned about COVID, she underwent testing and I have a report that indicates that she is negative.  She had a low-grade fever not presently.  She was coughing up purulent sputum and is still on antibiotics.  I explained to her that if she worsen she would need to go to the emergency room.    She has a very complicated prior history that includes emphysema, she did stop smoking and agrees to never smoke again.  In addition she has severe COPD, is on Singulair, Xopenex, Spiriva, nebulizer that she can utilize up to every 4 hours for acute symptoms, and then Symbicort maintenance, needs to rinse and gargle after use.    Presently she is on nasal prong oxygen, appears to have good inspiratory and expiratory flow without stridor or tachypnea.  No signs of oral thrush on exam.    We agree that she would finish the antibiotics, be seen in several months assuming that she fully recovers, if there is still lingering symptoms or question then she would contact us for possible in-house direct visit, may need to do a chest x-ray at that time if she does not fully resolve her symptoms given her underlying IgG deficiency but it appears that she is on course for recovery.

## 2020-07-02 ENCOUNTER — TELEPHONE (OUTPATIENT)
Dept: MEDICAL GROUP | Facility: LAB | Age: 75
End: 2020-07-02

## 2020-07-02 DIAGNOSIS — J06.9 UPPER RESPIRATORY TRACT INFECTION, UNSPECIFIED TYPE: ICD-10-CM

## 2020-07-02 RX ORDER — AMOXICILLIN AND CLAVULANATE POTASSIUM 875; 125 MG/1; MG/1
1 TABLET, FILM COATED ORAL 2 TIMES DAILY
Qty: 20 TAB | Refills: 0 | Status: SHIPPED | OUTPATIENT
Start: 2020-07-02 | End: 2020-11-23

## 2020-07-02 NOTE — TELEPHONE ENCOUNTER
1. Caller Name: Deb                         Call Back Number: [phone] (home)        How would the patient prefer to be contacted with a response: Phone call OK to leave a detailed message    Pt was treated with Augmentin in June for URI symptoms.  She did feel better for a few days but she is feeling bad again.  Sore throat, fever, SOB, green boogers.  Her COVID19 test was negative last month.  Should she take another round of abx?

## 2020-07-03 NOTE — TELEPHONE ENCOUNTER
Telephone call returned to the patient answering machine only, will re-prescribe Augmentin for 10 days.

## 2020-09-16 ENCOUNTER — APPOINTMENT (OUTPATIENT)
Dept: PULMONOLOGY | Facility: HOSPICE | Age: 75
End: 2020-09-16
Payer: MEDICARE

## 2020-09-25 ENCOUNTER — TELEPHONE (OUTPATIENT)
Dept: PULMONOLOGY | Facility: HOSPICE | Age: 75
End: 2020-09-25

## 2020-09-25 NOTE — TELEPHONE ENCOUNTER
Patient called. She wants to know if she needs to get a CT before her next appointment with Dr. Rowe. Call her at [phone]

## 2020-09-27 DIAGNOSIS — J44.9 CHRONIC OBSTRUCTIVE PULMONARY DISEASE, UNSPECIFIED COPD TYPE (HCC): ICD-10-CM

## 2020-09-28 RX ORDER — LEVALBUTEROL TARTRATE 45 UG/1
AEROSOL, METERED ORAL
Qty: 1 EACH | Refills: 11 | Status: SHIPPED | OUTPATIENT
Start: 2020-09-28 | End: 2021-08-23 | Stop reason: SDUPTHER

## 2020-09-28 NOTE — TELEPHONE ENCOUNTER
Patient was last seen 6/18/2020 Dr. Loredo no imaging ordered.   Acute respiratory illness, antibiotics initiated, fever improved, COVID testing negative, patient will contact us in the next week if we need to see her in the office and obtain a chest x-ray, also advised to go to urgent care or emergency room if symptoms worsen  Followup Return in about 3 months (around 9/18/2020) for With MD or APRN.    Next OV 11/24/2020 Dr. Rowe. Please advise otherwise.

## 2020-09-28 NOTE — TELEPHONE ENCOUNTER
Have we ever prescribed this med? Yes.  If yes, what date? 04/20/2020    Last OV: 06/18/2020 - Dr. Loredo    Next OV: 11/24/2020 - DR. RIOS    DX: COPD    Medications: Xopenex

## 2020-09-28 NOTE — TELEPHONE ENCOUNTER
Sounds like imaging was only indicated if patient symptoms were not improving. Regarding CT, her abnormal findings were stable for 3 years and deemed likely scarring so no clear need for imaging at this point.

## 2020-10-09 RX ORDER — DESLORATADINE 5 MG
TABLET ORAL
Qty: 90 TAB | Refills: 1 | Status: SHIPPED | OUTPATIENT
Start: 2020-10-09 | End: 2021-04-19

## 2020-10-09 NOTE — TELEPHONE ENCOUNTER
Received request via: Pharmacy    Was the patient seen in the last year in this department? Yes  5/18/20  Does the patient have an active prescription (recently filled or refills available) for medication(s) requested? No

## 2020-10-19 ENCOUNTER — TELEPHONE (OUTPATIENT)
Dept: PULMONOLOGY | Facility: HOSPICE | Age: 75
End: 2020-10-19

## 2020-10-20 ENCOUNTER — APPOINTMENT (OUTPATIENT)
Dept: PULMONOLOGY | Facility: HOSPICE | Age: 75
End: 2020-10-20
Payer: MEDICARE

## 2020-10-20 NOTE — TELEPHONE ENCOUNTER
Patient scheduled as virtual with Dr. Rowe for 10/20/2020. Dr. Rowe does in person visits only. Left to the patient to informed. Options provided, call change to in person appt. Rescheduled appt for some other time and date in person. Or rescheduled with another pulmonary physician that offers virtual.

## 2020-11-19 ENCOUNTER — TELEMEDICINE (OUTPATIENT)
Dept: SLEEP MEDICINE | Facility: MEDICAL CENTER | Age: 75
End: 2020-11-19
Payer: MEDICARE

## 2020-11-19 VITALS
DIASTOLIC BLOOD PRESSURE: 59 MMHG | SYSTOLIC BLOOD PRESSURE: 95 MMHG | TEMPERATURE: 98 F | OXYGEN SATURATION: 96 % | HEART RATE: 87 BPM | BODY MASS INDEX: 20.83 KG/M2 | WEIGHT: 122 LBS | HEIGHT: 64 IN

## 2020-11-19 DIAGNOSIS — J01.91 ACUTE RECURRENT SINUSITIS, UNSPECIFIED LOCATION: ICD-10-CM

## 2020-11-19 DIAGNOSIS — J96.11 CHRONIC RESPIRATORY FAILURE WITH HYPOXIA (HCC): ICD-10-CM

## 2020-11-19 DIAGNOSIS — J44.9 CHRONIC OBSTRUCTIVE PULMONARY DISEASE, UNSPECIFIED COPD TYPE (HCC): ICD-10-CM

## 2020-11-19 DIAGNOSIS — R93.89 ABNORMAL CT OF THE CHEST: Chronic | ICD-10-CM

## 2020-11-19 PROCEDURE — 99213 OFFICE O/P EST LOW 20 MIN: CPT | Mod: 95,CR | Performed by: PHYSICIAN ASSISTANT

## 2020-11-19 ASSESSMENT — FIBROSIS 4 INDEX: FIB4 SCORE: 1.47

## 2020-11-19 NOTE — PROGRESS NOTES
Virtual Visit: Established Patient   This visit was conducted via Zoom using secure and encrypted videoconferencing technology. The patient was in a private location in the state [facility]. The patient's identity was confirmed and verbal consent was obtained for this virtual visit.  Treatment initiated 12:45-13:45.    Subjective:   CC: Unresolved sinus issues including sinus pain and pressure.    Chief Complaint   Patient presents with   • Follow-Up     Immunologic deficiency syndrome.Last 08/30/19       Deb Vega is a 75 y.o. female presenting for evaluation and management of COPD and chronic respiratory failure with hypoxia.  Patient previously seen via telemedicine on 6/18/2020 by Dr. Loredo.  She was seen in clinic last 8/30/2019 by ROVERTO Broussard.  Patient is former smoker over 55 years, at least 25 pack years.  She does continue to smoke occasionally. Complete cessation advised.    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  Previously with BMI less than 19 now stable 21 kg/m².    Reviewed home medications including Xopenex, Spiriva, Symbicort, montelukast.  Patient reports using nebulizer treatment with Xopenex every morning.  She also reports using a flutter valve to facilitate secretion clearance when needed with benefit, using NeilMed sinus rinses and Ponaris for nasal dryness.    Reviewed most recent imaging including CT scan obtained 5/20/2019 demonstrating extensive emphysema greater in the upper lungs, unchanged 3 mm nodule right lower lobe since 2016, unchanged elongated opacity right lung base since 2016 no new or suspicious nodules.    CT of the sinuses obtained 12/21/2018 demonstrated minimal ethmoid sinus mucosal thickening, TMJ left greater  than right.    Last pulmonary function testing obtained 9/3/2019, recommended update but patient unwilling to come into clinic at this time.  Demonstrated FEV1 of 0.58 or 28% predicted, FVC of 1.64 L or 60% predicted, FEV1/FVC ratio of 35.  Residual volume 205% predicted, % predicted, DLCO 40% predicted.  PFTs continue to demonstrate reduced FEV1 and FEV1/FVC ratio, hyperinflation, reduced gas exchange, severe obstructive lung disease with partially reversible component, decrease in DLCO suggests underlying emphysema.    ROS   Denies any recent fevers.  Does report chills, poor temperature control.  Reports chronic fatigue, increased.  Reports hearing deficit, tinnitus, nasal congestion, occasional nosebleeds, sinus pain and pressure.  No vomiting, does report heartburn and nausea every morning.  Reports taking Tums with relief.    No chest pains, does have palpitations, leg swelling.  Patient reports heart rate increases up to 130, unable to tolerate beta-blockers due to decreased resting heart rate.  Reports shortness of breath with activity increased overall.   Reports dizziness and headache.    Allergies   Allergen Reactions   • Gadolinium Anaphylaxis and Nausea   • Influenza Virus Vaccine    • Epinephrine      Other reaction(s): Tachycardia   • Other Drug      Any antibiotics by mouth dizziness, abdominal pain.    • Other Misc      Intolerant of any stimulants   • Prednisone      Aggravates tachycardia   • Sulfa Drugs      Other reaction(s): Not Known   • Zyvox [Linezolid] Rash     Severe anxiety; prominent facial rash with swelling   • Doxycycline Diarrhea, Nausea, Unspecified and Vomiting   • Levaquin Anxiety, Diarrhea, Nausea and Palpitations   • Cefuroxime Nausea   • Nsaids        Current medicines (including changes today)  Current Outpatient Medications   Medication Sig Dispense Refill   • CLARINEX 5 MG tablet TAKE 1 TABLET BY MOUTH EVERY DAY 90 Tab 1   • XOPENEX HFA 45 MCG/ACT inhaler INHALE 2  PUFFS BY MOUTH EVERY FOUR HOURS AS NEEDED FOR SHORTNESS OF BREATH, COUGH, WHEEZING.DAW2 1 Each 11   • tiotropium (SPIRIVA HANDIHALER) 18 MCG Cap INHALE 1 CAP ONCE A DAY AT THE SAME TIME EVERYDAY 90 Cap 2   • budesonide-formoterol (SYMBICORT) 160-4.5 MCG/ACT Aerosol INHALE TWO PUFFS BY MOUTH TWICE DAILY. USE SPACER. RINSE MOUTH AFTER USE. 30.6 Inhaler 3   • levalbuterol (XOPENEX) 1.25 MG/3ML Nebu Soln USE 3ML VIA NEBULIZER ROUTE EVERY 4 HOURS AS NEEDED FOR SHORTNESS OF BREATH *J44* 360 mL 5   • montelukast (SINGULAIR) 10 MG Tab Take 1 Tab by mouth every day. 90 Tab 3   • pravastatin (PRAVACHOL) 40 MG tablet TAKE 1 TABLET BY MOUTH DAILY 90 Tab 1   • Omega-3 Fatty Acids (FISH OIL PO) Take  by mouth.     • MAGNESIUM PO Take 480 mg by mouth.     • acetaminophen (TYLENOL) 325 MG Tab Take 650 mg by mouth every four hours as needed.     • Oral Electrolytes (EMERGEN-C ELECTRO MIX PO) Take  by mouth every day.     • aspirin (ASA) 81 MG CHEW Take 162 mg by mouth every day.     • MAGNESIUM GLYCINATE Take 480 mg by mouth.     • amoxicillin-clavulanate (AUGMENTIN) 875-125 MG Tab Take 1 Tab by mouth every 12 hours. Take until gone. 28 Tab 0   • vitamin E 100 UNIT capsule Take 400 Units by mouth every day.     • methylPREDNISolone (MEDROL DOSEPAK) 4 MG Tablet Therapy Pack As directed on the packaging label. 21 Tab 0   • vitamin D, Ergocalciferol, (DRISDOL) 44321 units Cap capsule Take 1 Cap by mouth every 7 days. 5 Cap 1   • immune globulin (GAMMAGARD) 5 GM/50ML Solution infusion 5 g by Intravenous route Once.     • pravastatin (PRAVACHOL) 80 MG tablet Take 1 Tab by mouth every day. (Patient not taking: Reported on 9/17/2019) 90 Tab 3   • Tiotropium Bromide Monohydrate (SPIRIVA RESPIMAT) 2.5 MCG/ACT Aero Soln Inhale 2 Inhalation by mouth every day. Assemble and prime. 1 Inhaler 6   • Fluticasone-Umeclidin-Vilant (TRELEGY ELLIPTA) 100-62.5-25 MCG/INH AEROSOL POWDER, BREATH ACTIVATED Inhale 1 Puff by mouth every day. 1 Each 11   •  levalbuterol (XOPENEX) 1.25 MG/3ML Nebu Soln 3 mL by Nebulization route every four hours as needed for Shortness of Breath. 120 mL 4   • CLARINEX 5 MG tablet Take 1 Tab by mouth every day. 90 Tab 3   • fluconazole (DIFLUCAN) 150 MG tablet Take one tab by mouth daily for 3 days. Stop pravastatin while taking and restart 72 hours after last dose of diflucan. 3 Tab 0   • nystatin (MYCOSTATIN) 099041 UNIT/GM Cream topical cream Apply 1 g to affected area(s) 2 times a day. (Patient not taking: Reported on 5/24/2019) 1 Tube 2   • ondansetron (ZOFRAN) 8 MG Tab Take 1 Tab by mouth every 8 hours as needed for Nausea/Vomiting. 15 Tab 0   • Coenzyme Q10 (CO Q-10) 30 MG Cap Take 30 mg by mouth.     • vitamin E 100 UNIT capsule Take 400 Units by mouth.     • Bioflavonoid Products (MUNIRA-C) 500-200-60 MG Tab Take  by mouth.     • desloratadine (CLARINEX) 5 MG tablet Take 5 mg by mouth.     • gabapentin (NEURONTIN) 100 MG Cap Take 1 Cap by mouth 3 times a day. Increase to 2 tab tid in 2 weeks as directed (Patient not taking: Reported on 5/24/2019) 120 Cap 2   • desloratadine (CLARINEX) 5 MG tablet Take 1 Tab by mouth every day. 90 Tab 3   • NON SPECIFIED (hydration orders)  please infuse 2 L NS per week. Infuse over one hour QT: 2 L REFILL: 56 fax [phone] (Patient not taking: Reported on 5/24/2019) 2 Each 56   • XOPENEX HFA 45 MCG/ACT inhaler INHALE ONE TO TWO PUFFS BY MOUTH EVERY 4 HOURS AS NEEDED FOR SHORTNESS OF BREATH 15 g 11   • vitamin D (CHOLECALCIFEROL) 1000 UNIT Tab Take 1,000 Units by mouth every day.     • immune globulin (FLEBOGAMMA) 10 GM/100ML Solution 5 g by Intravenous route Once. Indications: Pt now on 5 grams     • docosahexanoic acid (OMEGA 3 FA) 1000 MG CAPS Take 1,000 mg by mouth 2 Times a Day.       No current facility-administered medications for this visit.        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   • BMI less than 19,adult 11/16/2018   • 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   • 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 ([facility]) 09/14/2018   • 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   • Chronic use of benzodiazepine for therapeutic purpose 09/04/2017   • Benzodiazepine dependence ([facility]) 08/29/2017   • Nocturnal hypoxemia 06/06/2017   • Supplemental oxygen dependent 02/07/2017   • Abnormal CT of the chest 02/07/2017   • Tachycardia 07/15/2015   • COPD (chronic obstructive pulmonary disease) ([facility]) 03/01/2013   • Lyme disease 03/01/2013   • Immunologic deficiency syndrome ([facility]) 03/01/2013   • Chronic fatigue syndrome 12/07/2012   • Hyperlipidemia with target LDL less than 100 06/06/2012   • Anxiety 06/06/2012       Family History   Problem Relation Age of Onset   • Alcohol/Drug Mother    • Cancer Father    • Stroke Father    • Heart Disease Father 42       She  has a past medical history of Allergy, Anxiety, ASTHMA, Back pain, Bladder infection, Bronchitis, Cellulitis, Chronic use of benzodiazepine for therapeutic purpose (9/4/2017), COPD, COPD (chronic obstructive pulmonary disease) ([facility]) (3/1/2013), H/O blood transfusion reaction, History of measles, History of mumps, Hives, Hyperlipidemia, Immunologic deficiency syndrome ([facility]) (3/1/2013), Lyme disease (3/1/2013), Mononucleosis, Pneumonia, Tachycardia (7/15/2015), Tachycardia (7/15/2015), and Vitamin D deficiency (6/27/2019).  She  has a past surgical history that includes breast biopsy; thyroid lobectomy; and tonsillectomy.       Objective:   BP (!) 95/59 (BP Location: Left arm, Patient Position: Sitting, BP Cuff Size: Adult) Comment: per pt  Pulse 87 Comment: per pt  Temp 36.7 °C (98 °F)   Ht 1.613 m (5' 3.5\") Comment: per pt  Wt 55.3 kg (122 lb) Comment: per " pt  SpO2 96% Comment: per pt  BMI 21.27 kg/m²     Physical Exam:  Constitutional: Alert, no distress, well-groomed.  Skin: No rashes in visible areas.  Eye: Round. Conjunctiva clear, lids normal. No icterus.   ENMT: Lips pink without lesions, good dentition, moist mucous membranes. Phonation normal.  Neck: No masses, no thyromegaly. Moves freely without pain.  Respiratory: Unlabored respiratory effort, no cough or audible wheeze  Psych: Alert and oriented x3, normal affect and mood.       Assessment and Plan:   The following treatment plan was discussed:     1. Chronic obstructive pulmonary disease, unspecified COPD type (HCC)    2. Chronic respiratory failure with hypoxia (HCC)  -Continue oxygen use at 2 L, 24/7.  States benefit    3. Abnormal CT of the chest  - CT-CHEST (THORAX) W/O; Future    4. Acute recurrent sinusitis, unspecified location  - REFERRAL TO ENT    Nasal gel moisturizing spray alternative to Ponaris, less residual.  Can use Mucinex up to twice daily.  Reviewed COVID-19 precautions, unable to do flu shot.  Consider trial Flonase once daily 1 spray per nostril.  Reviewed flutter valve use.    Follow-up: Return in about 2 months (around 1/19/2021) for Return with Charito Medel PA-C.               This dictation was created using voice recognition software. The accuracy of the dictation is limited to the abilities of the software. I expect there may be some errors of grammar and possibly content.

## 2020-11-19 NOTE — PATIENT INSTRUCTIONS
1-obtain CT scan  2-trial flonase once daily, one spray per nostril   3-referral ENT  4-nasogel moisturizing spray  5-reviewed flutter valve use  6-can use mucinex up to twice daily  7-unable to do flu shot  8-follow up 2 months

## 2020-11-23 ENCOUNTER — TELEPHONE (OUTPATIENT)
Dept: SLEEP MEDICINE | Facility: MEDICAL CENTER | Age: 75
End: 2020-11-23

## 2020-11-23 DIAGNOSIS — J01.90 ACUTE BACTERIAL RHINOSINUSITIS: ICD-10-CM

## 2020-11-23 DIAGNOSIS — B96.89 ACUTE BACTERIAL RHINOSINUSITIS: ICD-10-CM

## 2020-11-23 RX ORDER — AMOXICILLIN AND CLAVULANATE POTASSIUM 875; 125 MG/1; MG/1
1 TABLET, FILM COATED ORAL EVERY 12 HOURS
Qty: 28 TAB | Refills: 0 | Status: SHIPPED | OUTPATIENT
Start: 2020-11-23 | End: 2020-12-04 | Stop reason: SDUPTHER

## 2020-11-23 NOTE — TELEPHONE ENCOUNTER
Patient called to inform TATIANA Medel P.A.-C. That she having a sinus infection which she feels inflamed, her head and face is hurting she feels terrible and her breathing is horrible. During her office visit she was informed to start Flonase but is worried if she has an infection what will happen.   She would like to see if she could get antibiotics.   Please advised.

## 2020-11-24 ENCOUNTER — APPOINTMENT (OUTPATIENT)
Dept: SLEEP MEDICINE | Facility: MEDICAL CENTER | Age: 75
End: 2020-11-24
Payer: MEDICARE

## 2020-11-24 NOTE — TELEPHONE ENCOUNTER
Patient symptoms suggest acute bacterial rhinosinusitis.  Provided with antibiotic.  However worsening of symptoms, fever, recommend seeking medical care to possibly include covid 19 testing.

## 2020-12-02 ENCOUNTER — TELEPHONE (OUTPATIENT)
Dept: MEDICAL GROUP | Facility: LAB | Age: 75
End: 2020-12-02

## 2020-12-04 ENCOUNTER — TELEMEDICINE (OUTPATIENT)
Dept: MEDICAL GROUP | Facility: LAB | Age: 75
End: 2020-12-04
Payer: MEDICARE

## 2020-12-04 ENCOUNTER — TELEPHONE (OUTPATIENT)
Dept: MEDICAL GROUP | Facility: LAB | Age: 75
End: 2020-12-04

## 2020-12-04 VITALS
BODY MASS INDEX: 20.49 KG/M2 | HEIGHT: 64 IN | HEART RATE: 100 BPM | DIASTOLIC BLOOD PRESSURE: 64 MMHG | TEMPERATURE: 97.2 F | OXYGEN SATURATION: 95 % | WEIGHT: 120 LBS | SYSTOLIC BLOOD PRESSURE: 108 MMHG

## 2020-12-04 DIAGNOSIS — J01.90 ACUTE BACTERIAL RHINOSINUSITIS: ICD-10-CM

## 2020-12-04 DIAGNOSIS — J44.9 CHRONIC OBSTRUCTIVE PULMONARY DISEASE, UNSPECIFIED COPD TYPE (HCC): Chronic | ICD-10-CM

## 2020-12-04 DIAGNOSIS — F41.9 ANXIETY: ICD-10-CM

## 2020-12-04 DIAGNOSIS — B96.89 ACUTE BACTERIAL RHINOSINUSITIS: ICD-10-CM

## 2020-12-04 PROCEDURE — 99214 OFFICE O/P EST MOD 30 MIN: CPT | Mod: 95,CR | Performed by: INTERNAL MEDICINE

## 2020-12-04 RX ORDER — AMOXICILLIN AND CLAVULANATE POTASSIUM 875; 125 MG/1; MG/1
1 TABLET, FILM COATED ORAL EVERY 12 HOURS
Qty: 28 TAB | Refills: 1 | Status: SHIPPED | OUTPATIENT
Start: 2020-12-04 | End: 2021-06-15

## 2020-12-04 RX ORDER — CLONAZEPAM 0.5 MG
TABLET ORAL
Qty: 90 TAB | Refills: 1 | Status: SHIPPED | OUTPATIENT
Start: 2020-12-04 | End: 2022-02-07 | Stop reason: SDUPTHER

## 2020-12-04 RX ORDER — ALPRAZOLAM 0.25 MG/1
TABLET ORAL
COMMUNITY
Start: 1996-01-30 | End: 2021-06-15

## 2020-12-04 RX ORDER — CLONAZEPAM 0.5 MG/1
TABLET ORAL
COMMUNITY
Start: 1996-01-30 | End: 2021-06-15

## 2020-12-04 RX ORDER — PREDNISONE 2.5 MG/1
2.5 TABLET ORAL DAILY
Qty: 90 TAB | Refills: 1 | Status: SHIPPED | OUTPATIENT
Start: 2020-12-04 | End: 2021-05-24

## 2020-12-04 ASSESSMENT — FIBROSIS 4 INDEX: FIB4 SCORE: 1.47

## 2020-12-04 NOTE — TELEPHONE ENCOUNTER
"Regarding: RE:message  Contact: [phone]  ----- Message from Andi Mccullough D.O. sent at 12/4/2020  8:10 AM PST -----  Lesly:  Please schedule a telephone or video appointment for Deb.   RegardsAndi, DO         ----- Message sent from Andi Mccullough D.O. to Gisselle Vegaa at 12/4/2020  8:10 AM -----   Deb:  I am routing your information to our office staff to get you set up for a telephone appointment at a minimum we need to be communication.  I have refilled Xanax which is been sent to your [facility] pharmacy.  RegardsAndi DO      ----- Message -----       From:Deb Vega       Sent:12/3/2020 12:23 AM PST         To:Lesly Contreras, Med Ass't    Subject:RE:message    Hi Lesly:  Thanks for getting back to me  I'm hoping this means they are approving for next year, is that correct ? as they sent me a denial for next year.  Wanted to be sure this is not with regard to the refill I just got for 3months. Sorry I missed your call back.  difficult for me to get to phone. Other questions are about my annual wellness, how to handle that.  Need to refill my Rx's for Xanax and Klonopin as well and need to know how to do the urine test since I absolutely DO NOT leave this house. Perhaps I could get someone to  a container for me and then deliver it to the lab?    OR, there is the \"[facility]\" they come to your home?I'm very sick again with a sinus infection and on antibiotics  prescribed by pulmonary.      ----- Message -----       From:Lesly Contreras, Med Ass't       Sent:12/2/2020  4:40 PM PST         To:Deb Vega    Subject:message    Alan Boyd, you medication was approved for your SPRIVA  "

## 2020-12-04 NOTE — TELEPHONE ENCOUNTER
"Regarding: RE:message  Contact: [phone]  ----- Message from Andi Mccullough D.O. sent at 12/4/2020  8:10 AM PST -----  Lesly:  Please schedule a telephone or video appointment for Deb.   RegardsAndi, DO         ----- Message sent from Andi Mccullough D.O. to Gisselle Vegaa at 12/4/2020  8:10 AM -----   Deb:  I am routing your information to our office staff to get you set up for a telephone appointment at a minimum we need to be communication.  I have refilled Xanax which is been sent to your [facility] pharmacy.  RegardsAndi DO      ----- Message -----       From:Deb Vega       Sent:12/3/2020 12:23 AM PST         To:Lesly Contreras, Med Ass't    Subject:RE:message    Hi Lesly:  Thanks for getting back to me  I'm hoping this means they are approving for next year, is that correct ? as they sent me a denial for next year.  Wanted to be sure this is not with regard to the refill I just got for 3months. Sorry I missed your call back.  difficult for me to get to phone. Other questions are about my annual wellness, how to handle that.  Need to refill my Rx's for Xanax and Klonopin as well and need to know how to do the urine test since I absolutely DO NOT leave this house. Perhaps I could get someone to  a container for me and then deliver it to the lab?    OR, there is the \"[facility] they come to your home?I'm very sick again with a sinus infection and on antibiotics  prescribed by pulmonary.      ----- Message -----       From:Lesly Contreras, Med Ass't       Sent:12/2/2020  4:40 PM PST         To:Deb Vega    Subject:message    Alan Boyd, you medication was approved for your SPRIVA  "

## 2020-12-05 NOTE — PROGRESS NOTES
Telemedicine Video Visit: Established Patient   This Remote Face to Face encounter was conducted via Zoom. Given the importance of social distancing and other strategies recommended to reduce the risk of COVID-19 transmission, I am providing medical care to this patient via audio/video visit in place of an in person visit at the request of the patient. Verbal consent to telehealth, risks, benefits, and consequences were discussed. Patient retains the right to withdraw at any time. All existing confidentiality protections apply. The patient has access to all transmitted medical information. No dissemination of any patient images or information to other entities without further written consent.  Subjective:     Chief Complaint   Patient presents with   • Medication Refill     Klonopin    • Sinusitis       Deb Vega is a 75 y.o. female presenting for evaluation and management of:    Patient and I have discussed that she has severe end-stage lung disease.  Patient is to have antibiotics at home along with starting low-dose prednisone    ROS stable  Denies any recent fevers or chills. No nausea or vomiting. No chest pains or positive for shortness of breath.     Allergies   Allergen Reactions   • Gadolinium Anaphylaxis and Nausea   • Influenza Virus Vaccine    • Epinephrine      Other reaction(s): Tachycardia   • Other Drug      Any antibiotics by mouth dizziness, abdominal pain.    • Other Misc      Intolerant of any stimulants   • Prednisone      Aggravates tachycardia   • Sulfa Drugs      Other reaction(s): Not Known   • Zyvox [Linezolid] Rash     Severe anxiety; prominent facial rash with swelling   • Doxycycline Diarrhea, Nausea, Unspecified and Vomiting   • Levaquin Anxiety, Diarrhea, Nausea and Palpitations   • Cefuroxime Nausea   • Nsaids        Current medicines (including changes today)  Current Outpatient Medications   Medication Sig Dispense Refill   • ALPRAZolam (XANAX) 0.25 MG Tab      • clonazePAM  (KLONOPIN) 0.5 MG Tab      • KLONOPIN 0.5 MG Tab TAKE 1 TAB BY MOUTH EVERY NIGHT AT BEDTIME *F41* 90 Tab 1   • amoxicillin-clavulanate (AUGMENTIN) 875-125 MG Tab Take 1 Tab by mouth every 12 hours. Take until gone. 28 Tab 1   • prednisONE (DELTASONE) 2.5 MG Tab Take 1 Tab by mouth every day. 90 Tab 1   • ALPRAZolam (XANAX) 0.25 MG Tab Take 1 Tab by mouth 2 times a day as needed for Sleep for up to 90 days. 180 Tab 0   • CLARINEX 5 MG tablet TAKE 1 TABLET BY MOUTH EVERY DAY 90 Tab 1   • XOPENEX HFA 45 MCG/ACT inhaler INHALE 2 PUFFS BY MOUTH EVERY FOUR HOURS AS NEEDED FOR SHORTNESS OF BREATH, COUGH, WHEEZING.DAW2 1 Each 11   • vitamin E 100 UNIT capsule Take 400 Units by mouth every day.     • tiotropium (SPIRIVA HANDIHALER) 18 MCG Cap INHALE 1 CAP ONCE A DAY AT THE SAME TIME EVERYDAY 90 Cap 2   • budesonide-formoterol (SYMBICORT) 160-4.5 MCG/ACT Aerosol INHALE TWO PUFFS BY MOUTH TWICE DAILY. USE SPACER. RINSE MOUTH AFTER USE. 30.6 Inhaler 3   • levalbuterol (XOPENEX) 1.25 MG/3ML Nebu Soln USE 3ML VIA NEBULIZER ROUTE EVERY 4 HOURS AS NEEDED FOR SHORTNESS OF BREATH *J44* 360 mL 5   • montelukast (SINGULAIR) 10 MG Tab Take 1 Tab by mouth every day. 90 Tab 3   • pravastatin (PRAVACHOL) 40 MG tablet TAKE 1 TABLET BY MOUTH DAILY 90 Tab 1   • vitamin D, Ergocalciferol, (DRISDOL) 50282 units Cap capsule Take 1 Cap by mouth every 7 days. 5 Cap 1   • immune globulin (GAMMAGARD) 5 GM/50ML Solution infusion 5 g by Intravenous route Once.     • pravastatin (PRAVACHOL) 80 MG tablet Take 1 Tab by mouth every day. (Patient not taking: Reported on 9/17/2019) 90 Tab 3   • Tiotropium Bromide Monohydrate (SPIRIVA RESPIMAT) 2.5 MCG/ACT Aero Soln Inhale 2 Inhalation by mouth every day. Assemble and prime. 1 Inhaler 6   • Fluticasone-Umeclidin-Vilant (TRELEGY ELLIPTA) 100-62.5-25 MCG/INH AEROSOL POWDER, BREATH ACTIVATED Inhale 1 Puff by mouth every day. 1 Each 11   • levalbuterol (XOPENEX) 1.25 MG/3ML Nebu Soln 3 mL by Nebulization route  every four hours as needed for Shortness of Breath. 120 mL 4   • CLARINEX 5 MG tablet Take 1 Tab by mouth every day. 90 Tab 3   • fluconazole (DIFLUCAN) 150 MG tablet Take one tab by mouth daily for 3 days. Stop pravastatin while taking and restart 72 hours after last dose of diflucan. 3 Tab 0   • nystatin (MYCOSTATIN) 442848 UNIT/GM Cream topical cream Apply 1 g to affected area(s) 2 times a day. (Patient not taking: Reported on 5/24/2019) 1 Tube 2   • ondansetron (ZOFRAN) 8 MG Tab Take 1 Tab by mouth every 8 hours as needed for Nausea/Vomiting. 15 Tab 0   • Coenzyme Q10 (CO Q-10) 30 MG Cap Take 30 mg by mouth.     • vitamin E 100 UNIT capsule Take 400 Units by mouth.     • Bioflavonoid Products (MUNIRA-C) 500-200-60 MG Tab Take  by mouth.     • Omega-3 Fatty Acids (FISH OIL PO) Take  by mouth.     • MAGNESIUM PO Take 480 mg by mouth.     • desloratadine (CLARINEX) 5 MG tablet Take 5 mg by mouth.     • gabapentin (NEURONTIN) 100 MG Cap Take 1 Cap by mouth 3 times a day. Increase to 2 tab tid in 2 weeks as directed (Patient not taking: Reported on 5/24/2019) 120 Cap 2   • desloratadine (CLARINEX) 5 MG tablet Take 1 Tab by mouth every day. 90 Tab 3   • NON SPECIFIED (hydration orders)  please infuse 2 L NS per week. Infuse over one hour QT: 2 L REFILL: 56 fax [phone] (Patient not taking: Reported on 5/24/2019) 2 Each 56   • XOPENEX HFA 45 MCG/ACT inhaler INHALE ONE TO TWO PUFFS BY MOUTH EVERY 4 HOURS AS NEEDED FOR SHORTNESS OF BREATH 15 g 11   • vitamin D (CHOLECALCIFEROL) 1000 UNIT Tab Take 1,000 Units by mouth every day.     • acetaminophen (TYLENOL) 325 MG Tab Take 650 mg by mouth every four hours as needed.     • immune globulin (FLEBOGAMMA) 10 GM/100ML Solution 5 g by Intravenous route Once. Indications: Pt now on 5 grams     • Oral Electrolytes (EMERGEN-C ELECTRO MIX PO) Take  by mouth every day.     • docosahexanoic acid (OMEGA 3 FA) 1000 MG CAPS Take 1,000 mg by mouth 2 Times a Day.     • aspirin (ASA) 81  MG CHEW Take 162 mg by mouth every day.     • MAGNESIUM GLYCINATE Take 480 mg by mouth.       No current facility-administered medications for this visit.        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   • Chronic use of benzodiazepine for therapeutic purpose 09/04/2017   • Benzodiazepine dependence ([facility]) 08/29/2017   • Nocturnal hypoxemia 06/06/2017   • Supplemental oxygen dependent 02/07/2017   • Abnormal CT of the chest 02/07/2017   • Tachycardia 07/15/2015   • COPD (chronic obstructive pulmonary disease) ([facility]) 03/01/2013   • Lyme disease 03/01/2013   • Immunologic deficiency syndrome ([facility]) 03/01/2013   • Chronic fatigue syndrome 12/07/2012   • Hyperlipidemia with target LDL less than 100 06/06/2012   • Anxiety 06/06/2012       Family History   Problem Relation Age of Onset   • Alcohol/Drug Mother    • Cancer Father    • Stroke Father    • Heart Disease Father 42       She  has a past medical history of Allergy, Anxiety, ASTHMA, Back pain, Bladder infection, Bronchitis, Cellulitis, Chronic use of benzodiazepine for therapeutic purpose (9/4/2017), COPD, COPD (chronic obstructive pulmonary disease) ([facility]) (3/1/2013), H/O blood transfusion reaction, History of measles, History of mumps, Hives, Hyperlipidemia, Immunologic deficiency syndrome ([facility]) (3/1/2013), Lyme disease (3/1/2013), Mononucleosis, Pneumonia, Tachycardia (7/15/2015), Tachycardia (7/15/2015), and Vitamin D deficiency (6/27/2019).  She  has a past surgical history that  "includes breast biopsy; thyroid lobectomy; and tonsillectomy.       Objective:   Vitals obtained by patient:  /64   Pulse 100   Temp 36.2 °C (97.2 °F)   Ht 1.613 m (5' 3.5\")   Wt 54.4 kg (120 lb)   SpO2 95%   BMI 20.92 kg/m²     Physical Exam:   Constitutional: Alert, no distress, well-groomed.  Skin: No rashes in visible areas.  Eye: Round. Conjunctiva clear, lids normal. No icterus.   ENMT: Lips pink without lesions, good dentition, moist mucous membranes. Phonation normal.  Neck: No masses, no thyromegaly. Moves freely without pain.  CV: Pulse as reported by patient  Respiratory: Mild labored respiratory effort, no cough or audible wheeze  Psych: Alert and oriented x3, normal affect and mood.       Assessment and Plan:   The following treatment plan was discussed:     1. Anxiety  - KLONOPIN 0.5 MG Tab; TAKE 1 TAB BY MOUTH EVERY NIGHT AT BEDTIME *F41*  Dispense: 90 Tab; Refill: 1    2. Acute bacterial rhinosinusitis    3. Chronic obstructive pulmonary disease, unspecified COPD type (HCC)  - amoxicillin-clavulanate (AUGMENTIN) 875-125 MG Tab; Take 1 Tab by mouth every 12 hours. Take until gone.  Dispense: 28 Tab; Refill: 1  - prednisONE (DELTASONE) 2.5 MG Tab; Take 1 Tab by mouth every day.  Dispense: 90 Tab; Refill: 1    Other orders  - ALPRAZolam (XANAX) 0.25 MG Tab  - clonazePAM (KLONOPIN) 0.5 MG Tab        Follow-up: No follow-ups on file.    Face to Face Video Visit:   I spent 20 minutes with patient/guardian and I conducted this visit with audio and video present.  Andi Mccullough D.O.       "

## 2020-12-22 DIAGNOSIS — Z91.09 ENVIRONMENTAL ALLERGIES: ICD-10-CM

## 2020-12-22 DIAGNOSIS — J43.9 PULMONARY EMPHYSEMA, UNSPECIFIED EMPHYSEMA TYPE (HCC): Chronic | ICD-10-CM

## 2020-12-22 DIAGNOSIS — Z87.891 SMOKING HISTORY: ICD-10-CM

## 2020-12-22 RX ORDER — TIOTROPIUM BROMIDE 18 UG/1
CAPSULE ORAL; RESPIRATORY (INHALATION)
Qty: 90 CAP | Refills: 3 | Status: SHIPPED | OUTPATIENT
Start: 2020-12-22 | End: 2022-01-28

## 2020-12-22 NOTE — TELEPHONE ENCOUNTER
Received request via: Patient    Was the patient seen in the last year in this department? Yes 12/4/2020    Does the patient have an active prescription (recently filled or refills available) for medication(s) requested? No

## 2021-01-11 DIAGNOSIS — Z23 NEED FOR VACCINATION: ICD-10-CM

## 2021-01-15 RX ORDER — PRAVASTATIN SODIUM 40 MG
TABLET ORAL
Qty: 90 TAB | Refills: 1 | Status: SHIPPED | OUTPATIENT
Start: 2021-01-15 | End: 2021-07-16

## 2021-01-15 NOTE — TELEPHONE ENCOUNTER
Received request via: Pharmacy    Was the patient seen in the last year in this department? Yes  12/4/2020  Does the patient have an active prescription (recently filled or refills available) for medication(s) requested? No

## 2021-01-19 ENCOUNTER — TELEMEDICINE (OUTPATIENT)
Dept: MEDICAL GROUP | Facility: LAB | Age: 76
End: 2021-01-19
Payer: MEDICARE

## 2021-01-19 DIAGNOSIS — F41.9 ANXIETY: ICD-10-CM

## 2021-01-19 DIAGNOSIS — D83.0 COMMON VARIABLE IMMUNODEFICIENCY WITH PREDOMINANT ABNORMALITIES OF B-CELL NUMBERS AND FUNCTION (HCC): ICD-10-CM

## 2021-01-19 DIAGNOSIS — J43.8 OTHER EMPHYSEMA (HCC): ICD-10-CM

## 2021-01-19 PROCEDURE — 99214 OFFICE O/P EST MOD 30 MIN: CPT | Mod: 95,CR | Performed by: INTERNAL MEDICINE

## 2021-01-19 NOTE — PROGRESS NOTES
Telemedicine Video Visit: Established Patient   This Remote Face to Face encounter was conducted via Zoom. Given the importance of social distancing and other strategies recommended to reduce the risk of COVID-19 transmission, I am providing medical care to this patient via audio/video visit in place of an in person visit at the request of the patient. Verbal consent to telehealth, risks, benefits, and consequences were discussed. Patient retains the right to withdraw at any time. All existing confidentiality protections apply. The patient has access to all transmitted medical information. No dissemination of any patient images or information to other entities without further written consent.  Subjective:   No chief complaint on file.      Deb Vega is a 75 y.o. female presenting for evaluation and management of:    Patient and I have discussed that she has severe emphysema, I recommended she continue on low-dose prednisone at 2.5 mg explained to her the risks which include immunosuppression, bony demineralization etc.  Patient also suffers from anxiety, is using clonazepam, will continue her on this.  Patient with immune deficiency.  Patient is to follow-up with either Dr. Ion Gutierrez or with a local allergist as necessary.  We have discussed her previous CT.  I have looked at it from 2019, at this juncture there appears to be no urgency in having this repeated.  I would wait until she is vaccinated for COVID-19.    ROS    Denies any recent fevers or chills. No nausea or vomiting. No chest pains or shortness of breath.     Allergies   Allergen Reactions   • Gadolinium Anaphylaxis and Nausea   • Influenza Virus Vaccine    • Epinephrine      Other reaction(s): Tachycardia   • Other Drug      Any antibiotics by mouth dizziness, abdominal pain.    • Other Misc      Intolerant of any stimulants   • Prednisone      Aggravates tachycardia   • Sulfa Drugs      Other reaction(s): Not Known   • Zyvox [Linezolid] Rash      Severe anxiety; prominent facial rash with swelling   • Doxycycline Diarrhea, Nausea, Unspecified and Vomiting   • Levaquin Anxiety, Diarrhea, Nausea and Palpitations   • Cefuroxime Nausea   • Nsaids        Current medicines (including changes today)  Current Outpatient Medications   Medication Sig Dispense Refill   • pravastatin (PRAVACHOL) 40 MG tablet TAKE 1 TABLET BY MOUTH EVERY DAY 90 Tab 1   • tiotropium (SPIRIVA HANDIHALER) 18 MCG Cap INHALE 1 CAP ONCE A DAY AT THE SAME TIME EVERYDAY 90 Cap 3   • ALPRAZolam (XANAX) 0.25 MG Tab Take 1 Tab by mouth 2 times a day as needed for Sleep for up to 90 days. 180 Tab 0   • ALPRAZolam (XANAX) 0.25 MG Tab      • clonazePAM (KLONOPIN) 0.5 MG Tab      • KLONOPIN 0.5 MG Tab TAKE 1 TAB BY MOUTH EVERY NIGHT AT BEDTIME *F41* 90 Tab 1   • amoxicillin-clavulanate (AUGMENTIN) 875-125 MG Tab Take 1 Tab by mouth every 12 hours. Take until gone. 28 Tab 1   • prednisONE (DELTASONE) 2.5 MG Tab Take 1 Tab by mouth every day. 90 Tab 1   • CLARINEX 5 MG tablet TAKE 1 TABLET BY MOUTH EVERY DAY 90 Tab 1   • XOPENEX HFA 45 MCG/ACT inhaler INHALE 2 PUFFS BY MOUTH EVERY FOUR HOURS AS NEEDED FOR SHORTNESS OF BREATH, COUGH, WHEEZING.DAW2 1 Each 11   • vitamin E 100 UNIT capsule Take 400 Units by mouth every day.     • budesonide-formoterol (SYMBICORT) 160-4.5 MCG/ACT Aerosol INHALE TWO PUFFS BY MOUTH TWICE DAILY. USE SPACER. RINSE MOUTH AFTER USE. 30.6 Inhaler 3   • levalbuterol (XOPENEX) 1.25 MG/3ML Nebu Soln USE 3ML VIA NEBULIZER ROUTE EVERY 4 HOURS AS NEEDED FOR SHORTNESS OF BREATH *J44* 360 mL 5   • montelukast (SINGULAIR) 10 MG Tab Take 1 Tab by mouth every day. 90 Tab 3   • vitamin D, Ergocalciferol, (DRISDOL) 07200 units Cap capsule Take 1 Cap by mouth every 7 days. 5 Cap 1   • immune globulin (GAMMAGARD) 5 GM/50ML Solution infusion 5 g by Intravenous route Once.     • pravastatin (PRAVACHOL) 80 MG tablet Take 1 Tab by mouth every day. (Patient not taking: Reported on 9/17/2019) 90  Tab 3   • Tiotropium Bromide Monohydrate (SPIRIVA RESPIMAT) 2.5 MCG/ACT Aero Soln Inhale 2 Inhalation by mouth every day. Assemble and prime. 1 Inhaler 6   • Fluticasone-Umeclidin-Vilant (TRELEGY ELLIPTA) 100-62.5-25 MCG/INH AEROSOL POWDER, BREATH ACTIVATED Inhale 1 Puff by mouth every day. 1 Each 11   • levalbuterol (XOPENEX) 1.25 MG/3ML Nebu Soln 3 mL by Nebulization route every four hours as needed for Shortness of Breath. 120 mL 4   • CLARINEX 5 MG tablet Take 1 Tab by mouth every day. 90 Tab 3   • fluconazole (DIFLUCAN) 150 MG tablet Take one tab by mouth daily for 3 days. Stop pravastatin while taking and restart 72 hours after last dose of diflucan. 3 Tab 0   • nystatin (MYCOSTATIN) 584559 UNIT/GM Cream topical cream Apply 1 g to affected area(s) 2 times a day. (Patient not taking: Reported on 5/24/2019) 1 Tube 2   • ondansetron (ZOFRAN) 8 MG Tab Take 1 Tab by mouth every 8 hours as needed for Nausea/Vomiting. 15 Tab 0   • Coenzyme Q10 (CO Q-10) 30 MG Cap Take 30 mg by mouth.     • vitamin E 100 UNIT capsule Take 400 Units by mouth.     • Bioflavonoid Products (MUNIRA-C) 500-200-60 MG Tab Take  by mouth.     • Omega-3 Fatty Acids (FISH OIL PO) Take  by mouth.     • MAGNESIUM PO Take 480 mg by mouth.     • desloratadine (CLARINEX) 5 MG tablet Take 5 mg by mouth.     • gabapentin (NEURONTIN) 100 MG Cap Take 1 Cap by mouth 3 times a day. Increase to 2 tab tid in 2 weeks as directed (Patient not taking: Reported on 5/24/2019) 120 Cap 2   • desloratadine (CLARINEX) 5 MG tablet Take 1 Tab by mouth every day. 90 Tab 3   • NON SPECIFIED (hydration orders)  please infuse 2 L NS per week. Infuse over one hour QT: 2 L REFILL: 56 fax [phone] (Patient not taking: Reported on 5/24/2019) 2 Each 56   • XOPENEX HFA 45 MCG/ACT inhaler INHALE ONE TO TWO PUFFS BY MOUTH EVERY 4 HOURS AS NEEDED FOR SHORTNESS OF BREATH 15 g 11   • vitamin D (CHOLECALCIFEROL) 1000 UNIT Tab Take 1,000 Units by mouth every day.     • acetaminophen  (TYLENOL) 325 MG Tab Take 650 mg by mouth every four hours as needed.     • immune globulin (FLEBOGAMMA) 10 GM/100ML Solution 5 g by Intravenous route Once. Indications: Pt now on 5 grams     • Oral Electrolytes (EMERGEN-C ELECTRO MIX PO) Take  by mouth every day.     • docosahexanoic acid (OMEGA 3 FA) 1000 MG CAPS Take 1,000 mg by mouth 2 Times a Day.     • aspirin (ASA) 81 MG CHEW Take 162 mg by mouth every day.     • MAGNESIUM GLYCINATE Take 480 mg by mouth.       No current facility-administered medications for this visit.        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 ([facility]) 09/14/2018   • 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   • Chronic use of benzodiazepine for therapeutic purpose 09/04/2017   • Benzodiazepine dependence ([facility]) 08/29/2017   • Nocturnal hypoxemia 06/06/2017   • Supplemental oxygen dependent 02/07/2017   • Abnormal CT of the chest 02/07/2017   • Tachycardia 07/15/2015   • COPD (chronic obstructive pulmonary disease) ([facility]) 03/01/2013   • Lyme disease 03/01/2013   • Immunologic deficiency syndrome ([facility]) 03/01/2013   • Chronic fatigue syndrome 12/07/2012   • Hyperlipidemia with target LDL less than 100 06/06/2012   • Anxiety 06/06/2012       Family History   Problem Relation Age of Onset   • Alcohol/Drug Mother    • Cancer Father    • Stroke Father    • Heart Disease Father 42       She  has a past medical history of Allergy, Anxiety, ASTHMA, Back pain, Bladder infection, Bronchitis, Cellulitis, Chronic use of benzodiazepine for therapeutic  purpose (9/4/2017), COPD, COPD (chronic obstructive pulmonary disease) (HCC) (3/1/2013), H/O blood transfusion reaction, History of measles, History of mumps, Hives, Hyperlipidemia, Immunologic deficiency syndrome (HCC) (3/1/2013), Lyme disease (3/1/2013), Mononucleosis, Pneumonia, Tachycardia (7/15/2015), Tachycardia (7/15/2015), and Vitamin D deficiency (6/27/2019).  She  has a past surgical history that includes breast biopsy; thyroid lobectomy; and tonsillectomy.       Objective:   Vitals obtained by patient:  There were no vitals taken for this visit.    Physical Exam:   Constitutional: Alert, no distress, well-groomed.  Skin: No rashes in visible areas.  Eye: Round. Conjunctiva clear, lids normal. No icterus.   ENMT: Lips pink without lesions, good dentition, moist mucous membranes. Phonation normal.  Neck: No masses, no thyromegaly. Moves freely without pain.  CV: Pulse as reported by patient  Respiratory: Unlabored respiratory effort, no cough or audible wheeze  Psych: Alert and oriented x3, normal affect and mood.       Assessment and Plan:   The following treatment plan was discussed:     1. Other emphysema (HCC)    2. Anxiety    3. 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    Face to Face Video Visit:   I spent 20 minutes with patient/guardian and I conducted this visit with audio and video present.  Andi Mccullough D.O.

## 2021-04-08 ENCOUNTER — HOSPITAL ENCOUNTER (OUTPATIENT)
Facility: MEDICAL CENTER | Age: 76
End: 2021-04-08
Attending: PHYSICIAN ASSISTANT
Payer: MEDICARE

## 2021-04-08 PROCEDURE — U0003 INFECTIOUS AGENT DETECTION BY NUCLEIC ACID (DNA OR RNA); SEVERE ACUTE RESPIRATORY SYNDROME CORONAVIRUS 2 (SARS-COV-2) (CORONAVIRUS DISEASE [COVID-19]), AMPLIFIED PROBE TECHNIQUE, MAKING USE OF HIGH THROUGHPUT TECHNOLOGIES AS DESCRIBED BY CMS-2020-01-R: HCPCS

## 2021-04-08 PROCEDURE — U0005 INFEC AGEN DETEC AMPLI PROBE: HCPCS

## 2021-04-09 LAB
COVID ORDER STATUS COVID19: NORMAL
SARS-COV-2 RNA RESP QL NAA+PROBE: NOTDETECTED
SPECIMEN SOURCE: NORMAL

## 2021-04-17 DIAGNOSIS — J44.9 CHRONIC OBSTRUCTIVE PULMONARY DISEASE, UNSPECIFIED COPD TYPE (HCC): ICD-10-CM

## 2021-04-19 RX ORDER — DESLORATADINE 5 MG
TABLET ORAL
Qty: 90 TABLET | Refills: 3 | Status: SHIPPED | OUTPATIENT
Start: 2021-04-19 | End: 2022-03-14

## 2021-04-19 RX ORDER — BUDESONIDE AND FORMOTEROL FUMARATE DIHYDRATE 160; 4.5 UG/1; UG/1
AEROSOL RESPIRATORY (INHALATION)
Qty: 1 EACH | Refills: 0 | Status: SHIPPED | OUTPATIENT
Start: 2021-04-19 | End: 2021-04-23

## 2021-04-19 NOTE — TELEPHONE ENCOUNTER
Received request via: Pharmacy    Was the patient seen in the last year in this department? Yes 1/19/2021    Does the patient have an active prescription (recently filled or refills available) for medication(s) requested? No

## 2021-04-19 NOTE — TELEPHONE ENCOUNTER
Have we ever prescribed this med? Yes.  If yes, what date? 04/21/2020    Last OV: 11/19/2020 - [facility]    Next OV: was due back 1/2021    DX: COPD    Medications: Symbicort

## 2021-04-21 ENCOUNTER — IMMUNIZATION (OUTPATIENT)
Dept: FAMILY PLANNING/WOMEN'S HEALTH CLINIC | Facility: IMMUNIZATION CENTER | Age: 76
End: 2021-04-21
Payer: MEDICARE

## 2021-04-21 DIAGNOSIS — J44.9 CHRONIC OBSTRUCTIVE PULMONARY DISEASE, UNSPECIFIED COPD TYPE (HCC): ICD-10-CM

## 2021-04-21 DIAGNOSIS — Z23 ENCOUNTER FOR VACCINATION: Primary | ICD-10-CM

## 2021-04-21 PROCEDURE — 91300 PFIZER SARS-COV-2 VACCINE: CPT

## 2021-04-21 PROCEDURE — 0001A PFIZER SARS-COV-2 VACCINE: CPT

## 2021-04-23 RX ORDER — BUDESONIDE AND FORMOTEROL FUMARATE DIHYDRATE 160; 4.5 UG/1; UG/1
AEROSOL RESPIRATORY (INHALATION)
Qty: 3 EACH | Refills: 1 | Status: SHIPPED | OUTPATIENT
Start: 2021-04-23 | End: 2021-05-21 | Stop reason: SDUPTHER

## 2021-04-23 NOTE — TELEPHONE ENCOUNTER
90 day supply request    Have we ever prescribed this med? Yes.  If yes, what date? 04/19/21    Last OV: 11/19/20 with Charito Medel PA-C    Next OV: No Pending appt.     DX: COPD    Medications:   Requested Prescriptions     Pending Prescriptions Disp Refills   • budesonide-formoterol (SYMBICORT) 160-4.5 MCG/ACT Aerosol [Pharmacy Med Name: SYMBICORT 160-4.5 MCG INHALER]       Sig: INHALE TWO PUFFS BY MOUTH TWICE DAILY. USE SPACER. RINSE MOUTH AFTER USE. NEEDS OV FOR FURTHER REFILLS

## 2021-05-05 ENCOUNTER — HOSPITAL ENCOUNTER (OUTPATIENT)
Facility: MEDICAL CENTER | Age: 76
End: 2021-05-05
Attending: NURSE PRACTITIONER
Payer: MEDICARE

## 2021-05-05 PROCEDURE — 87086 URINE CULTURE/COLONY COUNT: CPT

## 2021-05-08 LAB
BACTERIA UR CULT: NORMAL
SIGNIFICANT IND 70042: NORMAL
SITE SITE: NORMAL
SOURCE SOURCE: NORMAL

## 2021-05-09 ENCOUNTER — HOSPITAL ENCOUNTER (OUTPATIENT)
Facility: MEDICAL CENTER | Age: 76
End: 2021-05-09
Payer: MEDICARE

## 2021-05-09 PROCEDURE — 87086 URINE CULTURE/COLONY COUNT: CPT

## 2021-05-09 PROCEDURE — 87186 SC STD MICRODIL/AGAR DIL: CPT

## 2021-05-09 PROCEDURE — 87077 CULTURE AEROBIC IDENTIFY: CPT

## 2021-05-10 ENCOUNTER — HOSPITAL ENCOUNTER (OUTPATIENT)
Dept: RADIOLOGY | Facility: MEDICAL CENTER | Age: 76
End: 2021-05-10
Attending: NURSE PRACTITIONER
Payer: MEDICARE

## 2021-05-10 ENCOUNTER — HOSPITAL ENCOUNTER (OUTPATIENT)
Dept: RADIOLOGY | Facility: MEDICAL CENTER | Age: 76
End: 2021-05-10
Attending: PHYSICIAN ASSISTANT
Payer: MEDICARE

## 2021-05-10 DIAGNOSIS — R10.30 LOWER ABDOMINAL PAIN: ICD-10-CM

## 2021-05-10 DIAGNOSIS — R93.89 ABNORMAL CT OF THE CHEST: ICD-10-CM

## 2021-05-10 PROCEDURE — 71250 CT THORAX DX C-: CPT | Mod: ME

## 2021-05-10 PROCEDURE — 74177 CT ABD & PELVIS W/CONTRAST: CPT | Mod: MH

## 2021-05-10 PROCEDURE — 700117 HCHG RX CONTRAST REV CODE 255

## 2021-05-10 RX ADMIN — IOHEXOL 100 ML: 350 INJECTION, SOLUTION INTRAVENOUS at 13:15

## 2021-05-12 LAB
BACTERIA UR CULT: ABNORMAL
BACTERIA UR CULT: ABNORMAL
SIGNIFICANT IND 70042: ABNORMAL
SITE SITE: ABNORMAL
SOURCE SOURCE: ABNORMAL

## 2021-05-13 ENCOUNTER — TELEPHONE (OUTPATIENT)
Dept: MEDICAL GROUP | Facility: LAB | Age: 76
End: 2021-05-13

## 2021-05-13 NOTE — TELEPHONE ENCOUNTER
1. Caller Name: Deb                         Call Back Number: [phone] (home)        How would the patient prefer to be contacted with a response: Phone call OK to leave a detailed message    Pt has a UTI per dispatch health.  The results are in her chart.  They called in Macrobid.  She states that she is very sensitive to medications and is asking that you call in something else.  Also, is she good to get her 2nd COVID vaccine tomorrow?

## 2021-05-13 NOTE — TELEPHONE ENCOUNTER
Janie:  Macrobid is the first drug that is recommended, and is the right one to use for empiric urinary tract infection treatment.  And yes I would like her to get her second shot tomorrow..   Regards, Andi Mccullough, DO

## 2021-05-14 ENCOUNTER — IMMUNIZATION (OUTPATIENT)
Dept: FAMILY PLANNING/WOMEN'S HEALTH CLINIC | Facility: IMMUNIZATION CENTER | Age: 76
End: 2021-05-14
Attending: INTERNAL MEDICINE
Payer: MEDICARE

## 2021-05-14 DIAGNOSIS — Z23 ENCOUNTER FOR VACCINATION: Primary | ICD-10-CM

## 2021-05-14 PROCEDURE — 91300 PFIZER SARS-COV-2 VACCINE: CPT

## 2021-05-14 PROCEDURE — 0002A PFIZER SARS-COV-2 VACCINE: CPT

## 2021-05-21 DIAGNOSIS — J44.9 CHRONIC OBSTRUCTIVE PULMONARY DISEASE, UNSPECIFIED COPD TYPE (HCC): ICD-10-CM

## 2021-05-21 DIAGNOSIS — J44.9 CHRONIC OBSTRUCTIVE PULMONARY DISEASE, UNSPECIFIED COPD TYPE (HCC): Chronic | ICD-10-CM

## 2021-05-24 RX ORDER — PREDNISONE 2.5 MG/1
TABLET ORAL
Qty: 90 TABLET | Refills: 1 | Status: SHIPPED | OUTPATIENT
Start: 2021-05-24 | End: 2021-11-18

## 2021-05-24 NOTE — TELEPHONE ENCOUNTER
Have we ever prescribed this med? Yes.  If yes, what date? 04/23/2021    Last OV: 11/19/2020 - [facility]    Next OV: 07/20/2021 - [facility]    DX: COPD    Medications: Symbicort

## 2021-05-24 NOTE — TELEPHONE ENCOUNTER
Received request via: Pharmacy    Was the patient seen in the last year in this department? Yes  LOV:1/19/2021  Does the patient have an active prescription (recently filled or refills available) for medication(s) requested? No

## 2021-05-26 RX ORDER — BUDESONIDE AND FORMOTEROL FUMARATE DIHYDRATE 160; 4.5 UG/1; UG/1
AEROSOL RESPIRATORY (INHALATION)
Qty: 3 EACH | Refills: 3 | Status: SHIPPED | OUTPATIENT
Start: 2021-05-26 | End: 2022-06-01

## 2021-06-15 ENCOUNTER — TELEMEDICINE (OUTPATIENT)
Dept: MEDICAL GROUP | Facility: LAB | Age: 76
End: 2021-06-15
Payer: MEDICARE

## 2021-06-15 VITALS — HEIGHT: 64 IN | BODY MASS INDEX: 20.83 KG/M2 | WEIGHT: 122 LBS

## 2021-06-15 DIAGNOSIS — F41.9 ANXIETY: ICD-10-CM

## 2021-06-15 DIAGNOSIS — F41.0 PANIC DISORDER: ICD-10-CM

## 2021-06-15 DIAGNOSIS — Z87.440 HISTORY OF UTI: ICD-10-CM

## 2021-06-15 DIAGNOSIS — K21.9 GASTROESOPHAGEAL REFLUX DISEASE WITHOUT ESOPHAGITIS: ICD-10-CM

## 2021-06-15 DIAGNOSIS — J06.9 UPPER RESPIRATORY TRACT INFECTION, UNSPECIFIED TYPE: ICD-10-CM

## 2021-06-15 DIAGNOSIS — R19.7 DIARRHEA OF PRESUMED INFECTIOUS ORIGIN: Primary | ICD-10-CM

## 2021-06-15 PROCEDURE — 99214 OFFICE O/P EST MOD 30 MIN: CPT | Mod: 95 | Performed by: INTERNAL MEDICINE

## 2021-06-15 RX ORDER — ALPRAZOLAM 0.25 MG/1
0.25 TABLET ORAL 3 TIMES DAILY PRN
Qty: 30 TABLET | Refills: 0 | Status: SHIPPED | OUTPATIENT
Start: 2021-06-15 | End: 2021-06-18

## 2021-06-15 RX ORDER — SUCRALFATE ORAL 1 G/10ML
1 SUSPENSION ORAL 4 TIMES DAILY
Qty: 414 ML | Refills: 1 | Status: SHIPPED | OUTPATIENT
Start: 2021-06-15 | End: 2021-09-15

## 2021-06-15 RX ORDER — METHYLPREDNISOLONE 4 MG/1
TABLET ORAL
Qty: 21 TABLET | Refills: 2 | Status: SHIPPED | OUTPATIENT
Start: 2021-06-15 | End: 2021-11-18

## 2021-06-15 RX ORDER — CLONAZEPAM 0.5 MG/1
0.5 TABLET ORAL 2 TIMES DAILY
Qty: 60 TABLET | Refills: 1 | Status: SHIPPED | OUTPATIENT
Start: 2021-06-15 | End: 2021-07-15

## 2021-06-15 ASSESSMENT — PATIENT HEALTH QUESTIONNAIRE - PHQ9: CLINICAL INTERPRETATION OF PHQ2 SCORE: 0

## 2021-06-15 ASSESSMENT — FIBROSIS 4 INDEX: FIB4 SCORE: 1.47

## 2021-06-15 NOTE — PROGRESS NOTES
Telemedicine Video Visit: Established Patient   This Remote Face to Face encounter was conducted via Zoom. Given the importance of social distancing and other strategies recommended to reduce the risk of COVID-19 transmission, I am providing medical care to this patient via audio/video visit in place of an in person visit at the request of the patient. Verbal consent to telehealth, risks, benefits, and consequences were discussed. Patient retains the right to withdraw at any time. All existing confidentiality protections apply. The patient has access to all transmitted medical information. No dissemination of any patient images or information to other entities without further written consent.  Subjective:     Chief Complaint   Patient presents with   • Medication Refill   • Overdue Lab And Imaging Result Follow-up       Deb Vega is a 75 y.o. female presenting for evaluation and management of:    Deb is here for follow-up, discussed on video.  Continues to struggle with severe COPD.  Patient is also having problems with anxiety and panic disorder    ROS    Denies any recent fevers or chills. No nausea or vomiting. No chest pains or shortness of breath.     Allergies   Allergen Reactions   • Gadolinium Anaphylaxis and Nausea   • Influenza Virus Vaccine    • Epinephrine      Other reaction(s): Tachycardia   • Other Drug      Any antibiotics by mouth dizziness, abdominal pain.    • Other Misc      Intolerant of any stimulants   • Prednisone      Aggravates tachycardia   • Sulfa Drugs      Other reaction(s): Not Known   • Zyvox [Linezolid] Rash     Severe anxiety; prominent facial rash with swelling   • Doxycycline Diarrhea, Nausea, Unspecified and Vomiting   • Levaquin Anxiety, Diarrhea, Nausea and Palpitations   • Cefuroxime Nausea   • Nsaids        Current medicines (including changes today)  Current Outpatient Medications   Medication Sig Dispense Refill   • ALPRAZolam (XANAX) 0.25 MG Tab Take 1 tablet by  mouth 3 times a day as needed for Sleep for up to 30 days. 30 tablet 0   • clonazePAM (KLONOPIN) 0.5 MG Tab Take 1 tablet by mouth 2 times a day for 30 days. 60 tablet 1   • sucralfate (CARAFATE) 1 GM/10ML Suspension Take 10 mL by mouth 4 times a day. 414 mL 1   • methylPREDNISolone (MEDROL DOSEPAK) 4 MG Tablet Therapy Pack As directed on the packaging label. 21 tablet 2   • budesonide-formoterol (SYMBICORT) 160-4.5 MCG/ACT Aerosol INHALE TWO PUFFS BY MOUTH TWICE DAILY. USE SPACER. RINSE MOUTH AFTER USE. 3 Each 3   • prednisONE (DELTASONE) 2.5 MG Tab TAKE 1 TABLET BY MOUTH EVERY DAY 90 tablet 1   • CLARINEX 5 MG tablet TAKE 1 TABLET BY MOUTH EVERY DAY 90 tablet 3   • pravastatin (PRAVACHOL) 40 MG tablet TAKE 1 TABLET BY MOUTH EVERY DAY 90 Tab 1   • tiotropium (SPIRIVA HANDIHALER) 18 MCG Cap INHALE 1 CAP ONCE A DAY AT THE SAME TIME EVERYDAY 90 Cap 3   • XOPENEX HFA 45 MCG/ACT inhaler INHALE 2 PUFFS BY MOUTH EVERY FOUR HOURS AS NEEDED FOR SHORTNESS OF BREATH, COUGH, WHEEZING.DAW2 1 Each 11   • levalbuterol (XOPENEX) 1.25 MG/3ML Nebu Soln USE 3ML VIA NEBULIZER ROUTE EVERY 4 HOURS AS NEEDED FOR SHORTNESS OF BREATH *J44* 360 mL 5   • montelukast (SINGULAIR) 10 MG Tab Take 1 Tab by mouth every day. 90 Tab 3   • acetaminophen (TYLENOL) 325 MG Tab Take 650 mg by mouth every four hours as needed.     • Oral Electrolytes (EMERGEN-C ELECTRO MIX PO) Take  by mouth every day.     • aspirin (ASA) 81 MG CHEW Take 162 mg by mouth every day.     • MAGNESIUM GLYCINATE Take 480 mg by mouth.     • levalbuterol (XOPENEX) 1.25 MG/3ML Nebu Soln 3 mL by Nebulization route every four hours as needed for Shortness of Breath. 120 mL 4     No current facility-administered medications for this visit.       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 ([facility]) 09/14/2018   • 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   • Chronic use of benzodiazepine for therapeutic purpose 09/04/2017   • Benzodiazepine dependence ([facility]) 08/29/2017   • Nocturnal hypoxemia 06/06/2017   • Supplemental oxygen dependent 02/07/2017   • Abnormal CT of the chest 02/07/2017   • Tachycardia 07/15/2015   • COPD (chronic obstructive pulmonary disease) ([facility]) 03/01/2013   • Lyme disease 03/01/2013   • Immunologic deficiency syndrome ([facility]) 03/01/2013   • Chronic fatigue syndrome 12/07/2012   • Hyperlipidemia with target LDL less than 100 06/06/2012   • Anxiety 06/06/2012       Family History   Problem Relation Age of Onset   • Alcohol/Drug Mother    • Cancer Father    • Stroke Father    • Heart Disease Father 42       She  has a past medical history of Allergy, Anxiety, ASTHMA, Back pain, Bladder infection, Bronchitis, Cellulitis, Chronic use of benzodiazepine for therapeutic purpose (9/4/2017), COPD, COPD (chronic obstructive pulmonary disease) ([facility]) (3/1/2013), H/O blood transfusion reaction, History of measles, History of mumps, Hives, Hyperlipidemia, Immunologic deficiency syndrome ([facility]) (3/1/2013), Lyme disease (3/1/2013), Mononucleosis, Pneumonia, Tachycardia (7/15/2015), Tachycardia (7/15/2015), and Vitamin D deficiency (6/27/2019).  She  has a past surgical history that includes breast biopsy; thyroid lobectomy; and tonsillectomy.       Objective:   Vitals obtained by patient:  Ht 1.613 m (5' 3.5\")   Wt 55.3 kg (122 lb)   BMI 21.27 kg/m²     Physical Exam:   Constitutional: Alert, no distress, well-groomed.  Skin: No rashes in visible areas.  Eye: Round. Conjunctiva clear, lids normal. No icterus.   ENMT: Lips pink without " lesions, good dentition, moist mucous membranes. Phonation normal.  Neck: No masses, no thyromegaly. Moves freely without pain.  CV: Pulse as reported by patient  Respiratory: Unlabored respiratory effort, no cough or audible wheeze  Psych: Alert and oriented x3, normal affect and mood.       Assessment and Plan:   The following treatment plan was discussed:     1. Anxiety  - clonazePAM (KLONOPIN) 0.5 MG Tab; Take 1 tablet by mouth 2 times a day for 30 days.  Dispense: 60 tablet; Refill: 1    2. Panic disorder  - ALPRAZolam (XANAX) 0.25 MG Tab; Take 1 tablet by mouth 3 times a day as needed for Sleep for up to 30 days.  Dispense: 30 tablet; Refill: 0    3. Gastroesophageal reflux disease without esophagitis  - sucralfate (CARAFATE) 1 GM/10ML Suspension; Take 10 mL by mouth 4 times a day.  Dispense: 414 mL; Refill: 1    4. History of UTI  - URINALYSIS,CULTURE IF INDICATED; Future    5. Diarrhea of presumed infectious origin  - Cdiff By PCR Rflx Toxin; Future    6. Upper respiratory tract infection, unspecified type  - methylPREDNISolone (MEDROL DOSEPAK) 4 MG Tablet Therapy Pack; As directed on the packaging label.  Dispense: 21 tablet; Refill: 2        Follow-up: No follow-ups on file.    Face to Face Video Visit:   I spent 20 minutes with patient/guardian and I conducted this visit with audio and video present.  Andi Mccullough D.O.

## 2021-06-18 ENCOUNTER — TELEPHONE (OUTPATIENT)
Dept: MEDICAL GROUP | Facility: LAB | Age: 76
End: 2021-06-18

## 2021-06-18 DIAGNOSIS — F41.9 ANXIETY: ICD-10-CM

## 2021-06-18 RX ORDER — ALPRAZOLAM 0.25 MG/1
TABLET ORAL
Qty: 180 TABLET | Refills: 1 | Status: SHIPPED | OUTPATIENT
Start: 2021-06-18 | End: 2022-05-04 | Stop reason: SDUPTHER

## 2021-06-18 NOTE — TELEPHONE ENCOUNTER
1. Caller Name Deb Vega                        Call Back Number: [phone] (home)         How would the patient prefer to be contacted with a response: Phone call OK to leave a detailed message    Pt wants clarification on her medications-XANAX rx written for TIB put was given 30 tabs. She is also asking if you are cutting her clonazepam back.   Also she requests 90 DAYS due to cost of medication and she pays someone to pick her medications up.

## 2021-07-16 RX ORDER — PRAVASTATIN SODIUM 40 MG
TABLET ORAL
Qty: 90 TABLET | Refills: 3 | Status: SHIPPED | OUTPATIENT
Start: 2021-07-16 | End: 2022-07-18

## 2021-07-16 NOTE — TELEPHONE ENCOUNTER
Received request via: Pharmacy    Was the patient seen in the last year in this department? Yes  LOV 06/15/2021 - [facility]  Does the patient have an active prescription (recently filled or refills available) for medication(s) requested? No

## 2021-07-17 DIAGNOSIS — Z91.09 ENVIRONMENTAL ALLERGIES: ICD-10-CM

## 2021-07-19 RX ORDER — MONTELUKAST SODIUM 10 MG/1
TABLET ORAL
Qty: 90 TABLET | Refills: 3 | Status: SHIPPED | OUTPATIENT
Start: 2021-07-19 | End: 2022-08-01

## 2021-07-20 ENCOUNTER — TELEMEDICINE (OUTPATIENT)
Dept: SLEEP MEDICINE | Facility: MEDICAL CENTER | Age: 76
End: 2021-07-20
Payer: MEDICARE

## 2021-07-20 VITALS
SYSTOLIC BLOOD PRESSURE: 107 MMHG | HEART RATE: 110 BPM | BODY MASS INDEX: 21.34 KG/M2 | DIASTOLIC BLOOD PRESSURE: 66 MMHG | HEIGHT: 64 IN | WEIGHT: 125 LBS | TEMPERATURE: 97.3 F | OXYGEN SATURATION: 95 %

## 2021-07-20 DIAGNOSIS — K21.9 GASTROESOPHAGEAL REFLUX DISEASE, UNSPECIFIED WHETHER ESOPHAGITIS PRESENT: ICD-10-CM

## 2021-07-20 DIAGNOSIS — D80.3 IGG DEFICIENCY (HCC): ICD-10-CM

## 2021-07-20 DIAGNOSIS — Z87.891 SMOKING HISTORY: ICD-10-CM

## 2021-07-20 DIAGNOSIS — J96.11 CHRONIC RESPIRATORY FAILURE WITH HYPOXIA (HCC): ICD-10-CM

## 2021-07-20 DIAGNOSIS — J44.9 CHRONIC OBSTRUCTIVE PULMONARY DISEASE, UNSPECIFIED COPD TYPE (HCC): ICD-10-CM

## 2021-07-20 PROCEDURE — 99214 OFFICE O/P EST MOD 30 MIN: CPT | Mod: 95 | Performed by: PHYSICIAN ASSISTANT

## 2021-07-20 RX ORDER — OMEPRAZOLE 20 MG/1
20 CAPSULE, DELAYED RELEASE ORAL DAILY
Qty: 30 CAPSULE | Refills: 3 | Status: SHIPPED | OUTPATIENT
Start: 2021-07-20 | End: 2021-08-11

## 2021-07-20 NOTE — PROGRESS NOTES
"Virtual Visit: Established Patient   This visit was conducted via Zoom using secure and encrypted videoconferencing technology. The patient was in a private location in the state of [place].  The patient's identity was confirmed and verbal consent was obtained for this virtual visit.    Subjective:   CC:   Chief Complaint   Patient presents with   • Follow-Up     Immunologic deficiency syndrome , Last Seen 11/19/20   • Results     Chest CT 05/10/21 , Pending ENT Records       Deb Vega is a 75 y.o. female presenting for evaluation and management of: COPD, chronic respiratory failure with hypoxia and IgG deficiency.  Patient is a former smoker of at least 25 pack years over 55 years.  Reported quit date November 2019.    Pertinent past medical history includes anxiety, common variable immunodeficiency with predominant abnormalities of the cell numbers and function, peripheral sensoral motor axonal polyneuropathy, Lyme disease, chronic and recurrent sinusitis last treated in November 2020.    Reviewed medication regimen including Xopenex, inhaler and nebulizer, omeprazole, montelukast, last provided with [facility] 6/15/2021 to have on hand, Symbicort 160, Clarinex, prednisone 2.5 mg, Spiriva.    Review of Systems   Constitutional: Positive for malaise/fatigue (severe). Negative for chills, fever and weight loss.   HENT: Positive for congestion, hearing loss (mild), sinus pain and tinnitus (chronic constant ). Negative for sore throat (some scratchiness ).    Eyes:        Presc for distance   Respiratory: Positive for shortness of breath (with any activity ). Negative for cough, sputum production and wheezing.    Cardiovascular: Positive for palpitations (occasional, panic ), orthopnea (for reflux) and leg swelling. Negative for chest pain.   Gastrointestinal: Positive for heartburn (unrelieved with carafate).        Upper partial, some trouble swallowing, \"stuck\"   Neurological: Positive for headaches. Negative for " dizziness and tremors.        History of vertigo   Psychiatric/Behavioral: The patient has insomnia.        Allergies   Allergen Reactions   • Gadolinium Anaphylaxis and Nausea   • Influenza Virus Vaccine    • Epinephrine      Other reaction(s): Tachycardia   • Other Drug      Any antibiotics by mouth dizziness, abdominal pain.    • Other Misc      Intolerant of any stimulants   • Prednisone      Aggravates tachycardia   • Sulfa Drugs      Other reaction(s): Not Known   • Zyvox [Linezolid] Rash     Severe anxiety; prominent facial rash with swelling   • Doxycycline Diarrhea, Nausea, Unspecified and Vomiting   • Levaquin Anxiety, Diarrhea, Nausea and Palpitations   • Cefuroxime Nausea   • Nsaids        Current medicines (including changes today)  Current Outpatient Medications   Medication Sig Dispense Refill   • omeprazole (PRILOSEC) 20 MG delayed-release capsule Take 1 capsule by mouth every day. Start before evening meal, may increase to twice daily 30 capsule 3   • montelukast (SINGULAIR) 10 MG Tab TAKE 1 TABLET BY MOUTH EVERY DAY 90 tablet 3   • pravastatin (PRAVACHOL) 40 MG tablet TAKE 1 TABLET BY MOUTH EVERY DAY 90 tablet 3   • ALPRAZolam (XANAX) 0.25 MG Tab TAKE 1 TABLET BY MOUTH TWICE A DAY AS NEEDED FOR UP TO 90 DAYS F41.9 180 tablet 1   • methylPREDNISolone (MEDROL DOSEPAK) 4 MG Tablet Therapy Pack As directed on the packaging label. 21 tablet 2   • budesonide-formoterol (SYMBICORT) 160-4.5 MCG/ACT Aerosol INHALE TWO PUFFS BY MOUTH TWICE DAILY. USE SPACER. RINSE MOUTH AFTER USE. 3 Each 3   • prednisONE (DELTASONE) 2.5 MG Tab TAKE 1 TABLET BY MOUTH EVERY DAY 90 tablet 1   • CLARINEX 5 MG tablet TAKE 1 TABLET BY MOUTH EVERY DAY 90 tablet 3   • tiotropium (SPIRIVA HANDIHALER) 18 MCG Cap INHALE 1 CAP ONCE A DAY AT THE SAME TIME EVERYDAY 90 Cap 3   • XOPENEX HFA 45 MCG/ACT inhaler INHALE 2 PUFFS BY MOUTH EVERY FOUR HOURS AS NEEDED FOR SHORTNESS OF BREATH, COUGH, WHEEZING.DAW2 1 Each 11   • levalbuterol  (XOPENEX) 1.25 MG/3ML Nebu Soln USE 3ML VIA NEBULIZER ROUTE EVERY 4 HOURS AS NEEDED FOR SHORTNESS OF BREATH *J44* 360 mL 5   • acetaminophen (TYLENOL) 325 MG Tab Take 650 mg by mouth every four hours as needed.     • Oral Electrolytes (EMERGEN-C ELECTRO MIX PO) Take  by mouth every day.     • aspirin (ASA) 81 MG CHEW Take 162 mg by mouth every day.     • MAGNESIUM GLYCINATE Take 480 mg by mouth.     • sucralfate (CARAFATE) 1 GM/10ML Suspension Take 10 mL by mouth 4 times a day. 414 mL 1   • levalbuterol (XOPENEX) 1.25 MG/3ML Nebu Soln 3 mL by Nebulization route every four hours as needed for Shortness of Breath. 120 mL 4     No current facility-administered medications for this visit.       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   • Chronic use of benzodiazepine for therapeutic purpose 09/04/2017   • Benzodiazepine dependence ([facility]) 08/29/2017   • Nocturnal hypoxemia 06/06/2017   • Supplemental oxygen dependent 02/07/2017   • Abnormal CT of the chest 02/07/2017   • Tachycardia 07/15/2015   • COPD (chronic obstructive pulmonary disease) ([facility]) 03/01/2013   • Lyme disease 03/01/2013   • Immunologic deficiency syndrome ([facility]) 03/01/2013   • Chronic fatigue syndrome 12/07/2012   • Hyperlipidemia with target LDL less than 100 06/06/2012   • Anxiety 06/06/2012       Family History   Problem Relation Age of Onset   • Alcohol/Drug Mother    • Cancer Father    • Stroke Father    • Heart Disease Father 42  "      She  has a past medical history of Allergy, Anxiety, ASTHMA, Back pain, Bladder infection, Bronchitis, Cellulitis, Chronic use of benzodiazepine for therapeutic purpose (9/4/2017), COPD, COPD (chronic obstructive pulmonary disease) ([facility]) (3/1/2013), H/O blood transfusion reaction, History of measles, History of mumps, Hives, Hyperlipidemia, Immunologic deficiency syndrome (HCC) (3/1/2013), Lyme disease (3/1/2013), Mononucleosis, Pneumonia, Tachycardia (7/15/2015), Tachycardia (7/15/2015), and Vitamin D deficiency (6/27/2019).  She  has a past surgical history that includes breast biopsy; thyroid lobectomy; and tonsillectomy.       Objective:   /66 (BP Location: Left arm)   Pulse (!) 110   Temp 36.3 °C (97.3 °F) (Oral)   Ht 1.613 m (5' 3.5\")   Wt 56.7 kg (125 lb)   SpO2 95%   BMI 21.80 kg/m²     Physical Exam:  Constitutional: Alert, no distress, well-groomed.  Skin: No rashes in visible areas.  Eye: Round. Conjunctiva clear, lids normal. No icterus.   ENMT: Lips pink without lesions, good dentition, moist mucous membranes. Phonation normal.  Neck: No masses, no thyromegaly. Moves freely without pain.  Respiratory: Unlabored respiratory effort, no cough or audible wheeze  Psych: Alert and oriented x3, anxious affect and mood.       Assessment and Plan:   The following treatment plan was discussed:     1. Chronic obstructive pulmonary disease, unspecified COPD type ([facility])  -Continue current regimen  2. Chronic respiratory failure with hypoxia ([facility])  -Requiring 2 L O2 ATC  3. Gastroesophageal reflux disease, unspecified whether esophagitis present  - omeprazole (PRILOSEC) 20 MG delayed-release capsule; Take 1 capsule by mouth every day. Start before evening meal, may increase to twice daily  Dispense: 30 capsule; Refill: 3     4. 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  Patient mildly teary and frustrated today with her overall medical care.  She reports having a referral to [facility] allergy, has appointment with her PCP in September. She does live [place] a slightly higher altitude and is on 2 L O2.  She reports having some lower extremity swelling advised to follow-up with primary.    Follow-up: Return in about 3 months (around 10/20/2021) for Return with Charito Medel PA-C.

## 2021-07-20 NOTE — PATIENT INSTRUCTIONS
1-review non pharmaceutical reflux precautions   -elevate head of bed 20 degrees   -avoid known triggers including chocolate, carbonated beverages, citrus, tomatoes or red sauce, spicy foods, alcohol   -sleep on back or left side-no relief with left side  2-support given  3-labwork for IgG subclasses, send to patient requisition  4-continue low dose steroid  5-follow up in 3 months

## 2021-08-11 DIAGNOSIS — K21.9 GASTROESOPHAGEAL REFLUX DISEASE, UNSPECIFIED WHETHER ESOPHAGITIS PRESENT: ICD-10-CM

## 2021-08-11 RX ORDER — OMEPRAZOLE 20 MG/1
20 CAPSULE, DELAYED RELEASE ORAL DAILY
Qty: 30 CAPSULE | Refills: 3 | Status: SHIPPED | OUTPATIENT
Start: 2021-08-11 | End: 2021-09-13

## 2021-08-11 NOTE — TELEPHONE ENCOUNTER
Have we ever prescribed this med? Yes.  If yes, what date? 07/20/21    Last OV: 07/20/21 with Charito Medel PA-C    Next OV: No Pending appt.     DX: Gastroesophageal reflux disease, unspecified whether esophagitis present (K21.9)    Medications:   Requested Prescriptions     Pending Prescriptions Disp Refills   • omeprazole (PRILOSEC) 20 MG delayed-release capsule [Pharmacy Med Name: OMEPRAZOLE DR 20 MG CAPSULE] 30 Capsule 3     Sig: TAKE 1 CAPSULE BY MOUTH EVERY DAY. START BEFORE EVENING MEAL, MAY INCREASE TO TWICE DAILY

## 2021-08-23 DIAGNOSIS — J44.9 CHRONIC OBSTRUCTIVE PULMONARY DISEASE, UNSPECIFIED COPD TYPE (HCC): ICD-10-CM

## 2021-08-23 RX ORDER — LEVALBUTEROL TARTRATE 45 UG/1
AEROSOL, METERED ORAL
Qty: 1 EACH | Refills: 11 | Status: SHIPPED | OUTPATIENT
Start: 2021-08-23 | End: 2022-01-01

## 2021-08-23 NOTE — TELEPHONE ENCOUNTER
Have we ever prescribed this med? Yes.  If yes, what date? 09/28/2020    Last OV: 07/20/2021 - Charito Medel    Next OV: 12/22/2021 - Charito Medel    DX: COPD    Medications: Xopenex

## 2021-09-10 DIAGNOSIS — K21.9 GASTROESOPHAGEAL REFLUX DISEASE, UNSPECIFIED WHETHER ESOPHAGITIS PRESENT: ICD-10-CM

## 2021-09-13 RX ORDER — OMEPRAZOLE 20 MG/1
20 CAPSULE, DELAYED RELEASE ORAL DAILY
Qty: 90 CAPSULE | Refills: 1 | Status: SHIPPED | OUTPATIENT
Start: 2021-09-13 | End: 2021-12-13

## 2021-09-13 NOTE — TELEPHONE ENCOUNTER
Have we ever prescribed this med? Yes.  If yes, what date? 08/11/21    Last OV: 07/20/21 with Charito Medel PA-C    Next OV: 12/22/21 with Charito Medel PA-C    DX: Gastroesophageal reflux disease, unspecified whether esophagitis present     Medications:   Requested Prescriptions     Pending Prescriptions Disp Refills   • omeprazole (PRILOSEC) 20 MG delayed-release capsule [Pharmacy Med Name: OMEPRAZOLE DR 20 MG CAPSULE] 90 Capsule 1     Sig: TAKE 1 CAPSULE BY MOUTH EVERY DAY. START BEFORE EVENING MEAL, MAY INCREASE TO TWICE DAILY

## 2021-09-15 ENCOUNTER — TELEMEDICINE (OUTPATIENT)
Dept: MEDICAL GROUP | Facility: LAB | Age: 76
End: 2021-09-15
Payer: MEDICARE

## 2021-09-15 DIAGNOSIS — J44.9 CHRONIC OBSTRUCTIVE PULMONARY DISEASE, UNSPECIFIED COPD TYPE (HCC): Chronic | ICD-10-CM

## 2021-09-15 DIAGNOSIS — D83.0 COMMON VARIABLE IMMUNODEFICIENCY WITH PREDOMINANT ABNORMALITIES OF B-CELL NUMBERS AND FUNCTION (HCC): ICD-10-CM

## 2021-09-15 DIAGNOSIS — R09.02 HYPOXIA: ICD-10-CM

## 2021-09-15 PROCEDURE — 99213 OFFICE O/P EST LOW 20 MIN: CPT | Mod: 95 | Performed by: INTERNAL MEDICINE

## 2021-09-16 NOTE — PROGRESS NOTES
Telemedicine Video Visit: Established Patient   This Remote Face to Face encounter was conducted via Zoom. Given the importance of social distancing and other strategies recommended to reduce the risk of COVID-19 transmission, I am providing medical care to this patient via audio/video visit in place of an in person visit at the request of the patient. Verbal consent to telehealth, risks, benefits, and consequences were discussed. Patient retains the right to withdraw at any time. All existing confidentiality protections apply. The patient has access to all transmitted medical information. No dissemination of any patient images or information to other entities without further written consent.  Subjective:     Chief Complaint   Patient presents with   • Follow-Up     3 months follow up       Deb Vega is a 75 y.o. female presenting for evaluation and management of:    And is here for 3-month follow-up continues to struggle with severe emphysema.  We have reviewed her pulmonary function tests which show very poor pulmonary function.  Patient also has a history of immune dysfunction, is having difficulty with her immunologist.    ROS    Denies any recent fevers or chills. No nausea or vomiting. No chest pains or shortness of breath.     Allergies   Allergen Reactions   • Gadolinium Anaphylaxis and Nausea   • Influenza Virus Vaccine    • Epinephrine      Other reaction(s): Tachycardia   • Other Drug      Any antibiotics by mouth dizziness, abdominal pain.    • Other Misc      Intolerant of any stimulants   • Prednisone      Aggravates tachycardia   • Sulfa Drugs      Other reaction(s): Not Known   • Zyvox [Linezolid] Rash     Severe anxiety; prominent facial rash with swelling   • Doxycycline Diarrhea, Nausea, Unspecified and Vomiting   • Levaquin Anxiety, Diarrhea, Nausea and Palpitations   • Cefuroxime Nausea   • Nsaids        Current medicines (including changes today)  Current Outpatient Medications   Medication  Sig Dispense Refill   • omeprazole (PRILOSEC) 20 MG delayed-release capsule TAKE 1 CAPSULE BY MOUTH EVERY DAY. START BEFORE EVENING MEAL, MAY INCREASE TO TWICE DAILY 90 Capsule 1   • levalbuterol (XOPENEX HFA) 45 MCG/ACT inhaler INHALE 2 PUFFS BY MOUTH EVERY FOUR HOURS AS NEEDED FOR SHORTNESS OF BREATH, COUGH, WHEEZING.DAW2 1 Each 11   • montelukast (SINGULAIR) 10 MG Tab TAKE 1 TABLET BY MOUTH EVERY DAY 90 tablet 3   • pravastatin (PRAVACHOL) 40 MG tablet TAKE 1 TABLET BY MOUTH EVERY DAY 90 tablet 3   • ALPRAZolam (XANAX) 0.25 MG Tab TAKE 1 TABLET BY MOUTH TWICE A DAY AS NEEDED FOR UP TO 90 DAYS F41.9 180 tablet 1   • methylPREDNISolone (MEDROL DOSEPAK) 4 MG Tablet Therapy Pack As directed on the packaging label. 21 tablet 2   • budesonide-formoterol (SYMBICORT) 160-4.5 MCG/ACT Aerosol INHALE TWO PUFFS BY MOUTH TWICE DAILY. USE SPACER. RINSE MOUTH AFTER USE. 3 Each 3   • prednisONE (DELTASONE) 2.5 MG Tab TAKE 1 TABLET BY MOUTH EVERY DAY 90 tablet 1   • CLARINEX 5 MG tablet TAKE 1 TABLET BY MOUTH EVERY DAY 90 tablet 3   • tiotropium (SPIRIVA HANDIHALER) 18 MCG Cap INHALE 1 CAP ONCE A DAY AT THE SAME TIME EVERYDAY 90 Cap 3   • levalbuterol (XOPENEX) 1.25 MG/3ML Nebu Soln USE 3ML VIA NEBULIZER ROUTE EVERY 4 HOURS AS NEEDED FOR SHORTNESS OF BREATH *J44* 360 mL 5   • acetaminophen (TYLENOL) 325 MG Tab Take 650 mg by mouth every four hours as needed.     • Oral Electrolytes (EMERGEN-C ELECTRO MIX PO) Take  by mouth every day.     • aspirin (ASA) 81 MG CHEW Take 162 mg by mouth every day.     • MAGNESIUM GLYCINATE Take 480 mg by mouth.     • levalbuterol (XOPENEX) 1.25 MG/3ML Nebu Soln 3 mL by Nebulization route every four hours as needed for Shortness of Breath. 120 mL 4     No current facility-administered medications for this visit.       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   • Chronic use of benzodiazepine for therapeutic purpose 09/04/2017   • Benzodiazepine dependence ([facility]) 08/29/2017   • Nocturnal hypoxemia 06/06/2017   • Supplemental oxygen dependent 02/07/2017   • Abnormal CT of the chest 02/07/2017   • Tachycardia 07/15/2015   • COPD (chronic obstructive pulmonary disease) ([facility]) 03/01/2013   • Lyme disease 03/01/2013   • Immunologic deficiency syndrome ([facility]) 03/01/2013   • Chronic fatigue syndrome 12/07/2012   • Hyperlipidemia with target LDL less than 100 06/06/2012   • Anxiety 06/06/2012       Family History   Problem Relation Age of Onset   • Alcohol/Drug Mother    • Cancer Father    • Stroke Father    • Heart Disease Father 42       She  has a past medical history of Allergy, Anxiety, ASTHMA, Back pain, Bladder infection, Bronchitis, Cellulitis, Chronic use of benzodiazepine for therapeutic purpose (9/4/2017), COPD, COPD (chronic obstructive pulmonary disease) ([facility]) (3/1/2013), H/O blood transfusion reaction, History of measles, History of mumps, Hives, Hyperlipidemia, Immunologic deficiency syndrome ([facility]) (3/1/2013), Lyme disease (3/1/2013), Mononucleosis, Pneumonia, Tachycardia (7/15/2015), Tachycardia (7/15/2015), and Vitamin D deficiency (6/27/2019).  She  has a past surgical history that includes breast biopsy; thyroid lobectomy; and tonsillectomy.       Objective:   Vitals obtained by patient:  There were no vitals taken for this visit.    Physical Exam:   Constitutional: Alert, no distress, well-groomed.  Skin: No rashes in visible areas.  Eye: Round. Conjunctiva clear, lids normal. No  icterus.   ENMT: Lips pink without lesions, good dentition, moist mucous membranes. Phonation normal.  Neck: No masses, no thyromegaly. Moves freely without pain.  CV: Pulse as reported by patient  Respiratory: Unlabored respiratory effort, no cough or audible wheeze  Psych: Alert and oriented x3, normal affect and mood.       Assessment and Plan:   The following treatment plan was discussed:     1. Chronic obstructive pulmonary disease, unspecified COPD type (HCC)    2. 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 Hypoxia        Follow-up: No follow-ups on file.    Face to Face Video Visit:   I spent 20 minutes with patient/guardian and I conducted this visit with audio and video present.  Adni Mccullough D.O.

## 2021-11-18 DIAGNOSIS — J44.9 CHRONIC OBSTRUCTIVE PULMONARY DISEASE, UNSPECIFIED COPD TYPE (HCC): Chronic | ICD-10-CM

## 2021-11-18 RX ORDER — PREDNISONE 2.5 MG/1
TABLET ORAL
Qty: 90 TABLET | Refills: 1 | Status: SHIPPED | OUTPATIENT
Start: 2021-11-18 | End: 2021-12-23

## 2021-11-18 NOTE — TELEPHONE ENCOUNTER
Received request via: Pharmacy    Was the patient seen in the last year in this department? Yes  LOV 09/15/2021 - Telemedicine  Does the patient have an active prescription (recently filled or refills available) for medication(s) requested? No

## 2021-12-12 DIAGNOSIS — K21.9 GASTROESOPHAGEAL REFLUX DISEASE, UNSPECIFIED WHETHER ESOPHAGITIS PRESENT: ICD-10-CM

## 2021-12-13 RX ORDER — OMEPRAZOLE 20 MG/1
20 CAPSULE, DELAYED RELEASE ORAL DAILY
Qty: 180 CAPSULE | Refills: 3 | Status: SHIPPED | OUTPATIENT
Start: 2021-12-13 | End: 2023-10-10

## 2021-12-13 NOTE — TELEPHONE ENCOUNTER
Have we ever prescribed this med? Yes.  If yes, what date? 09/13/21    Last OV: 07/20/21 with Charito Medel PA-C    Next OV: 12/22/21 with Charito Medel PA-C    DX: Gastroesophageal reflux disease, unspecified whether esophagitis present (K21.9)    Medications:   Requested Prescriptions     Pending Prescriptions Disp Refills   • omeprazole (PRILOSEC) 20 MG delayed-release capsule [Pharmacy Med Name: OMEPRAZOLE DR 20 MG CAPSULE] 180 Capsule      Sig: TAKE 1 CAPSULE BY MOUTH EVERY DAY. START BEFORE EVENING MEAL, MAY INCREASE TO TWICE DAILY

## 2021-12-22 ENCOUNTER — TELEMEDICINE (OUTPATIENT)
Dept: SLEEP MEDICINE | Facility: MEDICAL CENTER | Age: 76
End: 2021-12-22
Payer: MEDICARE

## 2021-12-22 VITALS
WEIGHT: 127 LBS | OXYGEN SATURATION: 98 % | HEIGHT: 63 IN | DIASTOLIC BLOOD PRESSURE: 65 MMHG | SYSTOLIC BLOOD PRESSURE: 106 MMHG | BODY MASS INDEX: 22.5 KG/M2 | HEART RATE: 101 BPM

## 2021-12-22 DIAGNOSIS — F51.04 CHRONIC INSOMNIA: ICD-10-CM

## 2021-12-22 DIAGNOSIS — J96.11 CHRONIC RESPIRATORY FAILURE WITH HYPOXIA (HCC): ICD-10-CM

## 2021-12-22 DIAGNOSIS — J44.9 CHRONIC OBSTRUCTIVE PULMONARY DISEASE, UNSPECIFIED COPD TYPE (HCC): ICD-10-CM

## 2021-12-22 DIAGNOSIS — D80.3 IGG DEFICIENCY (HCC): ICD-10-CM

## 2021-12-22 PROCEDURE — 99214 OFFICE O/P EST MOD 30 MIN: CPT | Mod: 95 | Performed by: PHYSICIAN ASSISTANT

## 2021-12-22 NOTE — PATIENT INSTRUCTIONS
1-increase prednisone to 5 mg per day and assess for benefit  2-take early in am with food  3-consider melatonin extended release for resetting circadian rhythm  4-using xopenex inhaler primarily about 2 x week  5-using spiriva and symbicort, singulair  for maintenance  6-follow up in 6 months  7-follow up with primary, make referral to somebody new in behavioral health

## 2021-12-22 NOTE — PROGRESS NOTES
Virtual Visit: Established Patient   This visit was conducted via Zoom using secure and encrypted videoconferencing technology. The patient was in a private location in the state [facility]. The patient's identity was confirmed and verbal consent was obtained for this virtual visit.    Subjective:   CC:   Chief Complaint   Patient presents with   • Follow-Up       Deb Vega is a 76 y.o. female presenting for evaluation and management of: COPD, chronic respiratory failure with hypoxia and IgG deficiency.  Patient is a former smoker with quit date November 2019 and at least 25 pack years over 55 years.    Pertinent past medical history includes common variable immune deficiency with predominant abnormalities of cell numbers and function, polyneuropathy, Lyme disease, chronic and recurrent sinusitis.    Reviewed medications including Ampligen, omeprazole, Clarinex, Xopenex which she reports using approximately twice per week, Symbicort 160, O2 at 2 L.  Patient does report generally not feeling well.    Reviewed current vital signs as reported patient BP of 106/65, , sat of 98% and BMI of 22.5 kg/m².    Chest CT 5/10/2021 demonstrated extensive emphysema, scattered areas of fibrosis, new discoid atelectasis versus fibrosis right middle lobe with some localized bronchiectasis.  No change in 3 mm noncalcified pulmonary nodule right lower lobe.  No lymphadenopathy noted.  No change in calcific atherosclerosis.    Pulmonary function testing obtained 8/15/19 demonstrated an FEV1 of 0.58 L or 28% predicted, FVC of 1.64 L or 60% predicted, FEV1/FVC ratio 35, residual volume 205% predicted, % predicted, DLCO 40.     Review of Systems   Constitutional: Positive for chills (chronic issues with temp regulations ) and malaise/fatigue. Negative for fever and weight loss.   HENT: Positive for congestion (Seen by ENT, referred to allergist), hearing loss, nosebleeds, sinus pain, sore throat (post nasal drip) and  tinnitus (constant ).    Eyes:        Presc eyeglasses   Respiratory: Positive for shortness of breath (with any kind of activity). Negative for cough, sputum production and wheezing.    Cardiovascular: Positive for palpitations, orthopnea and leg swelling (right for years off/on). Negative for chest pain.   Gastrointestinal: Positive for heartburn.        Upper dentures, missing some lower teeth, some swallowing issues   Neurological: Positive for tremors (if High HR) and headaches. Negative for dizziness.   Psychiatric/Behavioral: The patient has insomnia (shifted sleep schedule ).        Current medicines (including changes today)  Current Outpatient Medications   Medication Sig Dispense Refill   • omeprazole (PRILOSEC) 20 MG delayed-release capsule TAKE 1 CAPSULE BY MOUTH EVERY DAY. START BEFORE EVENING MEAL, MAY INCREASE TO TWICE DAILY 180 Capsule 3   • levalbuterol (XOPENEX HFA) 45 MCG/ACT inhaler INHALE 2 PUFFS BY MOUTH EVERY FOUR HOURS AS NEEDED FOR SHORTNESS OF BREATH, COUGH, WHEEZING.DAW2 1 Each 11   • montelukast (SINGULAIR) 10 MG Tab TAKE 1 TABLET BY MOUTH EVERY DAY 90 tablet 3   • pravastatin (PRAVACHOL) 40 MG tablet TAKE 1 TABLET BY MOUTH EVERY DAY 90 tablet 3   • budesonide-formoterol (SYMBICORT) 160-4.5 MCG/ACT Aerosol INHALE TWO PUFFS BY MOUTH TWICE DAILY. USE SPACER. RINSE MOUTH AFTER USE. 3 Each 3   • CLARINEX 5 MG tablet TAKE 1 TABLET BY MOUTH EVERY DAY 90 tablet 3   • tiotropium (SPIRIVA HANDIHALER) 18 MCG Cap INHALE 1 CAP ONCE A DAY AT THE SAME TIME EVERYDAY 90 Cap 3   • levalbuterol (XOPENEX) 1.25 MG/3ML Nebu Soln USE 3ML VIA NEBULIZER ROUTE EVERY 4 HOURS AS NEEDED FOR SHORTNESS OF BREATH *J44* 360 mL 5   • acetaminophen (TYLENOL) 325 MG Tab Take 650 mg by mouth every four hours as needed.     • Oral Electrolytes (EMERGEN-C ELECTRO MIX PO) Take  by mouth every day.     • aspirin (ASA) 81 MG CHEW Take 162 mg by mouth every day.     • MAGNESIUM GLYCINATE Take 480 mg by mouth.     • predniSONE  "(DELTASONE) 5 MG Tab Take 1 Tablet by mouth every day. 90 Tablet 3     No current facility-administered medications for this visit.       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 ([facility]) 09/14/2018   • 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   • Chronic use of benzodiazepine for therapeutic purpose 09/04/2017   • Benzodiazepine dependence ([facility]) 08/29/2017   • Nocturnal hypoxemia 06/06/2017   • Supplemental oxygen dependent 02/07/2017   • Abnormal CT of the chest 02/07/2017   • Tachycardia 07/15/2015   • COPD (chronic obstructive pulmonary disease) ([facility]) 03/01/2013   • Lyme disease 03/01/2013   • Immunologic deficiency syndrome ([facility]) 03/01/2013   • Chronic fatigue syndrome 12/07/2012   • Hyperlipidemia with target LDL less than 100 06/06/2012   • Anxiety 06/06/2012        Objective:   /65   Pulse (!) 101   Ht 1.6 m (5' 3\")   Wt 57.6 kg (127 lb)   SpO2 98%   BMI 22.50 kg/m²     Physical Exam:  Constitutional: Alert, no distress, well-groomed.  Skin: No rashes in visible areas.  Eye: Round. Conjunctiva clear, lids normal. No icterus.   ENMT: Lips pink without lesions, good dentition, moist mucous membranes. Phonation normal.  Neck: No masses, no thyromegaly. Moves freely without pain.  Respiratory: Unlabored respiratory effort, no cough or audible wheeze  Psych: Alert and oriented x3, normal affect and mood.     Assessment and Plan:   The following treatment plan was discussed:     1. Chronic obstructive pulmonary disease, unspecified COPD type " (HCC)    Increase prednisone to 5 mg/day and assess for benefit.  Take early in the morning with food.  Using Xopenex inhaler about 2 times per week on average.  Continue maintenance meds.    2. Chronic respiratory failure with hypoxia (HCC)  Oxygen use at 2 L with stated benefit.    3. IgG deficiency (HCC)    4. Chronic insomnia  Consider extended release melatonin to aid resetting circadian rhythm.    Follow-up with primary, request referral to somebody new in behavioral health.    Patient is up-to-date on COVID-19 vaccine including booster.  Follow-up pulmonary in 6 months sooner if needed.      Follow-up: Return in about 6 months (around 6/22/2022) for Return with Charito Medel PA-C.                 This dictation was created using voice recognition software. The accuracy of the dictation is limited to the abilities of the software. I expect there may be some errors of grammar and possibly content.

## 2021-12-23 ENCOUNTER — TELEMEDICINE (OUTPATIENT)
Dept: MEDICAL GROUP | Facility: LAB | Age: 76
End: 2021-12-23
Payer: MEDICARE

## 2021-12-23 VITALS — BODY MASS INDEX: 22.5 KG/M2 | WEIGHT: 127 LBS

## 2021-12-23 DIAGNOSIS — G93.32 CHRONIC FATIGUE SYNDROME: ICD-10-CM

## 2021-12-23 DIAGNOSIS — R63.5 WEIGHT GAIN: ICD-10-CM

## 2021-12-23 DIAGNOSIS — J44.9 CHRONIC OBSTRUCTIVE PULMONARY DISEASE, UNSPECIFIED COPD TYPE (HCC): Chronic | ICD-10-CM

## 2021-12-23 DIAGNOSIS — E55.9 VITAMIN D DEFICIENCY: ICD-10-CM

## 2021-12-23 PROCEDURE — 99214 OFFICE O/P EST MOD 30 MIN: CPT | Mod: 95 | Performed by: INTERNAL MEDICINE

## 2021-12-23 RX ORDER — PREDNISONE 5 MG/1
5 TABLET ORAL DAILY
Qty: 90 TABLET | Refills: 3 | Status: SHIPPED | OUTPATIENT
Start: 2021-12-23 | End: 2022-02-03

## 2021-12-23 NOTE — PROGRESS NOTES
Telemedicine Video Visit: Established Patient   This Remote Face to Face encounter was conducted via Zoom. Given the importance of social distancing and other strategies recommended to reduce the risk of COVID-19 transmission, I am providing medical care to this patient via audio/video visit in place of an in person visit at the request of the patient. Verbal consent to telehealth, risks, benefits, and consequences were discussed. Patient retains the right to withdraw at any time. All existing confidentiality protections apply. The patient has access to all transmitted medical information. No dissemination of any patient images or information to other entities without further written consent.  Subjective:   No chief complaint on file.      Deb Vega is a 76 y.o. 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  Patient has underlying chronic obstructive pulmonary disease, chronic fatigue, weight gain which I suspect is water weight, also vitamin D deficiency    ROS    Denies any recent fevers or chills. No nausea or vomiting. No chest pains or shortness of breath.     Allergies   Allergen Reactions   • Gadolinium Anaphylaxis and Nausea   • Influenza Virus Vaccine    • Epinephrine      Other reaction(s): Tachycardia   • Other Drug      Any antibiotics by mouth dizziness, abdominal pain.    • Other Misc      Intolerant of any stimulants   • Prednisone      Aggravates tachycardia   • Sulfa Drugs      Other reaction(s): Not Known   • Zyvox [Linezolid] Rash     Severe anxiety; prominent facial rash with swelling   • Doxycycline Diarrhea, Nausea, Unspecified and Vomiting   • Levaquin Anxiety, Diarrhea, Nausea and Palpitations   • Cefuroxime Nausea   • Nsaids        Current medicines (including changes today)  Current Outpatient Medications   Medication  Sig Dispense Refill   • predniSONE (DELTASONE) 5 MG Tab Take 1 Tablet by mouth every day. 90 Tablet 3   • omeprazole (PRILOSEC) 20 MG delayed-release capsule TAKE 1 CAPSULE BY MOUTH EVERY DAY. START BEFORE EVENING MEAL, MAY INCREASE TO TWICE DAILY 180 Capsule 3   • levalbuterol (XOPENEX HFA) 45 MCG/ACT inhaler INHALE 2 PUFFS BY MOUTH EVERY FOUR HOURS AS NEEDED FOR SHORTNESS OF BREATH, COUGH, WHEEZING.DAW2 1 Each 11   • montelukast (SINGULAIR) 10 MG Tab TAKE 1 TABLET BY MOUTH EVERY DAY 90 tablet 3   • pravastatin (PRAVACHOL) 40 MG tablet TAKE 1 TABLET BY MOUTH EVERY DAY 90 tablet 3   • budesonide-formoterol (SYMBICORT) 160-4.5 MCG/ACT Aerosol INHALE TWO PUFFS BY MOUTH TWICE DAILY. USE SPACER. RINSE MOUTH AFTER USE. 3 Each 3   • CLARINEX 5 MG tablet TAKE 1 TABLET BY MOUTH EVERY DAY 90 tablet 3   • tiotropium (SPIRIVA HANDIHALER) 18 MCG Cap INHALE 1 CAP ONCE A DAY AT THE SAME TIME EVERYDAY 90 Cap 3   • levalbuterol (XOPENEX) 1.25 MG/3ML Nebu Soln USE 3ML VIA NEBULIZER ROUTE EVERY 4 HOURS AS NEEDED FOR SHORTNESS OF BREATH *J44* 360 mL 5   • acetaminophen (TYLENOL) 325 MG Tab Take 650 mg by mouth every four hours as needed.     • Oral Electrolytes (EMERGEN-C ELECTRO MIX PO) Take  by mouth every day.     • aspirin (ASA) 81 MG CHEW Take 162 mg by mouth every day.     • MAGNESIUM GLYCINATE Take 480 mg by mouth.       No current facility-administered medications for this visit.       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 ([facility]) 09/14/2018   • Chronic tubulointerstitial  nephritis 09/14/2018   • Chronic use of benzodiazepine for therapeutic purpose 09/04/2017   • Benzodiazepine dependence ([facility]) 08/29/2017   • Nocturnal hypoxemia 06/06/2017   • Supplemental oxygen dependent 02/07/2017   • Abnormal CT of the chest 02/07/2017   • Tachycardia 07/15/2015   • COPD (chronic obstructive pulmonary disease) ([facility]) 03/01/2013   • Lyme disease 03/01/2013   • Immunologic deficiency syndrome ([facility]) 03/01/2013   • Chronic fatigue syndrome 12/07/2012   • Hyperlipidemia with target LDL less than 100 06/06/2012   • Anxiety 06/06/2012       Family History   Problem Relation Age of Onset   • Alcohol/Drug Mother    • Cancer Father    • Stroke Father    • Heart Disease Father 42       She  has a past medical history of Allergy, Anxiety, ASTHMA, Back pain, Bladder infection, Bronchitis, Cellulitis, Chronic use of benzodiazepine for therapeutic purpose (9/4/2017), COPD, COPD (chronic obstructive pulmonary disease) ([facility]) (3/1/2013), H/O blood transfusion reaction, History of measles, History of mumps, Hives, Hyperlipidemia, Immunologic deficiency syndrome ([facility]) (3/1/2013), Lyme disease (3/1/2013), Mononucleosis, Pneumonia, Tachycardia (7/15/2015), Tachycardia (7/15/2015), and Vitamin D deficiency (6/27/2019).  She  has a past surgical history that includes breast biopsy; thyroid lobectomy; and tonsillectomy.       Objective:   Vitals obtained by patient:  Wt 57.6 kg (127 lb)   BMI 22.50 kg/m²     Physical Exam:   Constitutional: Alert, no distress, well-groomed.  Skin: No rashes in visible areas.  Eye: Round. Conjunctiva clear, lids normal. No icterus.   ENMT: Lips pink without lesions, good dentition, moist mucous membranes. Phonation normal.  Neck: No masses, no thyromegaly. Moves freely without pain.  CV: Pulse as reported by patient  Respiratory: Unlabored respiratory effort, no cough or audible wheeze  Psych: Alert and oriented x3, normal affect and mood.       Assessment and Plan:   The following  treatment plan was discussed:     1. Chronic fatigue syndrome  - VITAMIN B12; Future  - CBC WITH DIFFERENTIAL; Future  - Comp Metabolic Panel; Future  - ANTI-JO01 ABS; Future    2. Vitamin D deficiency  - VITAMIN D,25 HYDROXY; Future  - ANTI-JO01 ABS; Future    3. Chronic obstructive pulmonary disease, unspecified COPD type (HCC)  - predniSONE (DELTASONE) 5 MG Tab; Take 1 Tablet by mouth every day.  Dispense: 90 Tablet; Refill: 3  - ANTI-JO01 ABS; Future    4. Weight gain  - proBrain Natriuretic Peptide, NT; Future  - FREE THYROXINE; Future  - TSH; Future  - ANTI-JO01 ABS; Future        Follow-up: No follow-ups on file.    Face to Face Video Visit:   I spent 30 minutes with patient/guardian and I conducted this visit with audio and video present.  Andi Mccullough D.O.     Depressed, brain fog, ampligen would normalize sleep wake cycles.  Ion Gutierrez md. Check Anti synsthetaise syndrome.

## 2021-12-24 ENCOUNTER — PATIENT MESSAGE (OUTPATIENT)
Dept: MEDICAL GROUP | Facility: LAB | Age: 76
End: 2021-12-24

## 2021-12-24 DIAGNOSIS — J44.9 CHRONIC OBSTRUCTIVE PULMONARY DISEASE, UNSPECIFIED COPD TYPE (HCC): ICD-10-CM

## 2022-01-01 ENCOUNTER — PATIENT MESSAGE (OUTPATIENT)
Dept: HEALTH INFORMATION MANAGEMENT | Facility: OTHER | Age: 77
End: 2022-01-01

## 2022-01-01 ENCOUNTER — PATIENT MESSAGE (OUTPATIENT)
Dept: MEDICAL GROUP | Facility: LAB | Age: 77
End: 2022-01-01
Payer: MEDICARE

## 2022-01-01 DIAGNOSIS — J44.9 CHRONIC OBSTRUCTIVE PULMONARY DISEASE, UNSPECIFIED COPD TYPE (HCC): ICD-10-CM

## 2022-01-01 DIAGNOSIS — J44.1 CHRONIC OBSTRUCTIVE PULMONARY DISEASE WITH ACUTE EXACERBATION (HCC): Chronic | ICD-10-CM

## 2022-01-01 DIAGNOSIS — J44.1 CHRONIC OBSTRUCTIVE PULMONARY DISEASE WITH ACUTE EXACERBATION (HCC): ICD-10-CM

## 2022-01-01 DIAGNOSIS — J01.91 ACUTE RECURRENT SINUSITIS, UNSPECIFIED LOCATION: ICD-10-CM

## 2022-01-01 DIAGNOSIS — F41.9 ANXIETY: ICD-10-CM

## 2022-01-01 RX ORDER — LEVALBUTEROL TARTRATE 45 UG/1
AEROSOL, METERED ORAL
Qty: 45 EACH | Refills: 3 | Status: SHIPPED | OUTPATIENT
Start: 2022-01-01 | End: 2023-10-10

## 2022-01-01 RX ORDER — ALPRAZOLAM 0.25 MG/1
TABLET ORAL
Qty: 180 TABLET | Refills: 1 | Status: SHIPPED | OUTPATIENT
Start: 2022-01-01 | End: 2023-01-01

## 2022-01-01 RX ORDER — AMOXICILLIN AND CLAVULANATE POTASSIUM 875; 125 MG/1; MG/1
1 TABLET, FILM COATED ORAL 2 TIMES DAILY
Qty: 14 TABLET | Refills: 0 | Status: SHIPPED | OUTPATIENT
Start: 2022-01-01 | End: 2023-10-10

## 2022-01-01 RX ORDER — IPRATROPIUM BROMIDE AND ALBUTEROL SULFATE 2.5; .5 MG/3ML; MG/3ML
3 SOLUTION RESPIRATORY (INHALATION) 4 TIMES DAILY
Qty: 150 EACH | Refills: 3 | Status: SHIPPED | OUTPATIENT
Start: 2022-01-01 | End: 2022-01-01

## 2022-01-14 ENCOUNTER — PATIENT MESSAGE (OUTPATIENT)
Dept: MEDICAL GROUP | Facility: LAB | Age: 77
End: 2022-01-14

## 2022-01-14 RX ORDER — PREDNISONE 2.5 MG/1
2.5 TABLET ORAL DAILY
Qty: 30 TABLET | Refills: 3 | Status: SHIPPED | OUTPATIENT
Start: 2022-01-14 | End: 2022-02-03 | Stop reason: SDUPTHER

## 2022-01-14 NOTE — TELEPHONE ENCOUNTER
"From: Deb Vega  To: Physician Andi Mccullough  Sent: 12/24/2021 12:32 PM PST  Subject: Increase of prednisone    I increased my prednisone by taking 2 pills yesterday of the 2.5's since I don't have the 5 mg ones yet. By late last night I was wired although that is somewhat normal for me - I am up most of the night going to sleep between 5 & 6 AM. This morning I was still going strong at 7 AM and finally forced myself down. Woke up after just a few hours, hardly any sleep which is not new, but certainly a lot more with just one extra pill? I've had this happen before with the emergency paks, but wanted you to know about this small increase. I don't know what to do, but think it's probably best to go back to 2.5 ONLY until I can get your opinion. Perhaps I'll get used to 5.0? I'm certainly \"speeding\" and it's quite uncomfortable. Thanks.  " Spoke to patient, she is in the process of moving to [place] so will call and schedule after checking her calendar  Priscilla Magallanes CMA

## 2022-01-14 NOTE — PATIENT COMMUNICATION
Pt asking for predniSONE (DELTASONE) 2.5 mg or can she cut the 5 MG and cut in half. Work her way up. She has been waiting to discuss this with you.

## 2022-01-19 DIAGNOSIS — J44.9 CHRONIC OBSTRUCTIVE PULMONARY DISEASE, UNSPECIFIED COPD TYPE (HCC): ICD-10-CM

## 2022-01-19 RX ORDER — LEVALBUTEROL INHALATION SOLUTION 1.25 MG/3ML
SOLUTION RESPIRATORY (INHALATION)
Qty: 360 ML | Refills: 5 | Status: SHIPPED | OUTPATIENT
Start: 2022-01-19 | End: 2023-10-10

## 2022-01-19 NOTE — TELEPHONE ENCOUNTER
Have we ever prescribed this med? Yes.  If yes, what date? 08/23/2021    Last OV: 12/22/2021 - Charito Medel    Next OV: due back 6/2022    DX: COPD    Medications: Xopenex

## 2022-01-28 DIAGNOSIS — Z87.891 SMOKING HISTORY: ICD-10-CM

## 2022-01-28 DIAGNOSIS — Z91.09 ENVIRONMENTAL ALLERGIES: ICD-10-CM

## 2022-01-28 DIAGNOSIS — J43.9 PULMONARY EMPHYSEMA, UNSPECIFIED EMPHYSEMA TYPE (HCC): Chronic | ICD-10-CM

## 2022-01-28 RX ORDER — TIOTROPIUM BROMIDE 18 UG/1
CAPSULE ORAL; RESPIRATORY (INHALATION)
Qty: 90 CAPSULE | Refills: 3 | Status: SHIPPED | OUTPATIENT
Start: 2022-01-28 | End: 2023-01-01

## 2022-01-28 NOTE — TELEPHONE ENCOUNTER
Received request via: Pharmacy    Was the patient seen in the last year in this department? Yes  LOV : 12/23/2021  Does the patient have an active prescription (recently filled or refills available) for medication(s) requested? No

## 2022-02-01 ENCOUNTER — PATIENT MESSAGE (OUTPATIENT)
Dept: MEDICAL GROUP | Facility: LAB | Age: 77
End: 2022-02-01

## 2022-02-02 ENCOUNTER — PATIENT MESSAGE (OUTPATIENT)
Dept: MEDICAL GROUP | Facility: LAB | Age: 77
End: 2022-02-02

## 2022-02-02 DIAGNOSIS — J44.9 CHRONIC OBSTRUCTIVE PULMONARY DISEASE, UNSPECIFIED COPD TYPE (HCC): ICD-10-CM

## 2022-02-03 RX ORDER — PREDNISONE 2.5 MG/1
2.5-5 TABLET ORAL DAILY
Qty: 90 TABLET | Refills: 3 | Status: SHIPPED | OUTPATIENT
Start: 2022-02-03 | End: 2022-05-04

## 2022-02-07 ENCOUNTER — PATIENT MESSAGE (OUTPATIENT)
Dept: MEDICAL GROUP | Facility: LAB | Age: 77
End: 2022-02-07

## 2022-02-07 DIAGNOSIS — F41.9 ANXIETY: ICD-10-CM

## 2022-02-07 RX ORDER — CLONAZEPAM 0.5 MG
TABLET ORAL
Qty: 90 TABLET | Refills: 1 | Status: SHIPPED | OUTPATIENT
Start: 2022-02-07 | End: 2022-02-10 | Stop reason: SDUPTHER

## 2022-02-07 NOTE — TELEPHONE ENCOUNTER
From: Deb Vega  To: Physician Andi Mccullough  Sent: 2/7/2022 2:11 PM PST  Subject: Refill of Klonopin .5mg tablets    Please refill Klonopin (specify Klonopin please) .5 mg tablets. Last refill was on 10/10/21 for 60. I am running very short and didn't realize. Thank you!

## 2022-02-08 ENCOUNTER — TELEPHONE (OUTPATIENT)
Dept: MEDICAL GROUP | Facility: LAB | Age: 77
End: 2022-02-08

## 2022-02-08 NOTE — TELEPHONE ENCOUNTER
MEDICATION PRIOR AUTHORIZATION NEEDED:    1. Name of Medication: clonazepam 0.5mg    2. Requested By (Name of [facility]): [facility]     3. Is insurance on file current? yes    4. What is the name & phone number of the 3rd party payor? [facility]    (Key: [id number])

## 2022-02-08 NOTE — TELEPHONE ENCOUNTER
FINAL PRIOR AUTHORIZATION STATUS:    1.  Name of Medication & Dose: Clonazepam 0.5mg     2. Prior Auth Status: Approved through [employer]     3. Action Taken: Pharmacy Notified: yes Patient Notified: N\A     Key: [id number]

## 2022-02-10 DIAGNOSIS — F41.9 ANXIETY: ICD-10-CM

## 2022-02-10 RX ORDER — CLONAZEPAM 0.5 MG
TABLET ORAL
Qty: 90 TABLET | Refills: 1 | Status: SHIPPED | OUTPATIENT
Start: 2022-02-10 | End: 2022-05-10 | Stop reason: SDUPTHER

## 2022-02-10 NOTE — TELEPHONE ENCOUNTER
CAN YOU APPROVE THIS SCRIPT WITH NDC # ON IT SO PT CAN GET A REFUND? SHE HAD  SOMEONE  HER rx AND WAS CHARGED OVER $200

## 2022-02-23 ENCOUNTER — PATIENT MESSAGE (OUTPATIENT)
Dept: MEDICAL GROUP | Facility: LAB | Age: 77
End: 2022-02-23
Payer: COMMERCIAL

## 2022-03-07 ENCOUNTER — TELEMEDICINE (OUTPATIENT)
Dept: MEDICAL GROUP | Facility: LAB | Age: 77
End: 2022-03-07
Payer: MEDICARE

## 2022-03-07 DIAGNOSIS — Z99.81 DEPENDENCE ON SUPPLEMENTAL OXYGEN: ICD-10-CM

## 2022-03-07 DIAGNOSIS — E55.9 VITAMIN D DEFICIENCY: ICD-10-CM

## 2022-03-07 DIAGNOSIS — R53.83 FATIGUE, UNSPECIFIED TYPE: ICD-10-CM

## 2022-03-07 DIAGNOSIS — J44.9 CHRONIC OBSTRUCTIVE PULMONARY DISEASE, UNSPECIFIED COPD TYPE (HCC): ICD-10-CM

## 2022-03-07 PROCEDURE — 99213 OFFICE O/P EST LOW 20 MIN: CPT | Mod: 95 | Performed by: INTERNAL MEDICINE

## 2022-03-08 NOTE — PROGRESS NOTES
Telemedicine Video Visit: Established Patient   This Remote Face to Face encounter was conducted via Zoom. Given the importance of social distancing and other strategies recommended to reduce the risk of COVID-19 transmission, I am providing medical care to this patient via audio/video visit in place of an in person visit at the request of the patient. Verbal consent to telehealth, risks, benefits, and consequences were discussed. Patient retains the right to withdraw at any time. All existing confidentiality protections apply. The patient has access to all transmitted medical information. No dissemination of any patient images or information to other entities without further written consent.  Subjective:   No chief complaint on file.      Deb Vega is a 76 y.o. female presenting for evaluation and management of:    Deb is here for follow-up continues to have problems with chronic fatigue syndrome labs reviewed from November 2021 from Dr. Ion Gutierrez, patient is also complaining lower extremity swelling states that going from 2.5 to 5 mg of prednisone each day did improve her breathing but only for a couple of days.  Patient continues to be dependent on oxygen 24 hours a day.  Needs recertification suffers from severe chronic obstructive pulmonary disease.    ROS    Denies any recent fevers or chills. No nausea or vomiting. No chest pains or shortness of breath.     Allergies   Allergen Reactions   • Gadolinium Anaphylaxis and Nausea   • Influenza Virus Vaccine    • Epinephrine      Other reaction(s): Tachycardia   • Other Drug      Any antibiotics by mouth dizziness, abdominal pain.    • Other Misc      Intolerant of any stimulants   • Prednisone      Aggravates tachycardia   • Sulfa Drugs      Other reaction(s): Not Known   • Zyvox [Linezolid] Rash     Severe anxiety; prominent facial rash with swelling   • Doxycycline Diarrhea, Nausea, Unspecified and Vomiting   • Levaquin Anxiety, Diarrhea, Nausea and  Palpitations   • Cefuroxime Nausea   • Nsaids        Current medicines (including changes today)  Current Outpatient Medications   Medication Sig Dispense Refill   • KLONOPIN 0.5 MG Tab TAKE 1 TAB BY MOUTH EVERY NIGHT AT BEDTIME *F41* 90 Tablet 1   • prednisONE (DELTASONE) 2.5 MG Tab Take 1-2 Tablets by mouth every day. 90 Tablet 3   • tiotropium (SPIRIVA HANDIHALER) 18 MCG Cap INHALE 1 CAP BY MOUTH ONCE A DAY AT THE SAME TIME EVERYDAY 90 Capsule 3   • levalbuterol (XOPENEX) 1.25 MG/3ML Nebu Soln USE 1 VIAL VIA NEBULIZER EVERY 4 HOURS AS NEEDED FOR SHORTNESS OF BREATH *J44* 360 mL 5   • omeprazole (PRILOSEC) 20 MG delayed-release capsule TAKE 1 CAPSULE BY MOUTH EVERY DAY. START BEFORE EVENING MEAL, MAY INCREASE TO TWICE DAILY 180 Capsule 3   • levalbuterol (XOPENEX HFA) 45 MCG/ACT inhaler INHALE 2 PUFFS BY MOUTH EVERY FOUR HOURS AS NEEDED FOR SHORTNESS OF BREATH, COUGH, WHEEZING.DAW2 1 Each 11   • montelukast (SINGULAIR) 10 MG Tab TAKE 1 TABLET BY MOUTH EVERY DAY 90 tablet 3   • pravastatin (PRAVACHOL) 40 MG tablet TAKE 1 TABLET BY MOUTH EVERY DAY 90 tablet 3   • budesonide-formoterol (SYMBICORT) 160-4.5 MCG/ACT Aerosol INHALE TWO PUFFS BY MOUTH TWICE DAILY. USE SPACER. RINSE MOUTH AFTER USE. 3 Each 3   • CLARINEX 5 MG tablet TAKE 1 TABLET BY MOUTH EVERY DAY 90 tablet 3   • acetaminophen (TYLENOL) 325 MG Tab Take 650 mg by mouth every four hours as needed.     • Oral Electrolytes (EMERGEN-C ELECTRO MIX PO) Take  by mouth every day.     • aspirin (ASA) 81 MG CHEW Take 162 mg by mouth every day.     • MAGNESIUM GLYCINATE Take 480 mg by mouth.       No current facility-administered medications for this visit.       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 ([facility]) 09/14/2018   • 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   • Chronic use of benzodiazepine for therapeutic purpose 09/04/2017   • Benzodiazepine dependence ([facility]) 08/29/2017   • Nocturnal hypoxemia 06/06/2017   • Supplemental oxygen dependent 02/07/2017   • Abnormal CT of the chest 02/07/2017   • Tachycardia 07/15/2015   • COPD (chronic obstructive pulmonary disease) ([facility]) 03/01/2013   • Lyme disease 03/01/2013   • Immunologic deficiency syndrome ([facility]) 03/01/2013   • Chronic fatigue syndrome 12/07/2012   • Hyperlipidemia with target LDL less than 100 06/06/2012   • Anxiety 06/06/2012       Family History   Problem Relation Age of Onset   • Alcohol/Drug Mother    • Cancer Father    • Stroke Father    • Heart Disease Father 42       She  has a past medical history of Allergy, Anxiety, ASTHMA, Back pain, Bladder infection, Bronchitis, Cellulitis, Chronic use of benzodiazepine for therapeutic purpose (9/4/2017), COPD, COPD (chronic obstructive pulmonary disease) ([facility]) (3/1/2013), H/O blood transfusion reaction, History of measles, History of mumps, Hives, Hyperlipidemia, Immunologic deficiency syndrome ([facility]) (3/1/2013), Lyme disease (3/1/2013), Mononucleosis, Pneumonia, Tachycardia (7/15/2015), Tachycardia (7/15/2015), and Vitamin D deficiency (6/27/2019).  She  has a past surgical history that includes breast biopsy; thyroid lobectomy; and tonsillectomy.       Objective:   Vitals obtained by patient:  There were no vitals taken for this visit.    Physical Exam:   Constitutional: Alert, no distress, well-groomed.  Skin: No rashes in visible areas.  Eye: Round. Conjunctiva clear, lids normal. No icterus.   ENMT: Lips pink without lesions, good dentition, moist mucous membranes. Phonation normal.  Neck: No masses, no  thyromegaly. Moves freely without pain.  CV: Pulse as reported by patient  Respiratory: Unlabored respiratory effort, no cough or audible wheeze  Psych: Alert and oriented x3, normal affect and mood.       Assessment and Plan:   The following treatment plan was discussed:     1. Chronic obstructive pulmonary disease, unspecified COPD type (HCC)  - proBrain Natriuretic Peptide, NT; Future  - Comp Metabolic Panel; Future  - CBC WITH DIFFERENTIAL; Future  - VITAMIN D,25 HYDROXY; Future    2. Supplemental oxygen dependent  - proBrain Natriuretic Peptide, NT; Future  - Comp Metabolic Panel; Future  - CBC WITH DIFFERENTIAL; Future  - VITAMIN D,25 HYDROXY; Future    3. Vitamin D deficiency  - proBrain Natriuretic Peptide, NT; Future  - Comp Metabolic Panel; Future  - CBC WITH DIFFERENTIAL; Future  - VITAMIN D,25 HYDROXY; Future    4. Fatigue, unspecified type  - VITAMIN B12; Future        Follow-up: No follow-ups on file.    Face to Face Video Visit:   I spent 25 minutes with patient/guardian and I conducted this visit with audio and video present.  Andi Mccullough D.O.

## 2022-03-14 ENCOUNTER — HOSPITAL ENCOUNTER (OUTPATIENT)
Facility: MEDICAL CENTER | Age: 77
End: 2022-03-14
Attending: INTERNAL MEDICINE
Payer: MEDICARE

## 2022-03-14 DIAGNOSIS — Z99.81 DEPENDENCE ON SUPPLEMENTAL OXYGEN: ICD-10-CM

## 2022-03-14 DIAGNOSIS — J44.9 CHRONIC OBSTRUCTIVE PULMONARY DISEASE, UNSPECIFIED COPD TYPE (HCC): ICD-10-CM

## 2022-03-14 DIAGNOSIS — G93.32 CHRONIC FATIGUE SYNDROME: ICD-10-CM

## 2022-03-14 DIAGNOSIS — R63.5 WEIGHT GAIN: ICD-10-CM

## 2022-03-14 DIAGNOSIS — E55.9 VITAMIN D DEFICIENCY: ICD-10-CM

## 2022-03-14 DIAGNOSIS — R53.83 FATIGUE, UNSPECIFIED TYPE: ICD-10-CM

## 2022-03-14 PROCEDURE — 80053 COMPREHEN METABOLIC PANEL: CPT

## 2022-03-14 PROCEDURE — 83880 ASSAY OF NATRIURETIC PEPTIDE: CPT

## 2022-03-14 PROCEDURE — 82607 VITAMIN B-12: CPT

## 2022-03-14 PROCEDURE — 85025 COMPLETE CBC W/AUTO DIFF WBC: CPT

## 2022-03-14 PROCEDURE — 82306 VITAMIN D 25 HYDROXY: CPT

## 2022-03-14 RX ORDER — DESLORATADINE 5 MG
TABLET ORAL
Qty: 90 TABLET | Refills: 3 | Status: SHIPPED | OUTPATIENT
Start: 2022-03-14 | End: 2023-01-01

## 2022-03-15 LAB
25(OH)D3 SERPL-MCNC: 44 NG/ML (ref 30–100)
ALBUMIN SERPL BCP-MCNC: 4.7 G/DL (ref 3.2–4.9)
ALBUMIN/GLOB SERPL: 2 G/DL
ALP SERPL-CCNC: 63 U/L (ref 30–99)
ALT SERPL-CCNC: 21 U/L (ref 2–50)
ANION GAP SERPL CALC-SCNC: 11 MMOL/L (ref 7–16)
AST SERPL-CCNC: 22 U/L (ref 12–45)
BASOPHILS # BLD AUTO: 0.7 % (ref 0–1.8)
BASOPHILS # BLD: 0.07 K/UL (ref 0–0.12)
BILIRUB SERPL-MCNC: 0.4 MG/DL (ref 0.1–1.5)
BUN SERPL-MCNC: 6 MG/DL (ref 8–22)
CALCIUM SERPL-MCNC: 9.5 MG/DL (ref 8.5–10.5)
CHLORIDE SERPL-SCNC: 100 MMOL/L (ref 96–112)
CO2 SERPL-SCNC: 30 MMOL/L (ref 20–33)
CREAT SERPL-MCNC: 0.46 MG/DL (ref 0.5–1.4)
EOSINOPHIL # BLD AUTO: 0.07 K/UL (ref 0–0.51)
EOSINOPHIL NFR BLD: 0.7 % (ref 0–6.9)
ERYTHROCYTE [DISTWIDTH] IN BLOOD BY AUTOMATED COUNT: 48.8 FL (ref 35.9–50)
GFR SERPLBLD CREATININE-BSD FMLA CKD-EPI: 99 ML/MIN/1.73 M 2
GLOBULIN SER CALC-MCNC: 2.3 G/DL (ref 1.9–3.5)
GLUCOSE SERPL-MCNC: 92 MG/DL (ref 65–99)
HCT VFR BLD AUTO: 48.3 % (ref 37–47)
HGB BLD-MCNC: 15.1 G/DL (ref 12–16)
IMM GRANULOCYTES # BLD AUTO: 0.05 K/UL (ref 0–0.11)
IMM GRANULOCYTES NFR BLD AUTO: 0.5 % (ref 0–0.9)
LYMPHOCYTES # BLD AUTO: 1.43 K/UL (ref 1–4.8)
LYMPHOCYTES NFR BLD: 14.7 % (ref 22–41)
MCH RBC QN AUTO: 29.8 PG (ref 27–33)
MCHC RBC AUTO-ENTMCNC: 31.3 G/DL (ref 33.6–35)
MCV RBC AUTO: 95.5 FL (ref 81.4–97.8)
MONOCYTES # BLD AUTO: 0.43 K/UL (ref 0–0.85)
MONOCYTES NFR BLD AUTO: 4.4 % (ref 0–13.4)
NEUTROPHILS # BLD AUTO: 7.7 K/UL (ref 2–7.15)
NEUTROPHILS NFR BLD: 79 % (ref 44–72)
NRBC # BLD AUTO: 0 K/UL
NRBC BLD-RTO: 0 /100 WBC
PLATELET # BLD AUTO: 336 K/UL (ref 164–446)
PMV BLD AUTO: 9.7 FL (ref 9–12.9)
POTASSIUM SERPL-SCNC: 4.3 MMOL/L (ref 3.6–5.5)
PROT SERPL-MCNC: 7 G/DL (ref 6–8.2)
RBC # BLD AUTO: 5.06 M/UL (ref 4.2–5.4)
SODIUM SERPL-SCNC: 141 MMOL/L (ref 135–145)
VIT B12 SERPL-MCNC: 471 PG/ML (ref 211–911)
WBC # BLD AUTO: 9.8 K/UL (ref 4.8–10.8)

## 2022-03-16 ENCOUNTER — TELEPHONE (OUTPATIENT)
Dept: MEDICAL GROUP | Facility: LAB | Age: 77
End: 2022-03-16
Payer: COMMERCIAL

## 2022-03-16 NOTE — TELEPHONE ENCOUNTER
You had ordered a ProBrain Natr Peptide blood test.  The coding used will not cover that test.  Please re order lab with different code if you'd like for her to have it done.

## 2022-03-17 ENCOUNTER — TELEPHONE (OUTPATIENT)
Dept: MEDICAL GROUP | Facility: LAB | Age: 77
End: 2022-03-17
Payer: COMMERCIAL

## 2022-03-17 DIAGNOSIS — R06.02 SOB (SHORTNESS OF BREATH): ICD-10-CM

## 2022-03-17 LAB — NT-PROBNP SERPL IA-MCNC: 15 PG/ML (ref 0–125)

## 2022-03-17 NOTE — TELEPHONE ENCOUNTER
Caller Name: [facility]  Call Back Number: [phone] option 3    How would the patient prefer to be contacted with a response: Phone call do NOT leave a detailed message    The order for [facility] needs a new code for medicare.  They need you to call to change it.

## 2022-03-17 NOTE — TELEPHONE ENCOUNTER
Janie:  Please call margie Boyd shortness of breath for the B-type natriuretic peptide test per the lab this will work.   Regards, Andi Mccullough, DO

## 2022-04-11 ENCOUNTER — PATIENT MESSAGE (OUTPATIENT)
Dept: MEDICAL GROUP | Facility: LAB | Age: 77
End: 2022-04-11
Payer: MEDICARE

## 2022-04-26 ENCOUNTER — PATIENT MESSAGE (OUTPATIENT)
Dept: MEDICAL GROUP | Facility: LAB | Age: 77
End: 2022-04-26
Payer: MEDICARE

## 2022-04-27 ENCOUNTER — PATIENT MESSAGE (OUTPATIENT)
Dept: MEDICAL GROUP | Facility: LAB | Age: 77
End: 2022-04-27
Payer: MEDICARE

## 2022-05-02 ENCOUNTER — TELEMEDICINE (OUTPATIENT)
Dept: SLEEP MEDICINE | Facility: MEDICAL CENTER | Age: 77
End: 2022-05-02
Payer: MEDICARE

## 2022-05-02 VITALS
DIASTOLIC BLOOD PRESSURE: 73 MMHG | WEIGHT: 124 LBS | BODY MASS INDEX: 21.97 KG/M2 | HEIGHT: 63 IN | OXYGEN SATURATION: 93 % | HEART RATE: 133 BPM | SYSTOLIC BLOOD PRESSURE: 122 MMHG

## 2022-05-02 DIAGNOSIS — J44.1 CHRONIC OBSTRUCTIVE PULMONARY DISEASE WITH ACUTE EXACERBATION (HCC): ICD-10-CM

## 2022-05-02 DIAGNOSIS — J96.11 CHRONIC RESPIRATORY FAILURE WITH HYPOXIA (HCC): ICD-10-CM

## 2022-05-02 DIAGNOSIS — F41.0 SEVERE ANXIETY WITH PANIC: ICD-10-CM

## 2022-05-02 DIAGNOSIS — J01.91 ACUTE RECURRENT SINUSITIS, UNSPECIFIED LOCATION: ICD-10-CM

## 2022-05-02 PROCEDURE — 99214 OFFICE O/P EST MOD 30 MIN: CPT | Mod: 95 | Performed by: PHYSICIAN ASSISTANT

## 2022-05-02 RX ORDER — PREDNISONE 5 MG/1
TABLET ORAL
Qty: 50 TABLET | Refills: 0 | Status: SHIPPED | OUTPATIENT
Start: 2022-05-02 | End: 2022-05-04 | Stop reason: SDUPTHER

## 2022-05-02 RX ORDER — AMOXICILLIN AND CLAVULANATE POTASSIUM 875; 125 MG/1; MG/1
TABLET, FILM COATED ORAL
COMMUNITY
Start: 2022-04-26 | End: 2022-01-01 | Stop reason: SDUPTHER

## 2022-05-02 ASSESSMENT — FIBROSIS 4 INDEX: FIB4 SCORE: 1.09

## 2022-05-02 NOTE — PROGRESS NOTES
Virtual Visit: Established Patient   This visit was conducted via Zoom using secure and encrypted videoconferencing technology. The patient was in their home in the [facility].    The patient's identity was confirmed and verbal consent was obtained for this virtual visit.     Subjective:   CC: Shortness of breath and panic attacks    Deb Vega is a 76 y.o. female presenting for evaluation and management of: COPD, chronic respiratory failure with hypoxia.  Patient last seen via telemedicine 12/22/2021.  She is a former smoker with reported 25-pack-year history and quit date in November 2019.    Pertinent past medical history includes common variable immune deficiency with predominant abnormalities and cell numbers and function, polyneuropathy, Lyme disease, chronic and recurrent sinusitis, chronic tubulointerstitial nephritis, chronic fatigue syndrome.  And severe anxiety.  Patient reports previously referred to psychiatry but no one called her back.    Reviewed in clinic vitals including /73, heart rate 133, O2 sat of 93% and BMI of 21.97 kg/m².    Reviewed current medication, patient currently on Augmentin course, she reports having stopped taking Clarinex, on Klonopin, prednisone, Spiriva HandiHaler, Symbicort 160, omeprazole, montelukast, Xopenex inhaler and nebulizer.  Patient reports being unable to tolerate albuterol due to baseline elevated heart rate.  She was provided with DuoNeb for nebulizer by the urgent care and has not taken.  She was provided with high-dose steroids but has opted not to take these as prescribed due to her anxiety.  She is on 2 L O2 around the clock.      Chest CT 5/10/2021 demonstrated extensive emphysema, scattered areas of fibrosis, new discoid atelectasis versus fibrosis right middle lobe with some localized bronchiectasis.  No change in 3 mm noncalcified pulmonary nodule right lower lobe.  No lymphadenopathy noted.  No change in calcific  atherosclerosis.     Pulmonary function testing obtained 8/15/19  demonstrated an FEV1 of 0.58 L or 28% predicted, FVC of 1.64 L or 60% predicted, FEV1/FVC ratio 35, residual volume 205% predicted, % predicted, DLCO 40.   No pulmonologist interpretation available.       Review of Systems   Constitutional: Positive for malaise/fatigue (severe ). Negative for chills, fever and weight loss.   HENT: Positive for congestion, sinus pain and tinnitus (constant). Negative for hearing loss, nosebleeds and sore throat.         Post nasal drip, seen by ENT   Eyes:        Presc glasses for distance   Respiratory: Positive for sputum production (scant swallows ), shortness of breath and wheezing (rare). Negative for cough.         Reports feeling better and breathing better midnight to 4 am.      Takes nap in afternoon, lasting up to 3 hours   Cardiovascular: Positive for leg swelling. Negative for chest pain, palpitations and orthopnea.   Gastrointestinal: Positive for heartburn (managed on meds ).        Upper partial, rare difficulty swallowing   Neurological: Positive for tremors and headaches. Negative for dizziness.   Psychiatric/Behavioral: Memory loss: not resting well  The patient is nervous/anxious and has insomnia.        Current medicines (including changes today)  Current Outpatient Medications   Medication Sig Dispense Refill   • amoxicillin-clavulanate (AUGMENTIN) 875-125 MG Tab      • CLARINEX 5 MG tablet TAKE 1 TABLET BY MOUTH EVERY DAY 90 Tablet 3   • NON SPECIFIED Oxygen at 2LPM 24 hours a day Severe COPD.  Merged with [facility] - [phone] (phone is [phone] 1 Each 0   • KLONOPIN 0.5 MG Tab TAKE 1 TAB BY MOUTH EVERY NIGHT AT BEDTIME *F41* 90 Tablet 1   • prednisONE (DELTASONE) 2.5 MG Tab Take 1-2 Tablets by mouth every day. 90 Tablet 3   • tiotropium (SPIRIVA HANDIHALER) 18 MCG Cap INHALE 1 CAP BY MOUTH ONCE A DAY AT THE SAME TIME EVERYDAY 90 Capsule 3   • levalbuterol (XOPENEX) 1.25  MG/3ML Nebu Soln USE 1 VIAL VIA NEBULIZER EVERY 4 HOURS AS NEEDED FOR SHORTNESS OF BREATH *J44* 360 mL 5   • omeprazole (PRILOSEC) 20 MG delayed-release capsule TAKE 1 CAPSULE BY MOUTH EVERY DAY. START BEFORE EVENING MEAL, MAY INCREASE TO TWICE DAILY 180 Capsule 3   • levalbuterol (XOPENEX HFA) 45 MCG/ACT inhaler INHALE 2 PUFFS BY MOUTH EVERY FOUR HOURS AS NEEDED FOR SHORTNESS OF BREATH, COUGH, WHEEZING.DAW2 1 Each 11   • montelukast (SINGULAIR) 10 MG Tab TAKE 1 TABLET BY MOUTH EVERY DAY 90 tablet 3   • pravastatin (PRAVACHOL) 40 MG tablet TAKE 1 TABLET BY MOUTH EVERY DAY 90 tablet 3   • budesonide-formoterol (SYMBICORT) 160-4.5 MCG/ACT Aerosol INHALE TWO PUFFS BY MOUTH TWICE DAILY. USE SPACER. RINSE MOUTH AFTER USE. 3 Each 3   • acetaminophen (TYLENOL) 325 MG Tab Take 650 mg by mouth every four hours as needed.     • Oral Electrolytes (EMERGEN-C ELECTRO MIX PO) Take  by mouth every day.     • aspirin (ASA) 81 MG CHEW Take 162 mg by mouth every day.     • MAGNESIUM GLYCINATE Take 480 mg by mouth.       No current facility-administered medications for this visit.       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 ([facility]) 09/14/2018   • 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   • Chronic use of benzodiazepine for therapeutic purpose 09/04/2017   • Benzodiazepine dependence ([facility]) 08/29/2017   • Nocturnal hypoxemia 06/06/2017   • Supplemental oxygen dependent 02/07/2017   • Abnormal CT of the chest 02/07/2017   • Tachycardia 07/15/2015   • COPD (chronic obstructive  "pulmonary disease) ([facility]) 03/01/2013   • Lyme disease 03/01/2013   • Immunologic deficiency syndrome ([facility]) 03/01/2013   • Chronic fatigue syndrome 12/07/2012   • Hyperlipidemia with target LDL less than 100 06/06/2012   • Anxiety 06/06/2012        Objective:   /73   Pulse (!) 133   Ht 1.6 m (5' 3\")   Wt 56.2 kg (124 lb)   SpO2 93%   BMI 21.97 kg/m²     Physical Exam:  Constitutional: Alert, no distress, well-groomed.  Skin: No rashes in visible areas.  Eye: Round. Conjunctiva clear, lids normal. No icterus.   ENMT: Lips pink without lesions, good dentition, moist mucous membranes. Phonation normal.  Neck: No masses, no thyromegaly. Moves freely without pain.  Respiratory: Mildly labored respiratory effort, no cough or audible wheeze  Psych: Alert and oriented x3, very anxious    Assessment and Plan:   Patient with multiple concerns including pain management and severe anxiety.  We are unable to manage these in clinic.  Patient amenable to referral to behavioral health and is advised to follow-up with primary care provider.      The following treatment plan was discussed:     1. Severe anxiety with panic  - Referral to Behavioral Health/CBT  -Meantime see primary care provider for meds related to pain and anxiety    2. Chronic obstructive pulmonary disease with acute exacerbation (HCC)  - amoxicillin-clavulanate (AUGMENTIN) 875-125 MG Tab  -Prednisone taper provided, prednisone is aggravating underlying anxiety  predniSONE (DELTASONE) 5 MG Tab; Take 5 mg + 2.5 mg per day x 2 weeks.  Then taper down to 5 mg every day x 2 weeks. Then 2.5 mg x 2 weeks  Dispense: 50 Tablet; Refill: 0  -Use over-the-counter vaginal yeast medication if needed  - Referral to Pulmonary Rehab-VIRTUAL    3. Chronic respiratory failure with hypoxia (HCC)  -Continues to use and benefit from use of oxygen and 2 L around-the-clock.    4. BMI less than 19,adult  -Patient encouraged to ingest frequent small meals.    5. Acute recurrent " sinusitis, unspecified location  - amoxicillin-clavulanate (AUGMENTIN) 875-125 MG Tab      Follow-up: Follow-up in 4 weeks             This dictation was created using voice recognition software. The accuracy of the dictation is limited to the abilities of the software. I expect there may be some errors of grammar and possibly content.

## 2022-05-02 NOTE — PATIENT INSTRUCTIONS
1-finish 20 mg tabs (split for 10 mg x next 4 days)  2-decrease to 7.5 mg (one 5 mg and one 2.5 mg)  3-referral to behavioral health/CBT  4-see PCP for meds related to anxiety  5-some yeast symptoms, OTC medication reviewed  6-4 weeks follow up appt

## 2022-05-04 ENCOUNTER — TELEPHONE (OUTPATIENT)
Dept: MEDICAL GROUP | Facility: LAB | Age: 77
End: 2022-05-04
Payer: MEDICARE

## 2022-05-04 DIAGNOSIS — J44.1 CHRONIC OBSTRUCTIVE PULMONARY DISEASE WITH ACUTE EXACERBATION (HCC): ICD-10-CM

## 2022-05-04 DIAGNOSIS — F41.9 ANXIETY: ICD-10-CM

## 2022-05-04 RX ORDER — PREDNISONE 5 MG/1
TABLET ORAL
Qty: 30 TABLET | Refills: 3 | Status: SHIPPED | OUTPATIENT
Start: 2022-05-04 | End: 2022-06-02 | Stop reason: SDUPTHER

## 2022-05-04 RX ORDER — ALPRAZOLAM 0.25 MG/1
TABLET ORAL
Qty: 180 TABLET | Refills: 1 | Status: SHIPPED | OUTPATIENT
Start: 2022-05-04 | End: 2022-01-01 | Stop reason: SDUPTHER

## 2022-05-04 NOTE — TELEPHONE ENCOUNTER
Telephone call returned, patient and I have discussed that she has severe emphysema, is having significant problems with anxiety.  I have discussed with her that I would write out for hospice care upon request.  Also discussed with the patient that I want her on no less than 5 mg of prednisone each day.

## 2022-05-10 ENCOUNTER — TELEPHONE (OUTPATIENT)
Dept: MEDICAL GROUP | Facility: LAB | Age: 77
End: 2022-05-10

## 2022-05-10 ENCOUNTER — OFFICE VISIT (OUTPATIENT)
Dept: MEDICAL GROUP | Facility: LAB | Age: 77
End: 2022-05-10
Payer: MEDICARE

## 2022-05-10 DIAGNOSIS — F41.9 ANXIETY: ICD-10-CM

## 2022-05-10 DIAGNOSIS — J43.9 PULMONARY EMPHYSEMA, UNSPECIFIED EMPHYSEMA TYPE (HCC): ICD-10-CM

## 2022-05-10 PROCEDURE — 99443 PR PHYSICIAN TELEPHONE EVALUATION 21-30 MIN: CPT | Mod: 95 | Performed by: INTERNAL MEDICINE

## 2022-05-10 RX ORDER — CLONAZEPAM 0.5 MG/1
TABLET ORAL
Qty: 90 TABLET | Refills: 1 | Status: SHIPPED | OUTPATIENT
Start: 2022-05-10 | End: 2022-08-03 | Stop reason: SDUPTHER

## 2022-05-10 NOTE — PROGRESS NOTES
30 minutes 3:15.     Telephone Appointment Visit    This telephone visit was initiated by the patient and they verbally consented.    Reason for Call:  Symptom Follow-up    HPI:    Patient and I have had an extensive discussion going on for greater than 30 minutes were we have reviewed her progress the fact that she is dealing with potentially end-stage lung disease.  I have discussed with her that I will write out a referral to hospice care when she feels that is appropriate and I will do this upon request.  Patient and I have discussed that she continues to suffer with air hunger, I have recommended that she continue on a minimum of 5 mg of prednisone.    Labs / Images Reviewed:   Previous pulmonary function tests    Assessment and Plan:     1. Pulmonary emphysema, unspecified emphysema type (HCC)  - Referral to Home Health      Follow-up: 6 weeks    Total Time Spent (mins): Greater than 30 minutes    Andi Mccullough D.O.

## 2022-05-11 NOTE — TELEPHONE ENCOUNTER
Lesly:  Please call Deb, I have written out for generic clonazepam.   Regards, Andi Mccullough, DO

## 2022-05-17 ENCOUNTER — TELEPHONE (OUTPATIENT)
Dept: MEDICAL GROUP | Facility: LAB | Age: 77
End: 2022-05-17
Payer: MEDICARE

## 2022-05-17 NOTE — TELEPHONE ENCOUNTER
"· Home Health paperwork received from [facility] requiring provider signature.     · All appropriate fields completed by Medical Assistant: N/A CMA printed and distributed to MA    · Paperwork placed in \"MA to Provider\" folder/basket. Awaiting provider completion/signature.  "

## 2022-05-26 ENCOUNTER — TELEPHONE (OUTPATIENT)
Dept: MEDICAL GROUP | Facility: LAB | Age: 77
End: 2022-05-26
Payer: MEDICARE

## 2022-05-26 NOTE — TELEPHONE ENCOUNTER
"· Home Health paperwork received from [facility]  requiring provider signature.     · All appropriate fields completed by Medical Assistant: N/A CMA printed and distributed to MA    · Paperwork placed in \"MA to Provider\" folder/basket. Awaiting provider completion/signature.  "

## 2022-05-31 DIAGNOSIS — J44.1 CHRONIC OBSTRUCTIVE PULMONARY DISEASE WITH ACUTE EXACERBATION (HCC): ICD-10-CM

## 2022-06-01 DIAGNOSIS — J44.9 CHRONIC OBSTRUCTIVE PULMONARY DISEASE, UNSPECIFIED COPD TYPE (HCC): ICD-10-CM

## 2022-06-01 RX ORDER — BUDESONIDE AND FORMOTEROL FUMARATE DIHYDRATE 160; 4.5 UG/1; UG/1
AEROSOL RESPIRATORY (INHALATION)
Qty: 30.6 EACH | Refills: 3 | Status: SHIPPED | OUTPATIENT
Start: 2022-06-01 | End: 2023-01-01 | Stop reason: SDUPTHER

## 2022-06-02 DIAGNOSIS — J44.1 CHRONIC OBSTRUCTIVE PULMONARY DISEASE WITH ACUTE EXACERBATION (HCC): ICD-10-CM

## 2022-06-02 RX ORDER — PREDNISONE 5 MG/1
TABLET ORAL
Qty: 30 TABLET | Refills: 3 | Status: SHIPPED | OUTPATIENT
Start: 2022-06-02 | End: 2022-06-27 | Stop reason: SDUPTHER

## 2022-06-02 NOTE — TELEPHONE ENCOUNTER
KATYM for the pt informing her that we received a refill for budesonide-formoterol (SYMBICORT) 160-4.5 MCG/ACT Aerosol and it was approve and sent to the [facility]/pharmacy #[id number] - [place], [place] - [address]

## 2022-06-06 ENCOUNTER — PATIENT MESSAGE (OUTPATIENT)
Dept: MEDICAL GROUP | Facility: LAB | Age: 77
End: 2022-06-06
Payer: MEDICARE

## 2022-06-06 RX ORDER — PREDNISONE 5 MG/1
TABLET ORAL
Qty: 50 TABLET | Status: CANCELLED | OUTPATIENT
Start: 2022-06-06

## 2022-06-06 NOTE — TELEPHONE ENCOUNTER
Rx was also requested at different department on 06/02/2022     Have we ever prescribed this med? No.  If yes, what date? N/A    Last OV: 05/02/2022- TATIANA Medel PA-C     Next OV: 06/23/2022- TATIANA Medel PA-C     DX: Chronic obstructive pulmonary disease with acute exacerbation (HCC) (J44.1)    Medications:   Requested Prescriptions     Pending Prescriptions Disp Refills   • predniSONE (DELTASONE) 5 MG Tab [Pharmacy Med Name: PREDNISONE 5 MG TABLET] 50 Tablet      Sig: TAKE 5MG AND 2.5MG PER DAY FOR 2 WEEKS. THEN TAPER DOWN TO 5MG EVERY DAY FOR 2 WEEKS. THEN 2.5 MG 2 WEEKS.

## 2022-06-25 ENCOUNTER — PATIENT MESSAGE (OUTPATIENT)
Dept: MEDICAL GROUP | Facility: LAB | Age: 77
End: 2022-06-25
Payer: MEDICARE

## 2022-06-25 DIAGNOSIS — J44.1 CHRONIC OBSTRUCTIVE PULMONARY DISEASE WITH ACUTE EXACERBATION (HCC): ICD-10-CM

## 2022-06-28 RX ORDER — PREDNISONE 5 MG/1
TABLET ORAL
Qty: 90 TABLET | Refills: 0 | Status: SHIPPED | OUTPATIENT
Start: 2022-06-28 | End: 2023-01-01

## 2022-07-05 ENCOUNTER — TELEPHONE (OUTPATIENT)
Dept: MEDICAL GROUP | Facility: LAB | Age: 77
End: 2022-07-05
Payer: MEDICARE

## 2022-07-12 ENCOUNTER — TELEMEDICINE (OUTPATIENT)
Dept: SLEEP MEDICINE | Facility: MEDICAL CENTER | Age: 77
End: 2022-07-12
Payer: MEDICARE

## 2022-07-12 VITALS
BODY MASS INDEX: 20.38 KG/M2 | OXYGEN SATURATION: 97 % | HEART RATE: 108 BPM | WEIGHT: 115 LBS | HEIGHT: 63 IN | RESPIRATION RATE: 16 BRPM

## 2022-07-12 DIAGNOSIS — J96.11 CHRONIC RESPIRATORY FAILURE WITH HYPOXIA (HCC): ICD-10-CM

## 2022-07-12 DIAGNOSIS — J44.1 CHRONIC OBSTRUCTIVE PULMONARY DISEASE WITH ACUTE EXACERBATION (HCC): ICD-10-CM

## 2022-07-12 DIAGNOSIS — F41.8 ANXIETY ABOUT HEALTH: ICD-10-CM

## 2022-07-12 PROCEDURE — 99213 OFFICE O/P EST LOW 20 MIN: CPT | Mod: 95 | Performed by: PHYSICIAN ASSISTANT

## 2022-07-12 ASSESSMENT — FIBROSIS 4 INDEX: FIB4 SCORE: 1.09

## 2022-07-12 NOTE — PROGRESS NOTES
Virtual Visit: Established Patient   This visit was conducted via Zoom using secure and encrypted videoconferencing technology. The patient was in their home in [place].  The patient's identity was confirmed and verbal consent was obtained for this virtual visit.     Subjective:   CC:   Chief Complaint   Patient presents with   • Follow-Up   • COPD       Deb Vega is a 76 y.o. female presenting for evaluation and management of: severe COPD, chronic respiratory failure with hypoxia    Reports code for CFS/ME is now G93.32. Now advised to remain on 5 mg daily prednisone.  Recent episode of bite like inflammation of skin with severe irritation seen by [facility], see Media.  He is chronically hoarse.    Medication regime:  symbicort 160 mcg, spiriva 18 mcg, xopenex, 5 mg daily prednisone, not requiring xopenex.  She will reports being unable to tolerate albuterol due to baseline elevated heart rate.  Also reports steroids cause her markedly increased anxiety reports strengthening with activity/exercise.     Chest CT obtained 5/10/2021 demonstrated extensive emphysema, scattered areas of fibrosis, new discoid atelectasis versus fibrosis right middle lobe with some localized bronchiectasis.  Unchanged 3 mm noncalcified pulmonary nodule right lower lobe.  No lymphadenopathy noted.  No change calcific atherosclerosis.    Patient is concerned regarding referral to hospice and home treatment by PCP.    Pulmonary function testing obtained 8/15/2019 demonstrated an FEV1 0.58 L or 28% predicted, FVC of 1.64 L or 60% predicted, FEV1/FVC ratio 35, residual volume 205% predicted, % predicted, DLCO 40.  No pulmonologist interpretation available.    Review of Systems   Constitutional: Positive for malaise/fatigue (severe). Negative for chills, fever and weight loss.   HENT: Positive for congestion, nosebleeds, sinus pain and tinnitus (constant ). Negative for hearing loss and sore throat.    Eyes:        Driving     Respiratory: Positive for cough. Negative for sputum production, shortness of breath and wheezing.    Cardiovascular: Positive for leg swelling. Negative for chest pain, palpitations and orthopnea.        Tachycardia   Gastrointestinal: Negative for heartburn.   Neurological: Negative for dizziness, tremors and headaches.   Psychiatric/Behavioral: The patient does not have insomnia.      Current medicines (including changes today)  Current Outpatient Medications   Medication Sig Dispense Refill   • predniSONE (DELTASONE) 5 MG Tab 5 mg x 90 days 90 Tablet 0   • budesonide-formoterol (SYMBICORT) 160-4.5 MCG/ACT Aerosol INHALE TWO PUFFS BY MOUTH TWICE DAILY. USE SPACER. RINSE MOUTH AFTER USE. 30.6 Each 3   • clonazePAM (KLONOPIN) 0.5 MG Tab TAKE 1 TAB BY MOUTH EVERY NIGHT AT BEDTIME *F41* 90 Tablet 1   • ALPRAZolam (XANAX) 0.25 MG Tab TAKE 1 TABLET BY MOUTH TWICE A DAY AS NEEDED FOR UP TO 90 DAYS F41.9 180 Tablet 1   • amoxicillin-clavulanate (AUGMENTIN) 875-125 MG Tab      • CLARINEX 5 MG tablet TAKE 1 TABLET BY MOUTH EVERY DAY 90 Tablet 3   • tiotropium (SPIRIVA HANDIHALER) 18 MCG Cap INHALE 1 CAP BY MOUTH ONCE A DAY AT THE SAME TIME EVERYDAY 90 Capsule 3   • levalbuterol (XOPENEX) 1.25 MG/3ML Nebu Soln USE 1 VIAL VIA NEBULIZER EVERY 4 HOURS AS NEEDED FOR SHORTNESS OF BREATH *J44* 360 mL 5   • omeprazole (PRILOSEC) 20 MG delayed-release capsule TAKE 1 CAPSULE BY MOUTH EVERY DAY. START BEFORE EVENING MEAL, MAY INCREASE TO TWICE DAILY 180 Capsule 3   • levalbuterol (XOPENEX HFA) 45 MCG/ACT inhaler INHALE 2 PUFFS BY MOUTH EVERY FOUR HOURS AS NEEDED FOR SHORTNESS OF BREATH, COUGH, WHEEZING.DAW2 1 Each 11   • montelukast (SINGULAIR) 10 MG Tab TAKE 1 TABLET BY MOUTH EVERY DAY 90 tablet 3   • pravastatin (PRAVACHOL) 40 MG tablet TAKE 1 TABLET BY MOUTH EVERY DAY 90 tablet 3   • acetaminophen (TYLENOL) 325 MG Tab Take 650 mg by mouth every four hours as needed.     • Oral Electrolytes (EMERGEN-C ELECTRO MIX PO) Take  by  "mouth every day.     • aspirin (ASA) 81 MG CHEW Take 162 mg by mouth every day.     • MAGNESIUM GLYCINATE Take 480 mg by mouth.     • NON SPECIFIED Oxygen at 2LPM 24 hours a day Severe COPD.  [facility] - [phone] (phone is [phone] Each 0     No current facility-administered medications for this visit.       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   • Chronic use of benzodiazepine for therapeutic purpose 09/04/2017   • Benzodiazepine dependence ([facility]) 08/29/2017   • Nocturnal hypoxemia 06/06/2017   • Supplemental oxygen dependent 02/07/2017   • Abnormal CT of the chest 02/07/2017   • Tachycardia 07/15/2015   • COPD (chronic obstructive pulmonary disease) ([facility]) 03/01/2013   • Lyme disease 03/01/2013   • Immunologic deficiency syndrome ([facility]) 03/01/2013   • Chronic fatigue syndrome 12/07/2012   • Hyperlipidemia with target LDL less than 100 06/06/2012   • Anxiety 06/06/2012        Objective:   Pulse (!) 108   Resp 16   Ht 1.6 m (5' 3\")   Wt 52.2 kg (115 lb)   SpO2 97%   BMI 20.37 kg/m²     Physical Exam:  Constitutional: Alert, no distress, well-groomed.  Skin: No rashes in visible areas.  Eye: Round. Conjunctiva clear, lids normal. No icterus.   ENMT: Lips pink without lesions, good dentition, moist mucous membranes. Phonation normal.  Neck: No masses, no thyromegaly. Moves freely without pain.  Respiratory: Unlabored respiratory effort, no cough or audible wheeze  Psych: " Alert and oriented x3, normal affect and mood.     Assessment and Plan:   The following treatment plan was discussed:     1. Anxiety about health  She expresses concerns regarding having been referred to hospice.  Support given.    2. Chronic obstructive pulmonary disease with acute exacerbation (HCC)  Continue current regimen, reviewed oral hygiene.    3. Chronic respiratory failure with hypoxia (HCC)  Requires O2 at 2 L around-the-clock.    4. BMI less than 19,adult  Patient reports her weight is up over the last 2 months about 10 pounds.  Encouraged continued efforts to supplement intake and maintain weight.    Follow-up: Follow-up in 6 months, sooner if needed.        This dictation was created using voice recognition software. The accuracy of the dictation is limited to the abilities of the software. I expect there may be some errors of grammar and possibly content.

## 2022-07-18 RX ORDER — PRAVASTATIN SODIUM 40 MG
TABLET ORAL
Qty: 90 TABLET | Refills: 3 | Status: SHIPPED | OUTPATIENT
Start: 2022-07-18 | End: 2023-01-01

## 2022-07-19 NOTE — PATIENT INSTRUCTIONS
1-anxious about health including having been referred to hospice.  Support given.  2-continue current regimen, reviewed oral hygiene  3-requiring O2 ATC and 2 L  4-weight BMI less than 19, continue efforts to supplement intake and maintain.  5-follow-up in 6 months, sooner if needed.

## 2022-07-31 DIAGNOSIS — Z91.09 ENVIRONMENTAL ALLERGIES: ICD-10-CM

## 2022-08-01 RX ORDER — MONTELUKAST SODIUM 10 MG/1
TABLET ORAL
Qty: 90 TABLET | Refills: 3 | Status: SHIPPED | OUTPATIENT
Start: 2022-08-01 | End: 2023-10-10

## 2022-08-03 DIAGNOSIS — F41.9 ANXIETY: ICD-10-CM

## 2022-08-03 RX ORDER — CLONAZEPAM 0.5 MG/1
TABLET ORAL
Qty: 90 TABLET | Refills: 1 | Status: SHIPPED | OUTPATIENT
Start: 2022-08-03 | End: 2023-01-01 | Stop reason: SDUPTHER

## 2022-10-24 NOTE — TELEPHONE ENCOUNTER
Medication:   Requested Prescriptions     Pending Prescriptions Disp Refills    liraglutide-weight management 18 MG/3ML SOPN 15 mL 0     Sig: Inject 0.6 mg into the skin daily        Last Filled:      Patient Phone Number: [phone] (home)     Last appt: 10/21/2022   Next appt: Visit date not found    Last OARRS: No flowsheet data found. Preferred Pharmacy[facility]   [facility]  [id number], [address]  [address] [place] [address] [address]   Phone: [phone] Fax: [phone]    [facility] [address], [address] [facility] [address] [phone] - f [phone]  [address] [place] [address]  Phone: [phone] Fax: [phone]    [facility]  [address] [place] [address]  Phone: [phone] Fax: [phone]    Medication:   Requested Prescriptions     Pending Prescriptions Disp Refills    liraglutide-weight management 18 MG/3ML SOPN 15 mL 0     Sig: Inject 0.6 mg into the skin daily        Last Filled:      Patient Phone Number: [phone] (home)     Last appt: 10/21/2022   Next appt: Visit date not found    Last OARRS: No flowsheet data found.     Preferred Pharmacy:   [facility]  [id number], [address]  [address] [place] [address]  [address]  Phone: [phone] Fax: [phone]    [facility] [address], [address] [facility] [address] [phone] - f [phone]  [address] [place] [address] Road [address] [place] [address]  Phone: [phone] Fax: [phone]    [facility]  [address]  Phone: [phone] Fax: [phone] Patient states that [facility] does not cover flutter valves and KEY charges too much.  She wants to order one offline but does not know what kind to order    Do you have any suggestions about what brand or specifics she should be looking for?

## 2022-11-22 NOTE — TELEPHONE ENCOUNTER
Received request via: Patient    Was the patient seen in the last year in this department? Yes  LOV: 5/10/2022  Does the patient have an active prescription (recently filled or refills available) for medication(s) requested? No    Does the patient have retirement Plus and need 100 day supply (blood pressure, diabetes and cholesterol meds only)? Patient does not have SCP

## 2022-12-19 NOTE — TELEPHONE ENCOUNTER
Have we ever prescribed this med? Yes.  If yes, what date? 01/19/2022 TATIANA MILES PA-C    Last OV: 07/12/22 TATIANA MILES PA-C    Next OV: NO PENDING APPOINTMENT , Follow-up in 6 months, sooner if needed.    DX: Chronic obstructive pulmonary disease, unspecified COPD type     Medications: levalbuterol (XOPENEX) 1.25 MG/3ML Nebu Soln

## 2022-12-24 NOTE — TELEPHONE ENCOUNTER
Received request via: Pharmacy    Was the patient seen in the last year in this department? Yes    Does the patient have an active prescription (recently filled or refills available) for medication(s) requested? No    Does the patient have half-way Plus and need 100 day supply (blood pressure, diabetes and cholesterol meds only)? Patient does not have SCP    Pharmacy comment: Alternative Requested:NON-FORMULARY.

## 2023-01-01 ENCOUNTER — TELEPHONE (OUTPATIENT)
Dept: MEDICAL GROUP | Facility: LAB | Age: 78
End: 2023-01-01
Payer: MEDICARE

## 2023-01-01 ENCOUNTER — PATIENT MESSAGE (OUTPATIENT)
Dept: MEDICAL GROUP | Facility: LAB | Age: 78
End: 2023-01-01
Payer: MEDICARE

## 2023-01-01 ENCOUNTER — TELEPHONE (OUTPATIENT)
Dept: MEDICAL GROUP | Facility: MEDICAL CENTER | Age: 78
End: 2023-01-01
Payer: MEDICARE

## 2023-01-01 ENCOUNTER — OFFICE VISIT (OUTPATIENT)
Dept: MEDICAL GROUP | Facility: LAB | Age: 78
End: 2023-01-01
Payer: MEDICARE

## 2023-01-01 ENCOUNTER — TELEPHONE (OUTPATIENT)
Dept: MEDICAL GROUP | Facility: LAB | Age: 78
End: 2023-01-01

## 2023-01-01 DIAGNOSIS — F41.9 ANXIETY: ICD-10-CM

## 2023-01-01 DIAGNOSIS — J44.1 CHRONIC OBSTRUCTIVE PULMONARY DISEASE WITH ACUTE EXACERBATION (HCC): Chronic | ICD-10-CM

## 2023-01-01 DIAGNOSIS — R25.2 SPASM: ICD-10-CM

## 2023-01-01 DIAGNOSIS — J44.1 CHRONIC OBSTRUCTIVE PULMONARY DISEASE WITH ACUTE EXACERBATION (HCC): ICD-10-CM

## 2023-01-01 DIAGNOSIS — M62.838 MUSCLE SPASM: ICD-10-CM

## 2023-01-01 DIAGNOSIS — J43.9 PULMONARY EMPHYSEMA, UNSPECIFIED EMPHYSEMA TYPE (HCC): Chronic | ICD-10-CM

## 2023-01-01 DIAGNOSIS — R60.9 EDEMA, UNSPECIFIED TYPE: ICD-10-CM

## 2023-01-01 DIAGNOSIS — R06.02 SOB (SHORTNESS OF BREATH): ICD-10-CM

## 2023-01-01 DIAGNOSIS — Z91.09 ENVIRONMENTAL ALLERGIES: ICD-10-CM

## 2023-01-01 DIAGNOSIS — Z87.891 SMOKING HISTORY: ICD-10-CM

## 2023-01-01 PROCEDURE — 99443 PR PHYSICIAN TELEPHONE EVALUATION 21-30 MIN: CPT | Performed by: INTERNAL MEDICINE

## 2023-01-01 RX ORDER — TIZANIDINE 2 MG/1
1-2 TABLET ORAL 3 TIMES DAILY PRN
Qty: 30 TABLET | Refills: 0 | Status: SHIPPED | OUTPATIENT
Start: 2023-01-01 | End: 2023-10-10

## 2023-01-01 RX ORDER — ALBUTEROL SULFATE 2.5 MG/3ML
2.5 SOLUTION RESPIRATORY (INHALATION) EVERY 6 HOURS PRN
Qty: 90 EACH | Refills: 0 | Status: SHIPPED | OUTPATIENT
Start: 2023-01-01 | End: 2023-10-10

## 2023-01-01 RX ORDER — PREDNISONE 5 MG/1
TABLET ORAL
Qty: 90 TABLET | Refills: 1 | Status: SHIPPED | OUTPATIENT
Start: 2023-01-01 | End: 2023-01-01 | Stop reason: SDUPTHER

## 2023-01-01 RX ORDER — CLONAZEPAM 0.5 MG/1
TABLET ORAL
Qty: 90 TABLET | Refills: 1 | Status: SHIPPED | OUTPATIENT
Start: 2023-01-01 | End: 2023-01-01

## 2023-01-01 RX ORDER — DESLORATADINE 5 MG/1
TABLET ORAL
Qty: 90 TABLET | Refills: 3 | Status: SHIPPED | OUTPATIENT
Start: 2023-01-01 | End: 2023-10-10

## 2023-01-01 RX ORDER — METAXALONE 800 MG/1
400-800 TABLET ORAL 3 TIMES DAILY PRN
Qty: 10 TABLET | Refills: 0 | Status: SHIPPED | OUTPATIENT
Start: 2023-01-01 | End: 2023-10-10

## 2023-01-01 RX ORDER — PRAVASTATIN SODIUM 40 MG
TABLET ORAL
Qty: 90 TABLET | Refills: 3 | Status: SHIPPED | OUTPATIENT
Start: 2023-01-01 | End: 2023-10-10

## 2023-01-01 RX ORDER — TIOTROPIUM BROMIDE 18 UG/1
CAPSULE ORAL; RESPIRATORY (INHALATION)
Qty: 90 CAPSULE | Refills: 3 | Status: SHIPPED | OUTPATIENT
Start: 2023-01-01 | End: 2023-10-10

## 2023-01-01 RX ORDER — TIZANIDINE HYDROCHLORIDE 2 MG/1
2 CAPSULE, GELATIN COATED ORAL 2 TIMES DAILY PRN
Qty: 60 CAPSULE | Refills: 2 | Status: SHIPPED | OUTPATIENT
Start: 2023-01-01 | End: 2023-01-01

## 2023-01-01 RX ORDER — PREDNISONE 5 MG/1
TABLET ORAL
Qty: 90 TABLET | Refills: 1 | Status: SHIPPED | OUTPATIENT
Start: 2023-01-01 | End: 2023-10-10

## 2023-01-01 RX ORDER — MONTELUKAST SODIUM 10 MG/1
10 TABLET ORAL
Qty: 90 TABLET | Refills: 3 | Status: SHIPPED | OUTPATIENT
Start: 2023-01-01 | End: 2023-10-10

## 2023-01-01 RX ORDER — METAXALONE 800 MG/1
800 TABLET ORAL 3 TIMES DAILY PRN
Qty: 60 TABLET | Refills: 1 | Status: SHIPPED | OUTPATIENT
Start: 2023-01-01 | End: 2023-01-01 | Stop reason: SDUPTHER

## 2023-01-01 RX ORDER — METAXALONE 800 MG/1
800 TABLET ORAL 3 TIMES DAILY PRN
Qty: 60 TABLET | Refills: 1 | Status: SHIPPED | OUTPATIENT
Start: 2023-01-01 | End: 2023-01-01

## 2023-01-03 NOTE — TELEPHONE ENCOUNTER
Received request via: Pharmacy    Was the patient seen in the last year in this department? Yes  5/10/22  Does the patient have an active prescription (recently filled or refills available) for medication(s) requested? No    Does the patient have jail Plus and need 100 day supply (blood pressure, diabetes and cholesterol meds only)? Medication is not for cholesterol, blood pressure or diabetes

## 2023-03-20 NOTE — TELEPHONE ENCOUNTER
1. Caller Name: Deb                        Call Back Number: [phone] (home)        How would the patient prefer to be contacted with a response: Phone call OK to leave a detailed message    Pt is requesting to be re referred for home health.  I scheduled her an appt for this but she is insisting that all they need from  you is a referral.

## 2023-03-29 NOTE — TELEPHONE ENCOUNTER
1. Caller Name: Jason                        Call Back Number: [phone]      How would the patient prefer to be contacted with a response:     [facility] is requesting an order for eval hospice. Will need face sheet

## 2023-03-30 NOTE — TELEPHONE ENCOUNTER
1. Caller Name:   Deb                      Call Back Number: [phone] (home)           How would the patient prefer to be contacted with a response:     cyclobenzaprine (FLEXERIL)   Pt stated she forgot to mention during her appt today that she has been dealing with muscle spasm in her back. Is there another medication she can take. Does not like the way cyclobenzaprine (FLEXERIL)   Makes her feel.

## 2023-03-30 NOTE — PROGRESS NOTES
Muscle spasm tylenol lower back right kidney desparate.  Minutes discussing end-of-life care exacerbation of COPD anxiety associated with chronic breathlessness possible use of morphine sulfate possibly ending hospice care    Telephone Appointment Visit    This telephone visit was initiated by the patient and they verbally consented.    Reason for Call:  Symptom Follow-up    HPI:    Deb is on the phone today we have discussed extensively that she has problems with COPD which is severe.  Patient is interested in possibly being on morphine sulfate as offered by palliative care or possibly hospice.  We have discussed that this may be a reasonable thing to do.  Patient additionally is having problems with musculoskeletal spasm, additionally is having problems with anxiety associated her chronic breathlessness.    Labs / Images Reviewed:   None    Assessment and Plan:     1. Spasm    2. Chronic obstructive pulmonary disease with acute exacerbation (HCC)    3. SOB (shortness of breath)    4. Edema, unspecified type      Follow-up: As needed    Total Time Spent (mins): 30 minutes    Andi Mccullough D.O.

## 2023-03-30 NOTE — TELEPHONE ENCOUNTER
Lesly:  Patient to discontinue Flexeril, try Skelaxin, new prescription sent to patient's pharmacy.   Regards, nAdi Mccullough, DO     -1, 0, 1

## 2023-03-31 NOTE — TELEPHONE ENCOUNTER
Lesly:  Please call Elidia Boydelaxin is not covered by her pharmacy, they have recommended tizanidine.   Regards, Andi Mccullough, DO

## 2023-04-01 NOTE — TELEPHONE ENCOUNTER
After hours phone call  Call on 3/31/2023 at 7:24 PM from Deb Vega [phone] (home)  who reports PCP: Andi Mccullough D.O..  Pt reports: unable to fill skelaxin due to cost and not covered by insurance.  [facility] contacted her and told her she could get 10 tabs for under $20 with a discount card.  She is requesting a script for this.  Review of chart shows insurance prefers tizanidine.  This was sent but to the incorrect pharmacy.  Patient wants meds to [facility] tonight before the close at 8:00 pm.  Discussed I will send scripts for both for her to try.  Advised not to take together and to separate by 6-8 hrs but that she can combine either with tylenol which she is also taking.  She relays that she is very sensitive to medication, got very sedated with flexeril.  Discussed that she can try half of either of these medications.  Discussed both can cause sedation.    Plan: 10 tabs of skelaxin and 30 tabs tizanidine sent to [facility] at 7:22 pm  Nithya Head M.D.

## 2023-04-11 NOTE — TELEPHONE ENCOUNTER
Received request via: Pharmacy    Was the patient seen in the last year in this department? Yes  3/30/23  Does the patient have an active prescription (recently filled or refills available) for medication(s) requested? No    Does the patient have [facility] Plus and need 100 day supply (blood pressure, diabetes and cholesterol meds only)? Medication is not for cholesterol, blood pressure or diabetes

## 2023-04-13 NOTE — TELEPHONE ENCOUNTER
1. Caller Name: Deb                         Call Back Number: [phone] (home)        How would the patient prefer to be contacted with a response: Phone call OK to leave a detailed message    [facility].  I am not sure how to put that referral in.

## 2023-07-20 NOTE — TELEPHONE ENCOUNTER
Received request via: Pharmacy    Was the patient seen in the last year in this department? Yes  LOV 03/30/2023  Does the patient have an active prescription (recently filled or refills available) for medication(s) requested? No    Does the patient have senior living Plus and need 100 day supply (blood pressure, diabetes and cholesterol meds only)? Patient does not have SCP

## 2023-07-26 NOTE — TELEPHONE ENCOUNTER
Cover My Meds states that PA is not required.    ? Information regarding your request  Prior Authorization Not Required

## 2023-09-05 ENCOUNTER — TELEPHONE (OUTPATIENT)
Dept: HEALTH INFORMATION MANAGEMENT | Facility: OTHER | Age: 78
End: 2023-09-05
Payer: MEDICARE
